# Patient Record
Sex: MALE | Race: WHITE | NOT HISPANIC OR LATINO | Employment: UNEMPLOYED | URBAN - METROPOLITAN AREA
[De-identification: names, ages, dates, MRNs, and addresses within clinical notes are randomized per-mention and may not be internally consistent; named-entity substitution may affect disease eponyms.]

---

## 2021-07-01 ENCOUNTER — RECORDS - HEALTHEAST (OUTPATIENT)
Dept: SCHEDULING | Facility: CLINIC | Age: 41
End: 2021-07-01

## 2021-07-01 ENCOUNTER — TELEPHONE (OUTPATIENT)
Dept: NURSING | Facility: CLINIC | Age: 41
End: 2021-07-01

## 2021-07-01 NOTE — TELEPHONE ENCOUNTER
[place] from patient group home calling to schedule covid vaccine appointments. Patient has not had vaccine. Transferred to scheduling line.   Louise Mosley RN   07/01/21 11:57 AM  [facility] Nurse Advisor

## 2021-07-04 NOTE — TELEPHONE ENCOUNTER
Telephone Encounter by Louise Mosley RN at 7/1/2021 11:54 AM     Author: Louise Mosley RN Service: -- Author Type: Registered Nurse    Filed: 7/1/2021 12:00 PM Encounter Date: 7/1/2021 Status: Signed    : Louise Mosley RN (Registered Nurse)       Error

## 2021-07-06 ENCOUNTER — OFFICE VISIT (OUTPATIENT)
Dept: FAMILY MEDICINE | Facility: CLINIC | Age: 41
End: 2021-07-06
Payer: MEDICAID

## 2021-07-06 ENCOUNTER — IMMUNIZATION (OUTPATIENT)
Dept: NURSING | Facility: CLINIC | Age: 41
End: 2021-07-06
Payer: MEDICAID

## 2021-07-06 VITALS
TEMPERATURE: 96.4 F | OXYGEN SATURATION: 98 % | HEIGHT: 72 IN | WEIGHT: 218.8 LBS | HEART RATE: 101 BPM | BODY MASS INDEX: 29.64 KG/M2 | DIASTOLIC BLOOD PRESSURE: 80 MMHG | RESPIRATION RATE: 22 BRPM | SYSTOLIC BLOOD PRESSURE: 132 MMHG

## 2021-07-06 DIAGNOSIS — E55.9 VITAMIN D DEFICIENCY: ICD-10-CM

## 2021-07-06 DIAGNOSIS — Z13.220 SCREENING FOR HYPERLIPIDEMIA: ICD-10-CM

## 2021-07-06 DIAGNOSIS — J45.909 ASTHMA, UNSPECIFIED ASTHMA SEVERITY, UNSPECIFIED WHETHER COMPLICATED, UNSPECIFIED WHETHER PERSISTENT: ICD-10-CM

## 2021-07-06 DIAGNOSIS — Z91.030 ALLERGY TO HONEY BEE VENOM: ICD-10-CM

## 2021-07-06 DIAGNOSIS — F29 PSYCHOSIS, UNSPECIFIED PSYCHOSIS TYPE (H): ICD-10-CM

## 2021-07-06 DIAGNOSIS — Z11.4 SCREENING FOR HIV (HUMAN IMMUNODEFICIENCY VIRUS): ICD-10-CM

## 2021-07-06 DIAGNOSIS — F60.2 ANTISOCIAL PERSONALITY DISORDER (H): ICD-10-CM

## 2021-07-06 DIAGNOSIS — B18.2 CHRONIC HEPATITIS C WITHOUT HEPATIC COMA (H): Primary | ICD-10-CM

## 2021-07-06 LAB
CHOLEST SERPL-MCNC: 185 MG/DL
HDLC SERPL-MCNC: 52 MG/DL
LDLC SERPL CALC-MCNC: 113 MG/DL
NONHDLC SERPL-MCNC: 133 MG/DL
TRIGL SERPL-MCNC: 99 MG/DL

## 2021-07-06 PROCEDURE — 99204 OFFICE O/P NEW MOD 45 MIN: CPT | Performed by: NURSE PRACTITIONER

## 2021-07-06 PROCEDURE — 80061 LIPID PANEL: CPT | Performed by: NURSE PRACTITIONER

## 2021-07-06 PROCEDURE — 36415 COLL VENOUS BLD VENIPUNCTURE: CPT | Performed by: NURSE PRACTITIONER

## 2021-07-06 PROCEDURE — 0001A PR COVID VAC PFIZER DIL RECON 30 MCG/0.3 ML IM: CPT

## 2021-07-06 PROCEDURE — 91300 PR COVID VAC PFIZER DIL RECON 30 MCG/0.3 ML IM: CPT

## 2021-07-06 PROCEDURE — 87522 HEPATITIS C REVRS TRNSCRPJ: CPT | Performed by: NURSE PRACTITIONER

## 2021-07-06 PROCEDURE — 86803 HEPATITIS C AB TEST: CPT | Performed by: NURSE PRACTITIONER

## 2021-07-06 PROCEDURE — 87389 HIV-1 AG W/HIV-1&-2 AB AG IA: CPT | Performed by: NURSE PRACTITIONER

## 2021-07-06 RX ORDER — OLANZAPINE 20 MG/1
20 TABLET ORAL DAILY
Status: ON HOLD | COMMUNITY
Start: 2021-06-28 | End: 2021-12-21

## 2021-07-06 RX ORDER — VITAMIN B COMPLEX
1 TABLET ORAL EVERY EVENING
Status: ON HOLD | COMMUNITY
Start: 2021-06-28 | End: 2021-12-21

## 2021-07-06 RX ORDER — EPINEPHRINE 0.3 MG/.3ML
0.3 INJECTION SUBCUTANEOUS PRN
Qty: 1 EACH | Refills: 1 | Status: SHIPPED | OUTPATIENT
Start: 2021-07-06

## 2021-07-06 SDOH — HEALTH STABILITY: MENTAL HEALTH: HOW OFTEN DO YOU HAVE A DRINK CONTAINING ALCOHOL?: NEVER

## 2021-07-06 ASSESSMENT — PAIN SCALES - GENERAL: PAINLEVEL: NO PAIN (0)

## 2021-07-06 ASSESSMENT — MIFFLIN-ST. JEOR: SCORE: 1950.6

## 2021-07-06 ASSESSMENT — PATIENT HEALTH QUESTIONNAIRE - PHQ9: SUM OF ALL RESPONSES TO PHQ QUESTIONS 1-9: 0

## 2021-07-06 NOTE — LETTER
July 7, 2021      Anderson Perez  [address] [place] [address]        Dear Orhermilo,    Your HIV screen was negative and your LDL cholesterol is at goal. Feel free to contact me with any questions or concerns.  Thank you for allowing me to participate in your care.         Chelsi Taylor APRN, CNP       Resulted Orders   HIV Antigen Antibody Combo   Result Value Ref Range    HIV Antigen Antibody Combo Nonreactive NR^Nonreactive          Comment:      HIV-1 p24 Ag & HIV-1/HIV-2 Ab Not Detected   Lipid panel reflex to direct LDL Non-fasting   Result Value Ref Range    Cholesterol 185 <200 mg/dL    Triglycerides 99 <150 mg/dL    HDL Cholesterol 52 >39 mg/dL    LDL Cholesterol Calculated 113 (H) <100 mg/dL      Comment:      Above desirable:  100-129 mg/dl  Borderline High:  130-159 mg/dL  High:             160-189 mg/dL  Very high:       >189 mg/dl      Non HDL Cholesterol 133 (H) <130 mg/dL      Comment:      Above Desirable:  130-159 mg/dl  Borderline high:  160-189 mg/dl  High:             190-219 mg/dl  Very high:       >219 mg/dl

## 2021-07-06 NOTE — PATIENT INSTRUCTIONS
At [facility], we strive to deliver an exceptional experience to you, every time we see you. If you receive a survey, please complete it as we do value your feedback.  If you have MyChart, you can expect to receive results automatically within 24 hours of their completion.  Your provider will send a note interpreting your results as well.   If you do not have MyChart, you should receive your results in about a week by mail.    Your care team:                            Family Medicine Internal Medicine   MD Jamie Ontiveros MD Shantel Branch-Fleming, MD Srinivasa Vaka, MD Katya Belousova, PAMARY Trimble, APRN CNP    Leon Oconnor, MD Pediatrics   Yon Ying, PAMARY Orellana, CNP MD Tabby Soto APRN CNP   MD Ethel Shaikh MD Deborah Mielke, MD Almaz Taylor, APRN [facility]      Clinic hours: Monday - Thursday 7 am-6 pm; Fridays 7 am-5 pm.   Urgent care: Monday - Friday 10 am- 8 pm; Saturday and Sunday 9 am-5 pm.    Clinic: [phone]       [facility]: Monday - Thursday 8 am - 7 pm; Friday 8 am - 6 pm  [facility]: [phone]     Use [email or web address] for 24/7 diagnosis and treatment of dozens of conditions.

## 2021-07-06 NOTE — LETTER
July 8, 2021      Anderson Perez  [address] [place] [address]        Dear ,    We are writing to inform you of your test results.    Your Hepatitis C screen was falsely positive and further testing showed that you do not have Hepatitis C. Feel free to contact me with any questions or concerns.  Thank you for allowing me to participate in your care.     Resulted Orders   HIV Antigen Antibody Combo   Result Value Ref Range    HIV Antigen Antibody Combo Nonreactive NR^Nonreactive          Comment:      HIV-1 p24 Ag & HIV-1/HIV-2 Ab Not Detected   Hepatitis C Screen Reflex to HCV RNA Quant and Genotype   Result Value Ref Range    Hepatitis C Antibody Reactive (A) NR^Nonreactive      Comment:      A reactive result indicates one of the following 1) current HCV infection 2)   past HCV infection that has resolved or 3) false positivity. The CDC   recommends that a reactive result should be followed by Nucleic acid testing   for HCV RNA.   If HCV RNA is detected, that indicates current HCV infection. If HCV RNA is   not detected, that indicates either past, resolved HCV infection, or false HCV   antibody positivity.  Assay performance characteristics have not been established for newborns,   infants, and children     Lipid panel reflex to direct LDL Non-fasting   Result Value Ref Range    Cholesterol 185 <200 mg/dL    Triglycerides 99 <150 mg/dL    HDL Cholesterol 52 >39 mg/dL    LDL Cholesterol Calculated 113 (H) <100 mg/dL      Comment:      Above desirable:  100-129 mg/dl  Borderline High:  130-159 mg/dL  High:             160-189 mg/dL  Very high:       >189 mg/dl      Non HDL Cholesterol 133 (H) <130 mg/dL      Comment:      Above Desirable:  130-159 mg/dl  Borderline high:  160-189 mg/dl  High:             190-219 mg/dl  Very high:       >219 mg/dl     Hepatitis C RNA Quantitative   Result Value Ref Range    HCV RNA Quant IU/ml HCV RNA Not Detected HCVND^HCV RNA Not Detected [IU]/mL       Comment:      The ALCIRA AmpliPrep/ALCIRA TaqMan HCV Test is an FDA-approved in vitro nucleic   acid amplification test for the quantitation of HCV RNA in human plasma (ETDA   plasma) or serum using the ALCIRA AmpliPrep Instrument for automated viral   nucleic acid extraction and the ALCIRA TaqMan Analyzer or ALCIRA TaqMan for   automated Real Time PCR amplification and detection of the viral nucleic acid   target.  Titer results are reported in International Units/mL (IU/mL) using the 1st WHO   International standard for HCV for Nucleic Acid Amplification based assays.      Log of HCV RNA Qt Not Calculated <1.2 Log IU/mL       If you have any questions or concerns, please call the clinic at the number listed above.       Sincerely,      KEHINDE Wheeler CNP

## 2021-07-06 NOTE — PROGRESS NOTES
Assessment & Plan     Chronic hepatitis C without hepatic coma (H)  Checking Hep C- patient reports that he completed treatment for Hep C in 2018.  - Hepatitis C Screen Reflex to HCV RNA Quant and Genotype    Antisocial personality disorder (H)  Referring to collaborative care for management until he is stable and/or he has relocated to the [facility].  - MENTAL HEALTH REFERRAL  - Adult; Psychiatry; Psychiatry; [facility]: Collaborative [facility] Psych[facility]/Bridge to Long-Term Psychiatry as indicated ([phone]); Yes; Recent hospitalization or Emergency Department Visit; Yes; We will contact you t...    Asthma, unspecified asthma severity, unspecified whether complicated, unspecified whether persistent  Patient denies history of asthma, states he's never been diagnosed with asthma, thinks he had Albuterol when he had URI symptoms. He declined to complete ACT today    Psychosis, unspecified psychosis type (H)  Patient is oriented and appropriate today, taking his Olanzapine, referring to collaborative care Psychiatry for stabilization/management.  - OLANZapine (ZYPREXA) 20 MG tablet  - MENTAL HEALTH REFERRAL  - Adult; Psychiatry; Psychiatry; G: Collaborative [facility] Psychiatry Service/Bridge to Long-Term Psychiatry as indicated ([phone]); Yes; Recent hospitalization or Emergency Department Visit; Yes; We will contact you t...    Allergy to honey bee venom  Needs epipen.  - EPINEPHrine (ANY BX GENERIC EQUIV) 0.3 MG/0.3ML injection 2-pack  Dispense: 1 each; Refill: 1    Vitamin D deficiency  Vit d level 4/16/21 was 22.4, replacing Vit D and will recheck level again in 2 months  - Vitamin D3 (CHOLECALCIFEROL) 25 mcg (1000 units) tablet    Screening for hyperlipidemia    - Lipid panel reflex to direct LDL Non-fasting    Screening for HIV (human immunodeficiency virus)    - HIV Antigen Antibody Combo           Tobacco Cessation:   reports that he has been smoking cigarettes and cigars. He has never used  "smokeless tobacco.  Tobacco Cessation Action Plan: Information offered: Patient not interested at this time    BMI:   Estimated body mass index is 29.41 kg/m  as calculated from the following:    Height as of this encounter: 1.837 m (6' 0.32\").    Weight as of this encounter: 99.2 kg (218 lb 12.8 oz).   Weight management plan: Discussed healthy diet and exercise guidelines    Work on weight loss  Regular exercise  See Patient Instructions    Return in about 3 months (around 10/6/2021), or if symptoms worsen or fail to improve, for Follow up, Routine preventive.    KEHINDE Carrillo CNP  [facility] 40 year [facility] who presents for the following health issues  accompanied by his Staff :    HPI   Patient is currently living  [facility] and is wanting to move back to [facility] but  Needs to establish care with a provider in the interim.    New Patient/Transfer of Care  Depression Followup    How are you doing with your depression since your last visit? No change    Are you having other symptoms that might be associated with depression? No    Have you had a significant life event?  No     Are you feeling anxious or having panic attacks?   No    Do you have any concerns with your use of alcohol or other drugs? No    Social History     Tobacco Use     Smoking status: Current Every Day Smoker     Types: Cigarettes, Cigars     Smokeless tobacco: Never Used     Tobacco comment: Patient states he's been trying to cut down    Substance Use Topics     Alcohol use: Never     Frequency: Never     Drug use: Never     No flowsheet data found.  No flowsheet data found.  Last PHQ-9 7/6/2021   1.  Little interest or pleasure in doing things 0   2.  Feeling down, depressed, or hopeless 0   3.  Trouble falling or staying asleep, or sleeping too much 0   4.  Feeling tired or having little energy 0   5.  Poor appetite or overeating 0   6.  Feeling bad about " "yourself 0   7.  Trouble concentrating 0   8.  Moving slowly or restless 0   Q9: Thoughts of better off dead/self-harm past 2 weeks 0   PHQ-9 Total Score 0   Difficulty at work, home, or with people Not difficult at all       Suicide Assessment Five-step Evaluation and Treatment (SAFE-T)      How many servings of fruits and vegetables do you eat daily?  4 or more    On average, how many sweetened beverages do you drink each day (Examples: soda, juice, sweet tea, etc.  Do NOT count diet or artificially sweetened beverages)?   1    How many days per week do you exercise enough to make your heart beat faster? 7    How many minutes a day do you exercise enough to make your heart beat faster? 60 or more    How many days per week do you miss taking your medication? 0      Review of Systems   Constitutional, HEENT, cardiovascular, pulmonary, gi and gu systems are negative, except as otherwise noted.      Objective    /80 (BP Location: Left arm, Patient Position: Sitting, Cuff Size: Adult Large)   Pulse 101   Temp 96.4  F (35.8  C) (Tympanic)   Resp 22   Ht 1.837 m (6' 0.32\")   Wt 99.2 kg (218 lb 12.8 oz)   SpO2 98%   BMI 29.41 kg/m    Body mass index is 29.41 kg/m .  Physical Exam   GENERAL: healthy, alert and no distress  EYES: Eyes grossly normal to inspection, PERRL and conjunctivae and sclerae normal  HENT: ear canals and TM's normal, nose and mouth without ulcers or lesions  NECK: no adenopathy, no asymmetry, masses, or scars and thyroid normal to palpation  RESP: lungs clear to auscultation - no rales, rhonchi or wheezes  CV: regular rate and rhythm, normal S1 S2, no S3 or S4, no murmur, click or rub, no peripheral edema and peripheral pulses strong  ABDOMEN: soft, nontender, no hepatosplenomegaly, no masses and bowel sounds normal  MS: no gross musculoskeletal defects noted, no edema  SKIN: no suspicious lesions or rashes  NEURO: Normal strength and tone, mentation intact and speech normal  BACK: no CVA " tenderness, no paralumbar tenderness  PSYCH: mentation appears normal, affect normal/bright  LYMPH: no cervical adenopathy

## 2021-07-07 ENCOUNTER — TELEPHONE (OUTPATIENT)
Dept: FAMILY MEDICINE | Facility: CLINIC | Age: 41
End: 2021-07-07

## 2021-07-07 LAB
HCV AB SERPL QL IA: REACTIVE
HCV RNA SERPL NAA+PROBE-ACNC: NORMAL [IU]/ML
HCV RNA SERPL NAA+PROBE-LOG IU: NORMAL LOG IU/ML
HIV 1+2 AB+HIV1 P24 AG SERPL QL IA: NONREACTIVE

## 2021-07-07 NOTE — TELEPHONE ENCOUNTER
EPINEPHrine (ANY BX GENERIC EQUIV) 0.3 MG/0.3ML injection 2-pack requires prior authorization.    [email or web address]  KEY: [facility]

## 2021-07-09 NOTE — TELEPHONE ENCOUNTER
Central Prior Authorization Team   Phone: [phone]      Prior Authorization Not Needed per Insurance    07/09/2021  Medication: EPINEPHrine (ANY BX GENERIC EQUIV) 0.3 MG/0.3ML injection 2-pack - NOT NEEDED  Insurance Company: [place] Medicaid ([facility]) - Phone [phone] Fax [phone]  Expected CoPay:      Pharmacy Filling the Rx: [id number] - [place], [place] - [address] AT [address]  Pharmacy Notified: Yes  Patient Notified: Yes (**Instructed pharmacy to notify patient when script is ready to /ship.**)    Notified pharmacy of approved NDC's.

## 2021-07-09 NOTE — TELEPHONE ENCOUNTER
Central Prior Authorization Team   Phone: [phone]      PA Initiation    Medication: EPINEPHrine (ANY BX GENERIC EQUIV) 0.3 MG/0.3ML injection 2-pack - INITIATED  Insurance Company: [place] Medicaid ([facility]) - Phone [phone] Fax [phone]  Pharmacy Filling the Rx: [id number] - [place], [place] - [address] AT [facility]  Filling Pharmacy Phone: [phone]  Filling Pharmacy Fax: [phone]  Start Date: 7/9/2021

## 2021-07-27 ENCOUNTER — IMMUNIZATION (OUTPATIENT)
Dept: NURSING | Facility: CLINIC | Age: 41
End: 2021-07-27
Attending: FAMILY MEDICINE
Payer: MEDICAID

## 2021-07-27 PROCEDURE — 0002A PR COVID VAC PFIZER DIL RECON 30 MCG/0.3 ML IM: CPT

## 2021-07-27 PROCEDURE — 91300 PR COVID VAC PFIZER DIL RECON 30 MCG/0.3 ML IM: CPT

## 2021-08-30 ENCOUNTER — TELEPHONE (OUTPATIENT)
Dept: BEHAVIORAL HEALTH | Facility: CLINIC | Age: 41
End: 2021-08-30

## 2021-08-30 ENCOUNTER — HOSPITAL ENCOUNTER (INPATIENT)
Facility: CLINIC | Age: 41
LOS: 119 days | Discharge: PSYCHIATRIC OR SUBSTANCE USE FACILITY | End: 2021-12-28
Attending: EMERGENCY MEDICINE | Admitting: PSYCHIATRY & NEUROLOGY
Payer: MEDICAID

## 2021-08-30 DIAGNOSIS — F29 PSYCHOSIS, UNSPECIFIED PSYCHOSIS TYPE (H): ICD-10-CM

## 2021-08-30 DIAGNOSIS — Z91.148 NONCOMPLIANCE WITH MEDICATION REGIMEN: ICD-10-CM

## 2021-08-30 DIAGNOSIS — Z72.0 TOBACCO USE: ICD-10-CM

## 2021-08-30 DIAGNOSIS — F31.74 BIPOLAR DISORDER, IN FULL REMISSION, MOST RECENT EPISODE MANIC (H): Primary | ICD-10-CM

## 2021-08-30 DIAGNOSIS — F41.9 ANXIETY: ICD-10-CM

## 2021-08-30 LAB
AMPHETAMINES UR QL SCN: NORMAL
BARBITURATES UR QL: NORMAL
BENZODIAZ UR QL: NORMAL
CANNABINOIDS UR QL SCN: NORMAL
COCAINE UR QL: NORMAL
OPIATES UR QL SCN: NORMAL

## 2021-08-30 PROCEDURE — 99285 EMERGENCY DEPT VISIT HI MDM: CPT | Mod: 25 | Performed by: EMERGENCY MEDICINE

## 2021-08-30 PROCEDURE — 80307 DRUG TEST PRSMV CHEM ANLYZR: CPT | Performed by: EMERGENCY MEDICINE

## 2021-08-30 PROCEDURE — 90791 PSYCH DIAGNOSTIC EVALUATION: CPT

## 2021-08-30 PROCEDURE — 99285 EMERGENCY DEPT VISIT HI MDM: CPT | Performed by: EMERGENCY MEDICINE

## 2021-08-30 NOTE — TELEPHONE ENCOUNTER
"S: 2 pm La[facility] Light,  phone number [phone], called with clinical information for 40yr old/M being brought in by ambulance to the [place] ED.     B: IQ level of 62 - \"compromised understanding\".  No medical conditions.   Hx of CD  See telephone note from AP for more clinical as this was previously conveyed to intake from PD.      A: appears psychotic.       R:  to fax hold paperwork, provisional discharge revocation paperwork, and clinical to intake. Pt to be assessed in ER.   "

## 2021-08-30 NOTE — ED TRIAGE NOTES
"Pt is pacing in room and denies any SI/SIB, HI or any other mental health concerns  Pt states everything you ask me I am going to say, \"no.\"  Pt appears very anxious. Pt states, \"I don't take any meds and I don't need any psychiatric help but I want a brain scan and a DNA test to see what race I am.  Pt makes other bizarre statements.  Pt is very disheveled and smells strongly of body odor.  Pt denies any drug use and states he doesn't take any medications    "
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
## 2021-08-30 NOTE — ED NOTES
8/30/2021  Anderson Perez 1980     [facility] Crisis Assessment:    Started at: 1545  Completed at: 1600  Patient was assessed via in-person.    Chief Complaint and History of Presenting Problem:    Anderson is a 40 year old male who presents to [facility] ED with concerns of decompensating mental health, refusal to take his medications, and making bizarre delusional statements. Pt is currently under commitment and had his provisional discharge revoked with [facility]. Pt was at [facility] ER and was refusing to take his injection of Haldol last week and made statements that if anyone makes him take his medications  I will kill . Pt was recently discharged from [facility] ([facility]). Pt was making statements that he has diplomatic immunity and that he was not going to answer questions. He also stated that he has never had a psychiatric evaluation, never taken medications, and that he is [place] which means he does not have  crazy genes  and that only Caucasians have  crazy genes . Pt reported that he has multiple higher level degrees including Archaeology and Therapy degrees. He denied having any hx of SI or SIB. He stated that he is safe and has been since he was forced into the hospital. Pt's appointed legal guardian is Daylin Umanzor with [employer], [phone].    Assessment and intervention involved meeting with pt, [facility] with  Laurel Ching , employing crisis psychotherapy including: Establishing rapport, Active listening and Assess dimensions of crisis. Collateral information includes phone numbers for  and appointed legal guardian, information regarding order revoking provisional discharge, and hx of patient's mental health.     Biopsychosocial Background and Demographic Information    Pt reports that he is [place] and was born near [facility]. He  was recently placed at [facility] group home after his placement at [facility]. He reports that he has a higher education degree in Archeology and Therapy, and denies having any current employment. Pt has an appointed legal guardian, Daylin Umanzor with [employer], [phone]. According to chart review, pt has a child with a previous romantic partner. His mother resides in an assisted living facility.      Mental Health History and Current Symptoms     Pt denies having any hx of mental health concerns and that he has never had a psychiatric evaluation or any inpatient level placements. Per chart review and discussion with , he has history of bipolar disorder, antisocial personality disorder, unspecified psychosis, and possibly intellectual disability. His  reports his IQ is approximately 63. Pt is currently under commitment and had his provisional discharge revoked with [facility]. Pt was recently discharged from [facility] ([facility]). His current medications are reported with the following; Olanzipam 20MG, Inveja tablet 9mg, Halperdol injection.       Mental Health History (prior psychiatric hospitalizations, civil commitments, programmatic care, etc): Pt has hx of commitments, is currently on commitment with a revoked provisional discharge, was recently discharged from [facility]. Family Mental and Chemical Health History: His mother has a diagnosis of schizoaffective disorder      Current and Historic Psychotropic Medications: Olanzipam 20MG, Inveja tablet 9mg, Halperdol injection.   Medication Adherent: No  Recent medication changes? No    Relevant Medical Concerns  Patient identifies concerns with completing ADLs? No  Patient can ambulate independently? Yes  Other medical health concerns? No  History of concussion or TBI? No     Trauma History   Physical, Emotional, or Sexual abuse: No  Loss of a friend or family member to suicide: No  Other identified  traumatic event or significant stressor: No    Substance Use History and Treatments  Pt denied all drug use. Per chart review, pt has hx of marijuana and methamphetamine use. No known hx of alcohol or drug treatment.     Drug screen/BAL/Breathalyzer Completed? Ordered and pending.   Results: Pending     History of Suicidal Ideation, Suicide Attempts, Non-Suicidal Self Injury, and Risk Formulation:   Details of Current Ideation, Attempt(s), Plan(s): Denies  Risk factors: recent legal concerns , poor interpersonal relationships, disengagement with or distrust of medical/mental health care, impulsivity/recklessness, history of or current substance use and recent discharge from inpatient psychiatric care  Protective factors:  community support, culture, spiritual, or Jainism beliefs and identification of future goals.  History and Prior Methods of Self-injury: Denies  History of Suicide Attempts:Denies    ESS-6  1.a. Over the past 2 weeks, have you had thoughts of killing yourself? No   1.b. Have you ever attempted to kill yourself and, if yes, when did this last happen? No  2. Recent or current suicide plan? No  3. Recent or current intent to act on ideation? No  4. Lifetime psychiatric hospitalization? Yes  5. Pattern of excessive substance use? No  6. Current irritability, agitation, or aggression? Yes  ESS-6 Score: 2      Other Risk Areas  Aggressive/assumptive/homicidal risk factors: Yes: Agitation / Hyperactivity, History of Violence and Impaired Self Control   Sexually inappropriate behavior? No      Vulnerability to sexual exploitation? No     Clinical Presentation and Current Symptoms   Pt was in the ED room and was observed to be pacing back and forth in the room. When  introduced self he immediately stated that he did not need a therapist and that he did not have a 'crazy brain' and that his brain was normal. He stated that only  people have 'crazy genes' and that he has never needed a  psychiatric evaluation because of that. He stated that he was not going to answer any questions and that he had diplomatic immunity which meant that he could leave without anyone talking to him. He continued to answer brief questions. Stated that he was out on a walk when he was brought here for no reason and that he was going to have everyone's jobs and degrees taken away. He stated that he was highly intelligent and that he spoke 17 languages, and denied wanting to teach  any words or elaborate on his languages spoken. He increasingly became agitated with , stated 'you're just standing there like a dildo and you know you can't make me take medications. I'm not answering questions. I will take your degree away from you'. At this point the assessment was concluded to prevent further agitation in pt.     Attention, Hyperactivity, and Impulsivity: Yes: Disorganized/Forgetful, Impulsive and Restless   Anxiety:Yes: Generalized Symptoms: Agitation, Excessive worry and Pacing    Behavioral Difficulties: Yes: Agitation, Anger Problems, Disruptive, Impulsivity/Disinhibition and Negativistic/Defiant   Mood Symptoms: Yes: Aggression, Flight of ideas, Impaired concentration, Impaired decision making , Increased irritability/agitation and Piper   Appetite: No   Feeding and Eating: No  Interpersonal Functioning: Yes: Emotional Deregulation, Impaired Impulse Control and Impaired Interpersonal Functioning  Learning Disabilities/Cognitive/Developmental Disorders: Yes: Mood and Self-Regulation   General Cognitive Impairments: Yes: Decision-Making and Judgment/Insight  Sleep: No   Psychosis: Yes: Delusions: Other: bizarre    Trauma: No       Mental Status Exam:  Affect: Labile  Appearance: Disheveled   Attention Span/Concentration: Inattentive    Eye Contact: Intense  Fund of Knowledge: Appropriate   Language /Speech Content: Fluent  Language /Speech Volume: Loud   Language /Speech Rate/Productions: Hyperverbal    Recent Memory: Poor  Remote Memory: Poor  Mood: Irritable   Orientation:   Person: Yes   Place: No  Time of Day: Yes   Date: Yes   Situation (Do they understand why they are here?): No   Psychomotor Behavior: Agitated   Thought Content: Delusions  Thought Form: Flight of Ideas      Current Providers and Contact Information   Legal guardian is Agency: Daylin Umanzor with [employer].. Physical guardianship paperwork has been reviewed. DEC  is scanning; copy is available for review.     Primary Care Provider: Yes, provider details not recalled  Psychiatrist: No  Therapist: No  : Yes, Laurel Ching [phone]  CTSS or ARM: No  ACT Team: No  Other: No    Has an FRED been signed? No ; no guardian present    Clinical Summary and Recommendations    Clinical summary of assessment (include strengths, protective factors, community resources, and assessment of vulnerability/risk): Anderson presents with concerns of decompensating mental health, refusal to take his medications, and making bizarre delusional statements. Pt is currently under commitment and had his provisional discharge revoked with [facility]. Pt was refusing to take his injection of Haldol last week and made statements that if anyone makes him take his medications  I will kill . Pt was recently discharged from [facility] ([facility] Pt was making statements that he has diplomatic immunity and that he was not going to answer questions. He also stated that he has never had a psychiatric evaluation, never taken medications, and that he is [place] which means he does not have  crazy genes  and that only Caucasians have  crazy genes . Based on his decompensating mental health and bizarre behaviors it is recommended that pt be placed inpatient for safekeeping and stabilization. Appointed legal guardian agrees and consents to him being inpatient.       Diagnosis with F Codes:  F25.9 Schizoaffective,  unspecified    Disposition  Attending provider, Dr. Parks consulted and does  agree with recommended disposition which includes Inpatient Mental Health. Patient's legal appointed guardian agrees with recommended level of care.     Details of final disposition include: Inpatient mental health .      If Inpatient, is patient admitted voluntary? Yes, voluntary by appointed legal guardian.   Patient aware of potential for transfer if there is not appropriate placement? Yes  Patient is willing to travel outside of the [facility] for placement? Yes   Central Intake Notified? Yes: Date: 8/30/2021 Time: 1645.      Duration of assessment time: .25 hrs    CPT code(s) utilized: 42387, up to 74 minutes      Rick Mays, MINA, LGSW

## 2021-08-30 NOTE — ED NOTES
Bed: ED14  Expected date: 8/30/21  Expected time: 2:15 PM  Means of arrival:   Comments:  [address]18: 39yo F bipolar, manic, off meds

## 2021-08-30 NOTE — ED PROVIDER NOTES
"  History     Chief Complaint   Patient presents with     Manic Behavior     The history is provided by the patient and medical records.     Anderson Perez is a 40 year old male with a past medical history of antisocial personality disorder, chronic hepatitis C virus infection, methamphetamine abuse, psychosis, and anxiety who presents to the emergency department with a chief complaint of manic behavior. Patient is pacing in room and denies any suicidal ideation, self injurious behavior, homicidal ideation, or any other mental health concerns.  Patient states everything you ask me I am going to say, \"no.\"  Patient appears very anxious. He states, \"I don't take any meds and I don't need any psychiatric help but I want a brain scan and a DNA test to see what race I am.  Patient makes other bizarre statements.  He is very disheveled and smells strongly of body odor.  He denies any drug use and states he doesn't take any medications.     When asked what brought the patient here today, he states that he does not know as he is \"in a group home for absolutely no reason\" and is \"not on any medications.\"  When he was informed that it does appear he has prescribed some psychiatric medications, he is adamant that he does not need them.  Today, he states that he was just \"walking around getting some exercise\" when suddenly he was surrounded by many police officers who brought him here.  He states he was confused because he does not have any warrants out for his arrest.  When the patient was informed that he is in the hospital, he states that he does not need to be here because he \"does not have crazy dreams\" because he is \"not .\"  The patient is demanding a \"brain scan\" to prove this.  He also states that he does not reside in this county, instead being a resident of a county in [place].  The patient then called me a \"fucking bitch.\"    Per DEC , patient has been refusing to take his Haldol injection.  He " "was supposed to have a Haldol injection last Wednesday at his group home but stated \"if anyone makes me I will kill\".  Patient is currently in  [facility].  He was just released from [facility].  He was on provisional discharge of [facility] which is now revoked and he is now under commitment.  Patient believes he has diplomatic immunity, an archaeology degree, a therapist degree, and speaks 17 languages.  He states that he has never had a psych evaluation, never been placed in treatment, and does not take any medications.  He states that he [place] so he \"does not have any crazy genes\".   He denies suicidal thoughts or history of suicidal thoughts.    I have reviewed the Medications, Allergies, Past Medical and Surgical History, and Social History in the Epic system.    No past medical history on file.  No past surgical history on file.  No current facility-administered medications for this encounter.     Current Outpatient Medications   Medication     EPINEPHrine (ANY BX GENERIC EQUIV) 0.3 MG/0.3ML injection 2-pack     OLANZapine (ZYPREXA) 20 MG tablet     Vitamin D3 (CHOLECALCIFEROL) 25 mcg (1000 units) tablet     Allergies   Allergen Reactions     Bee Venom Anaphylaxis     Clindamycin Hives     Morphine Hives     Past medical history, past surgical history, medications, and allergies were reviewed with the patient. Additional pertinent items: None    Social History     Socioeconomic History     Marital status:      Spouse name: Not on file     Number of children: Not on file     Years of education: Not on file     Highest education level: Not on file   Occupational History     Not on file   Tobacco Use     Smoking status: Current Every Day Smoker     Types: Cigarettes, Cigars     Smokeless tobacco: Never Used     Tobacco comment: Patient states he's been trying to cut down    Substance and Sexual Activity     Alcohol use: Never     Drug use: Never     Sexual " activity: Not Currently     Partners: Female   Other Topics Concern     Not on file   Social History Narrative     Not on file     Social Determinants of Health     Financial Resource Strain:      Difficulty of Paying Living Expenses:    Food Insecurity:      Worried About Running Out of Food in the Last Year:      Ran Out of Food in the Last Year:    Transportation Needs:      Lack of Transportation (Medical):      Lack of Transportation (Non-Medical):    Physical Activity:      Days of Exercise per Week:      Minutes of Exercise per Session:    Stress:      Feeling of Stress :    Social Connections:      Frequency of Communication with Friends and Family:      Frequency of Social Gatherings with Friends and Family:      Attends Voodoo Services:      Active Member of Clubs or Organizations:      Attends Club or Organization Meetings:      Marital Status:    Intimate Partner Violence:      Fear of Current or Ex-Partner:      Emotionally Abused:      Physically Abused:      Sexually Abused:      Social history was reviewed with the patient. Additional pertinent items: None    Review of Systems  A complete review of systems was attempted but limited due to Acute psychiatric condition.    Physical Exam   BP: 114/66  Pulse: 104  Temp: 98.1  F (36.7  C)  Resp: 18  SpO2: 98 %      General: Well nourished, well developed, NAD  HEENT: EOMI, anicteric. NCAT  Neck: no jugular venous distension, supple, nl ROM  Cardiac: Tachycardic rate, extremities appear well-perfused  Pulm: Airway patent, nonlabored breathing  Skin: Warm and dry to the touch.  No rash  Extremities: No LE edema, no cyanosis, w/w/p  Neuro: Alert, steady gait  Psych: Disorganized thought content, patient denies suicidal or homicidal ideation    ED Course        Procedures                          Labs Ordered and Resulted from Time of ED Arrival Up to the Time of Departure from the ED - No data to display         No results found for this or any previous  visit (from the past 24 hour(s)).    Labs, vital signs, and imaging studies were reviewed by me.    Medications - No data to display    Assessments & Plan (with Medical Decision Making)   Anderson Perez is a 40 year old male who presents with psychosis and noncompliance with his prescribed medication regimen.  Very disorganized thought content on arrival.  Urine drug screen and COVID-19 testing ordered in anticipation of mental health admission.    Patient was discussed with mental health , who recommends inpatient mental health admission.  He will contact patient's guardian to ensure that they are not agreement with this plan.  Patient will not need a 72-hour hold as he is currently on a provisional discharge, which she has violated.    I have reviewed the nursing notes.    I have reviewed the findings, diagnosis, plan and need for follow up with the patient.    Patient to be admitted to inpatient mental health.    New Prescriptions    No medications on file       Final diagnoses:   Noncompliance with medication regimen   Anxiety   Psychosis, unspecified psychosis type (H)     I, Monica Colvin, am serving as a trained medical scribe to document services personally performed by Kaylan Parks MD based on the provider's statements to me on August 30, 2021.  This document has been checked and approved by the attending provider.    I, Kaylan Parks MD, was physically present and have reviewed and verified the accuracy of this note documented by Monica Colvin, medical scribe.      Kaylan Parks MD  8/30/2021   [facility] EMERGENCY DEPARTMENT     Kaylan Parks MD  08/30/21 8721

## 2021-08-31 ENCOUNTER — TELEPHONE (OUTPATIENT)
Dept: BEHAVIORAL HEALTH | Facility: CLINIC | Age: 41
End: 2021-08-31

## 2021-08-31 PROBLEM — Z91.148 NONCOMPLIANCE WITH MEDICATION REGIMEN: Status: ACTIVE | Noted: 2021-08-31

## 2021-08-31 PROCEDURE — 250N000013 HC RX MED GY IP 250 OP 250 PS 637: Performed by: EMERGENCY MEDICINE

## 2021-08-31 PROCEDURE — 99223 1ST HOSP IP/OBS HIGH 75: CPT | Mod: AI | Performed by: PSYCHIATRY & NEUROLOGY

## 2021-08-31 PROCEDURE — 124N000002 HC R&B MH UMMC

## 2021-08-31 PROCEDURE — 250N000013 HC RX MED GY IP 250 OP 250 PS 637

## 2021-08-31 RX ORDER — OLANZAPINE 10 MG/1
10 TABLET, ORALLY DISINTEGRATING ORAL ONCE
Status: COMPLETED | OUTPATIENT
Start: 2021-08-31 | End: 2021-08-31

## 2021-08-31 RX ORDER — HYDROXYZINE HYDROCHLORIDE 25 MG/1
25 TABLET, FILM COATED ORAL EVERY 4 HOURS PRN
Status: DISCONTINUED | OUTPATIENT
Start: 2021-08-31 | End: 2021-12-20

## 2021-08-31 RX ORDER — MAGNESIUM HYDROXIDE/ALUMINUM HYDROXICE/SIMETHICONE 120; 1200; 1200 MG/30ML; MG/30ML; MG/30ML
30 SUSPENSION ORAL EVERY 4 HOURS PRN
Status: DISCONTINUED | OUTPATIENT
Start: 2021-08-31 | End: 2021-12-28 | Stop reason: HOSPADM

## 2021-08-31 RX ORDER — BENZTROPINE MESYLATE 0.5 MG/1
0.5 TABLET ORAL ONCE
Status: DISCONTINUED | OUTPATIENT
Start: 2021-08-31 | End: 2021-08-31

## 2021-08-31 RX ORDER — VITAMIN B COMPLEX
25 TABLET ORAL DAILY
Status: DISCONTINUED | OUTPATIENT
Start: 2021-08-31 | End: 2021-11-08

## 2021-08-31 RX ORDER — ACETAMINOPHEN 325 MG/1
650 TABLET ORAL EVERY 4 HOURS PRN
Status: DISCONTINUED | OUTPATIENT
Start: 2021-08-31 | End: 2021-08-31 | Stop reason: CLARIF

## 2021-08-31 RX ORDER — OLANZAPINE 20 MG/1
20 TABLET, ORALLY DISINTEGRATING ORAL DAILY
Status: DISCONTINUED | OUTPATIENT
Start: 2021-08-31 | End: 2021-09-01

## 2021-08-31 RX ORDER — POLYETHYLENE GLYCOL 3350 17 G/17G
17 POWDER, FOR SOLUTION ORAL DAILY PRN
Status: DISCONTINUED | OUTPATIENT
Start: 2021-08-31 | End: 2021-12-28 | Stop reason: HOSPADM

## 2021-08-31 RX ORDER — OLANZAPINE 10 MG/1
10 TABLET ORAL 3 TIMES DAILY PRN
Status: DISCONTINUED | OUTPATIENT
Start: 2021-08-31 | End: 2021-09-21

## 2021-08-31 RX ORDER — OLANZAPINE 10 MG/2ML
10 INJECTION, POWDER, FOR SOLUTION INTRAMUSCULAR 3 TIMES DAILY PRN
Status: DISCONTINUED | OUTPATIENT
Start: 2021-08-31 | End: 2021-09-21

## 2021-08-31 RX ADMIN — OLANZAPINE 10 MG: 10 TABLET, ORALLY DISINTEGRATING ORAL at 14:04

## 2021-08-31 RX ADMIN — Medication 25 MCG: at 18:35

## 2021-08-31 RX ADMIN — OLANZAPINE 20 MG: 20 TABLET, ORALLY DISINTEGRATING ORAL at 18:36

## 2021-08-31 RX ADMIN — OLANZAPINE 10 MG: 10 TABLET, ORALLY DISINTEGRATING ORAL at 09:53

## 2021-08-31 ASSESSMENT — ACTIVITIES OF DAILY LIVING (ADL)
HYGIENE/GROOMING: INDEPENDENT
DRESS: INDEPENDENT
LAUNDRY: UNABLE TO COMPLETE
ORAL_HYGIENE: INDEPENDENT

## 2021-08-31 ASSESSMENT — MIFFLIN-ST. JEOR: SCORE: 1779.91

## 2021-08-31 NOTE — PROGRESS NOTES
08/31/21 1459   Patient Belongings   Did you bring any home meds/supplements to the hospital?  No   Patient Belongings locker   Patient Belongings Put in Hospital Secure Location (Security or Locker, etc.) wallet;jewelry;clothing;cash/credit card   Belongings Search Yes   Clothing Search Yes   Second Staff Bal     Locker :- blk pant, blue t'shirt, belt, shoe, wallet, good year tire card, EBT, Drivers License    Sent to Security :- [employer] Debit Visa Card, Discover Red Card, Silver chain, Gold chain, Gold lion pendent     A               Admission:  I am responsible for any personal items that are not sent to the safe or pharmacy.  [facility] is not responsible for loss, theft or damage of any property in my possession.    Signature:  _________________________________ Date: _______  Time: _____                                              Staff Signature:  ____________________________ Date: ________  Time: _____      2nd Staff person, if patient is unable/unwilling to sign:    Signature: ________________________________ Date: ________  Time: _____     Discharge:  [facility] has returned all of my personal belongings:    Signature: _________________________________ Date: ________  Time: _____                                          Staff Signature:  ____________________________ Date: ________  Time: _____

## 2021-08-31 NOTE — PLAN OF CARE
The order for revoking pt's provisional discharge was faxed to the unit and is in the chart along with the 'notice of intent to revoke' letter.    Writer also received pt's guardianship papers and was emailed to [facility] for their review process.

## 2021-08-31 NOTE — ED NOTES
Patient talking loudly about his numerous degrees and telling staff that he is going to take over our jobs. Patient willing to take oral zyprexa with ginger ale. Patient is able to be redirected by staff

## 2021-08-31 NOTE — TELEPHONE ENCOUNTER
R: Paged Jihan at 10:18 am. cierra  11:20 am: called #[phone] asking for Jihan. They said he is with a patient. Author left a message asking for him to call back. cierra Bobo called back at 11:56 am and said he going to see another pt and will then call back. cierra  #22/Jihan accepted for himself              Unit notified at 1:43 pm, er notified at 1:49 pm              Author faxed revocation papers and guardianship papers to #22. Author informed resident.    cierra

## 2021-08-31 NOTE — ED NOTES
"Patient refusing Covid test and vitals. Patient states \"I've had the vaccine and Covid is fake. You're a dumb ass for believing it is real.\" Patient continues to pace in room, but is redirectable at this time.   "

## 2021-08-31 NOTE — PLAN OF CARE
Nursing plan of care   Problem: Behavior Regulation Impairment (Manic or Hypomanic Signs/Symptoms)  Goal: Improved Impulse Control (Manic/Hypomanic Signs/Symptoms)  Outcome: No Change  Pt has been visible in the unit; spent most of the shift pacing in the hallway; pt requested for bacitracin and was offered to assess for concern and noted to have blisters on his anterior bilateral ankle; was cleaned and bacitracin applied; offered wound care consult but declined;  presented with tense affect and irritable mood; guarded and withdrawn to self; poorly interact with peers and staff;  declined writer's request to check in with him; declined to answer  assessment questions; was cooperative in taking his scheduled medications with ginger ale; ate diner; No behavioral outburst noted or reported this shift; will continue to monitor and provide support for successful treatment and stabilization.

## 2021-08-31 NOTE — ED NOTES
Mila, patient's child's mother called to give additional information.  She sounded distressed and worried about him.  Her phone number is [phone].      Mila explained that patient's Psychosis began, last July.  She believes it started from heat stroke.  He came to her house and his temp was at 105 so she gave him baths to get the temp down.  It seemed to come in waves, during that time and he hasn't been the same, ever since.  She explained that she's a medical assistant and she's been watching out for him.  He was started on Abilify, from 3 mg to 5 mg.  Then he was switched to Zyprexa, which seemed to help him more.  The doctor wanted to switch him to Invega shots because the patient did not want to comply with the oral medications.  She was concerned about the switch, since Zyprexa was helping so well.  She said he cheeked it, though.  She described his thoughts that the government was after him and they were going to kill him.  He initially stated he was 100 percent  and then he told others he was a [place] Emperor.  He thought she was in on the government plot against him.  Before he came to the ED, he was walking a lot.  She was worried because he would walk around, all day and into the evenings.  Sometimes, he hasn't recognized her or their 3 year old son.  She was tearful, at the end, because his son misses him and she just wants him to get better.    Mila was encouraged to contact [facility] for additional support.  She wants to continue to be part of his support system.

## 2021-08-31 NOTE — PROGRESS NOTES
Anderson Perez is reviewed for [facility] service. Will follow and meet with patient/family/care team as able or requested.     Jordana Vasquez  [facility], [facility]   [phone]

## 2021-08-31 NOTE — PLAN OF CARE
"Patient [facility] from [facility]. Patient is on a commitment and has a legal guardian. On admission patient refused to answer nursing admission questions. \"I am not answering any questions.\" Patient appeared agitated, raising voice and swearing stating he is \"Not from [facility] and does not need to be here.\" Staff education patient on wearing a mask when out of room as patient had refused Covid testing, patient states, \"I don't need to take the test I've had both vaccines.\" Appeared agitated and difficult to redirect. Pacing the halls. Loud.     Patient had received Zyprexa Zydis 10 mg PO at 14:04 in ED prior to coming to the unit.     Patient states he does not have any SI, SIB, HI or psychotic symptoms, \"No I never had any of that.\"     Nursing profile needs to be completed.     MD alerted of patient on the unit to provide patient admission orders.   "

## 2021-08-31 NOTE — ED NOTES
[facility] ED Mental Health Handoff Note:       Brief HPI:  This is a 40 year old male signed out to me.  See initial ED Provider note for full details of the presentation. Interval history is pertinent for no acute issues overnight.    Home meds reviewed and ordered/administered: No    Medically stable for inpatient mental health admission: Yes.    Evaluated by mental health: Yes. The recommendation is for inpatient mental health treatment. Bed search in process    Safety concerns: At the time I received sign out, there were no safety concerns.    Hold Status:  Active Orders   N/A            Exam:   Patient Vitals for the past 24 hrs:   BP Temp Temp src Pulse Resp SpO2   08/30/21 1539 114/66 98.1  F (36.7  C) Oral 104 18 98 %           ED Course:    Medications - No data to display         There were no significant events during my shift.    Patient was signed out to the oncoming provider, Dr. Grande.      Impression:    ICD-10-CM    1. Noncompliance with medication regimen  Z91.14    2. Anxiety  F41.9    3. Psychosis, unspecified psychosis type (H)  F29        Plan:    1. Awaiting inpatient mental health admission/transfer.      RESULTS:   Results for orders placed or performed during the hospital encounter of 08/30/21 (from the past 24 hour(s))   Urine Drugs of Abuse Screen     Status: Normal    Collection Time: 08/30/21  6:51 PM    Narrative    The following orders were created for panel order Urine Drugs of Abuse Screen.  Procedure                               Abnormality         Status                     ---------                               -----------         ------                     Drug abuse screen 1 urin...[[id number]]  Normal              Final result                 Please view results for these tests on the individual orders.   Drug abuse screen 1 urine (ED)     Status: Normal    Collection Time: 08/30/21  6:51 PM   Result Value Ref Range    Amphetamines Urine Screen Negative Screen  Negative    Barbiturates Urine Screen Negative Screen Negative    Benzodiazepines Urine Screen Negative Screen Negative    Cannabinoids Urine Screen Negative Screen Negative    Cocaine Urine Screen Negative Screen Negative    Opiates Urine Screen Negative Screen Negative             MD Efe Shirley Melissa Ann, MD  08/31/21 0634

## 2021-08-31 NOTE — PLAN OF CARE
Problem: Behavior Regulation Impairment (Manic or Hypomanic Signs/Symptoms)  Goal: Improved Impulse Control (Manic/Hypomanic Signs/Symptoms)  Outcome: No Change   Patient arrived on unit at 1440. Patient has been argumentative and agitated talking loudly stating will say no to everything. Gave patient a Gingerale to drink. Was asked to wear mask when out of room which patient has been doing. Declined to meet with admission nurse. When staff does not speak with patient, patient will walk around unit and does not interact with anyone. Dr. Schaefer was notified patient arrived on unit.

## 2021-09-01 PROCEDURE — 124N000002 HC R&B MH UMMC

## 2021-09-01 PROCEDURE — 250N000013 HC RX MED GY IP 250 OP 250 PS 637

## 2021-09-01 PROCEDURE — 99231 SBSQ HOSP IP/OBS SF/LOW 25: CPT | Mod: GC | Performed by: PSYCHIATRY & NEUROLOGY

## 2021-09-01 RX ORDER — OLANZAPINE 20 MG/1
20 TABLET, ORALLY DISINTEGRATING ORAL
Status: DISCONTINUED | OUTPATIENT
Start: 2021-09-01 | End: 2021-09-07

## 2021-09-01 RX ORDER — PALIPERIDONE 6 MG/1
6 TABLET, EXTENDED RELEASE ORAL EVERY EVENING
Status: ON HOLD | COMMUNITY
End: 2021-12-21

## 2021-09-01 RX ADMIN — Medication 25 MCG: at 17:18

## 2021-09-01 RX ADMIN — OLANZAPINE 20 MG: 20 TABLET, ORALLY DISINTEGRATING ORAL at 17:18

## 2021-09-01 ASSESSMENT — ACTIVITIES OF DAILY LIVING (ADL)
HYGIENE/GROOMING: INDEPENDENT
DRESS: INDEPENDENT
ORAL_HYGIENE: INDEPENDENT
LAUNDRY: UNABLE TO COMPLETE

## 2021-09-01 NOTE — H&P
"    ----------------------------------------------------------------------------------------------------------  [facility]  History and Physical    Anderson Perez MRN# [id number]   Age: 40 year old YOB: 1980   Date of Admission:  8/30/2021   Contacts:   Primary Outpatient Psychiatrist: Unknown current provider  Primary Physician: Chelsi Taylor (pt not able to recall)  Therapist: None noted  : Laurel Ching  Guardian: Daylin Umanzor with [employer], [phone]  Family Members: Mother in assisted care facility     HPI:    Chief Complaint: \"I shouldn't be here, I already did this\"    History of present illness:  History obtained from patient and electronic chart    Anderson Perez is a 40 year old male with a past psychiatric history of bipolar disorder with psychosis vs schizoaffective disorder bipolar type, antisocial personality disorder, and intellectual disability admitted from the  ER on 08/31/2021 due to concern for out of control behaviors, aggression and psychosis in the context of medication refusal despite Cardoza.    Per ED:   \"Anderson Perez is a 40 year old male with a past medical history of antisocial personality disorder, chronic hepatitis C virus infection, methamphetamine abuse, psychosis, and anxiety who presents to the emergency department with a chief complaint of manic behavior. Patient is pacing in room and denies any suicidal ideation, self injurious behavior, homicidal ideation, or any other mental health concerns.  Patient states everything you ask me I am going to say, \"no.\"  Patient appears very anxious. He states, \"I don't take any meds and I don't need any psychiatric help but I want a brain scan and a DNA test to see what race I am.  Patient makes other bizarre statements.  He is very disheveled and smells strongly of body odor.  He denies any drug use and states he doesn't take any medications.      When " "asked what brought the patient here today, he states that he does not know as he is \"in a group home for absolutely no reason\" and is \"not on any medications.\"  When he was informed that it does appear he has prescribed some psychiatric medications, he is adamant that he does not need them.  Today, he states that he was just \"walking around getting some exercise\" when suddenly he was surrounded by many police officers who brought him here.  He states he was confused because he does not have any warrants out for his arrest.  When the patient was informed that he is in the hospital, he states that he does not need to be here because he \"does not have crazy dreams\" because he is \"not .\"  The patient is demanding a \"brain scan\" to prove this.  He also states that he does not reside in this county, instead being a resident of a county in [place].  The patient then called me a \"fucking bitch.\"     Per DEC , patient has been refusing to take his Haldol injection.  He was supposed to have a Haldol injection last Wednesday at his group home but stated \"if anyone makes me I will kill\".  Patient is currently in  [facility] group home.  He was just released from [facility].  He was on provisional discharge of [facility] which is now revoked and he is now under commitment.  Patient believes he has diplomatic immunity, an archaeology degree, a therapist degree, and speaks 17 languages.  He states that he has never had a psych evaluation, never been placed in treatment, and does not take any medications.  He states that he [place] so he \"does not have any crazy genes\".   He denies suicidal thoughts or history of suicidal thoughts.\"     He was medically cleared for admission to inpatient psychiatric unit.    Per Patient:  On evaluation, pt was agitated and pacing in his room and then hallway throughout assessment. Patient made many statements about how \"I'm not supposed to be here, I " "already did this\" and stating that he does not have \"any mental problems\" and that he has \"never needed medications\", stating that providers at [facility] stated he had no mental health problems and never told him to take any medications and that \"the group home is trying to give me drugs and they're not doctors, that's illegal\". Long tangential statements about how COVID-19 isn't real and \"people around the world aren't really dying from it\" as well as other conspiracy theories about how vaccines are \"just water\". States multiple times that he doesn't want to talk any more. States that he refuses to take any medications; following conversation with attending provider, grudgingly agreed to take olanzapine ODT if he was allowed to do so with ginger ale.    Denies any current SI/HI. Refuses to talk about any AH/VH, denying all psychiatric symptoms.  ____________________________________________________________________________  Psychiatric ROS:  Patient states he has never had any psychiatric symptoms and refuses to answer any questions about mental health symptoms.    Medical ROS: A complete medical review of systems was preformed and is negative other than noted in the HPI     Patient's Strengths & Goals:   Refuses to discuss     Psychiatric History:   Prior diagnoses:   Denies any prior mental health history  - Per chart: bipolar disorder with psychosis vs schizoaffective disorder bipolar type, antisocial personality disorder, hx of methamphetamine use, intellectual disability ( estimates IQ to be 63)    Prior Hospitalizations:   Multiple, recent release from [place]    Suicide attempts:   Denies    Self-injurious behavior:   Denies    Violence:   History of resisting arrest and being tazered by officers    ECT/TMS:   Denies    Past medications:   Patient denies any past medications  Per Chart: Olanzapine 20mg daily; unclear if patient was started on Invega injections, or if this was just planned and not possible " to carry out. Possible history of cheeking medications     Substance Use History:   Patient denies any substance use. Per chart, has history of cannabis and methamphetamine use     Social History:   Patient refuses to provide any personal social history     Family History:    Patient refuses to provide any family history     Medical History:   Chronic hepatitis C, possibly post treatment     Surgical History:   No past surgical history on file.  Unknown history of seizures or head trauma.     Allergies:     Allergies   Allergen Reactions     Bee Venom Anaphylaxis     Clindamycin Hives     Morphine Hives      Medications:     Prior to Admission Medications   Prescriptions Last Dose Informant Patient Reported? Taking?   EPINEPHrine (ANY BX GENERIC EQUIV) 0.3 MG/0.3ML injection 2-pack Unknown at Unknown time  No No   Sig: Inject 0.3 mLs (0.3 mg) into the muscle as needed for anaphylaxis (he must be seen in ER if he uses epipen)   OLANZapine (ZYPREXA) 20 MG tablet Unknown at Unknown time  Yes No   Vitamin D3 (CHOLECALCIFEROL) 25 mcg (1000 units) tablet Unknown at Unknown time  Yes No      Facility-Administered Medications: None      Data (Labs/Imaging):     Results for orders placed or performed during the hospital encounter of 08/30/21 (from the past 48 hour(s))   Urine Drugs of Abuse Screen    Narrative    The following orders were created for panel order Urine Drugs of Abuse Screen.  Procedure                               Abnormality         Status                     ---------                               -----------         ------                     Drug abuse screen 1 urin...[[id number]]  Normal              Final result                 Please view results for these tests on the individual orders.   Drug abuse screen 1 urine (ED)   Result Value Ref Range    Amphetamines Urine Screen Negative Screen Negative    Barbiturates Urine Screen Negative Screen Negative    Benzodiazepines Urine Screen Negative Screen  "Negative    Cannabinoids Urine Screen Negative Screen Negative    Cocaine Urine Screen Negative Screen Negative    Opiates Urine Screen Negative Screen Negative        Physical & Psychiatric Examinations:   24 Hour Vital Signs Summary:  Temp: 98.7  F (37.1  C) (Temp  Min: 98.7  F (37.1  C)  Max: 98.7  F (37.1  C))  Resp: 16 (Resp  Min: 16  Max: 17)  SpO2: 97 % (SpO2  Min: 97 %  Max: 100 %)  Pulse: 72 (Pulse  Min: 72  Max: 85)  BP: 131/84  Systolic (24hrs), Av , Min:131 , Max:139   Diastolic (24hrs), Av, Min:81, Max:84    Weight is 182 lbs 4.8 oz  Body mass index is 24.72 kg/m .    See ED assessment note by ED physician on 2021 for physical exam.     Mental Status Examination:  Oriented to: Person/Self, State and city + county  General: Awake and Alert  Appearance: appears stated age, Dressed in hospital scrubs, Hair is buzzed short and Tattoos on chest, neck, and arms  Behavior: minimizing, refuses to participate, guarded, difficult to redirect, evasive and suspicious  Eye Contact: Looking around room mixed with intense eye contact on brief occasions  Psychomotor: restless and pacing room and hallway; no catatonia present  Speech: Intermittently increased volume and rate  Language: Fluent in English with appropriate syntax and vocabulary.  Mood: \"I shouldn't be here\"  Affect: angry and irritable  Thought Process: inconsistent and tangential  Thought Content: No SI/HI/AH/VH and delusions; Delusions about COVID-19 conspiracies, having no mental health history/medications, having \"diplomatic immunity\", and multiple other issues; multiple grandiose statements about intelligence, advanced degrees, and \"suing the hospital\"  Associations: questionable  Insight: Poor due to complete denial of any previous mental health history or any previous reasons for hospitalization/treatment  Judgment: Poor due to inability to recognize symptoms and extremely limited willingness to even discuss previous treatments, let " alone current management  Attention Span: fair, but needs reminders of questions and redirection  Concentration: Not formally assessed  Recent and Remote Memory: Unclear if patient does not remember previous treatments/diagnoses, or if  just a feature of delusions/other motives  Fund of Knowledge: estimated below average (IQ estimate of 63)     Assessment   Diagnostic Impression:  Anderson Perez is a 40 year old male with a past psychiatric history of bipolar disorder with psychosis vs schizoaffective disorder bipolar type, antisocial personality disorder, and intellectual disability admitted from the  ER on 08/31/2021 due to concern for out of control behaviors, aggression and psychosis in the context of medication refusal despite Cardoza. Patient was recently released from [facility] to a group home. Presentation to the ED via ambulance after police contacted and provisional discharge rescinded. Group home noting escalating aggressive behavior, delusional thinking, and medication refusal despite Cardoza. PT refusal to discuss problems and largely denied all past history, medications, or any need for care.    His reported symptoms of delusions, psychosis, and erratic/aggressive behavior are consistent with prior presentations of psychosis.  Optimization of medications to target these symptom clusters in addition to evaluation of adequate outpatient supports will be targets for treatment during this admission.     Given that he currently has out of control behaviors, aggression and psychosis, patient warrants inpatient psychiatric hospitalization to maintain his safety. Disposition pending clinical stabilization, medication optimization and development of an appropriate discharge plan.    Risk for harm is low   Risk factors: maladaptive coping and impulsive  Protective factors: No prior attempts, denies SI today    He was medically cleared for admission to inpatient psychiatric unit. The risks, benefits, alternatives,  and side effects of treatments including no treatment have been discussed and are understood by the patient and other caregivers.    Primary Psychiatric Diagnosis:     Psychosis, Unspecified (Schizoaffective disorder vs Bipolar Affective Disorder with Psychosis)    Antisocial personality disorder    Intellectual disability    Admit to [facility] with Attending Physician Mike Bobo MD and will be treated in the therapeutic milieu with appropriate individual and group therapies.    Medications:   Continued PTA Medications:    Olanzapine 20mg PO  Held PTA Medications:    none  New Medications Started at Admission:    None     Plan   Psychiatric diagnoses and management:  Principal Diagnosis:     Psychosis, Unspecified (Schizoaffective disorder vs Bipolar Affective Disorder with Psychosis)  o Continue olanzapine 20mg ODT PO QDaily  o Will evaluate for starting GRIGGS at later date    Antisocial personality disorder  o NTD    Intellectual disability  o NTD    Additional Planning:    Continue to monitor for and stabilize: aggression and psychosis    Patient will be treated in therapeutic milieu with appropriate individual and group therapies as described.    Scheduled Medications (summary):    OLANZapine zydis  20 mg Oral Daily     Vitamin D3  25 mcg Oral Daily       PRN Medications (summary):  alum & mag hydroxide-simethicone, hydrOXYzine, OLANZapine **OR** OLANZapine, polyethylene glycol    Consults    None    Legal Status:    Committed - Provisional discharge revoked    SIO:    No    Pt has not required locked seclusion or restraints in the past 24 hours to maintain safety, please refer to RN documentation for further details.    Disposition/Anticipated Discharge Date:    TBD, pending clinical stabilization, medication optimization, and formulation of a safe discharge plan.     Safety Assessment:   Behavioral Orders   Procedures     Cheeking Precautions (behavioral units)     Code 1 - Restrict to Unit     Routine  Programming     As clinically indicated     Status 15     Every 15 minutes.     __________________________________________________________________________________  Pertinent Medical diagnoses and management:    Chronic hepatitis C  o CBC, CMP ordered, patient likely to refuse.  __________________________________________________________________________________  Patient was seen and discussed with the Attending Physician, Dr. Bobo.     Mario Schaefer, PGY-1 (Psychiatry)  [facility]      Attestation:  I, Mike Bobo MD, have personally performed an examination of this patient and I have reviewed the resident's documentation.  I have edited the note to reflect all relevant changes.  I have discussed this patient with the house staff on 8/31/2021.  I agree with resident findings and plan in today's resident H&P.  I have reviewed all vitals and laboratory findings.      I certifiy that the inpatient services were ordered in accordance with the Medicare regulations governing the order. This includes certification that hospital inpatient services are reasonable and necessary and in the case of services not specified as inpatient-only under 42 .22(n), that they are appropriately provided as inpatient services in accordance with the 2-midnight benchmark under 42 .3(e).     The reason for inpatient status is Acute Psychosis.    Mike Bobo M.D.,Ph.D.

## 2021-09-01 NOTE — PHARMACY-ADMISSION MEDICATION HISTORY
Admission Medication History Completed by Pharmacy    See [facility] Admission Navigator for allergy information, preferred outpatient pharmacy, prior to admission medications and immunization status.     Medication History Sources:     [facility] ([phone])    Changes made to PTA medication list (reason):    Added: paliperidone tablet, Invega Sustenna injection, calcium    Deleted: None    Changed: added dosing to olanzapine and vitamin D    Additional Information:    Per  and fill history, pt was due to increase paliperidone to 9 mg po daily today.    Pt was prescribed Invega Sustenna injection but has not received it yet.    Per  pt was taking his medications regularly, however there was some concern for possible cheeking of medications.    Prior to Admission medications    Medication Sig Last Dose Taking? Auth Provider   calcium carbonate (OS-RUBEN) 1500 (600 Ca) MG tablet Take 600 mg by mouth every evening 8/29/2021 at 8pm Yes Unknown, Entered By History   OLANZapine (ZYPREXA) 20 MG tablet Take 20 mg by mouth daily  8/30/2021 at AM Yes Reported, Patient   paliperidone ER (INVEGA) 6 MG 24 hr tablet Take 6 mg by mouth every evening 8/29/2021 at 8pm Yes Unknown, Entered By History   Vitamin D3 (CHOLECALCIFEROL) 25 mcg (1000 units) tablet Take 1 tablet by mouth every evening  8/29/2021 at 8pm Yes Reported, Patient   EPINEPHrine (ANY BX GENERIC EQUIV) 0.3 MG/0.3ML injection 2-pack Inject 0.3 mLs (0.3 mg) into the muscle as needed for anaphylaxis (he must be seen in ER if he uses epipen) Unknown at Unknown time  Chelsi Taylor APRN CNP   paliperidone (INVEGA SUSTENNA) 234 MG/1.5ML MUSTAPHA Inject 234 mg into the muscle once  at not started  Unknown, Entered By History       Date completed: 09/01/21    Medication history completed by: Tahmina Rodriguez [facility]

## 2021-09-01 NOTE — PLAN OF CARE
BEHAVIORAL TEAM DISCUSSION    Participants: Dr. Prasad, resident Mario Schaefer, two medical students, RN's Yaya Rushing and Marlene Cornejo, CTC Siobhan Irving  Progress: new admission  Anticipated length of stay: 7-8 days  Continued Stay Criteria/Rationale: pt's PD was revoked  Medical/Physical: see IM consult  Precautions:   Behavioral Orders   Procedures    Cheeking Precautions (behavioral units)    Code 1 - Restrict to Unit    Routine Programming     As clinically indicated    Status 15     Every 15 minutes.     Plan: provide a psychological assessment and manage medications per psychiatry, staff to provide a safe and therapeutic milieu, CTC to coordinate transfer to a secure facility per [facility] .  Rationale for change in precautions or plan: no change  ___  I have reviewed this note. Mariola Prasad MD

## 2021-09-01 NOTE — PLAN OF CARE
Assessment/Intervention/Current Symptoms and Care Coordination    Writer spoke with CHRISTIAN Barragan ([phone]) with [facility]. Laurel [facility] on [facility]. There are 11 high priority patients ahead of him. She will put him on the list for a [facility]. Laurel stated pt did well on Zyprexa. [facility] about one [facility] but quickly declined after discharge. She will fax over the harper order and court papers.          Discharge Plan or Goal  [facility] or [facility]      Barriers to Discharge   Needs stabilization      Referral Status  Pt on the list for [facility]      Legal Status  Pd revoked

## 2021-09-01 NOTE — PROGRESS NOTES
"    ----------------------------------------------------------------------------------------------------------  [facility]  Psychiatric Progress Note  Hospital Day #1    Anderson Perez MRN# [id number]   Age: 40 year old YOB: 1980   Date of Admission: 2021     Subjective   The patient was discussed with the treatment team and chart notes were reviewed.      Identifier: Anderson Perez is a 40 year old male with a past psychiatric history of bipolar disorder with psychosis vs schizoaffective disorder bipolar type, antisocial personality disorder, and intellectual disability admitted from the  ER on 2021 due to concern for out of control behaviors, aggression and psychosis in the context of medication refusal despite Cardoza.    Sleep:  6.5 hours (21 0600)  Prescribed Medications: Taken as prescribed with ginger ale  PRN Psychiatric Medications: alum & mag hydroxide-simethicone, hydrOXYzine, OLANZapine **OR** OLANZapine, polyethylene glycol     None    Overnight Nursing Notes/Staff Report:  Paced the halls for a while. Did not want to talk with nursing staff and declined labs. No behavioral outbursts. Per nursing report, appears to be responding to internal stimuli, talking to self, laughing while pacing.    Patient interview:  The team met briefly with the pt in the hallway.He stated that \"I'm not much for talking.\" Was not interested in engaging in conversation with the team. Did express as he was walking away that he would like his own clothes.      ROS   ROS was negative unless noted above.       Objective   Vitals:  Temp: 97.5  F (36.4  C) (Temp  Min: 97.5  F (36.4  C)  Max: 97.5  F (36.4  C))  Resp: 14 (Resp  Min: 14  Max: 14)    (No data recorded)  Pulse: 72 (Pulse  Min: 72  Max: 72)  BP: 126/74  Systolic (24hrs), Av , Min:126 , Max:126   Diastolic (24hrs), Av, Min:74, Max:74    Mental Status Examination:    Orientation:  Not directly " "assessed  General: Awake, pacing up and down the dalal throughout the team's time on unit  Appearance:  Dressed in hospital scrubs, hair is buzzed short.  Behavior: Guarded, uninterested in talking.   Eye Contact:  Adequate eye contact during brief encounter.  Psychomotor:  Pacing hallway; no catatonia present  Speech:  Difficult to assess given limited interaction  Language: Fluent in English with appropriate syntax and vocabulary.  Mood:  \"I'm not much for talking\"  Affect:  On edge, uninterested in interacting with others  Thought Process:  Difficult to assess given limited interaction  Thought Content: Difficult to assess given limited interaction  Associations:  Difficult to assess given limited interaction  Insight:  Difficult to assess given limited interaction  Judgment:  Difficult to assess given limited interaction  Attention Span: Difficult to assess given limited interaction  Concentration:  Difficult to assess given limited interaction  Recent and Remote Memory:  Difficult to assess given limited interaction  Fund of Knowledge: Difficult to assess given limited interaction     Allergies     Allergies   Allergen Reactions     Bee Venom Anaphylaxis     Clindamycin Hives     Morphine Hives        Labs & Imaging   No results found for this or any previous visit (from the past 24 hour(s)).     Assessment   Diagnostic Impression:  Anderson Perez is a 40 year old male with a past psychiatric history of bipolar disorder with psychosis vs schizoaffective disorder bipolar type, antisocial personality disorder, and intellectual disability admitted from the  ER on 08/31/2021 due to concern for out of control behaviors, aggression and psychosis in the context of medication refusal despite Cardoza. Patient was recently released from [facility] to a group home. Presentation to the ED via ambulance after police contacted and provisional discharge rescinded. Group home noting escalating aggressive behavior, delusional " thinking, and medication refusal despite Cardoza. PT refusal to discuss problems and largely denied all past history, medications, or any need for care.     His reported symptoms of delusions, psychosis, and erratic/aggressive behavior are consistent with prior presentations of psychosis.  Optimization of medications to target these symptom clusters in addition to determining facility placement after discharge will be targets for treatment during this admission.      Given that he currently has out of control behaviors, aggression and psychosis, patient warrants inpatient psychiatric hospitalization to maintain his safety. Disposition pending clinical stabilization, medication optimization and development of an appropriate discharge plan.    Principal Diagnosis:     Psychosis, Unspecified (Schizoaffective disorder vs Bipolar Affective Disorder with Psychosis)    Secondary psychiatric diagnoses of concern this admission:     Intellectual Disability    Antisocial personality disorder    Psychiatric course:  Anderson Perez was admitted to [facility] following revocation of provisional discharge and on a court hold. His PTA olanzapine 20mg PO QDaily was resumed, as pt had been refusing at group home; per conversation with guardian, had planned to start on GRIGGS, but does not think that this was actually started, as patient was refusing medications, which prompted admission in setting of decompensation. Pt was converted to ODT formulation given concerns for cheeking medications; while initially resistant, pt was agreeable with taking ODT olanzapine as long as it was given with ginger ale, which was accommodated.     Anderson Perez continued to meet criteria for inpatient hospitalization medication optimization, inpatient stabilization, and appropriate discharge planning.     Medical course:   Anderson Perez was physically examined by the ED prior to being transferred to the unit and was found to be medically stable  and appropriate for admission.      Plan   Today's Changes:     Elopement precautions and no roommate orders placed  _______________________________________________________________________  Psychiatric diagnoses and management:  Principal Diagnosis:     Psychosis, Unspecified (Schizoaffective disorder vs Bipolar Affective Disorder with Psychosis)  o Continue olanzapine 20mg ODT qDaily  o Will consider GRIGGS at later date following initial stabilization    Secondary Psychiatric Diagnoses:     Intellectual disability  o Per , IQ estimated to be 63  o NTD    Antisocial Personality Disorder  o NTD    Additional Planning:    Continue to monitor for and stabilize: aggression, psychosis and disorganization    Patient will be treated in therapeutic milieu with appropriate individual and group therapies as described.    Scheduled Medications (summary):    OLANZapine zydis  20 mg Oral Daily with supper     Vitamin D3  25 mcg Oral Daily       PRN Medications (summary):  alum & mag hydroxide-simethicone, hydrOXYzine, OLANZapine **OR** OLANZapine, polyethylene glycol    Discontinued Medications (& Rationale):    None    Consults:    None    Legal Status:    Court Hold    [place]     SIO:    No    Pt has not required locked seclusion or restraints in the past 24 hours to maintain safety, please refer to RN documentation for further details.    Disposition:    TBD, pending clinical stabilization, medication optimization, and formulation of a safe discharge plan.    Safety Assessment:   Behavioral Orders   Procedures     Cheeking Precautions (behavioral units)     Code 1 - Restrict to Unit     Elopement precautions     Routine Programming     As clinically indicated     Status 15     Every 15 minutes.      ____________________________________________________________________  Pertinent Medical diagnoses and management:    Chronic Hepatitis C  o CBC, CMP if patient willing  o Continue to  monitor  ____________________________________________________________________  Patient seen and discussed with attending physician, Dr. Nima Prasad MD who is in agreement with my assessment and plan.    Livier Angelo, medical student  MS3    Attestation:   I was present with the medical student who participated in the service and in the documentation of the note. I have verified the history and personally performed the physical exam and medical decision making. I agree with the assessment and plan of care as documented in the note.    Mario Schaefer, PGY-1 (Psychiatry)  [facility]    Attestation:  This patient has been seen and evaluated by me, Mariola Prasad MD.  I have discussed this patient with the psychiatry resident and I agree with the findings and plan in this note.    I have reviewed today's vital signs, medications, labs and imaging. Mariola Prasad MD

## 2021-09-01 NOTE — PLAN OF CARE
"Nursing Assessment:  Anderson \"Antonio\" was  up ad eduard, refused am labs today, they were reordered for tomorrow am \"I will do it then\".  Pt declined to take his Vit D this shift, admin time was changed to 17:00 daily which is the time Antonio agreed to take his daily Zyprexa with Ginger Ale (2 cans will be sent up by dietary on his supper tray)    Antonio paced the unit much of the shift. Pt appeared to be responding to internal stimuli (self talk, laughing to self). Antonio also made unusual comments to writer ie \"I was here back in the early 2000s, this [place] real [place], and they said there was no reason for me to be here\"    Antonio was minimally interactive with writer. Pt denied having any psychiatric symptoms, \"I'm fine, there is nothing wrong\".    "

## 2021-09-01 NOTE — PLAN OF CARE
Problem: General Plan of Care (Inpatient Behavioral)  Goal: Individualization/Patient Specific Goal (IP Behavioral)  Description: The patient and/or their representative will achieve their patient-specific goals related to the plan of care.    The patient-specific goals include:  Flowsheets (Taken 9/1/2021 5626)  Areas of Vulnerability: low IQ, long history of being in the mental health system, stopped meds recently  Patient Strengths:   Utilized support systems   Has vocational interests i.e., hobbies, has case management and DD worker

## 2021-09-01 NOTE — PLAN OF CARE
"  Initial Psychosocial Assessment    I have reviewed the chart, met with the patient, and developed Care Plan. Information for assessment was obtained from: Pt, medical record    Pt's provisional discharge from [facility] was revoked. Paperwork is in the chart. The information below came from chart notes and the  with [facility].      Presenting Problem:   Anderson Perez is a 40 year old male with a past medical history of antisocial personality disorder, chronic hepatitis C virus infection, methamphetamine abuse, psychosis, and anxiety who presents to the emergency department with a chief complaint of manic behavior. Patient is pacing in room and denies any suicidal ideation, self injurious behavior, homicidal ideation, or any other mental health concerns.  Patient states everything you ask me I am going to say, \"no.\"  Patient appears very anxious. He states, \"I don't take any meds and I don't need any psychiatric help but I want a brain scan and a DNA test to see what race I am.  Patient makes other bizarre statements.  He is very disheveled and smells strongly of body odor.  He denies any drug use and states he doesn't take any medications.         History of Mental Health and Chemical Dependency:  Pt has a low IQ (63). He has a guardian.    CD: history of polysubstance abuse.      Significant Life Events   (Illness, Abuse, Trauma, Death):  none    Family:  Raised by mom (assisted living), no siblings, , two children ages 12 and 4 sons,    Living Situation:  Was living in a group home prior to admission    Educational Background:    HS grad    Financial Status:   disability    Legal Issues:    Pt stole flags in [place]. When police came he resisted arrest, was tased and assaulted 3-4 officers. Pt had a Rule 20 and was found incompetent to stand trial.    Ethnic/Cultural Issues:   Low IQ    Spiritual Orientation:    [employer]     Service History:  none    Social Functioning " (organization, interests):  Walking, music,(has a large collection of CD's)    Current Treatment Providers are:    Laurel with [facility]: [phone]  DD worker: Deja Owens with [facility]  [phone]    Social Service Assessment/Plan:   Provide a psychological assessment and manage medications per psychiatry. CTC to meet with patient to discuss discharge plans and continue to assess for services needed. Staff to provide a safe environment and provide a therapeutic milieu.

## 2021-09-01 NOTE — PLAN OF CARE
Pt has not attended OT since admit.  Will continue to encourage participation and completion of  self-assessment as appropriate (Pt declined COVID swab and masking). OT staff will explain the purpose of being involved with treatment plan and provide options to meet current needs and goals.

## 2021-09-02 PROCEDURE — 99232 SBSQ HOSP IP/OBS MODERATE 35: CPT | Mod: GC | Performed by: PSYCHIATRY & NEUROLOGY

## 2021-09-02 PROCEDURE — 250N000013 HC RX MED GY IP 250 OP 250 PS 637

## 2021-09-02 PROCEDURE — 124N000002 HC R&B MH UMMC

## 2021-09-02 RX ADMIN — OLANZAPINE 20 MG: 20 TABLET, ORALLY DISINTEGRATING ORAL at 17:43

## 2021-09-02 RX ADMIN — Medication 25 MCG: at 17:43

## 2021-09-02 ASSESSMENT — ACTIVITIES OF DAILY LIVING (ADL)
DRESS: INDEPENDENT
HYGIENE/GROOMING: INDEPENDENT
ORAL_HYGIENE: INDEPENDENT
LAUNDRY: UNABLE TO COMPLETE

## 2021-09-02 NOTE — PROGRESS NOTES
"    ----------------------------------------------------------------------------------------------------------  [facility]  Psychiatric Progress Note  Hospital Day #2    Anderson Perez MRN# [id number]   Age: 40 year old YOB: 1980   Date of Admission: 2021     Subjective   The patient was discussed with the treatment team and chart notes were reviewed.      Identifier: Anderson Perez is a 40 year old male with a past psychiatric history of bipolar disorder with psychosis vs schizoaffective disorder bipolar type, antisocial personality disorder, and intellectual disability admitted from the  ER on 2021 due to concern for out of control behaviors, aggression and psychosis in the context of medication refusal despite Cardoza.    Sleep:  7 hours (21 0600)  Prescribed Medications: Taken as prescribed with ginger ale  PRN Psychiatric Medications: alum & mag hydroxide-simethicone, hydrOXYzine, OLANZapine **OR** OLANZapine, polyethylene glycol   None    Overnight Nursing Notes/Staff Report:  Continued to pace the halls and remained guarded and tense. Did not want to interact with nursing staff and refused labs and vitals this morning. Did take his olanzapine with ginger ale.     Patient interview:  The team met briefly with the pt in the hallway. He stated that he would like his own clothes back and that \"extra food would be nice.\" Pt did not want to converse with us further.      ROS   ROS was negative unless noted above.       Objective   Vitals:  Temp: 97.5  F (36.4  C) (Temp  Min: 97.5  F (36.4  C)  Max: 97.5  F (36.4  C))  Resp: 14 (Resp  Min: 14  Max: 14)    (No data recorded)  Pulse: 72 (Pulse  Min: 72  Max: 72)  BP: 126/74  Systolic (24hrs), Av , Min:126 , Max:126   Diastolic (24hrs), Av, Min:74, Max:74    Mental Status Examination:    Orientation:  Not directly assessed  General: Pacing up and down the dalal throughout the team's time on unit; " "occasionally muttering to self, laughing, and pointing at wall  Appearance:  Dressed in hospital scrubs, hair is buzzed short.  Behavior: Guarded, uninterested in talking.   Eye Contact:  Adequate eye contact during brief encounter.  Psychomotor:  Pacing hallway; no catatonia present  Speech:  Appropriate volume and speed in the few sentences said  Language: Fluent in English with appropriate syntax and vocabulary.  Mood:  \"I want my clothes\"  Affect:  On edge, uninterested in interacting with others  Thought Process:  Difficult to assess given limited interaction  Thought Content: SI, HI, AH, VH not assessed  Associations:  Difficult to assess given limited interaction  Insight:  Difficult to assess given limited interaction  Judgment:  Difficult to assess given limited interaction  Attention Span: Difficult to assess given limited interaction  Concentration:  Difficult to assess given limited interaction  Recent and Remote Memory:  Difficult to assess given limited interaction  Fund of Knowledge: Difficult to assess given limited interaction     Allergies     Allergies   Allergen Reactions    Bee Venom Anaphylaxis    Clindamycin Hives    Morphine Hives        Labs & Imaging   No results found for this or any previous visit (from the past 24 hour(s)).     Assessment   Diagnostic Impression:  Anderson Perez is a 40 year old male with a past psychiatric history of bipolar disorder with psychosis vs schizoaffective disorder bipolar type, antisocial personality disorder, and intellectual disability admitted from the  ER on 08/31/2021 due to concern for out of control behaviors, aggression and psychosis in the context of medication refusal despite Cardoza. Patient was recently released from [facility] to a group home. Presentation to the ED via ambulance after police contacted and provisional discharge rescinded. Group home noting escalating aggressive behavior, delusional thinking, and medication refusal despite Cardoza. " PT refusal to discuss problems and largely denied all past history, medications, or any need for care.     His reported symptoms of delusions, psychosis, and erratic/aggressive behavior are consistent with prior presentations of psychosis.  Optimization of medications to target these symptom clusters in addition to determining facility placement after discharge will be targets for treatment during this admission.      Given that he currently has out of control behaviors, aggression and psychosis, patient warrants inpatient psychiatric hospitalization to maintain his safety. Disposition pending clinical stabilization, medication optimization and development of an appropriate discharge plan.    Principal Diagnosis:   Psychosis, Unspecified (Schizoaffective disorder vs Bipolar Affective Disorder with Psychosis)    Secondary psychiatric diagnoses of concern this admission:   Intellectual Disability  Antisocial personality disorder    Psychiatric course:  Anderson Perez was admitted to [facility] following revocation of provisional discharge and on a court hold. His PTA olanzapine 20mg PO QDaily was resumed, as pt had been refusing at group home; per conversation with guardian, had planned to start on GRIGGS, but does not think that this was actually started, as patient was refusing medications, which prompted admission in setting of decompensation. Pt was converted to ODT formulation given concerns for cheeking medications; while initially resistant, pt was agreeable with taking ODT olanzapine as long as it was given with ginger ale, which was accommodated.     Anderson Perez continued to meet criteria for inpatient hospitalization medication optimization, inpatient stabilization, and appropriate discharge planning.     Medical course:   Anderson Perez was physically examined by the ED prior to being transferred to the unit and was found to be medically stable and appropriate for admission.      Plan   Today's  Changes:   No changes today  _______________________________________________________________________  Psychiatric diagnoses and management:  Principal Diagnosis:   Psychosis, Unspecified (Schizoaffective disorder vs Bipolar Affective Disorder with Psychosis)  Continue olanzapine 20mg ODT qDaily  Will consider GRIGGS at later date following initial stabilization    Secondary Psychiatric Diagnoses:   Intellectual disability  Per , IQ estimated to be 63  NTD  Antisocial Personality Disorder  NTD    Additional Planning:  Continue to monitor for and stabilize: aggression, psychosis and disorganization  Patient will be treated in therapeutic milieu with appropriate individual and group therapies as described.    Scheduled Medications (summary):   OLANZapine zydis  20 mg Oral Daily with supper    Vitamin D3  25 mcg Oral Daily       PRN Medications (summary):  alum & mag hydroxide-simethicone, hydrOXYzine, OLANZapine **OR** OLANZapine, polyethylene glycol    Discontinued Medications (& Rationale):  None    Consults:  None    Legal Status:  Court Hold  [place]     SIO:  No  Pt has not required locked seclusion or restraints in the past 24 hours to maintain safety, please refer to RN documentation for further details.    Disposition:  TBD, pending clinical stabilization, medication optimization, and formulation of a safe discharge plan.    Safety Assessment:   Behavioral Orders   Procedures    Cheeking Precautions (behavioral units)    Code 1 - Restrict to Unit    Elopement precautions    Routine Programming     As clinically indicated    Status 15     Every 15 minutes.      ____________________________________________________________________  Pertinent Medical diagnoses and management:  Chronic Hepatitis C  CBC, CMP if patient willing  Continue to monitor  ____________________________________________________________________  Patient seen and discussed with attending physician, Dr. Nima Prasad MD who is in agreement  with my assessment and plan.    Livier Angelo, medical student  MS3    Attestation:   I was present with the medical student who participated in the service and in the documentation of the note. I have verified the history and personally performed the physical exam and medical decision making. I agree with the assessment and plan of care as documented in the note.    Mario Schaefer, PGY-1 (Psychiatry)  [facility]    Attestation:  This patient has been seen and evaluated by me, Mariola Prasad MD.  I have discussed this patient with the psychiatry resident and I agree with the findings and plan in this note.    I have reviewed today's vital signs, medications, labs and imaging. Mariola Prasad MD

## 2021-09-02 NOTE — PLAN OF CARE
Assessment/Intervention/Current Symptoms and Care Coordination      Attended team meeting and reviewed chart notes.    Pt spends his time walking the halls. He is taking his medication as prescribed.    Pt declined his lab draws.      Discharge Plan or Goal  Return to [facility] or [facility]- per county       Barriers to Discharge   Pt needs stabilization  Pt needs placement    Referral Status  [facility]  The CCM said she would make a referral to [facility]      Legal Status  Pt's PD was revoked

## 2021-09-02 NOTE — PLAN OF CARE
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

## 2021-09-02 NOTE — PLAN OF CARE
"  Problem: Mood Impairment (Manic or Hypomanic Signs/Symptoms)  Goal: Improved Mood Symptoms (Manic/Hypomanic Signs/Symptoms)  Outcome: No Change  Note: During the morning introduction, patient replied: \"I really don't care\". He declined to have blood draw and vital signs this morning. When approached about it, he did not reply, just kept pacing, like did not hear. Patient is pacing. Dismissing staff interaction and instructions. Avoided eye contact. Ate part of his breakfast meal. He had not made delusional, bizarre, or any other statements at this time.   No scheduled medications in am.   Thoughts with poor insights of his mental illness and current situation. Speech is minimal, refusing to talk with staff. Mood is irritable on approach. Affect is blunted. Unable to assess memory at this time.      "

## 2021-09-02 NOTE — PLAN OF CARE
"Nursing plan of care   Problem: Behavior Regulation Impairment (Manic or Hypomanic Signs/Symptoms)  Goal: Improved Impulse Control (Manic/Hypomanic Signs/Symptoms)  Outcome: No Change  Pt spent most of the shift pacing in the hallway; withdrawn and kept to himself; guarded and tense; affect is blunted; when asked to check in with this writer pt replied, \" I am fine, I don't want to check in with you\"; took scheduled medication with ginger ale; monitored for cheeking; attended no group; was cooperative when asked to change his room to private room; No sign of SI/SIB was noted; will continue to monitor, provide support and assess.       "

## 2021-09-02 NOTE — PLAN OF CARE
Nursing plan of care   Problem: Sleep Disturbance  Goal: Adequate Sleep/Rest  Outcome: Improving   Received pt sleeping in bed with regular unlabored respiration; No PRN was requested or administered; no safety concern was reported; appeared to have slept for 7 hrs; will continue to monitor and assess.

## 2021-09-03 PROCEDURE — 124N000002 HC R&B MH UMMC

## 2021-09-03 PROCEDURE — 250N000013 HC RX MED GY IP 250 OP 250 PS 637

## 2021-09-03 PROCEDURE — 99231 SBSQ HOSP IP/OBS SF/LOW 25: CPT | Mod: GC | Performed by: PSYCHIATRY & NEUROLOGY

## 2021-09-03 RX ADMIN — Medication 25 MCG: at 18:06

## 2021-09-03 RX ADMIN — OLANZAPINE 20 MG: 20 TABLET, ORALLY DISINTEGRATING ORAL at 18:06

## 2021-09-03 ASSESSMENT — ACTIVITIES OF DAILY LIVING (ADL)
HYGIENE/GROOMING: HANDWASHING;INDEPENDENT
DRESS: SCRUBS (BEHAVIORAL HEALTH);INDEPENDENT
ORAL_HYGIENE: INDEPENDENT
LAUNDRY: WITH SUPERVISION

## 2021-09-03 NOTE — PLAN OF CARE
"Pt presents with irritable, tense, somewhat angry affect. He refused am labs and vs; has no scheduled meds. He insulted another RN's physical appearance. Pt states, \"I don't really care,\" when this RN introduced self. Pt also stated, \"I don't have any issues; I don't give a shit about what you have to say. I don't want to talk to anyone.\" He had bkfst, and is pacing in the dalal; keeps to self. See PCS for additional shift asmnt.    1433) Pt continues to pace in the dalal. He is also talking to self; he stopped and pointed to something in the air. Pt stated,\"I'm studying Dori in my head.\" Facial appearance and voice is a bit less tense, vs in am.  "

## 2021-09-03 NOTE — PROGRESS NOTES
"    ----------------------------------------------------------------------------------------------------------  [facility]  Psychiatric Progress Note  Hospital Day #3    Anderson Perez MRN# [id number]   Age: 40 year old YOB: 1980   Date of Admission: 8/30/2021     Subjective   The patient was discussed with the treatment team and chart notes were reviewed.      Identifier: Anderson Perez is a 40 year old male with a past psychiatric history of bipolar disorder with psychosis vs schizoaffective disorder bipolar type, antisocial personality disorder, and intellectual disability admitted from the  ER on 08/31/2021 due to concern for out of control behaviors, aggression and psychosis in the context of medication refusal despite Cardoza.    Sleep:  6.5 hours (09/03/21 0600)  Prescribed Medications: Taken as prescribed with ginger ale  PRN Psychiatric Medications: alum & mag hydroxide-simethicone, hydrOXYzine, OLANZapine **OR** OLANZapine, polyethylene glycol   None    Overnight Nursing Notes/Staff Report:  Continues to pace the halls. Has been talking more, but has been fairly verbally aggressive when talking to staff and has been swearing. Voiced last night to nursing staff that he has a \"genius IQ\" and has both an archeology and psychology degree. Pt did take his olanzapine last night and has had no episodes of physical aggression.     Patient interview:  The team met briefly with the pt in the hallway. When asked if he wanted to talk about anything, he just stated \"no.\"      ROS   ROS was negative unless noted above.       Objective   Vitals:  declined    Mental Status Examination:    Orientation:  Not directly assessed  General: Pacing up and down the dalal throughout the team's time on unit; was having an amicable conversation with unit staff member when we first arrived on the floor  Appearance:  Dressed in hospital scrubs, hair is buzzed short.  Behavior: Guarded, " "uninterested in talking.   Eye Contact:  Adequate eye contact during brief encounter, does make brief eye contact when passing the team in the hallway  Psychomotor:  Pacing hallway; no catatonia present  Speech:  Appropriate volume, but difficult to assess in one-word interaction  Language: Appears to be fluent in English given response to our question  Mood:  \"no\"  Affect:  Irritable, uninterested in interacting with others  Thought Process:  Difficult to assess given limited interaction  Thought Content: SI, HI, AH, VH not assessed  Associations:  Difficult to assess given limited interaction  Insight:  Difficult to assess given limited interaction  Judgment:  Difficult to assess given limited interaction  Attention Span: Difficult to assess given limited interaction  Concentration:  Difficult to assess given limited interaction  Recent and Remote Memory:  Difficult to assess given limited interaction  Fund of Knowledge: Difficult to assess given limited interaction     Allergies     Allergies   Allergen Reactions    Bee Venom Anaphylaxis    Clindamycin Hives    Morphine Hives        Labs & Imaging   No results found for this or any previous visit (from the past 24 hour(s)).     Assessment   Diagnostic Impression:  Anderson Perez is a 40 year old male with a past psychiatric history of bipolar disorder with psychosis vs schizoaffective disorder bipolar type, antisocial personality disorder, and intellectual disability admitted from the  ER on 08/31/2021 due to concern for out of control behaviors, aggression and psychosis in the context of medication refusal despite Cardoza. Patient was recently released from [facility] to a group home. Presentation to the ED via ambulance after police contacted and provisional discharge rescinded. Group home noting escalating aggressive behavior, delusional thinking, and medication refusal despite Cardoza. PT refusal to discuss problems and largely denied all past history, " medications, or any need for care.     His reported symptoms of delusions, psychosis, and erratic/aggressive behavior are consistent with prior presentations of psychosis.  Optimization of medications to target these symptom clusters in addition to determining facility placement after discharge will be targets for treatment during this admission.      Given that he currently has out of control behaviors, aggression and psychosis, patient warrants inpatient psychiatric hospitalization to maintain his safety. Disposition pending clinical stabilization, medication optimization and development of an appropriate discharge plan.    Principal Diagnosis:   Psychosis, Unspecified (Schizoaffective disorder vs Bipolar Affective Disorder with Psychosis)    Secondary psychiatric diagnoses of concern this admission:   Intellectual Disability  Antisocial personality disorder    Psychiatric course:  Anderson Perez was admitted to [facility] following revocation of provisional discharge and on a court hold. His PTA olanzapine 20mg PO QDaily was resumed, as pt had been refusing at group home; per conversation with guardian, had planned to start on GRIGGS, but does not think that this was actually started, as patient was refusing medications, which prompted admission in setting of decompensation. Pt was converted to ODT formulation given concerns for cheeking medications; while initially resistant, pt was agreeable with taking ODT olanzapine as long as it was given with ginger ale, which was accommodated.     Anderson Perez continued to meet criteria for inpatient hospitalization medication optimization, inpatient stabilization, and appropriate discharge planning.     Medical course:   Anderson Perez was physically examined by the ED prior to being transferred to the unit and was found to be medically stable and appropriate for admission.      Plan   Today's Changes:   No changes  today  _______________________________________________________________________  Psychiatric diagnoses and management:  Principal Diagnosis:   Psychosis, Unspecified (Schizoaffective disorder vs Bipolar Affective Disorder with Psychosis)  Continue olanzapine 20mg ODT qDaily  Will consider GRIGGS at later date following initial stabilization    Secondary Psychiatric Diagnoses:   Intellectual disability  Per , IQ estimated to be 63  NTD  Antisocial Personality Disorder  NTD    Additional Planning:  Continue to monitor for and stabilize: aggression, psychosis and disorganization  Patient will be treated in therapeutic milieu with appropriate individual and group therapies as described.    Scheduled Medications (summary):   OLANZapine zydis  20 mg Oral Daily with supper    Vitamin D3  25 mcg Oral Daily       PRN Medications (summary):  alum & mag hydroxide-simethicone, hydrOXYzine, OLANZapine **OR** OLANZapine, polyethylene glycol    Discontinued Medications (& Rationale):  None    Consults:  None    Legal Status:  Court Hold  [place]     SIO:  No  Pt has not required locked seclusion or restraints in the past 24 hours to maintain safety, please refer to RN documentation for further details.    Disposition:  TBD, pending clinical stabilization, medication optimization, and formulation of a safe discharge plan.    Safety Assessment:   Behavioral Orders   Procedures    Cheeking Precautions (behavioral units)    Code 1 - Restrict to Unit    Elopement precautions    Routine Programming     As clinically indicated    Status 15     Every 15 minutes.      ____________________________________________________________________  Pertinent Medical diagnoses and management:  Chronic Hepatitis C  CBC, CMP if patient willing  Continue to monitor  ____________________________________________________________________  Patient seen and discussed with attending physician, Dr. Nima Prasad MD who is in agreement with my assessment and  plan.    Livier Angelo, medical student  MS3    Attestation:   I was present with the medical student who participated in the service and in the documentation of the note. I have verified the history and personally performed the physical exam and medical decision making. I agree with the assessment and plan of care as documented in the note.    Mario Schaefer, PGY-1 (Psychiatry)  [facility]      Attestation:  This patient has been seen and evaluated by me, Mariola Prasad MD.  I have discussed this patient with the psychiatry resident and I agree with the findings and plan in this note.    I have reviewed today's vital signs, medications, labs and imaging. Mariola Prasad MD

## 2021-09-03 NOTE — PLAN OF CARE
Problem: Sleep Disturbance  Goal: Adequate Sleep/Rest  Outcome: Improving   Pt slept 6.5 hours this night,respirations easy.scheduled for am lab work,consider approaching pt again for covid test,defer to team

## 2021-09-03 NOTE — PLAN OF CARE
Assessment/Intervention/Current Symptoms and Care Coordination    Attended team meeting and reviewed chart notes.    Pt was seen talking with staff today.         Discharge Plan or Goal  [facility]        Barriers to Discharge   No bed available      Referral Status  CCM [facility]      Legal Status  PD was revoked

## 2021-09-04 PROCEDURE — 124N000002 HC R&B MH UMMC

## 2021-09-04 PROCEDURE — 250N000013 HC RX MED GY IP 250 OP 250 PS 637

## 2021-09-04 RX ADMIN — Medication 25 MCG: at 18:23

## 2021-09-04 RX ADMIN — OLANZAPINE 20 MG: 20 TABLET, ORALLY DISINTEGRATING ORAL at 18:23

## 2021-09-04 NOTE — PLAN OF CARE
"Nursing plan of care   Problem: Behavior Regulation Impairment (Psychotic Signs/Symptoms)  Goal: Improved Behavioral Control (Psychotic Signs/Symptoms)  Outcome: No Change  Pt continues to be oppositional and uncooperative with routine nursing care; declined VS assessment and to check in with this writer; blunted affect; withdrawn and guarded; ate dinner and snack; appetite is excellent; took scheduled med with ginger ale; denied having any problem, \" I am fine\"; No medication side effect was noted or reported; med compliant; will continue to monitor and and assess.   "

## 2021-09-04 NOTE — PLAN OF CARE
Problem: Sleep Disturbance  Goal: Adequate Sleep/Rest  Outcome: No Change   Pt appeared to sleep comfortably on all checks for 6.5 hours,he remains on elopement precautions for safety

## 2021-09-04 NOTE — PLAN OF CARE
"Nursing plan of care   Problem: Behavior Regulation Impairment (Psychotic Signs/Symptoms)  Goal: Improved Behavioral Control (Psychotic Signs/Symptoms)  Outcome: No Change  Pt was active and visible in the milieu; spent most of the shift pacing in the hallway; kept to himself and guarded; declined to check in with this writer, but stated, \" I am fine\"; noted talking to himself; affect is blunt; disheveled and untidy; offered to take scheduled medication, but stated, \" I will take after dinner\" ; no medication side effect was noted or reported this shift; will continue to monitor and assess.      Soon after dinner, pt agitated and start pacing up and down the hallways with tense and irritable affect; was responding to internal stimuli and over heard talking about diplomatic immunity; asked this writer if I am [place] and stated, \" yes, you are, fucken terrorist\"; will continue to monitor and provide support.          "

## 2021-09-04 NOTE — PLAN OF CARE
"Pt is up and pacing in the dalal. He had bkfst, and asked for extra coffee. His facial appearance is a bit less tense this morning, vs yesterday. He continues to decline 1:1 check in, also vs. Pt states, \"No! I have 12 degrees; I have an  degree, and a psychologist degree. I'm not supposed to be here!\" He continues to swear a bit, as he paces. Reminded pt to ask for assist, as needed. See PCS for additional shift asmnt.    1414) Pt continues to pace in the dalal for most of shift. Talking to self, at times. Not amenable to discussing tx plan or BH asmnt, currently.  "

## 2021-09-05 PROCEDURE — 124N000002 HC R&B MH UMMC

## 2021-09-05 PROCEDURE — 250N000013 HC RX MED GY IP 250 OP 250 PS 637

## 2021-09-05 RX ADMIN — Medication 25 MCG: at 19:04

## 2021-09-05 RX ADMIN — OLANZAPINE 20 MG: 20 TABLET, ORALLY DISINTEGRATING ORAL at 19:05

## 2021-09-05 ASSESSMENT — ACTIVITIES OF DAILY LIVING (ADL)
HYGIENE/GROOMING: INDEPENDENT
DRESS: INDEPENDENT
LAUNDRY: WITH SUPERVISION
ORAL_HYGIENE: INDEPENDENT

## 2021-09-05 NOTE — PROGRESS NOTES
"Pt appear agitated while pacing the dalal when he approached writer in an aggressive manner and asked \" why are you here in this country\" and \"go back to your country\". Pt also yelled \" you're a [place] terrorist\" and continued to repeat other racist remarks throughout the shift. Writer notified pt's nurse about this incident.   "

## 2021-09-05 NOTE — PLAN OF CARE
Plan of care   Problem: Sleep Disturbance  Goal: Adequate Sleep/Rest  Outcome: No Change  At the start of the shift,  Pt was up pacing the hallway; retired to bed around 0100 and remained asleep; No safety concern to report; pt appeared to have slept for 5.25 hrs; will continue to monitor and assess.

## 2021-09-05 NOTE — PLAN OF CARE
Problem: Adult Inpatient Plan of Care  Goal: Plan of Care Review  Outcome: No Change     Pt.was out in the milieu pacing the hallway. He showed great appetite as he ate breakfast and lunch. Affect blunted and irritable. He appears to be responding to internal stimuli. Was observed mumbling to self. He refused to check-in with writer. He did not require any prn medication or seclusion/restraint to manage behavior. Will continue to monitor.

## 2021-09-06 PROCEDURE — 124N000002 HC R&B MH UMMC

## 2021-09-06 PROCEDURE — 250N000013 HC RX MED GY IP 250 OP 250 PS 637

## 2021-09-06 PROCEDURE — 99232 SBSQ HOSP IP/OBS MODERATE 35: CPT | Performed by: PSYCHIATRY & NEUROLOGY

## 2021-09-06 RX ADMIN — Medication 25 MCG: at 18:25

## 2021-09-06 RX ADMIN — OLANZAPINE 20 MG: 20 TABLET, ORALLY DISINTEGRATING ORAL at 18:25

## 2021-09-06 ASSESSMENT — ACTIVITIES OF DAILY LIVING (ADL)
HYGIENE/GROOMING: INDEPENDENT;PROMPTS
DRESS: INDEPENDENT
LAUNDRY: UNABLE TO COMPLETE
ORAL_HYGIENE: INDEPENDENT
ORAL_HYGIENE: INDEPENDENT
LAUNDRY: WITH SUPERVISION
HYGIENE/GROOMING: INDEPENDENT
DRESS: INDEPENDENT

## 2021-09-06 NOTE — PLAN OF CARE
"Nursing Plan of care   Problem: Behavior Regulation Impairment (Psychotic Signs/Symptoms)  Goal: Improved Behavioral Control (Psychotic Signs/Symptoms)  Outcome: No Change   Pt spent most of the shift pacing in the hallway; guarded and tense but no behavioral outburst; was responding to internal stimuli and over hared talking about \"[place]\"; took scheduled medication with ginger ale; declined to check in with him; ate dinner; good appetite; No sign of SI/SIB was noted; will continue to monitor and assess.   "

## 2021-09-06 NOTE — PLAN OF CARE
Problem: Sleep Disturbance  Goal: Adequate Sleep/Rest  Outcome: Improving   Pt appeared to sleep comfortably for 6 hours this night,respirations easy

## 2021-09-06 NOTE — PLAN OF CARE
Antonio sarkar standing in dalal-appears preoccupied and is almost constantly moving lips and talking softly to self as though in conversation with other-apolinarq gesturing with hands or emphatically moving head as though making point in conversation-guarded on approach, quickly leaves interaction with staff-

## 2021-09-06 NOTE — PROGRESS NOTES
"    ----------------------------------------------------------------------------------------------------------  [facility]  Psychiatric Progress Note  Hospital Day #6    Anderson Perez MRN# [id number]   Age: 40 year old YOB: 1980   Date of Admission: 8/30/2021     Subjective   The patient was discussed with the treatment team and chart notes were reviewed.      Identifier: Anderson Perez is a 40 year old male with a past psychiatric history of bipolar disorder with psychosis vs schizoaffective disorder bipolar type, antisocial personality disorder, and intellectual disability admitted from the  ER on 08/31/2021 due to concern for out of control behaviors, aggression and psychosis in the context of medication refusal despite Cardoza.    Sleep:  6 hours (09/06/21 0600)  Prescribed Medications: Taken as prescribed with ginger ale - olanzapine ODT 20mg qpm  PRN Psychiatric Medications: alum & mag hydroxide-simethicone, hydrOXYzine, OLANZapine **OR** OLANZapine, polyethylene glycol     None    9/6/2021 - staff have offered PRNs, but he's consistently refused them.     Overnight Nursing Notes/Staff Report:  - paced halls until around 1:00, then slept (appeared to have slept 5.25 hours). No safety concern reported by night staff.  - 9/6/2021 day staff reported he's been agitated, pacing halls constantly. Refused PRNs.    Patient interview:  I attempted to speak with Antonio as he was pacing halls, after introducing myself. 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 That he doesn't need a doctor here. He said that he shouldn't be here, \"because the [facility] that they said, isn't even a real [facility]. I have  papers. The  is coming to get me. You all are going to be called terrorists.\"        ROS   Was not completed - see above.       Objective   Vitals:  Antonio declined " "vital signs measurement today.    Mental Status Examination:    Awake, alert.  Adequately-groomed in hospital scrubs.  Appeared mildly agitated - wide eyed staring at interviewer; uncooperative.  Mild psychomotor agitation - rapid gait, though no gross hyperactivity, nor invol mvmt/tremor.  Speech of mildly rapid rate, normal volume, articulate.  Thought Process was logical and linear; Thought Content was grossly delusion and with grandiosity.  Mood not described - though appeared irritable; affect was full, mildly agitated-appearing.  No outward evidence of responding to internal stimuli.  Per record, he has intellectual disability. Was not able to assess for gross cognitive deficits re: memory, attention/concentration, language capability, or fund of knowledge.  Insight: likely poor.  Reason and Judgment: impaired.        Allergies     Allergies   Allergen Reactions     Bee Venom Anaphylaxis     Clindamycin Hives     Morphine Hives        Labs & Imaging   No results found for this or any previous visit (from the past 24 hour(s)).     Assessment   Diagnostic Impression:  Per 9/3/2021 Psych Prog Note by ANANT Angelo MS3, MOY Schaefer MD (PGY1), and CRIS Prasad MD (Attending), \"Anderson Perez is a 40 year old male with a past psychiatric history of bipolar disorder with psychosis vs schizoaffective disorder bipolar type, antisocial personality disorder, and intellectual disability admitted from the  ER on 08/31/2021 due to concern for out of control behaviors, aggression and psychosis in the context of medication refusal despite Cardoza. Patient was recently released from [facility] to a group home. Presentation to the ED via ambulance after police contacted and provisional discharge rescinded. Group home noting escalating aggressive behavior, delusional thinking, and medication refusal despite Cardoza. PT refusal to discuss problems and largely denied all past history, medications, or any need for care.      His reported " "symptoms of delusions, psychosis, and erratic/aggressive behavior are consistent with prior presentations of psychosis.  Optimization of medications to target these symptom clusters in addition to determining facility placement after discharge will be targets for treatment during this admission.        Given that he currently has out of control behaviors, aggression and psychosis, patient warrants inpatient psychiatric hospitalization to maintain his safety. Disposition pending clinical stabilization, medication optimization and development of an appropriate discharge plan.\"    Principal Diagnosis:     Psychosis, Unspecified (Schizoaffective disorder vs Bipolar Affective Disorder with Psychosis), manic with psychotic features.    Secondary psychiatric diagnoses of concern this admission:     Intellectual Disability    Antisocial personality disorder    Psychiatric course:  Anderson Perez was admitted to [facility] following revocation of provisional discharge and on a court hold. His PTA olanzapine 20mg PO QDaily was resumed, as pt had been refusing at group home; per conversation with guardian, had planned to start on GRIGGS, but does not think that this was actually started, as patient was refusing medications, which prompted admission in setting of decompensation. Pt was converted to ODT formulation given concerns for cheeking medications; while initially resistant, pt was agreeable with taking ODT olanzapine as long as it was given with ginger ale, which was accommodated.     As of 9/6/2021, Anderson continues to present as mildly agitated, pacing the halls vigorously most to all of the day and sleeping approximately 5.25 hours last night. He's been adherent to scheduled olanzapine ODT 20mg po qpm, though has refused PRNs that have been offered to him by staff.  He declined to speak with me, though verbalized grossly delusional and grandiose beliefs and appeared to be mildly over-active.    Anderson Byrdgabriela " continued to meet criteria for inpatient hospitalization medication optimization, inpatient stabilization, and appropriate discharge planning.     Medical course:   Anderson Perez was physically examined by the ED prior to being transferred to the unit and was found to be medically stable and appropriate for admission.      Plan   Today's Changes:     No changes today - he will be receiving his 6th dose of olanzapine this evening (after likely non-adherence PTA).  _______________________________________________________________________  Psychiatric diagnoses and management:  Principal Diagnosis:     Psychosis, Unspecified (Schizoaffective disorder vs Bipolar Affective Disorder with Psychosis)  o Continue olanzapine 20mg ODT qDaily  o Will consider GRIGGS at later date following initial stabilization    Secondary Psychiatric Diagnoses:     Intellectual disability  o Per , IQ estimated to be 63  o NTD    Antisocial Personality Disorder  o NTD    Additional Planning:    Continue to monitor for and stabilize: aggression, psychosis and disorganization    Patient will be treated in therapeutic milieu with appropriate individual and group therapies as described.    Scheduled Medications (summary):    OLANZapine zydis  20 mg Oral Daily with supper     Vitamin D3  25 mcg Oral Daily       PRN Medications (summary):  alum & mag hydroxide-simethicone, hydrOXYzine, OLANZapine **OR** OLANZapine, polyethylene glycol    Discontinued Medications (& Rationale):    None    Consults:    None    Legal Status:    Court Hold    [place]     SIO:    No    Pt has not required locked seclusion or restraints in the past 24 hours to maintain safety, please refer to RN documentation for further details.    Disposition:    TBD, pending clinical stabilization, medication optimization, and formulation of a safe discharge plan.    Safety Assessment:   Behavioral Orders   Procedures     Cheeking Precautions (behavioral units)     Code 1 -  Restrict to Unit     Elopement precautions     Routine Programming     As clinically indicated     Status 15     Every 15 minutes.      ____________________________________________________________________  Pertinent Medical diagnoses and management:    Chronic Hepatitis C  o CBC, CMP if patient willing  o Continue to monitor  ____________________________________________________________________      Sina Cutler MD  Staff Psychiatrist providing cross-coverage

## 2021-09-06 NOTE — PLAN OF CARE
"  Problem: Behavioral Health Plan of Care  Goal: Plan of Care Review  Outcome: No Change  Flowsheets (Taken 9/6/2021 1102)  Plan of Care Reviewed With: patient  Progress: no change  Patient Agreement with Plan of Care: unable to participate     Problem: Behavior Regulation Impairment (Psychotic Signs/Symptoms)  Goal: Improved Behavioral Control (Psychotic Signs/Symptoms)  9/6/2021 1102 by Natacha Saini RN  Outcome: No Change      Antonio freq standing in dalal-appears preoccupied and is almost constantly moving lips and talking softly to self as though in conversation with other-freq gesturing with hands or emphatically moving head as though making point in conversation-guarded on approach, quickly leaves interaction with staff-freq walking about unit-[address] Anderson becoming increasingly agitated-angrily raising middle finger to no one present-pacing intensely in dalal-refused PRN medication-intense verbal response, \"Check my record! There's a  stamp on there! I'm not even supposed to be here! Check the maps! [facility] doesn't even exist!\"-Antonio did go to his room for brief period-accepted cup of coffee and able to decrease level of agitation-   "

## 2021-09-07 PROCEDURE — 124N000002 HC R&B MH UMMC

## 2021-09-07 PROCEDURE — 250N000013 HC RX MED GY IP 250 OP 250 PS 637

## 2021-09-07 PROCEDURE — 99231 SBSQ HOSP IP/OBS SF/LOW 25: CPT | Mod: GC | Performed by: PSYCHIATRY & NEUROLOGY

## 2021-09-07 RX ADMIN — OLANZAPINE 30 MG: 20 TABLET, ORALLY DISINTEGRATING ORAL at 17:30

## 2021-09-07 RX ADMIN — Medication 25 MCG: at 17:30

## 2021-09-07 ASSESSMENT — ACTIVITIES OF DAILY LIVING (ADL)
HYGIENE/GROOMING: INDEPENDENT
ORAL_HYGIENE: INDEPENDENT
LAUNDRY: WITH SUPERVISION
DRESS: INDEPENDENT

## 2021-09-07 NOTE — PLAN OF CARE
Problem: Behavioral Health Plan of Care  Goal: Plan of Care Review  Outcome: Improving  Flowsheets (Taken 9/7/2021 3713)  Plan of Care Reviewed With: patient  Progress: improving  Patient Agreement with Plan of Care: (Denies illness) disagrees (describe)     Problem: Behavior Regulation Impairment (Manic or Hypomanic Signs/Symptoms)  Goal: Improved Impulse Control (Manic/Hypomanic Signs/Symptoms)  Outcome: Improving     Problem: Behavior Regulation Impairment (Psychotic Signs/Symptoms)  Goal: Improved Behavioral Control (Psychotic Signs/Symptoms)  Outcome: Improving     Anderson spending most of day walking up and down hallway-freq lips moving as though responding in conversation, but fewer audible words as compared with yesterday-less gesturing as compared with yesterday-continues tense affect, but pleasant in brief interactions-avoids interaction with others

## 2021-09-07 NOTE — PLAN OF CARE
BEHAVIORAL TEAM DISCUSSION    Participants: Dr. Bobo, resident Mario Schaefer, RN Natacha Saini, medical students Jorge, OT Sujatha Bill, CTC Siobhan Irving  Progress: minimal  Anticipated length of stay: CCM making referrals  Continued Stay Criteria/Rationale: PD was revoked  Medical/Physical: pt declines Labs or medical check  Precautions:   Behavioral Orders   Procedures     Cheeking Precautions (behavioral units)     Code 1 - Restrict to Unit     Elopement precautions     Routine Programming     As clinically indicated     Status 15     Every 15 minutes.     Plan: provide a psychological assessment and manage medications per psychiatry, staff to provide a safe and therapeutic milieu, CTC to coordinate with CCM for discharge planning.  Rationale for change in precautions or plan: no change

## 2021-09-07 NOTE — PROGRESS NOTES
----------------------------------------------------------------------------------------------------------  [facility]  Psychiatric Progress Note  Hospital Day #7    Anderson Perez MRN# [id number]   Age: 40 year old YOB: 1980   Date of Admission: 8/30/2021     Subjective   The patient was discussed with the treatment team and chart notes were reviewed.      Identifier: Anderson Perez is a 40 year old male with a past psychiatric history of bipolar disorder with psychosis vs schizoaffective disorder bipolar type, antisocial personality disorder, and intellectual disability admitted from the  ER on 08/31/2021 due to concern for out of control behaviors, aggression and psychosis in the context of medication refusal despite Cardoza.    Sleep:  6 hours (09/07/21 0622)  Prescribed Medications: Taken as prescribed with ginger ale  PRN Psychiatric Medications: alum & mag hydroxide-simethicone, hydrOXYzine, OLANZapine **OR** OLANZapine, polyethylene glycol     None    Overnight Nursing Notes/Staff Report:  Anderson spending most of day walking up and down hallway-freq lips moving as though responding in conversation, but fewer audible words as compared with yesterday-less gesturing as compared with yesterday-continues tense affect, but pleasant in brief interactions-avoids interaction with others.    Pt pacing in hallway majority of shift. Pt is appearing to respond to visual stimuli and is frequently making odd gestures and pointing at empty spaced in the room. Pt also appears to be possibly responding to auditory hallucinations.     Patient interview:  Pt was willing to talk to the treatment team while pacing back and forth in the dalal.  The patient makes minimal effort to communicate with the team.  He says his mood is good, and that his medications are ok.  He denies any problems.  Pt endorses getting his meds with ginger ale, which is his preference.       YAMILA DRISCOLL was  "negative unless noted above.       Objective   Vitals:  declined    Mental Status Examination:    Orientation:  Grossly intact  General: Pacing up and down the dalal throughout the team's time on unit; was willing to briefly chat with the treatment team while he was pacing.  Appearance:  Dressed in hospital scrubs, hair is buzzed short.  Behavior: Guarded  Eye Contact:  Adequate eye contact during brief encounter, does make brief eye contact when passing the team in the hallway  Psychomotor:  Pacing hallway; no catatonia present  Speech:  Appropriate volume, and rate  Language: Appears to be fluent in English given response to our question  Mood:  \"okay\"  Affect:  Irritable, uninterested in interacting with others  Thought Process:  linear  Thought Content: continues to express delusions  Associations:  loose  Insight:  poor  Judgment:  poor  Attention Span: limited  Concentration:  limited  Recent and Remote Memory:  Grossly intact  Fund of Knowledge: WNL   Allergies     Allergies   Allergen Reactions     Bee Venom Anaphylaxis     Clindamycin Hives     Morphine Hives        Labs & Imaging   No results found for this or any previous visit (from the past 24 hour(s)).     Assessment   Diagnostic Impression:  Anderson Perez is a 40 year old male with a past psychiatric history of bipolar disorder with psychosis vs schizoaffective disorder bipolar type, antisocial personality disorder, and intellectual disability admitted from the  ER on 08/31/2021 due to concern for out of control behaviors, aggression and psychosis in the context of medication refusal despite Cardoza. Patient was recently released from [facility] to a group home. Presentation to the ED via ambulance after police contacted and provisional discharge rescinded. Group home noting escalating aggressive behavior, delusional thinking, and medication refusal despite Cardoza. PT refusal to discuss problems and largely denied all past history, medications, or any " need for care.     His reported symptoms of delusions, psychosis, and erratic/aggressive behavior are consistent with prior presentations of psychosis.  Optimization of medications to target these symptom clusters in addition to determining facility placement after discharge will be targets for treatment during this admission.      Given that he currently has out of control behaviors, aggression and psychosis, patient warrants inpatient psychiatric hospitalization to maintain his safety. Disposition pending clinical stabilization, medication optimization and development of an appropriate discharge plan.    Principal Diagnosis:     Psychosis, Unspecified (Schizoaffective disorder vs Bipolar Affective Disorder with Psychosis)    Secondary psychiatric diagnoses of concern this admission:     Intellectual Disability    Antisocial personality disorder    Psychiatric course:  Anderson Perez was admitted to [facility] following revocation of provisional discharge and on a court hold. His PTA olanzapine 20mg PO QDaily was resumed, as pt had been refusing at group home; per conversation with guardian, had planned to start on GRIGGS, but does not think that this was actually started, as patient was refusing medications, which prompted admission in setting of decompensation. Pt was converted to ODT formulation given concerns for cheeking medications; while initially resistant, pt was agreeable with taking ODT olanzapine as long as it was given with ginger ale, which was accommodated.     Anderson Perez continued to meet criteria for inpatient hospitalization medication optimization, inpatient stabilization, and appropriate discharge planning.     Medical course:   Anderson Perez was physically examined by the ED prior to being transferred to the unit and was found to be medically stable and appropriate for admission.      Plan   Today's Changes:     Increase zyprexa to 30mg ODT  hs  _______________________________________________________________________  Psychiatric diagnoses and management:  Principal Diagnosis:     Psychosis, Unspecified (Schizoaffective disorder vs Bipolar Affective Disorder with Psychosis)  o Continue olanzapine 20mg ODT qDaily  o Will consider GRIGGS at later date following initial stabilization    Secondary Psychiatric Diagnoses:     Intellectual disability  o Per , IQ estimated to be 63  o NTD    Antisocial Personality Disorder  o NTD    Additional Planning:    Continue to monitor for and stabilize: aggression, psychosis and disorganization    Patient will be treated in therapeutic milieu with appropriate individual and group therapies as described.    Scheduled Medications (summary):    OLANZapine zydis  30 mg Oral Daily with supper     Vitamin D3  25 mcg Oral Daily       PRN Medications (summary):  alum & mag hydroxide-simethicone, hydrOXYzine, OLANZapine **OR** OLANZapine, polyethylene glycol    Discontinued Medications (& Rationale):    None    Consults:    None    Legal Status:    Court Hold    Cardoza     SIO:    No    Pt has not required locked seclusion or restraints in the past 24 hours to maintain safety, please refer to RN documentation for further details.    Disposition:    TBD, pending clinical stabilization, medication optimization, and formulation of a safe discharge plan.    Safety Assessment:   Behavioral Orders   Procedures     Cheeking Precautions (behavioral units)     Code 1 - Restrict to Unit     Elopement precautions     Routine Programming     As clinically indicated     Status 15     Every 15 minutes.      ____________________________________________________________________  Pertinent Medical diagnoses and management:    Chronic Hepatitis C  o CBC, CMP if patient willing  o Continue to monitor  ____________________________________________________________________    Attestation:  This patient has been seen and evaluated by Mike edgar  MD Jihan.  I have discussed this patient with the house staff team including the resident and medical student and I agree with the findings and plan in this note.    I have reviewed today's vital signs, medications, labs and imaging. Mike Bobo MD , PhD.

## 2021-09-07 NOTE — PLAN OF CARE
Problem: Social Isolation  Goal: Increased Social Interaction  Outcome: No Change   Nursing Assessment    Recent Vitals: Refused    General Shift Summary  Pt pacing in hallway majority of shift. Pt is appearing to respond to visual stimuli and is frequently making odd gestures and pointing at empty spaced in the room. Pt also appears to be possibly responding to auditory hallucinations.     Pt is pleasant but brief with writer and declines to check in. Pt states that everything is going okay though. Pt did not have any behavioral concerns this shift.     Patient is medication compliant and reported no side effects. No PRN medications given this shift.     Hygiene is neglected. Appetite good.       Davey Moody, RN

## 2021-09-07 NOTE — PLAN OF CARE
Assessment/Intervention/Current Symptoms and Care Coordination    Attended team meeting and reviewed chart notes.    Writer left message with CHRISTIAN Barragan -[phone]- to see if there is any movement at [email or web address] or [facility].      Discharge Plan or Goal  Discharge to [facility] [[email or web address] or [facility]      Barriers to Discharge   Needs bed available      Referral Status  CHRISTIAN Barragan [facility]      [facility] Status  PD was revoked

## 2021-09-07 NOTE — PLAN OF CARE
Night Shift Summary (9/6/21 into 09/07/21)    Pt awake in room at start of shift. Pt appeared asleep around 0015. Pt woke a couple times briefly during the night but remained in room and did not have interaction with staff.     Appears to have slept 6 hours.

## 2021-09-08 PROCEDURE — 250N000013 HC RX MED GY IP 250 OP 250 PS 637

## 2021-09-08 PROCEDURE — 124N000002 HC R&B MH UMMC

## 2021-09-08 PROCEDURE — 99232 SBSQ HOSP IP/OBS MODERATE 35: CPT | Mod: GC | Performed by: PSYCHIATRY & NEUROLOGY

## 2021-09-08 RX ADMIN — OLANZAPINE 30 MG: 20 TABLET, ORALLY DISINTEGRATING ORAL at 18:08

## 2021-09-08 RX ADMIN — Medication 25 MCG: at 18:08

## 2021-09-08 ASSESSMENT — ACTIVITIES OF DAILY LIVING (ADL)
DRESS: SCRUBS (BEHAVIORAL HEALTH);INDEPENDENT
LAUNDRY: UNABLE TO COMPLETE
ORAL_HYGIENE: INDEPENDENT
HYGIENE/GROOMING: INDEPENDENT

## 2021-09-08 NOTE — PLAN OF CARE
"Problem: Adult Inpatient Plan of Care  Goal: Optimal Comfort and Wellbeing  Outcome: No Change  Intervention: Provide Person-Centered Care     Problem: Suicidal Behavior  Goal: Suicidal Behavior is Absent or Managed  Outcome: No Change     Patient is up on unit frequently pacing halls.  He does appear to be responding to internal stimuli as he is observed talking to himself and making bizarre gestures with his hands, however he denies any hallucinations.  Patient is dismissive when asked assessment questions.  He denies anxiety, depression, pain, SI, HI, and SIB.  He states, \"I'm fine.\"  Patient refused vital signs this shift.  Patient remains safe on unit.  Will continue to monitor.  Pura Clark RN   "

## 2021-09-08 NOTE — PLAN OF CARE
"Nursing plan of care   Problem: Behavior Regulation Impairment (Psychotic Signs/Symptoms)  Goal: Improved Behavioral Control (Psychotic Signs/Symptoms)  Outcome: No Change  Pt has been busy pacing in the hallway; was noted quietly responding to internal stimuli; declined to check in with this writer, but sated \"I am fine, I already checked in this morning\"; appeared to be less tense, but dismissive of staff attention;isolate and withdrawn; mostly kept to himself and no interaction with peers; medication compliant; took medication with ginger ale; No medication side effect was noted or reported; No SI/SIB noted; declined shift VS assessment; will continue to monitor and assess.      "

## 2021-09-08 NOTE — PLAN OF CARE
Problem: Behavior Regulation Impairment (Psychotic Signs/Symptoms)  Goal: Improved Behavioral Control (Psychotic Signs/Symptoms)  Outcome: Improving   Pt has spent entire shift walking the halls , talking to himself . Med compliant , pleasant on approach but very minimal conversation

## 2021-09-08 NOTE — PROGRESS NOTES
"    ----------------------------------------------------------------------------------------------------------  [facility]  Psychiatric Progress Note  Hospital Day #8    Anderson Perez MRN# [id number]   Age: 40 year old YOB: 1980   Date of Admission: 8/30/2021     Subjective   The patient was discussed with the treatment team and chart notes were reviewed.      Identifier: Anderson Perez is a 40 year old male with a past psychiatric history of bipolar disorder with psychosis vs schizoaffective disorder bipolar type, antisocial personality disorder, and intellectual disability admitted from the  ER on 08/31/2021 due to concern for out of control behaviors, aggression and psychosis in the context of medication refusal despite Cardoza.    Sleep:  7 hours (09/08/21 0600)  Prescribed Medications: Taken as prescribed with ginger ale  PRN Psychiatric Medications: alum & mag hydroxide-simethicone, hydrOXYzine, OLANZapine **OR** OLANZapine, polyethylene glycol     None    Overnight Nursing Notes/Staff Report:  \"Pt has spent entire shift walking the halls , talking to himself . Med compliant , pleasant on approach but very minimal conversation\"    \"Pt appeared to sleep 7 hours. No PRNs given or requested. No medical or behavioral events this shift.\"    Patient interview:    Pt seen in his room during team rounds. Short answers to questions, responded \"yup\" to if his sleep was alright, was having adequate BM, and if he was getting enough to eat. Responded \"nope\" to if there was anything he needed or we could do for him today.     ROS   ROS was negative unless noted above.       Objective   Vitals:  declined    Mental Status Examination:    Orientation:  Grossly intact  General: Pacing up and down the dalal prior to meeting, but was sitting up in bed when seen by team.  Appearance:  Dressed in hospital scrubs, hair is buzzed short.  Behavior: Guarded  Eye Contact:  Adequate eye " "contact during brief encounter, does make brief eye contact when passing the team in the hallway and while sitting in bed  Psychomotor:  Pacing hallway; no catatonia present  Speech:  Appropriate volume, and rate  Language: Appears to be fluent in English given response to our question  Mood:  \"I don't need to check in with anyone\"  Affect:  Irritable but less so than previous days, uninterested in interacting with others  Thought Process:  linear  Thought Content: Unable to assess due to brevity of encounter  Associations:  loose  Insight:  poor  Judgment:  poor  Attention Span: limited  Concentration:  limited  Recent and Remote Memory:  Grossly intact  Fund of Knowledge: WNL   Allergies     Allergies   Allergen Reactions     Bee Venom Anaphylaxis     Clindamycin Hives     Morphine Hives        Labs & Imaging   No results found for this or any previous visit (from the past 24 hour(s)).     Assessment   Diagnostic Impression:  Anderson Perez is a 40 year old male with a past psychiatric history of bipolar disorder with psychosis vs schizoaffective disorder bipolar type, antisocial personality disorder, and intellectual disability admitted from the  ER on 08/31/2021 due to concern for out of control behaviors, aggression and psychosis in the context of medication refusal despite Cardoza. Patient was recently released from [facility] to a group home. Presentation to the ED via ambulance after police contacted and provisional discharge rescinded. Group home noting escalating aggressive behavior, delusional thinking, and medication refusal despite Cardoza. PT refusal to discuss problems and largely denied all past history, medications, or any need for care.     His reported symptoms of delusions, psychosis, and erratic/aggressive behavior are consistent with prior presentations of psychosis.  Optimization of medications to target these symptom clusters in addition to determining facility placement after discharge will be " targets for treatment during this admission.      Given that he currently has out of control behaviors, aggression and psychosis, patient warrants inpatient psychiatric hospitalization to maintain his safety. Disposition pending clinical stabilization, medication optimization and development of an appropriate discharge plan.    Principal Diagnosis:     Psychosis, Unspecified (Schizoaffective disorder vs Bipolar Affective Disorder with Psychosis)    Secondary psychiatric diagnoses of concern this admission:     Intellectual Disability    Antisocial personality disorder    Psychiatric course:  Anderson Perez was admitted to [facility] following revocation of provisional discharge and on a court hold. His PTA olanzapine 20mg PO QDaily was resumed, as pt had been refusing at group home; per conversation with guardian, had planned to start on GRIGGS, but does not think that this was actually started, as patient was refusing medications, which prompted admission in setting of decompensation. Pt was converted to ODT formulation given concerns for cheeking medications; while initially resistant, pt was agreeable with taking ODT olanzapine as long as it was given with ginger ale, which was accommodated.     Anderson Perez continued to meet criteria for inpatient hospitalization medication optimization, inpatient stabilization, and appropriate discharge planning.     Medical course:   Anderson Perez was physically examined by the ED prior to being transferred to the unit and was found to be medically stable and appropriate for admission.      Plan   Today's Changes:     None  _______________________________________________________________________  Psychiatric diagnoses and management:  Principal Diagnosis:     Psychosis, Unspecified (Schizoaffective disorder vs Bipolar Affective Disorder with Psychosis)  o Continue olanzapine 30mg ODT qDaily  o Will consider GRIGGS at later date following initial stabilization    Secondary  Psychiatric Diagnoses:     Intellectual disability  o Per , IQ estimated to be 63  o NTD    Antisocial Personality Disorder  o NTD    Additional Planning:    Continue to monitor for and stabilize: aggression, psychosis and disorganization    Patient will be treated in therapeutic milieu with appropriate individual and group therapies as described.    Scheduled Medications (summary):    OLANZapine zydis  30 mg Oral Daily with supper     Vitamin D3  25 mcg Oral Daily       PRN Medications (summary):  alum & mag hydroxide-simethicone, hydrOXYzine, OLANZapine **OR** OLANZapine, polyethylene glycol    Discontinued Medications (& Rationale):    None    Consults:    None    Legal Status:    Court Hold    [place]     SIO:    No    Pt has not required locked seclusion or restraints in the past 24 hours to maintain safety, please refer to RN documentation for further details.    Disposition:    TBD, pending clinical stabilization, medication optimization, and formulation of a safe discharge plan.    Safety Assessment:   Behavioral Orders   Procedures     Cheeking Precautions (behavioral units)     Code 1 - Restrict to Unit     Elopement precautions     Routine Programming     As clinically indicated     Status 15     Every 15 minutes.      ____________________________________________________________________  Pertinent Medical diagnoses and management:    Chronic Hepatitis C  o CBC, CMP if patient willing  o Continue to monitor  ____________________________________________________________________  Patient was seen and discussed with the Attending Physician, Dr. Bobo.    Mario Schaefer, PGY-1 (Psychiatry)  [facility]    Attestation:  This patient has been seen and evaluated by me, Mike Bobo MD.  I have discussed this patient with the house staff team including the resident and medical student and I agree with the findings and plan in this note.    I have reviewed today's vital signs, medications, labs  and imaging. Mike Bobo MD , PhD.

## 2021-09-09 PROCEDURE — 99231 SBSQ HOSP IP/OBS SF/LOW 25: CPT | Mod: GC | Performed by: PSYCHIATRY & NEUROLOGY

## 2021-09-09 PROCEDURE — 250N000013 HC RX MED GY IP 250 OP 250 PS 637

## 2021-09-09 PROCEDURE — 124N000002 HC R&B MH UMMC

## 2021-09-09 RX ADMIN — OLANZAPINE 30 MG: 20 TABLET, ORALLY DISINTEGRATING ORAL at 18:07

## 2021-09-09 RX ADMIN — Medication 25 MCG: at 18:07

## 2021-09-09 ASSESSMENT — ACTIVITIES OF DAILY LIVING (ADL)
LAUNDRY: WITH SUPERVISION
HYGIENE/GROOMING: INDEPENDENT;PROMPTS
DRESS: INDEPENDENT
ORAL_HYGIENE: INDEPENDENT

## 2021-09-09 NOTE — PROGRESS NOTES
"    ----------------------------------------------------------------------------------------------------------  [facility]  Psychiatric Progress Note  Hospital Day #9    Anderson Perez MRN# [id number]   Age: 40 year old YOB: 1980   Date of Admission: 8/30/2021     Subjective   The patient was discussed with the treatment team and chart notes were reviewed.      Identifier: Anderson Perez is a 40 year old male with a past psychiatric history of bipolar disorder with psychosis vs schizoaffective disorder bipolar type, antisocial personality disorder, and intellectual disability admitted from the  ER on 08/31/2021 due to concern for out of control behaviors, aggression and psychosis in the context of medication refusal despite Cardoza.    Sleep:  6 hours (09/09/21 0600)  Prescribed Medications: Taken as prescribed with ginger ale  PRN Psychiatric Medications: alum & mag hydroxide-simethicone, hydrOXYzine, OLANZapine **OR** OLANZapine, polyethylene glycol     None    Overnight Nursing Notes/Staff Report:  \"Pt has been busy pacing in the hallway; was noted quietly responding to internal stimuli; declined to check in with this writer, but sated \"I am fine, I already checked in this morning\"; appeared to be less tense, but dismissive of staff attention;isolate and withdrawn; mostly kept to himself and no interaction with peers; medication compliant; took medication with ginger ale; No medication side effect was noted or reported; No SI/SIB noted; declined shift VS assessment; will continue to monitor and assess\"    \"Pt had a good shift; respiration was regular and unlabored; No safety concern to report; pt appeared to have slept for 6 hrs; will continue to monitor ad assess.\"    Patient interview:   Patient was seen very briefly while pacing the hallways. States that he does not need to check in. Responds to questions about sleeping well, having BM's, and eating well in the " "affirmative, states that he is eating all food at meals.     ROS   ROS was negative unless noted above.       Objective   Vitals:  declined    Mental Status Examination:    Orientation:  Grossly intact  General: Pacing up and down the dalal prior to meeting, but was sitting up in bed when seen by team.  Appearance:  Dressed in hospital scrubs, hair is buzzed short.  Behavior: Guarded  Eye Contact:  Adequate eye contact during brief encounter, does make brief eye contact when passing the team in the hallway and while sitting in bed  Psychomotor:  Pacing hallway; no catatonia present  Speech:  Appropriate volume, and rate  Language: Appears to be fluent in English given response to our question  Mood:  \"I don't need to check in\"  Affect:  Irritable but less so than previous days, uninterested in interacting with others  Thought Process:  linear  Thought Content: Unable to assess due to brevity of encounter  Associations:  loose  Insight:  poor  Judgment:  poor  Attention Span: limited  Concentration:  limited  Recent and Remote Memory:  Grossly intact  Fund of Knowledge: WNL   Allergies     Allergies   Allergen Reactions     Bee Venom Anaphylaxis     Clindamycin Hives     Morphine Hives        Labs & Imaging   No results found for this or any previous visit (from the past 24 hour(s)).     Assessment   Diagnostic Impression:  Anderson Perez is a 40 year old male with a past psychiatric history of bipolar disorder with psychosis vs schizoaffective disorder bipolar type, antisocial personality disorder, and intellectual disability admitted from the  ER on 08/31/2021 due to concern for out of control behaviors, aggression and psychosis in the context of medication refusal despite Cardoza. Patient was recently released from [facility] to a group home. Presentation to the ED via ambulance after police contacted and provisional discharge rescinded. Group home noting escalating aggressive behavior, delusional thinking, and " medication refusal despite Cardoza. PT refusal to discuss problems and largely denied all past history, medications, or any need for care.     His reported symptoms of delusions, psychosis, and erratic/aggressive behavior are consistent with prior presentations of psychosis.  Optimization of medications to target these symptom clusters in addition to determining facility placement after discharge will be targets for treatment during this admission.      Given that he currently has out of control behaviors, aggression and psychosis, patient warrants inpatient psychiatric hospitalization to maintain his safety. Disposition pending clinical stabilization, medication optimization and development of an appropriate discharge plan.    Principal Diagnosis:     Psychosis, Unspecified (Schizoaffective disorder vs Bipolar Affective Disorder with Psychosis)    Secondary psychiatric diagnoses of concern this admission:     Intellectual Disability    Antisocial personality disorder    Psychiatric course:  Anderson Perez was admitted to [facility] following revocation of provisional discharge and on a court hold. His PTA olanzapine 20mg PO QDaily was resumed, as pt had been refusing at group home; per conversation with guardian, had planned to start on GRIGGS, but does not think that this was actually started, as patient was refusing medications, which prompted admission in setting of decompensation. Pt was converted to ODT formulation given concerns for cheeking medications; while initially resistant, pt was agreeable with taking ODT olanzapine as long as it was given with ginger ale, which was accommodated.     Anderson Perez continued to meet criteria for inpatient hospitalization medication optimization, inpatient stabilization, and appropriate discharge planning.     Medical course:   Anderson Perez was physically examined by the ED prior to being transferred to the unit and was found to be medically stable and  appropriate for admission.      Plan   Today's Changes:     None  _______________________________________________________________________  Psychiatric diagnoses and management:  Principal Diagnosis:     Psychosis, Unspecified (Schizoaffective disorder vs Bipolar Affective Disorder with Psychosis)  o Continue olanzapine 30mg ODT qDaily  o Will consider GRIGGS at later date following initial stabilization    Secondary Psychiatric Diagnoses:     Intellectual disability  o Per , IQ estimated to be 63  o NTD    Antisocial Personality Disorder  o NTD    Additional Planning:    Continue to monitor for and stabilize: aggression, psychosis and disorganization    Patient will be treated in therapeutic milieu with appropriate individual and group therapies as described.    Scheduled Medications (summary):    OLANZapine zydis  30 mg Oral Daily with supper     Vitamin D3  25 mcg Oral Daily       PRN Medications (summary):  alum & mag hydroxide-simethicone, hydrOXYzine, OLANZapine **OR** OLANZapine, polyethylene glycol    Discontinued Medications (& Rationale):    None    Consults:    None    Legal Status:    Court Hold    Cardoza     SIO:    No    Pt has not required locked seclusion or restraints in the past 24 hours to maintain safety, please refer to RN documentation for further details.    Disposition:    TBD, pending clinical stabilization, medication optimization, and formulation of a safe discharge plan.    Safety Assessment:   Behavioral Orders   Procedures     Cheeking Precautions (behavioral units)     Code 1 - Restrict to Unit     Elopement precautions     Routine Programming     As clinically indicated     Status 15     Every 15 minutes.      ____________________________________________________________________  Pertinent Medical diagnoses and management:    Chronic Hepatitis C  o CBC, CMP if patient willing  o Continue to monitor  ____________________________________________________________________  Patient  was seen and discussed with the Attending Physician, Dr. Bobo.    Mario Schaefer, PGY-1 (Psychiatry)  [facility]    Attestation:  This patient has been seen and evaluated by me, Mike Bobo MD.  I have discussed this patient with the house staff team including the resident and medical student and I agree with the findings and plan in this note.    I have reviewed today's vital signs, medications, labs and imaging. Mike Bobo MD , PhD.

## 2021-09-09 NOTE — PLAN OF CARE
Plan of care   Problem: Sleep Disturbance  Goal: Adequate Sleep/Rest  Outcome: Improving   Pt had a good shift; respiration was regular and unlabored; No safety concern to report; pt appeared to have slept for 6 hrs; will continue to monitor ad assess.

## 2021-09-09 NOTE — PLAN OF CARE
Assessment/Intervention/Current Symptoms and Care Coordination    Attended team meeting and reviewed chart notes.    Writer left another message with CRHISTIAN Barragan [phone] to see if there is any movement on a bed at a [facility].      Discharge Plan or Goal  [facility] or [facility]      Barriers to Discharge   Pt needs stabilization  No bed available at this time      Referral Status  No bed available at this time      Legal Status  PD revocation

## 2021-09-09 NOTE — PLAN OF CARE
Problem: Behavioral Health Plan of Care  Goal: Plan of Care Review  Outcome: Improving  Flowsheets (Taken 9/9/2021 1023)  Plan of Care Reviewed With: patient  Progress: improving  Patient Agreement with Plan of Care: (Denies illness) disagrees (describe)     Problem: Behavior Regulation Impairment (Manic or Hypomanic Signs/Symptoms)  Goal: Improved Impulse Control (Manic/Hypomanic Signs/Symptoms)  Outcome: Improving     Problem: Behavior Regulation Impairment (Psychotic Signs/Symptoms)  Goal: Improved Behavioral Control (Psychotic Signs/Symptoms)  Outcome: Improving     Anderson continues to spend time walking up and down the dalal, almost constantly moving lips as though in conversation with someone-at times gesturing with hands-pleasant on approach, but continues to avoid interactions with others-does not interact with peers-continues to refuse VS

## 2021-09-09 NOTE — PLAN OF CARE
"Nursing plan of care   Problem: Behavior Regulation Impairment (Psychotic Signs/Symptoms)  Goal: Improved Behavioral Control (Psychotic Signs/Symptoms)  Outcome: No Change  Pt spent most of the shift pacing in the hallway, and was noted responding to internal stimuli; kept to himself and withdrawn; attended no group; no shower; declined check in but answered he is doing \"OK\"; No sign of SI/SIB was noted or reported this shift.       During HS medication administration, pt get agitated and stated,\" You know that you are a terrorist giving me this medication, Why I am taking two ODT tablets?; I have 9 degrees, I am better than all F* Drs in this hospital\" Pt also stated, \"I have Doctorate degree in archeology and psychology, who treats psychopaths\". Pt appeared to be cheeking his medication AEB noted an attempt to hide the ODT Zyprexa under his tongue and run to his room, he took Vitamin D separately. Will continue to monitor and assess.        "

## 2021-09-10 PROCEDURE — 124N000002 HC R&B MH UMMC

## 2021-09-10 PROCEDURE — 99231 SBSQ HOSP IP/OBS SF/LOW 25: CPT | Mod: GC | Performed by: PSYCHIATRY & NEUROLOGY

## 2021-09-10 PROCEDURE — 250N000013 HC RX MED GY IP 250 OP 250 PS 637

## 2021-09-10 RX ADMIN — Medication 25 MCG: at 17:42

## 2021-09-10 RX ADMIN — OLANZAPINE 30 MG: 20 TABLET, ORALLY DISINTEGRATING ORAL at 17:42

## 2021-09-10 ASSESSMENT — ACTIVITIES OF DAILY LIVING (ADL)
LAUNDRY: WITH SUPERVISION
ORAL_HYGIENE: INDEPENDENT
DRESS: INDEPENDENT
HYGIENE/GROOMING: INDEPENDENT;PROMPTS

## 2021-09-10 NOTE — PROGRESS NOTES
"    ----------------------------------------------------------------------------------------------------------  [facility]  Psychiatric Progress Note  Hospital Day #10    Anderson Perez MRN# [id number]   Age: 40 year old YOB: 1980   Date of Admission: 8/30/2021     Subjective   The patient was discussed with the treatment team and chart notes were reviewed.      Identifier: Anderson Perez is a 40 year old male with a past psychiatric history of bipolar disorder with psychosis vs schizoaffective disorder bipolar type, antisocial personality disorder, and intellectual disability admitted from the  ER on 08/31/2021 due to concern for out of control behaviors, aggression and psychosis in the context of medication refusal despite Cardoza.    Sleep:  7 hours (09/10/21 0600)  Prescribed Medications: Taken as prescribed with ginger ale  PRN Psychiatric Medications: alum & mag hydroxide-simethicone, hydrOXYzine, OLANZapine **OR** OLANZapine, polyethylene glycol     None    Overnight Nursing Notes/Staff Report:  \"Pt spent most of the shift pacing in the hallway, and was noted responding to internal stimuli; kept to himself and withdrawn; attended no group; no shower; declined check in but answered he is doing \"OK\"; No sign of SI/SIB was noted or reported this shift.        During HS medication administration, pt get agitated and stated,\" You know that you are a terrorist giving me this medication, Why I am taking two ODT tablets?; I have 9 degrees, I am better than all F* Drs in this hospital\" Pt also stated, \"I have Doctorate degree in archeology and psychology, who treats psychopaths\". Pt appeared to be cheeking his medication AEB noted an attempt to hide the ODT Zyprexa under his tongue and run to his room, he took Vitamin D separately. Will continue to monitor and assess.\"    \"Pt appeared to sleep 7 hours. No PRNs given or requested. No medical or behavioral events this " "shift.\"    Patient interview:   Pt was seen while pacing in the hallway as a part of team rounds. Responds that \"I don't need to check in\" and affirms that he is sleeping and eating well, no concerns. States that he was not trying to spit out or cheek medications last night. Was accepting of his door being closed during medication times for the next few days.     ROS   ROS was negative unless noted above.       Objective   Vitals:  declined    Mental Status Examination:    Orientation:  Grossly intact  General: Pacing up and down the dalal during meeting.  Appearance:  Dressed in hospital scrubs, hair is buzzed short.  Behavior: Guarded  Eye Contact:  Adequate eye contact during brief encounter, does make brief eye contact when passing the team in the hallway  Psychomotor:  Pacing hallway; no catatonia present  Speech:  Appropriate volume, and rate  Language: Appears to be fluent in English given response to our question  Mood:  \"I don't need a check-in\"  Affect:  Irritable but less so than previous days, uninterested in interacting with others  Thought Process:  Uncertain, limited interactions  Thought Content: Unable to assess due to brevity of encounter  Associations:  loose  Insight:  poor  Judgment:  poor  Attention Span: limited  Concentration:  limited  Recent and Remote Memory:  Grossly intact  Fund of Knowledge: Suspect lower than average   Allergies     Allergies   Allergen Reactions     Bee Venom Anaphylaxis     Clindamycin Hives     Morphine Hives        Labs & Imaging   No results found for this or any previous visit (from the past 24 hour(s)).     Assessment   Diagnostic Impression:  Anderson Perez is a 40 year old male with a past psychiatric history of bipolar disorder with psychosis vs schizoaffective disorder bipolar type, antisocial personality disorder, and intellectual disability admitted from the  ER on 08/31/2021 due to concern for out of control behaviors, aggression and psychosis in the " context of medication refusal despite Cardoza. Patient was recently released from [facility] to a group home. Presentation to the ED via ambulance after police contacted and provisional discharge rescinded. Group home noting escalating aggressive behavior, delusional thinking, and medication refusal despite Cardoza. PT refusal to discuss problems and largely denied all past history, medications, or any need for care.     His reported symptoms of delusions, psychosis, and erratic/aggressive behavior are consistent with prior presentations of psychosis.  Optimization of medications to target these symptom clusters in addition to determining facility placement after discharge will be targets for treatment during this admission.      Given that he currently has out of control behaviors, aggression and psychosis, patient warrants inpatient psychiatric hospitalization to maintain his safety. Disposition pending clinical stabilization, medication optimization and development of an appropriate discharge plan.    Principal Diagnosis:     Psychosis, Unspecified (Schizoaffective disorder vs Bipolar Affective Disorder with Psychosis)    Secondary psychiatric diagnoses of concern this admission:     Intellectual Disability    Antisocial personality disorder    Psychiatric course:  Anderson Perez was admitted to [facility] following revocation of provisional discharge and on a court hold. His PTA olanzapine 20mg PO QDaily was resumed, as pt had been refusing at group home; per conversation with guardian, had planned to start on GRIGGS, but does not think that this was actually started, as patient was refusing medications, which prompted admission in setting of decompensation. Pt was converted to ODT formulation given concerns for cheeking medications; while initially resistant, pt was agreeable with taking ODT olanzapine as long as it was given with ginger ale, which was accommodated.     Anderson Perez continued to meet criteria for  inpatient hospitalization medication optimization, inpatient stabilization, and appropriate discharge planning.     Medical course:   Anderson Perez was physically examined by the ED prior to being transferred to the unit and was found to be medically stable and appropriate for admission.      Plan   Today's Changes:     None  _______________________________________________________________________  Psychiatric diagnoses and management:  Principal Diagnosis:     Psychosis, Unspecified (Schizoaffective disorder vs Bipolar Affective Disorder with Psychosis)  o Continue olanzapine 30mg ODT qDaily  o Will consider GRIGGS at later date following initial stabilization    Secondary Psychiatric Diagnoses:     Intellectual disability  o Per , IQ estimated to be 63  o NTD    Antisocial Personality Disorder  o NTD    Additional Planning:    Continue to monitor for and stabilize: aggression, psychosis and disorganization    Patient will be treated in therapeutic milieu with appropriate individual and group therapies as described.    Scheduled Medications (summary):    OLANZapine zydis  30 mg Oral Daily with supper     Vitamin D3  25 mcg Oral Daily       PRN Medications (summary):  alum & mag hydroxide-simethicone, hydrOXYzine, OLANZapine **OR** OLANZapine, polyethylene glycol    Discontinued Medications (& Rationale):    None    Consults:    None    Legal Status:    Court Hold    Sony ADAMES:    No    Pt has not required locked seclusion or restraints in the past 24 hours to maintain safety, please refer to RN documentation for further details.    Disposition:    TBD, pending clinical stabilization, medication optimization, and formulation of a safe discharge plan.    Safety Assessment:   Behavioral Orders   Procedures     Cheeking Precautions (behavioral units)     Code 1 - Restrict to Unit     Elopement precautions     Routine Programming     As clinically indicated     Status 15     Every 15 minutes.       ____________________________________________________________________  Pertinent Medical diagnoses and management:    Chronic Hepatitis C  o CBC, CMP if patient willing  o Continue to monitor  ____________________________________________________________________  Patient was seen and discussed with the Attending Physician, Dr. Bobo.    Mario Schaefer, PGY-1 (Psychiatry)  [facility]    Attestation:  This patient has been seen and evaluated by me, Mike Bobo MD.  I have discussed this patient with the house staff team including the resident and medical student and I agree with the findings and plan in this note.    I have reviewed today's vital signs, medications, labs and imaging. Mike Bobo MD , PhD.

## 2021-09-10 NOTE — PLAN OF CARE
Patient pacing dalal majority of shift thus far.  Patient joaquina when asked if he thought meds beneficial ('yeah').   Pt appeared to be responding to internal stimuli throughout shift; minimal interaction with staff or peers.

## 2021-09-10 NOTE — PLAN OF CARE
Assessment/Intervention/Current Symptoms and Care Coordination    Attended team meeting and reviewed chart notes.    Pt is still pacing the halls and responding to internal stimuli. He appears to be improving. He was cheeking meds last evening.      Discharge Plan or Goal  To [facility]    Barriers to Discharge   Needs a bed      Referral Status  No beds at this time    Legal Status  PD was revoked

## 2021-09-11 PROCEDURE — 124N000002 HC R&B MH UMMC

## 2021-09-11 PROCEDURE — 250N000013 HC RX MED GY IP 250 OP 250 PS 637

## 2021-09-11 RX ADMIN — Medication 25 MCG: at 17:58

## 2021-09-11 RX ADMIN — OLANZAPINE 30 MG: 20 TABLET, ORALLY DISINTEGRATING ORAL at 17:58

## 2021-09-11 ASSESSMENT — ACTIVITIES OF DAILY LIVING (ADL)
LAUNDRY: WITH SUPERVISION
DRESS: INDEPENDENT
HYGIENE/GROOMING: INDEPENDENT
ORAL_HYGIENE: INDEPENDENT

## 2021-09-11 NOTE — PLAN OF CARE
Problem: Behavioral Health Plan of Care  Goal: Plan of Care Review  Outcome: Improving  Flowsheets (Taken 9/11/2021 4936)  Plan of Care Reviewed With: patient  Progress: improving  Patient Agreement with Plan of Care: (Does not believe he is ill) disagrees (describe)     Problem: Behavior Regulation Impairment (Psychotic Signs/Symptoms)  Goal: Improved Behavioral Control (Psychotic Signs/Symptoms)  Outcome: Improving     Anderson OOB for bkft-returned to room where he has remained in bed sleeping and resting until 1045-remained quietly in bed even while housekeeping mopping floors and cleaning room-post OOB pacing in dalal, moving mouth as though in conversation-does not interact with others-

## 2021-09-12 PROCEDURE — 124N000002 HC R&B MH UMMC

## 2021-09-12 PROCEDURE — 250N000013 HC RX MED GY IP 250 OP 250 PS 637

## 2021-09-12 RX ADMIN — Medication 25 MCG: at 18:04

## 2021-09-12 RX ADMIN — OLANZAPINE 30 MG: 20 TABLET, ORALLY DISINTEGRATING ORAL at 18:04

## 2021-09-12 ASSESSMENT — ACTIVITIES OF DAILY LIVING (ADL)
ORAL_HYGIENE: INDEPENDENT
LAUNDRY: WITH SUPERVISION
HYGIENE/GROOMING: INDEPENDENT
DRESS: INDEPENDENT

## 2021-09-12 NOTE — PLAN OF CARE
"  Problem: Behavioral Health Plan of Care  Goal: Plan of Care Review  Outcome: No Change  Flowsheets (Taken 9/12/2021 1229)  Plan of Care Reviewed With: patient  Progress: no change  Patient Agreement with Plan of Care: disagrees (describe)     Problem: Behavior Regulation Impairment (Manic or Hypomanic Signs/Symptoms)  Goal: Improved Impulse Control (Manic/Hypomanic Signs/Symptoms)  Outcome: No Change     Problem: Mood Impairment (Manic or Hypomanic Signs/Symptoms)  Goal: Improved Mood Symptoms (Manic/Hypomanic Signs/Symptoms)  Outcome: No Change     Problem: Behavior Regulation Impairment (Psychotic Signs/Symptoms)  Goal: Improved Behavioral Control (Psychotic Signs/Symptoms)  Outcome: No Change     Anderson CHARITY early in shift-pacing in dalal throughout day-almost constant moving of lips as though in conversation-appears preoccupied-irritable when approached and asked how he is feeling, \"Why do you keep asking me that?!\"-does not interact with others other than to obtain coffee-  "

## 2021-09-12 NOTE — PLAN OF CARE
Patient pacing dalal majority of shift.   Patient requested cream for abrasion on right foot which developed from sandle rubbing against his skin as pt paced dalal.  Pt readily took meds; polite with requests.   Pt appears to be responding to internal stimuli throughout shift.

## 2021-09-12 NOTE — PLAN OF CARE
"  \"It's funny; cause I got kicked out of here years ago for having sex with three staff here.  So I don't know why I'm here.\"  Pt became irate after taking medication, insisting that he was not mentally ill and that he 'should not even be here!'.  Patient also claims he had 3 doctorate degrees.   Pt appears to be responding to internal stimuli;  continues to pace throughout shift.  "

## 2021-09-13 PROCEDURE — 99232 SBSQ HOSP IP/OBS MODERATE 35: CPT | Mod: GC | Performed by: PSYCHIATRY & NEUROLOGY

## 2021-09-13 PROCEDURE — 250N000013 HC RX MED GY IP 250 OP 250 PS 637

## 2021-09-13 PROCEDURE — 124N000002 HC R&B MH UMMC

## 2021-09-13 RX ADMIN — OLANZAPINE 30 MG: 20 TABLET, ORALLY DISINTEGRATING ORAL at 18:07

## 2021-09-13 RX ADMIN — Medication 25 MCG: at 18:08

## 2021-09-13 ASSESSMENT — ACTIVITIES OF DAILY LIVING (ADL)
LAUNDRY: WITH SUPERVISION
ORAL_HYGIENE: INDEPENDENT
HYGIENE/GROOMING: INDEPENDENT;PROMPTS
DRESS: INDEPENDENT

## 2021-09-13 NOTE — PROGRESS NOTES
"    ----------------------------------------------------------------------------------------------------------  [facility]  Psychiatric Progress Note  Hospital Day #13    Anderson Perez MRN# [id number]   Age: 40 year old YOB: 1980   Date of Admission: 8/30/2021     Subjective   The patient was discussed with the treatment team and chart notes were reviewed.      Identifier: Anderson Perez is a 40 year old male with a past psychiatric history of bipolar disorder with psychosis vs schizoaffective disorder bipolar type, antisocial personality disorder, and intellectual disability admitted from the  ER on 08/31/2021 due to concern for out of control behaviors, aggression and psychosis in the context of medication refusal despite Cardoza.    Sleep:  6.5 hours (09/13/21 0600)  Prescribed Medications: Taken as prescribed with ginger ale  PRN Psychiatric Medications: alum & mag hydroxide-simethicone, hydrOXYzine, OLANZapine **OR** OLANZapine, polyethylene glycol     None    Overnight Nursing Notes/Staff Report:  Nursing notes reviewed. Continues to pace hallways, generally averse to interactions with peers or staff.    \"\"It's funny; cause I got kicked out of here years ago for having sex with three staff here.  So I don't know why I'm here.\"  Pt became irate after taking medication, insisting that he was not mentally ill and that he 'should not even be here!'.  Patient also claims he had 3 doctorate degrees.   Pt appears to be responding to internal stimuli;  continues to pace throughout shift.\"    \"Pt appeared to sleep 6.5 hours. No PRNs given or requested. No medical or behavioral events this shift.  \"    Patient interview:   Pt was seen while pacing in the hallway as a part of team rounds. Responds to questions about sleep and appetite with \"I'm fine, I eat all my meals\". When asked about showering, stated that he was interested, but didn't want to until after dinner. " "Refused to have blood drawn for olanzapine level.     ROS   ROS was negative unless noted above.       Objective   Vitals:  Declined. Most recent from 9/9/21    /74 (BP Location: Left arm)   Pulse 79   Temp 97  F (36.1  C) (Tympanic)   Resp 14   Ht 1.829 m (6')   Wt 82.7 kg (182 lb 4.8 oz)   SpO2 98%   BMI 24.72 kg/m      Mental Status Examination:  Orientation:  Grossly intact  General: Pacing up and down the dalal during meeting.  Appearance:  Dressed in hospital scrubs, hair is buzzed short.  Behavior: Guarded  Eye Contact:  Adequate eye contact during brief encounter, does make brief eye contact when passing the team in the hallway  Psychomotor:  Pacing hallway; no catatonia present  Speech:  Appropriate volume, and rate  Language: Appears to be fluent in English given response to our question  Mood:  \"I'm fine\"  Affect:  Irritable but less so than previous days, uninterested in interacting with others  Thought Process:  Uncertain, limited interactions  Thought Content: Unable to assess due to brevity of encounter  Associations:  loose  Insight:  poor  Judgment:  poor  Attention Span: limited  Concentration:  limited  Recent and Remote Memory:  Grossly intact  Fund of Knowledge: Suspect lower than average   Allergies     Allergies   Allergen Reactions     Bee Venom Anaphylaxis     Clindamycin Hives     Morphine Hives        Labs & Imaging   No results found for this or any previous visit (from the past 24 hour(s)).     Assessment   Diagnostic Impression:  Anderson Perez is a 40 year old male with a past psychiatric history of bipolar disorder with psychosis vs schizoaffective disorder bipolar type, antisocial personality disorder, and intellectual disability admitted from the  ER on 08/31/2021 due to concern for out of control behaviors, aggression and psychosis in the context of medication refusal despite Cardoza. Patient was recently released from [facility] to a group home. Presentation to the ED via " ambulance after police contacted and provisional discharge rescinded. Group home noting escalating aggressive behavior, delusional thinking, and medication refusal despite Cardoza. PT refusal to discuss problems and largely denied all past history, medications, or any need for care.     His reported symptoms of delusions, psychosis, and erratic/aggressive behavior are consistent with prior presentations of psychosis.  Optimization of medications to target these symptom clusters in addition to determining facility placement after discharge will be targets for treatment during this admission.      Given that he currently has out of control behaviors, aggression and psychosis, patient warrants inpatient psychiatric hospitalization to maintain his safety. Disposition pending clinical stabilization, medication optimization and development of an appropriate discharge plan.    Principal Diagnosis:     Psychosis, Unspecified (Schizoaffective disorder vs Bipolar Affective Disorder with Psychosis)    Secondary psychiatric diagnoses of concern this admission:     Intellectual Disability    Antisocial personality disorder    Psychiatric course:  Anderson Perez was admitted to [facility] following revocation of provisional discharge and on a court hold. His PTA olanzapine 20mg PO QDaily was resumed, as pt had been refusing at group home; per conversation with guardian, had planned to start on GRIGGS, but does not think that this was actually started, as patient was refusing medications, which prompted admission in setting of decompensation. Pt was converted to ODT formulation given concerns for cheeking medications; while initially resistant, pt was agreeable with taking ODT olanzapine as long as it was given with ginger ale, which was accommodated. Refused blood draw to get olanzapine level on 9/13.    Anderson Perez continued to meet criteria for inpatient hospitalization medication optimization, inpatient stabilization, and  appropriate discharge planning.     Medical course:   Anderson Perez was physically examined by the ED prior to being transferred to the unit and was found to be medically stable and appropriate for admission.      Plan   Today's Changes:     Olanzapine level ordered - draw was refused by patient  _______________________________________________________________________  Psychiatric diagnoses and management:  Principal Diagnosis:     Psychosis, Unspecified (Schizoaffective disorder vs Bipolar Affective Disorder with Psychosis)  o Continue olanzapine 30mg ODT qDaily  o Will consider GRIGGS at later date following initial stabilization    Secondary Psychiatric Diagnoses:     Intellectual disability  o Per , IQ estimated to be 63  o NTD    Antisocial Personality Disorder  o NTD    Additional Planning:    Continue to monitor for and stabilize: aggression, psychosis and disorganization    Patient will be treated in therapeutic milieu with appropriate individual and group therapies as described.    Scheduled Medications (summary):    OLANZapine zydis  30 mg Oral Daily with supper     Vitamin D3  25 mcg Oral Daily       PRN Medications (summary):  alum & mag hydroxide-simethicone, hydrOXYzine, OLANZapine **OR** OLANZapine, polyethylene glycol    Discontinued Medications (& Rationale):    None    Consults:    None    Legal Status:    Court Hold    Sony ADAMES:    No    Pt has not required locked seclusion or restraints in the past 24 hours to maintain safety, please refer to RN documentation for further details.    Disposition:    TBD, pending clinical stabilization, medication optimization, and formulation of a safe discharge plan.    Safety Assessment:   Behavioral Orders   Procedures     Cheeking Precautions (behavioral units)     Code 1 - Restrict to Unit     Elopement precautions     Routine Programming     As clinically indicated     Status 15     Every 15 minutes.       ____________________________________________________________________  Pertinent Medical diagnoses and management:    Chronic Hepatitis C  o CBC, CMP if patient willing  o Continue to monitor  ____________________________________________________________________  Patient seen and discussed with attending physician, Dr. Mike Bobo MD who is in agreement with my assessment and plan.    Mario Schaefer, PGY-1 (Psychiatry)  [facility]    Attestation:  This patient has been seen and evaluated by me, Mike Bobo MD.  I have discussed this patient with the house staff team including the resident and medical student and I agree with the findings and plan in this note.    I have reviewed today's vital signs, medications, labs and imaging. Mike Bobo MD , PhD.

## 2021-09-13 NOTE — PLAN OF CARE
BEHAVIORAL TEAM DISCUSSION    Participants: Dr. Bobo, resident Mario Schaefer, medical students Ralph and Terrance, OT Sujatha Rose, RN Natacha Saini, CTC Siobhan Irving  Progress: minimal, pt still appears to be responding to internal stimuli  Anticipated length of stay: until a bed opens at [facility] or [facility]  Continued Stay Criteria/Rationale: pt's PD was revoked  Medical/Physical: pt is declining medical work up  Precautions:   Behavioral Orders   Procedures     Cheeking Precautions (behavioral units)     Code 1 - Restrict to Unit     Elopement precautions     Routine Programming     As clinically indicated     Status 15     Every 15 minutes.     Plan: provide a daily psychological assessment and manage medications per psychiatry team, staff to provide a safe and therapeutic milieu, CTC to coordinate transfer with [facility]  Rationale for change in precautions or plan: no change

## 2021-09-13 NOTE — PLAN OF CARE
Assessment/Intervention/Current Symptoms and Care Coordination    Attended team meeting and reviewed chart notes.    Writer spoke with pt's CHRISTIAN Barragan ([phone]) regarding bed availability. [facility] stated that pt is on the list for all the [facility] facilities and [facility]. She will call [facility] to see about a bed. We discussed pt possibly cheeking his medications. She thought this happened at the group home as he decompensated so quickly. She will call once she knows more.      Discharge Plan or Goal  To [facility] or [facility]        Barriers to Discharge   Needs bed available      Referral Status  CM checking status of referrals      Legal Status  Pt's PD was revoked

## 2021-09-13 NOTE — PLAN OF CARE
"  Problem: Behavioral Health Plan of Care  Goal: Plan of Care Review  Outcome: No Change  Flowsheets (Taken 9/13/2021 1032)  Plan of Care Reviewed With: patient  Progress: no change  Patient Agreement with Plan of Care: (Does not believe he is ill) disagrees (describe)     Problem: Behavior Regulation Impairment (Psychotic Signs/Symptoms)  Goal: Improved Behavioral Control (Psychotic Signs/Symptoms)  Outcome: No Change     Problem: Mood Impairment (Manic or Hypomanic Signs/Symptoms)  Goal: Improved Mood Symptoms (Manic/Hypomanic Signs/Symptoms)  Outcome: No Change     Anderson NASH early pacing in dalal-continues to walk up and down dalal throughout morning, almost constantly moving lips as though in conversation-often gesturing with arms, nodding or tilting head-did with encouragement stop and participate in [employer] contest during community meeting-1215 Antonio became agitated post request for lab draw-intensely walking up and down dalal talking about [facility] brought him to hospital-Again talking about having sex with all of the female staff on previous admission which and put ultimately led to his being here-eventually accepted cup of coffee and regained calm-Antonio whispered to male psych assoc that he \"almost\" had sex with female OT-    "

## 2021-09-14 PROCEDURE — 99232 SBSQ HOSP IP/OBS MODERATE 35: CPT | Mod: GC | Performed by: PSYCHIATRY & NEUROLOGY

## 2021-09-14 PROCEDURE — 250N000013 HC RX MED GY IP 250 OP 250 PS 637

## 2021-09-14 PROCEDURE — 124N000002 HC R&B MH UMMC

## 2021-09-14 RX ADMIN — OLANZAPINE 30 MG: 20 TABLET, ORALLY DISINTEGRATING ORAL at 17:04

## 2021-09-14 RX ADMIN — Medication 25 MCG: at 17:04

## 2021-09-14 ASSESSMENT — ACTIVITIES OF DAILY LIVING (ADL)
ORAL_HYGIENE: INDEPENDENT
DRESS: INDEPENDENT
HYGIENE/GROOMING: INDEPENDENT;PROMPTS

## 2021-09-14 NOTE — PROGRESS NOTES
"Pt walked up and down the milieu throughout the shift. He was social with staff tonight and talked with writer, he began the conversation with \"Are you , do you have a boyfriend? Are you in school?\" He then talked about his \"14 different degrees\". He assured writer that he was an  and that he was in charge of a team where they had dug up an important bone. He also insisted that he was \"banned for life from this hospital\" and \"shouldn't be here\".   "

## 2021-09-14 NOTE — PLAN OF CARE
Nursing Plan of care   Problem: Behavior Regulation Impairment (Psychotic Signs/Symptoms)  Goal: Improved Behavioral Control (Psychotic Signs/Symptoms)  Outcome: No Change  Received pt pacing up and down in the hallway; overheard talking about having no social security number and no body can trace him; declined to check in with this writer; was agitated earlier and intrusive towards other pt;was easily redirected and later apologized to staff for his behavior;  offered and took his scheduled Zyprexa; presented as tense and anxious; thought content appeared to be grandiose delusional; ate dinner; good appetite; No sign of SI/SIB was noted; refused VS assessment; took no shower; will continue to monitor and assess.

## 2021-09-14 NOTE — PLAN OF CARE
Assessment/Intervention/Current Symptoms and Care Coordination    Attended team meeting and reviewed chart notes.      Writer spoke with [facility] regarding placement. [facility] stated that [facility] not [facility] pt in a [facility] due to his past behavior. He will remain on the list for [facility].        Discharge Plan or Goal  [facility]      Barriers to Discharge   No bed at [facility]      Referral Status  On list for [facility]        Legal Status  PD was revoked

## 2021-09-14 NOTE — PLAN OF CARE
"  Problem: Behavior Regulation Impairment (Psychotic Signs/Symptoms)  Goal: Improved Behavioral Control (Psychotic Signs/Symptoms)  Outcome: No Change   Writer asked writer how his feet are and patient said \"I have 16 degrees. I'm an  and a psychotherapist. I know if my feet have an infection.\" Patient asked for Pedialyte and Gatorade was ordered with each tray for patient. Patient was okay with receiving the Gatorade. Patient declined to check in with writer and the he went on to say that we should call his doctor from [place] and also another doctor. Patient continued to perseverate about calling doctors from the past. Writer ended conversation. Writer has been walking the hallway most of the morning. Patient's lips will often be moving. Declined VSs.  "

## 2021-09-14 NOTE — PLAN OF CARE
Patient still pacing dalal most shift; appears responding to internal stimuli.  Pt shaved tonight, but did not shower.

## 2021-09-14 NOTE — PROGRESS NOTES
"    ----------------------------------------------------------------------------------------------------------  [facility]  Psychiatric Progress Note  Hospital Day #14    Anderson Perez MRN# [id number]   Age: 40 year old YOB: 1980   Date of Admission: 8/30/2021     Subjective   The patient was discussed with the treatment team and chart notes were reviewed.      Identifier: Anderson Perez is a 40 year old male with a past psychiatric history of bipolar disorder with psychosis vs schizoaffective disorder bipolar type, antisocial personality disorder, and intellectual disability admitted from the  ER on 08/31/2021 due to concern for out of control behaviors, aggression and psychosis in the context of medication refusal despite Cardoza.    Sleep:  7 hours (09/14/21 0700)  Prescribed Medications: Taken as prescribed with ginger ale  PRN Psychiatric Medications: alum & mag hydroxide-simethicone, hydrOXYzine, OLANZapine **OR** OLANZapine, polyethylene glycol     None    Overnight Nursing Notes/Staff Report:  All overnight nursing notes reviewed.    \"Pt walked up and down the milieu throughout the shift. He was social with staff tonight and talked with writer, he began the conversation with \"Are you , do you have a boyfriend? Are you in school?\" He then talked about his \"14 different degrees\". He assured writer that he was an  and that he was in charge of a team where they had dug up an important bone. He also insisted that he was \"banned for life from this hospital\" and \"shouldn't be here\". \"    \"Pt appeared to sleep 7 hours. No PRNs given or requested. No medical or behavioral events this shift.\"    Patient interview:   Patient was seen while pacing the hallway during team rounds. Patient was initially reticent to speak with team, but once engaged spoke at length about \"killing a nazi\", delusions about [place] being comprised entirely of Russians and " "Germans and \"incest\", and other delusional topics. 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 Largely not possible to redirect patient and continued to speak tangentially about delusions until redirected to lunch arriving.     ROS   ROS was negative unless noted above.       Objective   Vitals:  Declined. Most recent from 9/9/21    /74 (BP Location: Left arm)   Pulse 79   Temp 97  F (36.1  C) (Tympanic)   Resp 14   Ht 1.829 m (6')   Wt 82.7 kg (182 lb 4.8 oz)   SpO2 98%   BMI 24.72 kg/m      Mental Status Examination:  Orientation:  Grossly intact  General: Pacing up and down the dalal during meeting.  Appearance:  Dressed in hospital scrubs, hair is buzzed short.  Behavior: Guarded  Eye Contact:  Relatively intense eye contact when speaking about delusions  Psychomotor:  Pacing hallway; no catatonia present  Speech:  Appropriate volume, and rate  Language: Appears to be fluent in English   Mood:  \"I'm good\"  Affect:  Generally flat, with minor reactivity when speaking about delusions, less irritable than previous assessments.  Thought Process:  Tangential, disorganized  Thought Content: Unable to assess due to brevity of encounter  Associations:  loose  Insight:  poor  Judgment:  poor  Attention Span: limited  Concentration:  limited  Recent and Remote Memory:  Grossly intact  Fund of Knowledge: Suspect lower than average   Allergies     Allergies   Allergen Reactions     Bee Venom Anaphylaxis     Clindamycin Hives     Morphine Hives        Labs & Imaging   No results found for this or any previous visit (from the past 24 hour(s)).     Assessment   Diagnostic Impression:  Anderson Perez is a 40 year old male with a past psychiatric history of bipolar disorder with psychosis vs schizoaffective disorder bipolar type, antisocial personality disorder, and intellectual disability admitted " from the  ER on 08/31/2021 due to concern for out of control behaviors, aggression and psychosis in the context of medication refusal despite Cardoza. Patient was recently released from [facility] to a group home. Presentation to the ED via ambulance after police contacted and provisional discharge rescinded. Group home noting escalating aggressive behavior, delusional thinking, and medication refusal despite Cardoza. PT refusal to discuss problems and largely denied all past history, medications, or any need for care.     His reported symptoms of delusions, psychosis, and erratic/aggressive behavior are consistent with prior presentations of psychosis.  Optimization of medications to target these symptom clusters in addition to determining facility placement after discharge will be targets for treatment during this admission.      Given that he currently has out of control behaviors, aggression and psychosis, patient warrants inpatient psychiatric hospitalization to maintain his safety. Disposition pending clinical stabilization, medication optimization and development of an appropriate discharge plan.    Principal Diagnosis:     Psychosis, Unspecified (Schizoaffective disorder vs Bipolar Affective Disorder with Psychosis)    Secondary psychiatric diagnoses of concern this admission:     Intellectual Disability    Antisocial personality disorder    Psychiatric course:  Anderson Perez was admitted to [facility] following revocation of provisional discharge and on a court hold. His PTA olanzapine 20mg PO QDaily was resumed, as pt had been refusing at group home; per conversation with guardian, had planned to start on GRIGGS, but does not think that this was actually started, as patient was refusing medications, which prompted admission in setting of decompensation. Pt was converted to ODT formulation given concerns for cheeking medications; while initially resistant, pt was agreeable with taking ODT olanzapine as long as it  was given with ginger ale, which was accommodated. Refused blood draw to get olanzapine level on 9/13.    Anderson Perez continued to meet criteria for inpatient hospitalization medication optimization, inpatient stabilization, and appropriate discharge planning.     Medical course:   Anderson Perez was physically examined by the ED prior to being transferred to the unit and was found to be medically stable and appropriate for admission.      Plan   Today's Changes:     None  _______________________________________________________________________  Psychiatric diagnoses and management:  Principal Diagnosis:     Psychosis, Unspecified (Schizoaffective disorder vs Bipolar Affective Disorder with Psychosis)  o Continue olanzapine 30mg ODT qDaily  o Will consider GRIGGS at later date following initial stabilization    Secondary Psychiatric Diagnoses:     Intellectual disability  o Per , IQ estimated to be 63  o NTD    Antisocial Personality Disorder  o NTD    Additional Planning:    Continue to monitor for and stabilize: aggression, psychosis and disorganization    Patient will be treated in therapeutic milieu with appropriate individual and group therapies as described.    Scheduled Medications (summary):    OLANZapine zydis  30 mg Oral Daily with supper     Vitamin D3  25 mcg Oral Daily       PRN Medications (summary):  alum & mag hydroxide-simethicone, hydrOXYzine, OLANZapine **OR** OLANZapine, polyethylene glycol    Discontinued Medications (& Rationale):    None    Consults:    None    Legal Status:    Court Hold    Cardo[place]     SIO:    No    Pt has not required locked seclusion or restraints in the past 24 hours to maintain safety, please refer to RN documentation for further details.    Disposition:    TBD, pending clinical stabilization, medication optimization, and formulation of a safe discharge plan.    Safety Assessment:   Behavioral Orders   Procedures     Cheeking Precautions (behavioral units)      Code 1 - Restrict to Unit     Elopement precautions     Routine Programming     As clinically indicated     Status 15     Every 15 minutes.      ____________________________________________________________________  Pertinent Medical diagnoses and management:    Chronic Hepatitis C  o CBC, CMP if patient willing  o Continue to monitor  ____________________________________________________________________  Patient seen and discussed with attending physician, Dr. Mike Bobo MD who is in agreement with my assessment and plan.    Mario Schaefer, PGY-1 (Psychiatry)  [facility]    Attestation:  This patient has been seen and evaluated by me, Mike Bobo MD.  I have discussed this patient with the house staff team including the resident and medical student and I agree with the findings and plan in this note.    I have reviewed today's vital signs, medications, labs and imaging. Mike Bobo MD , PhD.

## 2021-09-15 PROCEDURE — 99232 SBSQ HOSP IP/OBS MODERATE 35: CPT | Mod: GC | Performed by: PSYCHIATRY & NEUROLOGY

## 2021-09-15 PROCEDURE — 124N000002 HC R&B MH UMMC

## 2021-09-15 PROCEDURE — 250N000013 HC RX MED GY IP 250 OP 250 PS 637

## 2021-09-15 RX ADMIN — OLANZAPINE 30 MG: 20 TABLET, ORALLY DISINTEGRATING ORAL at 17:32

## 2021-09-15 RX ADMIN — Medication 25 MCG: at 17:32

## 2021-09-15 ASSESSMENT — ACTIVITIES OF DAILY LIVING (ADL)
DRESS: INDEPENDENT
HYGIENE/GROOMING: INDEPENDENT
LAUNDRY: WITH SUPERVISION
ORAL_HYGIENE: INDEPENDENT

## 2021-09-15 NOTE — PLAN OF CARE
Problem: Behavioral Health Plan of Care  Goal: Plan of Care Review  Outcome: Improving  Flowsheets (Taken 9/15/2021 1030)  Plan of Care Reviewed With: patient  Progress: improving  Patient Agreement with Plan of Care: (Denial of illness and need for treatment) disagrees (describe)     Problem: Behavior Regulation Impairment (Psychotic Signs/Symptoms)  Goal: Improved Behavioral Control (Psychotic Signs/Symptoms)  Outcome: Improving   Antonio continues to spend most of time pacing dalal, mumbling to self-appears preoccupied-small amt conversation with male staff, but does not interact with peers-less tense today

## 2021-09-15 NOTE — PROGRESS NOTES
"    ----------------------------------------------------------------------------------------------------------  [facility]  Psychiatric Progress Note  Hospital Day #15    Anderson Perez MRN# [id number]   Age: 40 year old YOB: 1980   Date of Admission: 8/30/2021     Subjective   The patient was discussed with the treatment team and chart notes were reviewed.      Identifier: Anderson Perez is a 40 year old male with a past psychiatric history of bipolar disorder with psychosis vs schizoaffective disorder bipolar type, antisocial personality disorder, and intellectual disability admitted from the  ER on 08/31/2021 due to concern for out of control behaviors, aggression and psychosis in the context of medication refusal despite Cardoza.    Sleep:  6.5 hours (09/15/21 0600)  Prescribed Medications: Taken as prescribed with ginger ale  PRN Psychiatric Medications: alum & mag hydroxide-simethicone, hydrOXYzine, OLANZapine **OR** OLANZapine, polyethylene glycol     None    Overnight Nursing Notes/Staff Report:  All overnight nursing notes reviewed.    \"Received pt pacing up and down in the hallway; overheard talking about having no social security number and no body can trace him; declined to check in with this writer; was agitated earlier and intrusive towards other pt;was easily redirected and later apologized to staff for his behavior;  offered and took his scheduled Zyprexa; presented as tense and anxious; thought content appeared to be grandiose delusional; ate dinner; good appetite; No sign of SI/SIB was noted; refused VS assessment; took no shower; will continue to monitor and assess.\"    \"Pt appeared to sleep 6.5 hours. No PRNs given or requested. No medical or behavioral events this shift\"    Patient interview:   Was seen while walking the halls in the afternoon. Stated that he was eating well and doing \"the Louise walk\" and that the prophet had been crucified for " "being Jain and that he had walked 10 million steps because he was incarcerated even though he was innocent. No noted issues, but expressed wanting to leave as soon as possible.     ROS   ROS was negative unless noted above.       Objective   Vitals:  Declined. Most recent from 9/9/21    /74 (BP Location: Left arm)   Pulse 79   Temp 97  F (36.1  C) (Tympanic)   Resp 14   Ht 1.829 m (6')   Wt 82.7 kg (182 lb 4.8 oz)   SpO2 98%   BMI 24.72 kg/m      Mental Status Examination:  Orientation:  Grossly intact  General: Pacing up and down the dalal during meeting.  Appearance:  Dressed in hospital scrubs, hair is buzzed short.  Behavior: Guarded  Eye Contact:  Relatively intense eye contact when speaking about delusions  Psychomotor:  Pacing hallway; no catatonia present  Speech:  Appropriate volume, and rate  Language: Appears to be fluent in English   Mood:  \"I'm doing the Louise walk\"  Affect:  Generally flat, with minor reactivity when speaking about delusions, less irritable than previous assessments.  Thought Process:  Tangential, disorganized  Thought Content: Unable to assess due to brevity of encounter  Associations:  loose  Insight:  poor  Judgment:  poor  Attention Span: limited  Concentration:  limited  Recent and Remote Memory:  Grossly intact  Fund of Knowledge: Suspect lower than average   Allergies     Allergies   Allergen Reactions     Bee Venom Anaphylaxis     Clindamycin Hives     Morphine Hives        Labs & Imaging   No results found for this or any previous visit (from the past 24 hour(s)).     Assessment   Diagnostic Impression:  Anderson Perez is a 40 year old male with a past psychiatric history of bipolar disorder with psychosis vs schizoaffective disorder bipolar type, antisocial personality disorder, and intellectual disability admitted from the  ER on 08/31/2021 due to concern for out of control behaviors, aggression and psychosis in the context of medication refusal despite " Cardoza. Patient was recently released from [facility] to a group home. Presentation to the ED via ambulance after police contacted and provisional discharge rescinded. Group home noting escalating aggressive behavior, delusional thinking, and medication refusal despite Cardoza. PT refusal to discuss problems and largely denied all past history, medications, or any need for care.     His reported symptoms of delusions, psychosis, and erratic/aggressive behavior are consistent with prior presentations of psychosis.  Optimization of medications to target these symptom clusters in addition to determining facility placement after discharge will be targets for treatment during this admission.      Given that he currently has out of control behaviors, aggression and psychosis, patient warrants inpatient psychiatric hospitalization to maintain his safety. Disposition pending clinical stabilization, medication optimization and development of an appropriate discharge plan.    Principal Diagnosis:     Psychosis, Unspecified (Schizoaffective disorder vs Bipolar Affective Disorder with Psychosis)    Secondary psychiatric diagnoses of concern this admission:     Intellectual Disability    Antisocial personality disorder    Psychiatric course:  Anderson Perez was admitted to [facility] following revocation of provisional discharge and on a court hold. His PTA olanzapine 20mg PO QDaily was resumed, as pt had been refusing at group home; per conversation with guardian, had planned to start on GRIGGS, but does not think that this was actually started, as patient was refusing medications, which prompted admission in setting of decompensation. Pt was converted to ODT formulation given concerns for cheeking medications; while initially resistant, pt was agreeable with taking ODT olanzapine as long as it was given with ginger ale, which was accommodated. Refused blood draw to get olanzapine level on 9/13.    Anderson Perez continued to meet  criteria for inpatient hospitalization medication optimization, inpatient stabilization, and appropriate discharge planning.     Medical course:   Anderson Perez was physically examined by the ED prior to being transferred to the unit and was found to be medically stable and appropriate for admission.      Plan   Today's Changes:     None  _______________________________________________________________________  Psychiatric diagnoses and management:  Principal Diagnosis:     Psychosis, Unspecified (Schizoaffective disorder vs Bipolar Affective Disorder with Psychosis)  o Continue olanzapine 30mg ODT qDaily  o Will consider GRIGGS at later date following initial stabilization    Secondary Psychiatric Diagnoses:     Intellectual disability  o Per , IQ estimated to be 63  o NTD    Antisocial Personality Disorder  o NTD    Additional Planning:    Continue to monitor for and stabilize: aggression, psychosis and disorganization    Patient will be treated in therapeutic milieu with appropriate individual and group therapies as described.    Scheduled Medications (summary):    OLANZapine zydis  30 mg Oral Daily with supper     Vitamin D3  25 mcg Oral Daily       PRN Medications (summary):  alum & mag hydroxide-simethicone, hydrOXYzine, OLANZapine **OR** OLANZapine, polyethylene glycol    Discontinued Medications (& Rationale):    None    Consults:    None    Legal Status:    Court Hold    Sony CASTILLOO:    No    Pt has not required locked seclusion or restraints in the past 24 hours to maintain safety, please refer to RN documentation for further details.    Disposition:    TBD, pending clinical stabilization, medication optimization, and formulation of a safe discharge plan.    Safety Assessment:   Behavioral Orders   Procedures     Cheeking Precautions (behavioral units)     Code 1 - Restrict to Unit     Elopement precautions     Routine Programming     As clinically indicated     Status 15     Every 15  minutes.      ____________________________________________________________________  Pertinent Medical diagnoses and management:    Chronic Hepatitis C  o CBC, CMP if patient willing  o Continue to monitor  ____________________________________________________________________  Patient seen and discussed with attending physician, Dr. Mike Bobo MD who is in agreement with my assessment and plan.    Mario Schaefer, PGY-1 (Psychiatry)  [facility]    Attestation:  This patient has been seen and evaluated by me, Mike Bobo MD.  I have discussed this patient with the house staff team including the resident and medical student and I agree with the findings and plan in this note.    I have reviewed today's vital signs, medications, labs and imaging. Mike Bobo MD , PhD.

## 2021-09-15 NOTE — PLAN OF CARE
Assessment/Intervention/Current Symptoms and Care Coordination    Attended team meeting and reviewed chart notes.    Pt spends his time pacing the halls. He speaks to a few staff members.      Discharge Plan or Goal  [facility]      Barriers to Discharge   Needs bed at [facility]      Referral Status  Referral to [facility]  Pt was declined to transfer to [facility]    Legal Status  Pt's PD was revoked

## 2021-09-16 PROCEDURE — 250N000013 HC RX MED GY IP 250 OP 250 PS 637

## 2021-09-16 PROCEDURE — 124N000002 HC R&B MH UMMC

## 2021-09-16 PROCEDURE — 99232 SBSQ HOSP IP/OBS MODERATE 35: CPT | Mod: GC | Performed by: PSYCHIATRY & NEUROLOGY

## 2021-09-16 RX ADMIN — Medication 25 MCG: at 18:10

## 2021-09-16 RX ADMIN — OLANZAPINE 30 MG: 20 TABLET, ORALLY DISINTEGRATING ORAL at 18:10

## 2021-09-16 ASSESSMENT — ACTIVITIES OF DAILY LIVING (ADL)
HYGIENE/GROOMING: INDEPENDENT
ORAL_HYGIENE: INDEPENDENT
DRESS: INDEPENDENT

## 2021-09-16 NOTE — PLAN OF CARE
Nursing plan of care   Problem: Behavior Regulation Impairment (Psychotic Signs/Symptoms)  Goal: Improved Behavioral Control (Psychotic Signs/Symptoms)  Outcome: No Change  Pt was active pacing in the hallway most of the shift; declined to check in with his writer, but was cooperative in taking his scheduled medication; reported he is doing ok; NO sign of SI/SIB was noted; appeared responding to internal stimuli; ate dinner; good appetite, took no shower; attended no group.

## 2021-09-16 NOTE — PROGRESS NOTES
"    ----------------------------------------------------------------------------------------------------------  [facility]  Psychiatric Progress Note  Hospital Day #16    Anderson Perez MRN# [id number]   Age: 40 year old YOB: 1980   Date of Admission: 8/30/2021     Subjective   The patient was discussed with the treatment team and chart notes were reviewed.      Identifier: Anderson Perez is a 40 year old male with a past psychiatric history of bipolar disorder with psychosis vs schizoaffective disorder bipolar type, antisocial personality disorder, and intellectual disability admitted from the  ER on 08/31/2021 due to concern for out of control behaviors, aggression and psychosis in the context of medication refusal despite Cardoza.    Sleep:  7 hours (09/16/21 0200)  Prescribed Medications: Taken as prescribed with ginger ale  PRN Psychiatric Medications: alum & mag hydroxide-simethicone, hydrOXYzine, OLANZapine **OR** OLANZapine, polyethylene glycol     None    Overnight Nursing Notes/Staff Report:  All overnight nursing notes reviewed. Continues to pace hallway. Somewhat more open with staff (said \"good morning\" to familiar people this AM).     \"Antonio continues to spend most of time pacing dalal, mumbling to self-appears preoccupied-small amt conversation with male staff, but does not interact with peers-less tense today\"    \"Received pt sleeping in bed with regular unlabored respiration;  No safety concern was noted or reported; pt appeared to have slept for 7 hrs; will continue to monitor and assess.\"    Patient interview:   Patient was seen in the hallway while pacing. Patient was brief in responses, stated that he is doing \"fine\", denied any concerns or issues with medications.      ROS   ROS was negative unless noted above.       Objective   Vitals:  Declined. Most recent from 9/9/21    /74   Pulse 82   Temp 96.9  F (36.1  C) (Oral)   Resp 14   Ht 1.829 " "m (6')   Wt 82.7 kg (182 lb 4.8 oz)   SpO2 97%   BMI 24.72 kg/m      Mental Status Examination:  Orientation:  Grossly intact  General: Pacing up and down the dalal during meeting.  Appearance:  Dressed in hospital scrubs, hair is buzzed short.  Behavior: Guarded  Eye Contact:  Relatively intense eye contact when speaking about delusions  Psychomotor:  Pacing hallway; no catatonia present  Speech:  Appropriate volume, and rate  Language: Appears to be fluent in English   Mood:  \"I\"m good\"  Affect:  Generally flat, with minor reactivity when speaking about delusions, less irritable than previous assessments.  Thought Process:  Tangential, disorganized  Thought Content: Unable to assess due to brevity of encounter  Associations:  loose  Insight:  poor  Judgment:  poor  Attention Span: limited  Concentration:  limited  Recent and Remote Memory:  Grossly intact  Fund of Knowledge: Suspect lower than average   Allergies     Allergies   Allergen Reactions     Bee Venom Anaphylaxis     Clindamycin Hives     Morphine Hives        Labs & Imaging   No results found for this or any previous visit (from the past 24 hour(s)).     Assessment   Diagnostic Impression:  Anderson Perez is a 40 year old male with a past psychiatric history of bipolar disorder with psychosis vs schizoaffective disorder bipolar type, antisocial personality disorder, and intellectual disability admitted from the  ER on 08/31/2021 due to concern for out of control behaviors, aggression and psychosis in the context of medication refusal despite Cardoza. Patient was recently released from [facility] to a group home. Presentation to the ED via ambulance after police contacted and provisional discharge rescinded. Group home noting escalating aggressive behavior, delusional thinking, and medication refusal despite Cardoza. PT refusal to discuss problems and largely denied all past history, medications, or any need for care.     His reported symptoms of " delusions, psychosis, and erratic/aggressive behavior are consistent with prior presentations of psychosis.  Optimization of medications to target these symptom clusters in addition to determining facility placement after discharge will be targets for treatment during this admission.      Given that he currently has PTA report of out of control behaviors, aggression and psychosis, patient warrants inpatient psychiatric hospitalization to maintain his safety. Disposition pending clinical stabilization, medication optimization and development of an appropriate discharge plan.    Principal Diagnosis:     Psychosis, Unspecified (Schizoaffective disorder vs Bipolar Affective Disorder with Psychosis)    Secondary psychiatric diagnoses of concern this admission:     Intellectual Disability    Antisocial personality disorder    Psychiatric course:  Anderson Perez was admitted to [facility] following revocation of provisional discharge and on a court hold. His PTA olanzapine 20mg PO QDaily was resumed, as pt had been refusing at group home; per conversation with guardian, had planned to start on GRIGGS, but does not think that this was actually started, as patient was refusing medications, which prompted admission in setting of decompensation. Pt was converted to ODT formulation given concerns for cheeking medications; while initially resistant, pt was agreeable with taking ODT olanzapine as long as it was given with ginger ale, which was accommodated. Refused blood draw to get olanzapine level on 9/13.    Anderson Perez continued to meet criteria for inpatient hospitalization medication optimization, inpatient stabilization, and appropriate discharge planning.     Medical course:   Anderson Perez was physically examined by the ED prior to being transferred to the unit and was found to be medically stable and appropriate for admission.      Plan   Today's Changes:      None  _______________________________________________________________________  Psychiatric diagnoses and management:  Principal Diagnosis:     Psychosis, Unspecified (Schizoaffective disorder vs Bipolar Affective Disorder with Psychosis)  o Continue olanzapine 30mg ODT qDaily  o Will consider GRIGGS at later date following initial stabilization    Secondary Psychiatric Diagnoses:     Intellectual disability  o Per , IQ estimated to be 63  o NTD    Antisocial Personality Disorder  o NTD    Additional Planning:    Continue to monitor for and stabilize: aggression, psychosis and disorganization    Patient will be treated in therapeutic milieu with appropriate individual and group therapies as described.    Scheduled Medications (summary):    OLANZapine zydis  30 mg Oral Daily with supper     Vitamin D3  25 mcg Oral Daily       PRN Medications (summary):  alum & mag hydroxide-simethicone, hydrOXYzine, OLANZapine **OR** OLANZapine, polyethylene glycol    Discontinued Medications (& Rationale):    None    Consults:    None    Legal Status:    Court Hold    Cardoza     SIO:    No    Pt has not required locked seclusion or restraints in the past 24 hours to maintain safety, please refer to RN documentation for further details.    Disposition:    TBD, pending clinical stabilization, medication optimization, and formulation of a safe discharge plan.    Safety Assessment:   Behavioral Orders   Procedures     Cheeking Precautions (behavioral units)     Code 1 - Restrict to Unit     Elopement precautions     Routine Programming     As clinically indicated     Status 15     Every 15 minutes.      ____________________________________________________________________  Pertinent Medical diagnoses and management:    Chronic Hepatitis C  o CBC, CMP if patient willing  o Continue to monitor  ____________________________________________________________________  Patient seen and discussed with attending physician, Dr. Fitzgerald  MD Neo who is in agreement with my assessment and plan.    Mario Schaefer, PGY-1 (Psychiatry)  [facility]    Attending Attestation:

## 2021-09-16 NOTE — PLAN OF CARE
Plan of care   Problem: Sleep Disturbance  Goal: Adequate Sleep/Rest  Outcome: Improving   Received pt sleeping in bed with regular unlabored respiration;  No safety concern was noted or reported; pt appeared to have slept for 7 hrs; will continue to monitor and assess.

## 2021-09-16 NOTE — PLAN OF CARE
Assessment/Intervention/Current Symptoms and Care Coordination     Attended team meeting and reviewed chart notes.     Pt spends his time pacing the halls. He speaks to a few staff members.        Discharge Plan or Goal  [facility]        Barriers to Discharge   Needs bed at [facility]        Referral Status  Referral to [facility]  Pt was declined to transfer to [facility]     Legal Status  Pt's PD was revoked

## 2021-09-16 NOTE — PLAN OF CARE
Problem: Suicidal Behavior  Goal: Suicidal Behavior is Absent or Managed  Outcome: Improving  Flowsheets (Taken 9/16/2021 6369)  Mutually Determined Action Steps (Suicidal Behavior Absent/Managed): sets future-oriented goal   Pt has been walking up and down the dalal all shift. Pt

## 2021-09-17 PROCEDURE — 99232 SBSQ HOSP IP/OBS MODERATE 35: CPT | Mod: GC | Performed by: PSYCHIATRY & NEUROLOGY

## 2021-09-17 PROCEDURE — 124N000002 HC R&B MH UMMC

## 2021-09-17 PROCEDURE — 250N000013 HC RX MED GY IP 250 OP 250 PS 637

## 2021-09-17 RX ADMIN — OLANZAPINE 30 MG: 20 TABLET, ORALLY DISINTEGRATING ORAL at 18:29

## 2021-09-17 RX ADMIN — Medication 25 MCG: at 18:29

## 2021-09-17 ASSESSMENT — ACTIVITIES OF DAILY LIVING (ADL)
ORAL_HYGIENE: INDEPENDENT
DRESS: SCRUBS (BEHAVIORAL HEALTH)
HYGIENE/GROOMING: INDEPENDENT
LAUNDRY: WITH SUPERVISION

## 2021-09-17 NOTE — PLAN OF CARE
Assessment/Intervention/Current Symptoms and Care Coordination    Attended team meeting and reviewed chart notes.    Pt was told he was going to [facility]. He did appear to be accepting of this discharge plan.      Discharge Plan or Goal  To [facility]    Barriers to Discharge   No bed available      Referral Status  Only made to [facility] won't take pt        Legal Status  PD has been revoked

## 2021-09-17 NOTE — PLAN OF CARE
"Nursing Plan of care   Problem: Behavior Regulation Impairment (Psychotic Signs/Symptoms)  Goal: Improved Behavioral Control (Psychotic Signs/Symptoms)  Outcome: No Change  Received pt nonstop pacing in the hallway; was overheard discussing delusional statements with peers in the lounge; one pt attempted to challenge pt statement and told him that is not true; Antonio was not happy about it. Disheveled and untidy; encouraged to take shower but stated, \" I took the other day\"; took medication with ginger ale and opened his mouth to show not cheeking; No sign of SI/SIB noted; appeared responding; ate dinner and snack; good appetite; will continue to monitor and assess.      "

## 2021-09-17 NOTE — PROGRESS NOTES
"    ----------------------------------------------------------------------------------------------------------  [facility]  Psychiatric Progress Note  Hospital Day #17    Anderson Perez MRN# [id number]   Age: 40 year old YOB: 1980   Date of Admission: 8/30/2021     Subjective   The patient was discussed with the treatment team and chart notes were reviewed.      Identifier: Anderson Perez is a 40 year old male with a past psychiatric history of bipolar disorder with psychosis vs schizoaffective disorder bipolar type, antisocial personality disorder, and intellectual disability admitted from the  ER on 08/31/2021 due to concern for out of control behaviors, aggression and psychosis in the context of medication refusal despite Cardoza.    Sleep:  7 hours (09/17/21 0649)  Prescribed Medications: Taken as prescribed with ginger ale  PRN Psychiatric Medications: alum & mag hydroxide-simethicone, hydrOXYzine, OLANZapine **OR** OLANZapine, polyethylene glycol     None    Overnight Nursing Notes/Staff Report:  All overnight nursing notes reviewed.     \"Received pt nonstop pacing in the hallway; was overheard discussing delusional statements with peers in the lounge; one pt attempted to challenge pt statement and told him that is not true; Antonio was not happy about it. Disheveled and untidy; encouraged to take shower but stated, \" I took the other day\"; took medication with ginger ale and opened his mouth to show not cheeking; No sign of SI/SIB noted; appeared responding; ate dinner and snack; good appetite; will continue to monitor and assess.\"    \"Pt had a good shift; No Behavioral concern was noted or reported; pt appeared to have slept for 7 hrs ;will continue to monitor and assess.\"    Patient interview:   Patient seen while pacing the halls as a part of team rounds. States that \"everything is good\" and agreeable with plan to go to [facility] when bed available. Michel that he " "walks 2/2 agitation, and that it is instead what he calls the \"Dani Walk\", as Dani was also an innocent who was wrongly imprisoned.     ROS   ROS was negative unless noted above.     Objective   Vitals:  Declined. Most recent from 9/9/21    /70 (BP Location: Left arm)   Pulse 100   Temp 98.2  F (36.8  C)   Resp 14   Ht 1.829 m (6')   Wt 82.7 kg (182 lb 4.8 oz)   SpO2 97%   BMI 24.72 kg/m      Mental Status Examination:  Orientation:  Grossly intact  General: Pacing up and down the dalal during meeting.  Appearance:  Dressed in hospital scrubs, hair is buzzed short.  Behavior: Guarded  Eye Contact:  Relatively intense eye contact when speaking about delusions  Psychomotor:  Pacing hallway; no catatonia present  Speech:  Appropriate volume, and rate  Language: Appears to be fluent in English   Mood:  \"Everything is good\"  Affect:  Generally flat, with minor reactivity when speaking about delusions, less irritable than previous assessments.  Thought Process:  Tangential, disorganized  Thought Content: Unable to assess due to brevity of encounter  Associations:  loose  Insight:  poor  Judgment:  poor  Attention Span: limited  Concentration:  limited  Recent and Remote Memory:  Grossly intact  Fund of Knowledge: Suspect lower than average   Allergies     Allergies   Allergen Reactions     Bee Venom Anaphylaxis     Clindamycin Hives     Morphine Hives        Labs & Imaging   No results found for this or any previous visit (from the past 24 hour(s)).     Assessment   Diagnostic Impression:  Anderson Perez is a 40 year old male with a past psychiatric history of bipolar disorder with psychosis vs schizoaffective disorder bipolar type, antisocial personality disorder, and intellectual disability admitted from the  ER on 08/31/2021 due to concern for out of control behaviors, aggression and psychosis in the context of medication refusal despite Cardoza. Patient was recently released from [facility] to a group " home. Presentation to the ED via ambulance after police contacted and provisional discharge rescinded. Group home noting escalating aggressive behavior, delusional thinking, and medication refusal despite Cardoza. PT refusal to discuss problems and largely denied all past history, medications, or any need for care.     His reported symptoms of delusions, psychosis, and erratic/aggressive behavior are consistent with prior presentations of psychosis.  Optimization of medications to target these symptom clusters in addition to determining facility placement after discharge will be targets for treatment during this admission.      Given that he currently has PTA report of out of control behaviors, aggression and psychosis, patient warrants inpatient psychiatric hospitalization to maintain his safety. Disposition pending clinical stabilization, medication optimization and development of an appropriate discharge plan.    Principal Diagnosis:     Psychosis, Unspecified (Schizoaffective disorder vs Bipolar Affective Disorder with Psychosis)    Secondary psychiatric diagnoses of concern this admission:     Intellectual Disability    Antisocial personality disorder    Psychiatric course:  Anderson Perez was admitted to [facility] following revocation of provisional discharge and on a court hold. His PTA olanzapine 20mg PO QDaily was resumed, as pt had been refusing at group home; per conversation with guardian, had planned to start on GRIGGS, but does not think that this was actually started, as patient was refusing medications, which prompted admission in setting of decompensation. Pt was converted to ODT formulation given concerns for cheeking medications; while initially resistant, pt was agreeable with taking ODT olanzapine as long as it was given with ginger ale, which was accommodated. Refused blood draw to get olanzapine level on 9/13.    Anderson Perez continued to meet criteria for inpatient hospitalization  medication optimization, inpatient stabilization, and appropriate discharge planning.     Medical course:   Anderson Perez was physically examined by the ED prior to being transferred to the unit and was found to be medically stable and appropriate for admission.      Plan   Today's Changes:     None  _______________________________________________________________________  Psychiatric diagnoses and management:  Principal Diagnosis:     Psychosis, Unspecified (Schizoaffective disorder vs Bipolar Affective Disorder with Psychosis)  o Continue olanzapine 30mg ODT qDaily  o Will consider GRIGGS at later date following initial stabilization    Secondary Psychiatric Diagnoses:     Intellectual disability  o Per , IQ estimated to be 63  o NTD    Antisocial Personality Disorder  o NTD    Additional Planning:    Continue to monitor for and stabilize: aggression, psychosis and disorganization    Patient will be treated in therapeutic milieu with appropriate individual and group therapies as described.    Scheduled Medications (summary):    OLANZapine zydis  30 mg Oral Daily with supper     Vitamin D3  25 mcg Oral Daily       PRN Medications (summary):  alum & mag hydroxide-simethicone, hydrOXYzine, OLANZapine **OR** OLANZapine, polyethylene glycol    Discontinued Medications (& Rationale):    None    Consults:    None    Legal Status:    Court Hold    Sony ADAMES:    No    Pt has not required locked seclusion or restraints in the past 24 hours to maintain safety, please refer to RN documentation for further details.    Disposition:    TBD, pending clinical stabilization, medication optimization, and formulation of a safe discharge plan.    Safety Assessment:   Behavioral Orders   Procedures     Cheeking Precautions (behavioral units)     Code 1 - Restrict to Unit     Elopement precautions     Routine Programming     As clinically indicated     Status 15     Every 15 minutes.       ____________________________________________________________________  Pertinent Medical diagnoses and management:    Chronic Hepatitis C  o CBC, CMP if patient willing  o Continue to monitor  ____________________________________________________________________  Patient seen and discussed with attending physician, Dr. Mike Bobo, who is in agreement with my assessment and plan.    Mario Schaefer, PGY-1 (Psychiatry)  [facility]    Attestation:  This patient has been seen and evaluated by me, Mike Bobo MD.  I have discussed this patient with the house staff team including the resident and medical student and I agree with the findings and plan in this note.    I have reviewed today's vital signs, medications, labs and imaging. Mike Bobo MD , PhD.

## 2021-09-17 NOTE — PLAN OF CARE
Patient calm and cooperative, visible in milieu.  Flat affect, denies SI/SIB, depression/anxiety.  Patient has been pacing hallway since morning keeping to himself.  Good appetite, no aggressive behavior noted, will continue to monitor closely.

## 2021-09-17 NOTE — PLAN OF CARE
Plan of care   Problem: Sleep Disturbance  Goal: Adequate Sleep/Rest  Outcome: Improving   Pt had a good shift; No Behavioral concern was noted or reported; pt appeared to have slept for 7 hrs ;will continue to monitor and assess.

## 2021-09-18 PROCEDURE — 250N000013 HC RX MED GY IP 250 OP 250 PS 637

## 2021-09-18 PROCEDURE — 124N000002 HC R&B MH UMMC

## 2021-09-18 RX ADMIN — OLANZAPINE 30 MG: 20 TABLET, ORALLY DISINTEGRATING ORAL at 18:48

## 2021-09-18 RX ADMIN — Medication 25 MCG: at 18:48

## 2021-09-18 ASSESSMENT — ACTIVITIES OF DAILY LIVING (ADL)
DRESS: INDEPENDENT
LAUNDRY: WITH SUPERVISION
LAUNDRY: WITH SUPERVISION
HYGIENE/GROOMING: INDEPENDENT
HYGIENE/GROOMING: INDEPENDENT
ORAL_HYGIENE: INDEPENDENT
DRESS: SCRUBS (BEHAVIORAL HEALTH);INDEPENDENT
ORAL_HYGIENE: INDEPENDENT

## 2021-09-18 NOTE — PLAN OF CARE
"Nursing plan of care   Problem: Behavior Regulation Impairment (Psychotic Signs/Symptoms)  Goal: Improved Behavioral Control (Psychotic Signs/Symptoms)  Outcome: No Change   Pt spent most of the shift pacing in the hallway and noted responding to internal stimuli. Was cooperative with his scheduled medication and took with ginger ale; declined to check in with this writer but stated \" I am fine\"; verified for cheeking and was cooperative; ate dinner; good appetite; No PRN administered; was encouraged but took no shower; will continue to monitor and assess.   "

## 2021-09-18 NOTE — PLAN OF CARE
Problem: Suicidal Behavior  Goal: Suicidal Behavior is Absent or Managed  Outcome: Improving  Flowsheets (Taken 9/18/2021 0957)  Individualized Action Step (Suicidal Behavior Absent/Managed): pt denies SI/SIB     Pt visible in the milieu but socially withdrawn. Per unit staff, pt was observed punching the air and seemed irritable. When this writer approached pt he appeared calm and was pacing the hallway. This writer spoke with pt and he denies the presence of anxiety. Pt also agrees to come to this writer if he is feeling upset or agitated. Pt offered coffee, which he was happy to receive. Pt does appear responding to internal stimuli. At this time, pt has not displayed physical or verbal aggression towards staff or peers. Will continue to observe for necessity of prn medication.

## 2021-09-18 NOTE — PLAN OF CARE
"  Problem: Sleep Disturbance  Goal: Adequate Sleep/Rest  Outcome: No Change   Observed to have slept well for approx 7 hrs. On all Q 15\" rounds overnight w/ regular non-labored respirations and no reported or observed distress.  Safe, therapeutic environment maintained.   "

## 2021-09-19 PROCEDURE — 124N000002 HC R&B MH UMMC

## 2021-09-19 PROCEDURE — 250N000013 HC RX MED GY IP 250 OP 250 PS 637

## 2021-09-19 RX ADMIN — Medication 25 MCG: at 18:23

## 2021-09-19 RX ADMIN — OLANZAPINE 30 MG: 20 TABLET, ORALLY DISINTEGRATING ORAL at 18:23

## 2021-09-19 ASSESSMENT — ACTIVITIES OF DAILY LIVING (ADL)
ORAL_HYGIENE: INDEPENDENT
DRESS: INDEPENDENT
LAUNDRY: WITH SUPERVISION
HYGIENE/GROOMING: INDEPENDENT
DRESS: SCRUBS (BEHAVIORAL HEALTH);INDEPENDENT
ORAL_HYGIENE: INDEPENDENT
HYGIENE/GROOMING: INDEPENDENT
LAUNDRY: WITH SUPERVISION

## 2021-09-19 NOTE — PLAN OF CARE
Problem: Sleep Disturbance  Goal: Adequate Sleep/Rest  Outcome: Improving   Pt slept 7 hrs,respirations easy

## 2021-09-19 NOTE — PLAN OF CARE
"  Problem: Behavioral Disturbance  Goal: Behavioral Disturbance  Description: Signs and symptoms of listed problems will be absent or manageable by discharge or transition of care.  Outcome: Improving  Flowsheets (Taken 9/19/2021 1309)  Behavioral Disturbance Assessed: all  Behavioral Disturbance Present:   thought process   insight     Pt visible in the milieu for the majority of the day. He is socially withdrawn but states to this writer, \"I'm not much for socializing.\" He denies SI/SIB, as well as AH/VH. However, appears responding. Pt reports to this writer that he feels ready for discharge. He appears to lack some insight to his illness, as he states that he doesn't know why he was admitted to the hospital. No request or indication for prn medications. No additional concerns at this time. Will continue to assess and offer support.   "

## 2021-09-19 NOTE — PLAN OF CARE
Problem: Behavioral Disturbance  Goal: Behavioral Disturbance  Description: Signs and symptoms of listed problems will be absent or manageable by discharge or transition of care.  Outcome: Improving  Flowsheets (Taken 9/18/2021 2021)  Behavioral Disturbance Assessed: all  Behavioral Disturbance Present:   affect   thought process   mood   insight  Note: Patient appeared less tense than this morning. Spends shift walking the dalal. No interaction with peers. Appears distracted and responding to internal stimuli. He was cooperative taking medication. Requested it with gingerale. No agitation towards staff. Declines to check in with staff. Tonya Sotelo RN

## 2021-09-20 PROCEDURE — 99232 SBSQ HOSP IP/OBS MODERATE 35: CPT | Mod: GC | Performed by: PSYCHIATRY & NEUROLOGY

## 2021-09-20 PROCEDURE — 124N000002 HC R&B MH UMMC

## 2021-09-20 PROCEDURE — 250N000013 HC RX MED GY IP 250 OP 250 PS 637

## 2021-09-20 RX ADMIN — Medication 25 MCG: at 18:24

## 2021-09-20 RX ADMIN — OLANZAPINE 30 MG: 20 TABLET, ORALLY DISINTEGRATING ORAL at 18:23

## 2021-09-20 ASSESSMENT — ACTIVITIES OF DAILY LIVING (ADL)
HYGIENE/GROOMING: INDEPENDENT
DRESS: INDEPENDENT
LAUNDRY: WITH SUPERVISION
ORAL_HYGIENE: INDEPENDENT
LAUNDRY: UNABLE TO COMPLETE
ORAL_HYGIENE: INDEPENDENT
HYGIENE/GROOMING: INDEPENDENT
DRESS: INDEPENDENT

## 2021-09-20 NOTE — PROGRESS NOTES
"    ----------------------------------------------------------------------------------------------------------  [facility]  Psychiatric Progress Note  Hospital Day #20    Anderson Perez MRN# [id number]   Age: 40 year old YOB: 1980   Date of Admission: 8/30/2021     Subjective   The patient was discussed with the treatment team and chart notes were reviewed.      Identifier: Anderson Perez is a 40 year old male with a past psychiatric history of bipolar disorder with psychosis vs schizoaffective disorder bipolar type, antisocial personality disorder, and intellectual disability admitted from the  ER on 08/31/2021 due to concern for out of control behaviors, aggression and psychosis in the context of medication refusal despite Cardoza.    Sleep:  6.5 hours (09/20/21 0600)  Prescribed Medications: Taken as prescribed with ginger ale  PRN Psychiatric Medications: alum & mag hydroxide-simethicone, hydrOXYzine, OLANZapine **OR** OLANZapine, polyethylene glycol     None    Overnight Nursing Notes/Staff Report:  All weekend nursing notes reviewed.     \"Pt visible in the milieu for the majority of the evening. He took scheduled medications without issue. He has been calm and pleasant on the unit. Pt denies SI/SIB, as well AH/VH but continues to appear responding. Went to bed this evening without prompting. No additional concerns at this time. Will continue to assess and offer support. \"    \"Pt slept 6.5 hrs respirations easy\"    Patient interview:   Patient seen while pacing the halls as a part of team rounds. Replies \"I'm good\" to questions about medications, sleep, and eating. When asked about increasing medications, states \"if that's what you want\", then adds \"I think they're okay\". Asked about when [facility] was [facility] and [facility] \"[facility]\" and indicated he is still willing to go there as a part of care.     ROS   ROS was negative " "unless noted above.     Objective   Vitals:  Declined. Most recent from 9/9/21    /70 (BP Location: Left arm)   Pulse 100   Temp 97.7  F (36.5  C)   Resp 14   Ht 1.829 m (6')   Wt 82.7 kg (182 lb 4.8 oz)   SpO2 100%   BMI 24.72 kg/m      Mental Status Examination:  Orientation:  Grossly intact  General: Pacing up and down the dalal during meeting.  Appearance:  Dressed in hospital scrubs, hair is buzzed short.  Behavior: Guarded  Eye Contact:  Minimal, only brief when turning at either end of the hallway  Psychomotor:  Pacing hallway; no catatonia present  Speech:  Appropriate volume, and rate  Language: Appears to be fluent in English   Mood:  \"I'm good\"  Affect:  Generally flat, with minor reactivity; less irritable than previous assessments.  Thought Process:  Tangential, disorganized  Thought Content: Unable to assess due to brevity of encounter  Associations:  loose  Insight:  poor  Judgment:  poor  Attention Span: limited  Concentration:  limited  Recent and Remote Memory:  Grossly intact  Fund of Knowledge: Suspect lower than average   Allergies     Allergies   Allergen Reactions     Bee Venom Anaphylaxis     Clindamycin Hives     Morphine Hives        Labs & Imaging   No results found for this or any previous visit (from the past 24 hour(s)).     Assessment   Diagnostic Impression:  Anderson Perez is a 40 year old male with a past psychiatric history of bipolar disorder with psychosis vs schizoaffective disorder bipolar type, antisocial personality disorder, and intellectual disability admitted from the  ER on 08/31/2021 due to concern for out of control behaviors, aggression and psychosis in the context of medication refusal despite Cardoza. Patient was recently released from [facility] to a group home. Presentation to the ED via ambulance after police contacted and provisional discharge rescinded. Group home noting escalating aggressive behavior, delusional thinking, and medication refusal despite " Sony. PT refusal to discuss problems and largely denied all past history, medications, or any need for care.     His reported symptoms of delusions, psychosis, and erratic/aggressive behavior are consistent with prior presentations of psychosis.  Optimization of medications to target these symptom clusters in addition to determining facility placement after discharge will be targets for treatment during this admission.      Given that he currently has PTA report of out of control behaviors, aggression and psychosis, patient warrants inpatient psychiatric hospitalization to maintain his safety. Disposition pending availability of [facility] placement.    Principal Diagnosis:     Psychosis, Unspecified (Schizoaffective disorder vs Bipolar Affective Disorder with Psychosis)    Secondary psychiatric diagnoses of concern this admission:     Intellectual Disability    Antisocial personality disorder    Psychiatric course:  Anderson Perez was admitted to [facility] following revocation of provisional discharge and on a court hold. His PTA olanzapine 20mg PO QDaily was resumed, as pt had been refusing at group home; per conversation with guardian, had planned to start on GRIGGS, but does not think that this was actually started, as patient was refusing medications, which prompted admission in setting of decompensation. Pt was converted to ODT formulation given concerns for cheeking medications; while initially resistant, pt was agreeable with taking ODT olanzapine as long as it was given with ginger ale, which was accommodated. Refused blood draw to get olanzapine level on 9/13.    Anderson Perez continued to meet criteria for inpatient hospitalization medication optimization, inpatient stabilization, and appropriate discharge planning.     Medical course:   Anderson Perez was physically examined by the ED prior to being transferred to the unit and was found to be medically stable and appropriate for admission.      Plan    Today's Changes:     Increase olanzapine to 40mg ODT  _______________________________________________________________________  Psychiatric diagnoses and management:  Principal Diagnosis:     Psychosis, Unspecified (Schizoaffective disorder vs Bipolar Affective Disorder with Psychosis)  o Increase olanzapine to 40mg ODT qDaily  o Will consider GRIGGS at later date following initial stabilization    Secondary Psychiatric Diagnoses:     Intellectual disability  o Per , IQ estimated to be 63  o NTD    Antisocial Personality Disorder  o NTD    Additional Planning:    Continue to monitor for and stabilize: aggression, psychosis and disorganization    Patient will be treated in therapeutic milieu with appropriate individual and group therapies as described.    Scheduled Medications (summary):    OLANZapine zydis  30 mg Oral Daily with supper     Vitamin D3  25 mcg Oral Daily       PRN Medications (summary):  alum & mag hydroxide-simethicone, hydrOXYzine, OLANZapine **OR** OLANZapine, polyethylene glycol    Discontinued Medications (& Rationale):    None    Consults:    None    Legal Status:    Court Hold    Cardoza     SIO:    No    Pt has not required locked seclusion or restraints in the past 24 hours to maintain safety, please refer to RN documentation for further details.    Disposition:    TBD, pending clinical stabilization, medication optimization, and formulation of a safe discharge plan.    Safety Assessment:   Behavioral Orders   Procedures     Cheeking Precautions (behavioral units)     Code 1 - Restrict to Unit     Elopement precautions     Routine Programming     As clinically indicated     Status 15     Every 15 minutes.      ____________________________________________________________________  Pertinent Medical diagnoses and management:    Chronic Hepatitis C  o CBC, CMP if patient willing  o Continue to monitor  ____________________________________________________________________  Patient seen and  discussed with attending physician, Dr. Mike Bobo, who is in agreement with my assessment and plan.    Mario Schaefer, PGY-1 (Psychiatry)  [facility]      Attestation:  This patient has been seen and evaluated by me, Mike Bobo MD.  I have discussed this patient with the house staff team including the resident and medical student and I agree with the findings and plan in this note.    I have reviewed today's vital signs, medications, labs and imaging. Mike Bobo MD , PhD.

## 2021-09-20 NOTE — PLAN OF CARE
Problem: Behavioral Health Plan of Care  Goal: Plan of Care Review  Outcome: Improving  Flowsheets (Taken 9/20/2021 0509)  Plan of Care Reviewed With: patient  Progress: improving  Patient Agreement with Plan of Care: disagrees (describe)     Problem: Behavior Regulation Impairment (Psychotic Signs/Symptoms)  Goal: Improved Behavioral Control (Psychotic Signs/Symptoms)  Outcome: Improving     Anderson continues pacing dalal constantly moving mouth in talking motion-less tense, but avoids interaction with others-did introduce self to young female float staff-does not attend grps-minimal interactions with others

## 2021-09-20 NOTE — PLAN OF CARE
Problem: Sleep Disturbance  Goal: Adequate Sleep/Rest  Outcome: No Change   Pt slept 6.5 hrs respirations easy

## 2021-09-20 NOTE — PLAN OF CARE
BEHAVIORAL TEAM DISCUSSION    Participants: Dr. Bobo, resident Mario Schaefer, medical students Ralph and Vivek, OT Sujatha Bill, RN Natacha Saini, CTC Siobhan Irving  Progress: some improvement in mood, pt still appears to be responding to internal stimuli  Anticipated length of stay: until a bed at [facility] opens up  Continued Stay Criteria/Rationale: pt's PD was revoked  Medical/Physical: pt refusing labs  Precautions:   Behavioral Orders   Procedures     Cheeking Precautions (behavioral units)     Code 1 - Restrict to Unit     Elopement precautions     Routine Programming     As clinically indicated     Status 15     Every 15 minutes.     Plan: Provide a psychological assessment and manage medications per psychiatry, staff to provide safe and therapeutic milieu and groups will be offered, CTC to coordinate care with [facility] from [facility]  Rationale for change in precautions or plan: no change

## 2021-09-20 NOTE — PLAN OF CARE
Assessment/Intervention/Current Symptoms and Care Coordination      Attended team meeting and reviewed chart notes.    Pt continues to pace the dalal. He is checked daily to assure pt is taking his medications.        Discharge Plan or Goal  [facility]      Barriers to Discharge   Needs bed to open at [facility]      Referral Status  Referral to [facility]  Pt has been denied referral for [facility]        Legal Status  Pt's PD was revoked

## 2021-09-20 NOTE — PLAN OF CARE
Problem: Adult Inpatient Plan of Care  Goal: Optimal Comfort and Wellbeing  Outcome: Improving    Pt visible in the milieu for the majority of the evening. He took scheduled medications without issue. He has been calm and pleasant on the unit. Pt denies SI/SIB, as well AH/VH but continues to appear responding. Went to bed this evening without prompting. No additional concerns at this time. Will continue to assess and offer support.

## 2021-09-21 PROCEDURE — 99231 SBSQ HOSP IP/OBS SF/LOW 25: CPT | Mod: GC | Performed by: PSYCHIATRY & NEUROLOGY

## 2021-09-21 PROCEDURE — 250N000013 HC RX MED GY IP 250 OP 250 PS 637

## 2021-09-21 PROCEDURE — 124N000002 HC R&B MH UMMC

## 2021-09-21 RX ORDER — DIPHENHYDRAMINE HYDROCHLORIDE 50 MG/ML
50 INJECTION INTRAMUSCULAR; INTRAVENOUS EVERY 4 HOURS PRN
Status: DISCONTINUED | OUTPATIENT
Start: 2021-09-21 | End: 2021-09-21

## 2021-09-21 RX ORDER — OLANZAPINE 20 MG/1
40 TABLET, ORALLY DISINTEGRATING ORAL
Status: DISCONTINUED | OUTPATIENT
Start: 2021-09-21 | End: 2021-11-08

## 2021-09-21 RX ORDER — DIPHENHYDRAMINE HYDROCHLORIDE 50 MG/ML
50 INJECTION INTRAMUSCULAR; INTRAVENOUS EVERY 4 HOURS PRN
Status: DISCONTINUED | OUTPATIENT
Start: 2021-09-21 | End: 2021-12-28 | Stop reason: HOSPADM

## 2021-09-21 RX ORDER — LORAZEPAM 2 MG/ML
2 INJECTION INTRAMUSCULAR EVERY 4 HOURS PRN
Status: DISCONTINUED | OUTPATIENT
Start: 2021-09-21 | End: 2021-12-28 | Stop reason: HOSPADM

## 2021-09-21 RX ORDER — HALOPERIDOL 5 MG/1
5 TABLET ORAL EVERY 6 HOURS PRN
Status: DISCONTINUED | OUTPATIENT
Start: 2021-09-21 | End: 2021-12-28 | Stop reason: HOSPADM

## 2021-09-21 RX ORDER — LORAZEPAM 2 MG/ML
2 INJECTION INTRAMUSCULAR EVERY 4 HOURS PRN
Status: DISCONTINUED | OUTPATIENT
Start: 2021-09-21 | End: 2021-09-21

## 2021-09-21 RX ORDER — HALOPERIDOL 5 MG/ML
5 INJECTION INTRAMUSCULAR EVERY 4 HOURS PRN
Status: DISCONTINUED | OUTPATIENT
Start: 2021-09-21 | End: 2021-12-28 | Stop reason: HOSPADM

## 2021-09-21 RX ORDER — HALOPERIDOL 5 MG/ML
2 INJECTION INTRAMUSCULAR EVERY 4 HOURS PRN
Status: DISCONTINUED | OUTPATIENT
Start: 2021-09-21 | End: 2021-09-21

## 2021-09-21 RX ADMIN — OLANZAPINE 40 MG: 20 TABLET, ORALLY DISINTEGRATING ORAL at 18:00

## 2021-09-21 RX ADMIN — Medication 25 MCG: at 18:00

## 2021-09-21 ASSESSMENT — ACTIVITIES OF DAILY LIVING (ADL)
LAUNDRY: WITH SUPERVISION
ORAL_HYGIENE: INDEPENDENT
DRESS: INDEPENDENT
HYGIENE/GROOMING: INDEPENDENT

## 2021-09-21 NOTE — PLAN OF CARE
Problem: Behavioral Health Plan of Care  Goal: Plan of Care Review  Outcome: Improving  Flowsheets (Taken 9/21/2021 1127)  Plan of Care Reviewed With: patient  Progress: improving  Patient Agreement with Plan of Care: disagrees (describe)     Problem: Behavior Regulation Impairment (Psychotic Signs/Symptoms)  Goal: Improved Behavioral Control (Psychotic Signs/Symptoms)  Outcome: Improving     Anderson walking in dalal most of morning-moving lips as though in conversation-avoids conversation with staff-states he is fine and doesn't need to talk-does talk with autistic peer in pleasant manner-

## 2021-09-21 NOTE — PLAN OF CARE
Pt appeared to sleep 5.75 hours. No PRNs given or requested. No medical or behavioral events this shift.      Problem: Sleep Disturbance  Goal: Adequate Sleep/Rest  Outcome: No Change

## 2021-09-21 NOTE — PROGRESS NOTES
"    ----------------------------------------------------------------------------------------------------------  [facility]  Psychiatric Progress Note  Hospital Day #21    Anderson Perez MRN# [id number]   Age: 40 year old YOB: 1980   Date of Admission: 8/30/2021     Subjective   The patient was discussed with the treatment team and chart notes were reviewed.      Identifier: Anderson Perez is a 40 year old male with a past psychiatric history of bipolar disorder with psychosis vs schizoaffective disorder bipolar type, antisocial personality disorder, and intellectual disability admitted from the  ER on 08/31/2021 due to concern for out of control behaviors, aggression and psychosis in the context of medication refusal despite Cardoza.    Sleep:  5.75 hours (09/21/21 0600)  Prescribed Medications: Taken as prescribed with ginger ale  PRN Psychiatric Medications: alum & mag hydroxide-simethicone, hydrOXYzine, OLANZapine **OR** OLANZapine, polyethylene glycol     None    Overnight Nursing Notes/Staff Report:  All overnight nursing notes reviewed.     \"Visible in milieu, pacing almost entire shift, although was observed talking with peers more than usual, and also was conversational with staff at times. Pt appears responding frequently to . Pt denies any MH concerns, but asks how long until they are able to be discharged to [facility]. Pt also asks if they can be transferred to another hospital. At one point pt was observed by staff to be punching in numbers to exit door code at end of dalal. When asked what they were doing pt reported, \"I'm trying to get out of here.\" Pt was easily redirected from behavior.      Patient is medication compliant and reported no side effects. Writer educated pt to let ODT zyprexa to dissolve in their mouth which they agreed to, but pt continues to swallow medication. No PRN medications given this shift     Hygiene is poor, " "appetite is good.\"    \"Pt appeared to sleep 5.75 hours. No PRNs given or requested. No medical or behavioral events this shift.  \"    Patient interview:   Patient was seen while pacing hallway as a part of team rounds. Before asking a question, patient said \"Everything's fine, no changes\". Stated that he had no concerns, was eating and sleeping well, and had no issues with medication.     ROS   ROS was negative unless noted above.     Objective   Vitals:  Declined. Most recent from 9/9/21    /75 (BP Location: Left arm)   Pulse 77   Temp 98.2  F (36.8  C) (Oral)   Resp 14   Ht 1.829 m (6')   Wt 82.7 kg (182 lb 4.8 oz)   SpO2 98%   BMI 24.72 kg/m      Mental Status Examination:  Orientation:  Grossly intact  General: Pacing up and down the dalal during meeting.  Appearance:  Dressed in hospital scrubs, hair is buzzed short.  Behavior: Guarded  Eye Contact:  Minimal, only brief when turning at either end of the hallway  Psychomotor:  Pacing hallway; no catatonia present  Speech:  Appropriate volume, and rate  Language: Appears to be fluent in English   Mood:  \"No changes\"  Affect:  Generally flat, with minor reactivity; irritability continues to be reduced from time of admission, but still present intermittently  Thought Process:  Limited engagement with team during today's assessment  Thought Content: Unable to assess due to brevity of encounter  Associations:  loose  Insight:  poor  Judgment:  poor  Attention Span: limited  Concentration:  limited  Recent and Remote Memory:  Grossly intact  Fund of Knowledge: Suspect lower than average   Allergies     Allergies   Allergen Reactions     Bee Venom Anaphylaxis     Clindamycin Hives     Morphine Hives        Labs & Imaging   No results found for this or any previous visit (from the past 24 hour(s)).     Assessment   Diagnostic Impression:  Anderson CHI Orlagabriela is a 40 year old male with a past psychiatric history of bipolar disorder with psychosis vs " schizoaffective disorder bipolar type, antisocial personality disorder, and intellectual disability admitted from the  ER on 08/31/2021 due to concern for out of control behaviors, aggression and psychosis in the context of medication refusal despite Cardoza. Patient was recently released from [facility] to a group home. Presentation to the ED via ambulance after police contacted and provisional discharge rescinded. Group home noting escalating aggressive behavior, delusional thinking, and medication refusal despite Cardoza. PT refusal to discuss problems and largely denied all past history, medications, or any need for care.     His reported symptoms of delusions, psychosis, and erratic/aggressive behavior are consistent with prior presentations of psychosis.  Optimization of medications to target these symptom clusters in addition to determining facility placement after discharge will be targets for treatment during this admission.      Given that he currently has PTA report of out of control behaviors, aggression and psychosis, patient warrants inpatient psychiatric hospitalization to maintain his safety. Disposition pending availability of [facility] placement.    Principal Diagnosis:     Psychosis, Unspecified (Schizoaffective disorder vs Bipolar Affective Disorder with Psychosis)    Secondary psychiatric diagnoses of concern this admission:     Intellectual Disability    Antisocial personality disorder    Psychiatric course:  Anderson Perez was admitted to [facility] following revocation of provisional discharge and on a court hold. His PTA olanzapine 20mg PO QDaily was resumed, as pt had been refusing at group home; per conversation with guardian, had planned to start on GRIGGS, but does not think that this was actually started, as patient was refusing medications, which prompted admission in setting of decompensation. Pt was converted to ODT formulation given concerns for cheeking medications; while initially resistant,  pt was agreeable with taking ODT olanzapine as long as it was given with ginger ale, which was accommodated. Refused blood draw to get olanzapine level on 9/13. Olanzapine dose increased to 40mg on 9/21 given limited efficacy at current dose. PRN for agitation/aggression was changed to haloperidol 5mg, lorazepam 2mg, and diphenhydramine 50mg, with PO haloperidol available.    Anderson Perez continued to meet criteria for inpatient hospitalization medication optimization, inpatient stabilization, and appropriate discharge planning.     Medical course:   Anderson Perez was physically examined by the ED prior to being transferred to the unit and was found to be medically stable and appropriate for admission.      Plan   Today's Changes:     Increase olanzapine to 40mg ODT  _______________________________________________________________________  Psychiatric diagnoses and management:  Principal Diagnosis:     Psychosis, Unspecified (Schizoaffective disorder vs Bipolar Affective Disorder with Psychosis)  o Increase olanzapine to 40mg ODT qDaily  o Will consider GRIGGS at later date following initial stabilization    Secondary Psychiatric Diagnoses:     Intellectual disability  o Per , IQ estimated to be 63  o NTD    Antisocial Personality Disorder  o NTD    Additional Planning:    Continue to monitor for and stabilize: aggression, psychosis and disorganization    Patient will be treated in therapeutic milieu with appropriate individual and group therapies as described.    Scheduled Medications (summary):    OLANZapine zydis  40 mg Oral Daily with supper     Vitamin D3  25 mcg Oral Daily       PRN Medications (summary):  alum & mag hydroxide-simethicone, hydrOXYzine, OLANZapine **OR** OLANZapine, polyethylene glycol    Discontinued Medications (& Rationale):    None    Consults:    None    Legal Status:    Court Hold    Sony CASTILLOO:    No    Pt has not required locked seclusion or restraints in the past 24  hours to maintain safety, please refer to RN documentation for further details.    Disposition:    Pending availability of bed for placement at [facility].    Safety Assessment:   Behavioral Orders   Procedures     Cheeking Precautions (behavioral units)     Code 1 - Restrict to Unit     Elopement precautions     Routine Programming     As clinically indicated     Status 15     Every 15 minutes.      ____________________________________________________________________  Pertinent Medical diagnoses and management:    Chronic Hepatitis C  o Continue to monitor  ____________________________________________________________________  Patient seen and discussed with attending physician, Dr. Mike Bobo, who is in agreement with my assessment and plan.    Mario Schaefer, PGY-1 (Psychiatry)  [facility]    Attestation:  This patient has been seen and evaluated by me, Mike Bobo MD.  I have discussed this patient with the house staff team including the resident and medical student and I agree with the findings and plan in this note.    I have reviewed today's vital signs, medications, labs and imaging. Mike Bobo MD , PhD.

## 2021-09-21 NOTE — PLAN OF CARE
"  Problem: Behavioral Disturbance  Goal: Behavioral Disturbance  Description: Signs and symptoms of listed problems will be absent or manageable by discharge or transition of care.  Outcome: Improving   Nursing Assessment    Recent Vitals: B/P:122/75  T:98.2  P:77     General Shift Summary  Visible in milieu, pacing almost entire shift, although was observed talking with peers more than usual, and also was conversational with staff at times. Pt appears responding frequently to AH. Pt denies any MH concerns, but asks how long until they are able to be discharged to [facility]. Pt also asks if they can be transferred to another hospital. At one point pt was observed by staff to be punching in numbers to exit door code at end of dalal. When asked what they were doing pt reported, \"I'm trying to get out of here.\" Pt was easily redirected from behavior.      Patient is medication compliant and reported no side effects. Writer educated pt to let ODT zyprexa to dissolve in their mouth which they agreed to, but pt continues to swallow medication. No PRN medications given this shift    Hygiene is poor, appetite is good.       Davey Moody, RN    "

## 2021-09-21 NOTE — PLAN OF CARE
Assessment/Intervention/Current Symptoms and Care Coordination    Reviewed chart notes and checked in with the team.    Writer spoke with CPA to see if or when a bed would be available at [facility]. A bed is not available and when a bed will be open is up to the list of people ahead of pt. According to CPA people in long term get preference due to not being treated for their mental illness while in long term. We talked about [facility] but since pt has a history of aggression he will only be on the [facility] list. In May of 2020, pt had a R20 assessment for a pending felony charge and was found incompetent to stand trial. He was admitted to [facility] at the end of April 2021 and was PD'd to a group home at the end of June. He came to  August 30th.              Discharge Plan or Goal  [facility]    Barriers to Discharge   Needs bed available      Referral Status  Pt has been declined bed at [facility]        Legal Status  Pt's PD has been revoked

## 2021-09-22 PROCEDURE — 250N000013 HC RX MED GY IP 250 OP 250 PS 637

## 2021-09-22 PROCEDURE — 124N000002 HC R&B MH UMMC

## 2021-09-22 PROCEDURE — 99232 SBSQ HOSP IP/OBS MODERATE 35: CPT | Mod: GC | Performed by: PSYCHIATRY & NEUROLOGY

## 2021-09-22 RX ADMIN — Medication 25 MCG: at 17:51

## 2021-09-22 RX ADMIN — OLANZAPINE 40 MG: 20 TABLET, ORALLY DISINTEGRATING ORAL at 17:51

## 2021-09-22 ASSESSMENT — ACTIVITIES OF DAILY LIVING (ADL)
DRESS: INDEPENDENT
ORAL_HYGIENE: INDEPENDENT
LAUNDRY: WITH SUPERVISION
HYGIENE/GROOMING: INDEPENDENT

## 2021-09-22 NOTE — PROGRESS NOTES
"    ----------------------------------------------------------------------------------------------------------  [facility]  Psychiatric Progress Note  Hospital Day #22    Anderson Perez MRN# [id number]   Age: 40 year old YOB: 1980   Date of Admission: 8/30/2021     Subjective   The patient was discussed with the treatment team and chart notes were reviewed.      Identifier: Anderson Perez is a 40 year old male with a past psychiatric history of bipolar disorder with psychosis vs schizoaffective disorder bipolar type, antisocial personality disorder, and intellectual disability admitted from the  ER on 08/31/2021 due to concern for out of control behaviors, aggression and psychosis in the context of medication refusal despite Cardoza.    Sleep:  6 hours (09/22/21 0700)  Prescribed Medications: Taken as prescribed with ginger ale  PRN Psychiatric Medications: alum & mag hydroxide-simethicone, haloperidol lactate **AND** diphenhydrAMINE **AND** LORazepam, haloperidol, hydrOXYzine, polyethylene glycol     None    Overnight Nursing Notes/Staff Report:  All overnight nursing notes reviewed.     \" Pt appeared to sleep 6 hours. Pt talking to self in bed at beginning of shift. Pt up @0215 to request yogurt as a snack and returned to bed. No PRNs given or requested. No medical or behavioral events this shift.\"    \"Pt calm . Pleasant , med compliant . Appears to be responding, talking and smiling to him self, . Pt enjoys  another peer on the unit , walks with him\"      Patient interview:   Patient was seen while pacing hallway as a part of team rounds. Pt stated \" I do not need any medicine, why are you giving me medication? I  Have 16 degrees and I belong to the [employer]...\" .     ROS   ROS was negative unless noted above.     Objective   Vitals:  Declined. Most recent from 9/20/21  Vitals:    09/15/21 1600 09/19/21 1558 09/20/21 0700 09/20/21 1620   BP:    122/75   BP " "Location:    Left arm   Pulse:    77   Resp:  14     Temp:   97.7  F (36.5  C) 98.2  F (36.8  C)   TempSrc: Oral   Oral   SpO2:   100% 98%   Weight:       Height:           Mental Status Examination:  Orientation:  Grossly intact  General: Pacing up and down the dalal during meeting.  Appearance:  Dressed in hospital scrubs, hair is buzzed short.  Behavior: Guarded  Eye Contact:  Minimal, only brief when turning at either end of the hallway  Psychomotor:  Pacing hallway; no catatonia present  Speech:  Appropriate volume, and rate  Language: Appears to be fluent in English   Mood:  \"upset\"  Affect:  Generally flat, with minor reactivity; irritability continues to be reduced from time of admission, but still present intermittently  Thought Process:  Limited engagement with team during today's assessment  Thought Content: Unable to assess due to brevity of encounter  Associations:  loose  Insight:  poor  Judgment:  poor  Attention Span: limited  Concentration:  limited  Recent and Remote Memory:  Grossly intact  Fund of Knowledge: Suspect lower than average   Allergies     Allergies   Allergen Reactions     Bee Venom Anaphylaxis     Clindamycin Hives     Morphine Hives        Labs & Imaging   No results found for this or any previous visit (from the past 24 hour(s)).     Assessment   Diagnostic Impression:  Anderson Perez is a 40 year old male with a past psychiatric history of bipolar disorder with psychosis vs schizoaffective disorder bipolar type, antisocial personality disorder, and intellectual disability admitted from the  ER on 08/31/2021 due to concern for out of control behaviors, aggression and psychosis in the context of medication refusal despite Cardoza. Patient was recently released from [facility] to a group home. Presentation to the ED via ambulance after police contacted and provisional discharge rescinded. Group home noting escalating aggressive behavior, delusional thinking, and medication refusal " despite Cardoza. PT refusal to discuss problems and largely denied all past history, medications, or any need for care.     His reported symptoms of delusions, psychosis, and erratic/aggressive behavior are consistent with prior presentations of psychosis.  Optimization of medications to target these symptom clusters in addition to determining facility placement after discharge will be targets for treatment during this admission.      Given that he currently has PTA report of out of control behaviors, aggression and psychosis, patient warrants inpatient psychiatric hospitalization to maintain his safety. Disposition pending availability of [facility] placement.    Principal Diagnosis:     Psychosis, Unspecified (Schizoaffective disorder vs Bipolar Affective Disorder with Psychosis)    Secondary psychiatric diagnoses of concern this admission:     Intellectual Disability    Antisocial personality disorder    Psychiatric course:  Anderson Perez was admitted to [facility] following revocation of provisional discharge and on a court hold. His PTA olanzapine 20mg PO QDaily was resumed, as pt had been refusing at group home; per conversation with guardian, had planned to start on GRIGGS, but does not think that this was actually started, as patient was refusing medications, which prompted admission in setting of decompensation. Pt was converted to ODT formulation given concerns for cheeking medications; while initially resistant, pt was agreeable with taking ODT olanzapine as long as it was given with ginger ale, which was accommodated. Refused blood draw to get olanzapine level on 9/13. Olanzapine dose increased to 40mg on 9/21 given limited efficacy at current dose. PRN for agitation/aggression was changed to haloperidol 5mg, lorazepam 2mg, and diphenhydramine 50mg, with PO haloperidol available.    Anderson Perez continued to meet criteria for inpatient hospitalization medication optimization, inpatient stabilization, and  appropriate discharge planning.     Medical course:   Anderson Perez was physically examined by the ED prior to being transferred to the unit and was found to be medically stable and appropriate for admission.      Plan   Today's Changes:     None  _______________________________________________________________________  Psychiatric diagnoses and management:  Principal Diagnosis:     Psychosis, Unspecified (Schizoaffective disorder vs Bipolar Affective Disorder with Psychosis)  o Increase olanzapine to 40mg ODT qDaily  o Will consider GRIGGS at later date following initial stabilization    Secondary Psychiatric Diagnoses:     Intellectual disability  o Per , IQ estimated to be 63  o NTD    Antisocial Personality Disorder  o NTD    Additional Planning:    Continue to monitor for and stabilize: aggression, psychosis and disorganization    Patient will be treated in therapeutic milieu with appropriate individual and group therapies as described.    Scheduled Medications (summary):    OLANZapine zydis  40 mg Oral Daily with supper     Vitamin D3  25 mcg Oral Daily       PRN Medications (summary):  alum & mag hydroxide-simethicone, haloperidol lactate **AND** diphenhydrAMINE **AND** LORazepam, haloperidol, hydrOXYzine, polyethylene glycol    Discontinued Medications (& Rationale):    None    Consults:    None    Legal Status:    Court Hold    Sony CASTILLOO:    No    Pt has not required locked seclusion or restraints in the past 24 hours to maintain safety, please refer to RN documentation for further details.    Disposition:    Pending availability of bed for placement at [facility]. Exploring other options like [facility] for him.     Safety Assessment:   Behavioral Orders   Procedures     Cheeking Precautions (behavioral units)     Code 1 - Restrict to Unit     Elopement precautions     Routine Programming     As clinically indicated     Status 15     Every 15 minutes.       ____________________________________________________________________  Pertinent Medical diagnoses and management:    Chronic Hepatitis C  o Continue to monitor  ____________________________________________________________________  Patient seen and discussed with attending physician, Dr. Mike Bobo, who is in agreement with my assessment and plan.    Marlon Apodaca, PGY-1 (Psychiatry)  [facility]    Attestation:  This patient has been seen and evaluated by me, Mike Bobo MD.  I have discussed this patient with the house staff team including the resident and medical student and I agree with the findings and plan in this note.    I have reviewed today's vital signs, medications, labs and imaging. Mike Bobo MD , PhD.

## 2021-09-22 NOTE — PLAN OF CARE
Problem: Behavioral Health Plan of Care  Goal: Plan of Care Review  Outcome: Improving  Flowsheets (Taken 9/22/2021 9332)  Plan of Care Reviewed With: patient  Progress: improving  Patient Agreement with Plan of Care: (Denies illness) disagrees (describe)     Problem: Behavior Regulation Impairment (Psychotic Signs/Symptoms)  Goal: Improved Behavioral Control (Psychotic Signs/Symptoms)  Outcome: Improving     Anderson spent almost entire day walking up and down dalal moving mouth as though in conversation with others-less tense, but avoids interactions with staff-rarely interacts with others-

## 2021-09-22 NOTE — PLAN OF CARE
Assessment/Intervention/Current Symptoms and Care Coordination     Reviewed chart notes and checked in with the team.     Writer spoke with CPA to see if or when a bed would be available at [facility]. A bed is not available and when a bed will be open is up to the list of people ahead of pt. According to CPA people in [facility] get preference due to not being treated for their mental illness while in [facility]. We talked about [facility] but since pt has a history of aggression he will only be on the [facility] list. In May of 2020, pt had a R20 assessment for a pending felony charge and was found incompetent to stand trial. He was admitted to [facility] at the end of April 2021 and was PD'd to a group home at the end of June. He came to  August 30th.                    Discharge Plan or Goal  [facility]     Barriers to Discharge   Needs bed available        Referral Status  Pt has been declined bed at [facility]           Legal Status  Pt's PD has been revoked

## 2021-09-22 NOTE — PLAN OF CARE
Pt calm . Pleasant , med compliant . Appears to be responding, talking and smiling to him self, . Pt enjoys  another peer on the unit , walks with him

## 2021-09-22 NOTE — PLAN OF CARE
Pt appeared to sleep 6 hours. Pt talking to self in bed at beginning of shift. Pt up @0215 to request yogurt as a snack and returned to bed. No PRNs given or requested. No medical or behavioral events this shift.      Problem: Sleep Disturbance  Goal: Adequate Sleep/Rest  Outcome: No Change

## 2021-09-23 PROCEDURE — 124N000002 HC R&B MH UMMC

## 2021-09-23 PROCEDURE — 99232 SBSQ HOSP IP/OBS MODERATE 35: CPT | Mod: GC | Performed by: PSYCHIATRY & NEUROLOGY

## 2021-09-23 PROCEDURE — 250N000013 HC RX MED GY IP 250 OP 250 PS 637

## 2021-09-23 PROCEDURE — 258N000001 HC RX 258

## 2021-09-23 RX ORDER — WADDING
500 EACH MISCELLANEOUS 3 TIMES DAILY PRN
Status: DISCONTINUED | OUTPATIENT
Start: 2021-09-23 | End: 2021-10-10

## 2021-09-23 RX ADMIN — Medication 500 ML: at 17:39

## 2021-09-23 RX ADMIN — OLANZAPINE 40 MG: 20 TABLET, ORALLY DISINTEGRATING ORAL at 18:40

## 2021-09-23 RX ADMIN — Medication 500 ML: at 20:17

## 2021-09-23 RX ADMIN — Medication 25 MCG: at 18:40

## 2021-09-23 ASSESSMENT — ACTIVITIES OF DAILY LIVING (ADL)
HYGIENE/GROOMING: INDEPENDENT
LAUNDRY: WITH SUPERVISION
DRESS: INDEPENDENT
ORAL_HYGIENE: INDEPENDENT
HYGIENE/GROOMING: INDEPENDENT
DRESS: INDEPENDENT
LAUNDRY: WITH SUPERVISION
ORAL_HYGIENE: INDEPENDENT

## 2021-09-23 NOTE — PLAN OF CARE
Problem: Sleep Disturbance  Goal: Adequate Sleep/Rest  Outcome: Improving     Antonio appeared comfortable and to be sleeping a total of 6.75 hours this night shift.  No prns or snacks given or requested.

## 2021-09-23 NOTE — PLAN OF CARE
Problem: Behavioral Health Plan of Care  Goal: Plan of Care Review  Outcome: Improving  Flowsheets (Taken 9/23/2021 6160)  Plan of Care Reviewed With: patient  Progress: improving  Patient Agreement with Plan of Care: (Denies illness) disagrees (describe)     Problem: Mood Impairment (Manic or Hypomanic Signs/Symptoms)  Goal: Improved Mood Symptoms (Manic/Hypomanic Signs/Symptoms)  Outcome: Improving     Problem: Behavior Regulation Impairment (Manic or Hypomanic Signs/Symptoms)  Goal: Improved Impulse Control (Manic/Hypomanic Signs/Symptoms)  Outcome: Improving     Antonio continues to spend day walking up and down dalal-smiling and laughing to self at times-almost constantly moving lips as though in conversation-more easily engaged in conversation today-laughing and joking with male staff-states he has four twins and is fluent in Ninja-softer tone in brief social exchanges-

## 2021-09-23 NOTE — PLAN OF CARE
Assessment/Intervention/Current Symptoms and Care Coordination      Checked in with the team and reviewed chart notes.    Writer will speak with CHRISTIAN Barragan today about discharge planning.    Writer [facility] ([phone] [place]) to review pt's case.         Discharge Plan or Goal  Pt is on the list for [facility]- pt has been declined for [facility] placement    Writer will check with [facility] for placement to see if they will take him.      Barriers to Discharge   Needs stabilization      Referral Status  None made        Legal Status  PD has been revoked

## 2021-09-23 NOTE — PROGRESS NOTES
"    ----------------------------------------------------------------------------------------------------------  [facility]  Psychiatric Progress Note  Hospital Day #23    Anderson Perez MRN# [id number]   Age: 40 year old YOB: 1980   Date of Admission: 8/30/2021     Subjective   The patient was discussed with the treatment team and chart notes were reviewed.      Identifier: Anderson Perez is a 40 year old male with a past psychiatric history of bipolar disorder with psychosis vs schizoaffective disorder bipolar type, antisocial personality disorder, and intellectual disability admitted from the  ER on 08/31/2021 due to concern for out of control behaviors, aggression and psychosis in the context of medication refusal despite Cardoza.    Sleep:  6.75 hours (09/23/21 0610)  Prescribed Medications: Taken as prescribed with ginger ale  PRN Psychiatric Medications: alum & mag hydroxide-simethicone, haloperidol lactate **AND** diphenhydrAMINE **AND** LORazepam, haloperidol, hydrOXYzine, pediatric electrolyte, polyethylene glycol     None    Overnight Nursing Notes/Staff Report:  All overnight nursing notes reviewed.     \"Antonio appeared comfortable and to be sleeping a total of 6.75 hours this night shift.  No prns or snacks given or requested.\"    \"Pt calm , walking the halls talking to himself, does not speak to staff , med compliant\"      Patient interview:   Patient was seen while pacing hallway as a part of team rounds. Pt stated \" I am fine\"  \"I don't want to talk\".  \"I am doing my Mohamed walk\". He responded with \"I ate all my food\" when asked about his appetite.   Pt requested pedialyte.      ROS   ROS was negative unless noted above.     Objective   Vitals:  Declined. Most recent from 9/20/21  Vitals:    09/15/21 1600 09/19/21 1558 09/20/21 0700 09/20/21 1620   BP:    122/75   BP Location:    Left arm   Pulse:    77   Resp:  14     TempSrc: Oral   Oral   SpO2:   100% " "98%   Weight:       Height:           Mental Status Examination:  Orientation:  Grossly intact  General: Pacing up and down the dalal during meeting.  Appearance:  Dressed in hospital scrubs, hair is buzzed short.  Behavior: Guarded  Eye Contact:  Minimal, only brief when turning at either end of the hallway  Psychomotor:  Pacing hallway; no catatonia present  Speech:  Appropriate volume, and rate  Language: Appears to be fluent in English   Mood:  \"I am fine\"  Affect:  Generally flat, with minor reactivity; irritability continues to be reduced from time of admission, but still present intermittently  Thought Process:  Limited engagement with team during today's assessment  Thought Content: Unable to assess due to brevity of encounter  Associations:  loose  Insight:  poor  Judgment:  poor  Attention Span: limited  Concentration:  limited  Recent and Remote Memory:  Grossly intact  Fund of Knowledge: Suspect lower than average   Allergies     Allergies   Allergen Reactions     Bee Venom Anaphylaxis     Clindamycin Hives     Morphine Hives        Labs & Imaging   No results found for this or any previous visit (from the past 24 hour(s)).     Assessment   Diagnostic Impression:  Anderson Perez is a 40 year old male with a past psychiatric history of bipolar disorder with psychosis vs schizoaffective disorder bipolar type, antisocial personality disorder, and intellectual disability admitted from the  ER on 08/31/2021 due to concern for out of control behaviors, aggression and psychosis in the context of medication refusal despite Cardoza. Patient was recently released from [facility] to a group home. Presentation to the ED via ambulance after police contacted and provisional discharge rescinded. Group home noting escalating aggressive behavior, delusional thinking, and medication refusal despite Cardoza. PT refusal to discuss problems and largely denied all past history, medications, or any need for care.     His reported " symptoms of delusions, psychosis, and erratic/aggressive behavior are consistent with prior presentations of psychosis.  Optimization of medications to target these symptom clusters in addition to determining facility placement after discharge will be targets for treatment during this admission.      Given that he currently has PTA report of out of control behaviors, aggression and psychosis, patient warrants inpatient psychiatric hospitalization to maintain his safety. Dis[facility].    Principal Diagnosis:     Psychosis, Unspecified (Schizoaffective disorder vs Bipolar Affective Disorder with Psychosis)    Secondary psychiatric diagnoses of concern this admission:     Intellectual Disability    Antisocial personality disorder    Psychiatric course:  Anderson Perez was admitted to [facility] following revocation of provisional discharge and on a court hold. His PTA olanzapine 20mg PO QDaily was resumed, as pt had been refusing at group home; per conversation with guardian, had planned to start on GRIGGS, but does not think that this was actually started, as patient was refusing medications, which prompted admission in setting of decompensation. Pt was converted to ODT formulation given concerns for cheeking medications; while initially resistant, pt was agreeable with taking ODT olanzapine as long as it was given with ginger ale, which was accommodated. Refused blood draw to get olanzapine level on 9/13. Olanzapine dose increased to 40mg on 9/21 given limited efficacy at current dose. PRN for agitation/aggression was changed to haloperidol 5mg, lorazepam 2mg, and diphenhydramine 50mg, with PO haloperidol available.    Anderson Perez continued to meet criteria for inpatient hospitalization medication optimization, inpatient stabilization, and appropriate discharge planning.     Medical course:   Anderson Perez was physically examined by the ED prior to being transferred to  the unit and was found to be medically stable and appropriate for admission.      Plan   Today's Changes:     Pedialyte prescribed for hydration per pt request.   _______________________________________________________________________  Psychiatric diagnoses and management:  Principal Diagnosis:     Psychosis, Unspecified (Schizoaffective disorder vs Bipolar Affective Disorder with Psychosis)  o Increase olanzapine to 40mg ODT qDaily  o Will consider GRIGGS at later date following initial stabilization    Secondary Psychiatric Diagnoses:     Intellectual disability  o Per , IQ estimated to be 63  o NTD    Antisocial Personality Disorder  o NTD    Additional Planning:    Continue to monitor for and stabilize: aggression, psychosis and disorganization    Patient will be treated in therapeutic milieu with appropriate individual and group therapies as described.    Scheduled Medications (summary):    OLANZapine zydis  40 mg Oral Daily with supper     Vitamin D3  25 mcg Oral Daily       PRN Medications (summary):  alum & mag hydroxide-simethicone, haloperidol lactate **AND** diphenhydrAMINE **AND** LORazepam, haloperidol, hydrOXYzine, pediatric electrolyte, polyethylene glycol    Discontinued Medications (& Rationale):    None    Consults:    None    Legal Status:    Court Hold    Cardoza     SIO:    No    Pt has not required locked seclusion or restraints in the past 24 hours to maintain safety, please refer to RN documentation for further details.    Disposition:    Pending availability of bed for placement at [facility]. Exploring other options like [facility] for him.     Safety Assessment:   Behavioral Orders   Procedures     Cheeking Precautions (behavioral units)     Code 1 - Restrict to Unit     Elopement precautions     Routine Programming     As clinically indicated     Status 15     Every 15 minutes.      ____________________________________________________________________  Pertinent Medical diagnoses and  management:    Chronic Hepatitis C  o Continue to monitor  ____________________________________________________________________  Patient seen and discussed with attending physician, Dr. Mike Bobo, who is in agreement with my assessment and plan.    Marlon Apodaca, PGY-1 (Psychiatry)  [facility]      Attestation:  This patient has been seen and evaluated by me, Mike Bobo MD.  I have discussed this patient with the house staff team including the resident and medical student and I agree with the findings and plan in this note.    I have reviewed today's vital signs, medications, labs and imaging. Mike Bobo MD , PhD.

## 2021-09-24 PROCEDURE — 99231 SBSQ HOSP IP/OBS SF/LOW 25: CPT | Mod: GC | Performed by: PSYCHIATRY & NEUROLOGY

## 2021-09-24 PROCEDURE — 258N000001 HC RX 258

## 2021-09-24 PROCEDURE — 250N000013 HC RX MED GY IP 250 OP 250 PS 637

## 2021-09-24 PROCEDURE — 124N000002 HC R&B MH UMMC

## 2021-09-24 RX ADMIN — Medication 500 ML: at 10:54

## 2021-09-24 RX ADMIN — OLANZAPINE 40 MG: 20 TABLET, ORALLY DISINTEGRATING ORAL at 18:24

## 2021-09-24 RX ADMIN — Medication 25 MCG: at 18:24

## 2021-09-24 RX ADMIN — Medication 500 ML: at 16:30

## 2021-09-24 ASSESSMENT — ACTIVITIES OF DAILY LIVING (ADL)
ORAL_HYGIENE: INDEPENDENT
HYGIENE/GROOMING: INDEPENDENT
DRESS: SCRUBS (BEHAVIORAL HEALTH);INDEPENDENT

## 2021-09-24 NOTE — PLAN OF CARE
Problem: Behavioral Health Plan of Care  Goal: Plan of Care Review  9/23/2021 2250 by Natacha Saini RN  Outcome: Improving  Flowsheets (Taken 9/23/2021 2250)     Anderson spent most of evening pacing in dalal-more spontaneous and improved ability to engage with others, but continues to laugh and talk to self when not engaged with others

## 2021-09-24 NOTE — PROGRESS NOTES
"    ----------------------------------------------------------------------------------------------------------  [facility]  Psychiatric Progress Note  Hospital Day #24    Anderson Perez MRN# [id number]   Age: 40 year old YOB: 1980   Date of Admission: 8/30/2021     Subjective   The patient was discussed with the treatment team and chart notes were reviewed.      Identifier: Anderson Perez is a 40 year old male with a past psychiatric history of bipolar disorder with psychosis vs schizoaffective disorder bipolar type, antisocial personality disorder, and intellectual disability admitted from the  ER on 08/31/2021 due to concern for out of control behaviors, aggression and psychosis in the context of medication refusal despite Cardoza.    Sleep:  7 hours (09/24/21 0600)  Prescribed Medications: Taken as prescribed with ginger ale  PRN Psychiatric Medications: alum & mag hydroxide-simethicone, haloperidol lactate **AND** diphenhydrAMINE **AND** LORazepam, haloperidol, hydrOXYzine, pediatric electrolyte, polyethylene glycol     None    Overnight Nursing Notes/Staff Report:  All overnight nursing notes reviewed.     \"Pt appeared to sleep 7 hours. Did not hear pt talking to self this shift. No PRNs given or requested. No medical or behavioral events this shift.\"    \"Anderson spent most of evening pacing in dalal-more spontaneous and improved ability to engage with others, but continues to laugh and talk to self when not engaged with others\"    Patient interview:   Patient was seen while pacing hallway as a part of team rounds. Pt stated \" I am fine\"  \"I don't want to talk\".  When asked how his mood was he said \"great\". He said \"yes \" when asked if he had received his pedialyte and if he had a BM.     ROS   ROS was negative unless noted above.     Objective   Vitals:  Declined. Most recent from 9/20/21  Vitals:    09/15/21 1600 09/19/21 1558 09/20/21 0700 09/20/21 1620   Pulse:  " "  77   Resp:  14     TempSrc: Oral   Oral   SpO2:   100% 98%   Weight:       Height:           Mental Status Examination:  Orientation:  Grossly intact  General: Pacing up and down the dalal during meeting.  Appearance:  Dressed in hospital scrubs, hair is buzzed short.  Behavior: Guarded  Eye Contact:  Minimal, only brief when turning at either end of the hallway  Psychomotor:  Pacing hallway; no catatonia present  Speech:  Appropriate volume, and rate  Language: Appears to be fluent in English   Mood:  \"great\"  Affect:  Generally flat, with minor reactivity; irritability continues to be reduced from time of admission, but still present intermittently  Thought Process:  Limited engagement with team during today's assessment  Thought Content: Unable to assess due to brevity of encounter  Associations:  loose  Insight:  poor  Judgment:  poor  Attention Span: limited  Concentration:  limited  Recent and Remote Memory:  Grossly intact  Fund of Knowledge: Suspect lower than average   Allergies     Allergies   Allergen Reactions     Bee Venom Anaphylaxis     Clindamycin Hives     Morphine Hives        Labs & Imaging   No results found for this or any previous visit (from the past 24 hour(s)).     Assessment   Diagnostic Impression:  Anderson Perez is a 40 year old male with a past psychiatric history of bipolar disorder with psychosis vs schizoaffective disorder bipolar type, antisocial personality disorder, and intellectual disability admitted from the  ER on 08/31/2021 due to concern for out of control behaviors, aggression and psychosis in the context of medication refusal despite Cardoza. Patient was recently released from [facility] to a group home. Presentation to the ED via ambulance after police contacted and provisional discharge rescinded. Group home noting escalating aggressive behavior, delusional thinking, and medication refusal despite Cardoza. PT refusal to discuss problems and largely denied all past history, " medications, or any need for care.     His reported symptoms of delusions, psychosis, and erratic/aggressive behavior are consistent with prior presentations of psychosis.  Optimization of medications to target these symptom clusters in addition to determining facility placement after discharge will be targets for treatment during this admission.      Given that he currently has PTA report of out of control behaviors, aggression and psychosis, patient warrants inpatient psychiatric hospitalization to maintain his safety. Disposition pending availability of [facility] placement.    Principal Diagnosis:     Psychosis, Unspecified (Schizoaffective disorder vs Bipolar Affective Disorder with Psychosis)    Secondary psychiatric diagnoses of concern this admission:     Intellectual Disability    Antisocial personality disorder    Psychiatric course:  Anderson Perez was admitted to [facility] following revocation of provisional discharge and on a court hold. His PTA olanzapine 20mg PO QDaily was resumed, as pt had been refusing at group home; per conversation with guardian, had planned to start on GRIGGS, but does not think that this was actually started, as patient was refusing medications, which prompted admission in setting of decompensation. Pt was converted to ODT formulation given concerns for cheeking medications; while initially resistant, pt was agreeable with taking ODT olanzapine as long as it was given with ginger ale, which was accommodated. Refused blood draw to get olanzapine level on 9/13. Olanzapine dose increased to 40mg on 9/21 given limited efficacy at current dose. PRN for agitation/aggression was changed to haloperidol 5mg, lorazepam 2mg, and diphenhydramine 50mg, with PO haloperidol available.    Anderson Perez continued to meet criteria for inpatient hospitalization medication optimization, inpatient stabilization, and appropriate discharge planning.     Medical course:   Anderson Perez was  physically examined by the ED prior to being transferred to the unit and was found to be medically stable and appropriate for admission.      Plan   Today's Changes:     None  _______________________________________________________________________  Psychiatric diagnoses and management:  Principal Diagnosis:     Psychosis, Unspecified (Schizoaffective disorder vs Bipolar Affective Disorder with Psychosis)  o Increase olanzapine to 40mg ODT qDaily  o Will consider GRIGGS at later date following initial stabilization    Secondary Psychiatric Diagnoses:     Intellectual disability  o Per , IQ estimated to be 63  o NTD    Antisocial Personality Disorder  o NTD    Additional Planning:    Continue to monitor for and stabilize: aggression, psychosis and disorganization    Patient will be treated in therapeutic milieu with appropriate individual and group therapies as described.    Scheduled Medications (summary):    OLANZapine zydis  40 mg Oral Daily with supper     Vitamin D3  25 mcg Oral Daily       PRN Medications (summary):  alum & mag hydroxide-simethicone, haloperidol lactate **AND** diphenhydrAMINE **AND** LORazepam, haloperidol, hydrOXYzine, pediatric electrolyte, polyethylene glycol    Discontinued Medications (& Rationale):    None    Consults:    None    Legal Status:    Court Hold    [place]     SIO:    No    Pt has not required locked seclusion or restraints in the past 24 hours to maintain safety, please refer to RN documentation for further details.    Disposition:    Pending availability of bed for placement at [facility]. Exploring other options like [facility] for him.     Safety Assessment:   Behavioral Orders   Procedures     Cheeking Precautions (behavioral units)     Code 1 - Restrict to Unit     Elopement precautions     Routine Programming     As clinically indicated     Status 15     Every 15 minutes.      ____________________________________________________________________  Pertinent Medical diagnoses  and management:    Chronic Hepatitis C  o Continue to monitor  ____________________________________________________________________  Patient seen and discussed with attending physician, Dr. Mike Bobo, who is in agreement with my assessment and plan.    Marlon Apodaca, PGY-1 (Psychiatry)  [facility]      Attestation:  This patient has been seen and evaluated by me, Mike Bobo MD.  I have discussed this patient with the house staff team including the resident and medical student and I agree with the findings and plan in this note.    I have reviewed today's vital signs, medications, labs and imaging. Mike Bobo MD , PhD.

## 2021-09-24 NOTE — PLAN OF CARE
Problem: Behavior Regulation Impairment (Psychotic Signs/Symptoms)  Goal: Improved Behavioral Control (Psychotic Signs/Symptoms)  Outcome: No Change   Patient denied pain. Patient asked for Pedialyte which was administered. Patient declined to check in with writer. Has been walking the hallway most the shift, lips are frequently moving. Does not attend groups.

## 2021-09-24 NOTE — PLAN OF CARE
Pt appeared to sleep 7 hours. Did not hear pt talking to self this shift. No PRNs given or requested. No medical or behavioral events this shift.      Problem: Sleep Disturbance  Goal: Adequate Sleep/Rest  Outcome: No Change

## 2021-09-24 NOTE — PLAN OF CARE
Assessment/Intervention/Current Symptoms and Care Coordination    Checked in with team and reviewed chart notes.    Pt continues to pace the halls and often seen mumbling to self. He did ask a pt if he was leaving today showing a bit more engagement with peers.       Discharge Plan or Goal  Discharge to [facility]      Barriers to Discharge   Needs further stabilization  Needs bed at [facility]      Referral Status  Referral to [facility]    Legal Status  PD was revoked

## 2021-09-25 PROCEDURE — 250N000013 HC RX MED GY IP 250 OP 250 PS 637

## 2021-09-25 PROCEDURE — 124N000002 HC R&B MH UMMC

## 2021-09-25 RX ADMIN — OLANZAPINE 40 MG: 20 TABLET, ORALLY DISINTEGRATING ORAL at 17:49

## 2021-09-25 RX ADMIN — Medication 25 MCG: at 17:49

## 2021-09-25 ASSESSMENT — ACTIVITIES OF DAILY LIVING (ADL)
DRESS: SCRUBS (BEHAVIORAL HEALTH);INDEPENDENT
ORAL_HYGIENE: INDEPENDENT
HYGIENE/GROOMING: INDEPENDENT
LAUNDRY: UNABLE TO COMPLETE

## 2021-09-25 NOTE — PLAN OF CARE
Patient continues to pace dalal throughout shift.  Patient questioned rational for locking bedroom door after taking medication, but complied.   Pt took shower tonight; albeit, briefly and still wet when donned clean scrubs.

## 2021-09-25 NOTE — PLAN OF CARE
"Problem: Behavioral Health Plan of Care  Goal: Optimized Coping Skills in Response to Life Stressors  Outcome: No Change  Goal: Develops/Participates in Therapeutic Burnham to Support Successful Transition  Outcome: No Change  Intervention: Foster Therapeutic [place]    Patient is flat, blunted, and dismissive.  He is up on the unit, pacing halls this shift.  When approached, patient was dismissive with assessment questions.  He denies all psych symptoms and pain.  He states, \"I'm good, everything is good.\"  Patient does appear to be responding to internal stimuli.  He is observed talking and laughing to self.  Patient remains safe on unit.  Will continue to monitor.  Pura Clark RN   "

## 2021-09-26 PROCEDURE — 124N000002 HC R&B MH UMMC

## 2021-09-26 PROCEDURE — 250N000013 HC RX MED GY IP 250 OP 250 PS 637

## 2021-09-26 PROCEDURE — 258N000001 HC RX 258

## 2021-09-26 RX ADMIN — Medication 400 ML: at 20:55

## 2021-09-26 RX ADMIN — OLANZAPINE 40 MG: 20 TABLET, ORALLY DISINTEGRATING ORAL at 17:28

## 2021-09-26 RX ADMIN — Medication 25 MCG: at 17:28

## 2021-09-26 RX ADMIN — Medication 350 ML: at 22:29

## 2021-09-26 ASSESSMENT — ACTIVITIES OF DAILY LIVING (ADL)
LAUNDRY: WITH SUPERVISION
DRESS: INDEPENDENT
ORAL_HYGIENE: INDEPENDENT
HYGIENE/GROOMING: INDEPENDENT

## 2021-09-26 NOTE — PLAN OF CARE
Patient pacing dalal almost entire shift.   Pt readily complied with locking door after taking medication; albeit, pt still continues to respond to internal stimuli.  Pt had one brief episode of bloody nose relieved by cold pack and tissue.

## 2021-09-26 NOTE — PLAN OF CARE
Pt appeared to sleep 6.75 hours. Not heard talking to self. No PRNs given or requested. No medical or behavioral events this shift.      Problem: Sleep Disturbance  Goal: Adequate Sleep/Rest  Outcome: No Change

## 2021-09-26 NOTE — PLAN OF CARE
Problem: Behavioral Health Plan of Care  Goal: Plan of Care Review  Outcome: Improving  Flowsheets (Taken 9/26/2021 1200)  Plan of Care Reviewed With: patient  Progress: improving  Patient Agreement with Plan of Care: (diagrees re dx) disagrees (describe)     Problem: Behavior Regulation Impairment (Manic or Hypomanic Signs/Symptoms)  Goal: Improved Impulse Control (Manic/Hypomanic Signs/Symptoms)  Outcome: Improving     Anderson continues to spend day pacing in dalal mumbling to self-at times moving hands as though explaining details of conversation to another-did stop walking to participate in Proxeonop contest, but post his turn left grp and continued pacing-pleasant tone in brief interactions, but avoids extensive interaction

## 2021-09-27 PROCEDURE — 250N000013 HC RX MED GY IP 250 OP 250 PS 637

## 2021-09-27 PROCEDURE — 258N000001 HC RX 258

## 2021-09-27 PROCEDURE — 124N000002 HC R&B MH UMMC

## 2021-09-27 PROCEDURE — 99231 SBSQ HOSP IP/OBS SF/LOW 25: CPT | Mod: GC | Performed by: PSYCHIATRY & NEUROLOGY

## 2021-09-27 RX ADMIN — Medication 500 ML: at 20:18

## 2021-09-27 RX ADMIN — OLANZAPINE 40 MG: 20 TABLET, ORALLY DISINTEGRATING ORAL at 18:42

## 2021-09-27 RX ADMIN — Medication 25 MCG: at 18:43

## 2021-09-27 RX ADMIN — Medication 250 ML: at 16:30

## 2021-09-27 ASSESSMENT — ACTIVITIES OF DAILY LIVING (ADL)
DRESS: INDEPENDENT
LAUNDRY: WITH SUPERVISION
ORAL_HYGIENE: INDEPENDENT
HYGIENE/GROOMING: INDEPENDENT

## 2021-09-27 NOTE — PLAN OF CARE
BEHAVIORAL TEAM DISCUSSION    Participants: MITA Ellis, Mike Bobo MD, Cheryl Apodaca MD (resident), Natacha Saini RN  Progress: A little improved, continues to be psychotic.  Anticipated length of stay: 7-10 days.  Continued Stay Criteria/Rationale: Pt is ill, exploring consideration that he may be at baseline.  Medical/Physical: No  Precautions:   Behavioral Orders   Procedures     Cheeking Precautions (behavioral units)     Code 1 - Restrict to Unit     Elopement precautions     Routine Programming     As clinically indicated     Status 15     Every 15 minutes.     Plan: Psychiatric supervision of medication management, Patient will participate in therapeutic milieu, and CTC will provide case management and discharge planning.  Rationale for change in precautions or plan: No changes.

## 2021-09-27 NOTE — PLAN OF CARE
Problem: Behavioral Health Plan of Care  Goal: Plan of Care Review  Outcome: Improving  Flowsheets (Taken 9/27/2021 1145)  Plan of Care Reviewed With: patient  Progress: improving  Patient Agreement with Plan of Care: (Does not believe he is ill) disagrees (describe)     Problem: Behavioral Disturbance  Goal: Behavioral Disturbance  Description: Signs and symptoms of listed problems will be absent or manageable by discharge or transition of care.  Outcome: Improving  Flowsheets (Taken 9/27/2021 1145)  Behavioral Disturbance Assessed: all  Behavioral Disturbance Present:    thought process    insight    Antonio continues to spend most of time pacing in dalal-eats meals in his room, otherwise walking in dalal-brief social greetings, but avoids conversation-supportive tone with autistic peer who seeks interaction with him-does not attend grp

## 2021-09-27 NOTE — PROGRESS NOTES
"    ----------------------------------------------------------------------------------------------------------  [facility]  Psychiatric Progress Note  Hospital Day #27    Anderson ePrez MRN# [id number]   Age: 40 year old YOB: 1980   Date of Admission: 8/30/2021     Subjective   The patient was discussed with the treatment team and chart notes were reviewed.      Identifier: Anderson Perez is a 40 year old male with a past psychiatric history of bipolar disorder with psychosis vs schizoaffective disorder bipolar type, antisocial personality disorder, and intellectual disability admitted from the  ER on 08/31/2021 due to concern for out of control behaviors, aggression and psychosis in the context of medication refusal despite Cardoza.    Sleep:  7 hours (09/27/21 0700)  Prescribed Medications: Taken as prescribed with ginger ale  PRN Psychiatric Medications: alum & mag hydroxide-simethicone, haloperidol lactate **AND** diphenhydrAMINE **AND** LORazepam, haloperidol, hydrOXYzine, pediatric electrolyte, polyethylene glycol     None    Overnight Nursing Notes/Staff Report:  9/27/2021  11:50 AM  \"Antonio continues to spend most of time pacing in dalal-eats meals in his room, otherwise walking in dalal-brief social greetings, but avoids conversation-supportive tone with autistic peer who seeks interaction with him-does not attend [facility].\"    9/27/2021  6:25 AM  \"Pt appeared to sleep 7 hours. No PRNs given or requested. No medical or behavioral events this shift\"      Patient interview:   Patient was seen while pacing the main hallway. He spoke and responded with brief answers.  He said \" I want to go to [facility]. The team proposed other options, including [facility] and patient was open to knowing more. He reported eating well, taking meds and passing BM well.      ROS   ROS was negative unless noted above.     Objective   Vitals:  Declined. Most recent from 9/20/21  Vitals:    09/09/21 1545 " "09/15/21 1600 09/19/21 1558 09/20/21 1620   Resp:   14    TempSrc: Tympanic Oral  Oral   Weight:       Height:           Mental Status Examination:  Orientation:  Grossly intact  General: Pacing up and down the dalal during meeting.  Appearance:  Dressed in hospital scrubs, hair is buzzed short.  Behavior: Guarded  Eye Contact:  Minimal, only brief when turning at either end of the hallway  Psychomotor:  Pacing hallway; no catatonia present  Speech:  Appropriate volume, and rate  Language: Appears to be fluent in English   Mood:  \"great\"  Affect:  Generally flat, with minor reactivity; irritability continues to be reduced from time of admission, but still present intermittently  Thought Process:  Limited engagement with team during today's assessment  Thought Content: Unable to assess due to brevity of encounter  Associations:  loose  Insight:  poor  Judgment:  poor  Attention Span: limited  Concentration:  limited  Recent and Remote Memory:  Grossly intact  Fund of Knowledge: Suspect lower than average   Allergies     Allergies   Allergen Reactions     Bee Venom Anaphylaxis     Clindamycin Hives     Morphine Hives        Labs & Imaging   No results found for this or any previous visit (from the past 24 hour(s)).     Assessment   Diagnostic Impression:  Anderson Perez is a 40 year old male with a past psychiatric history of bipolar disorder with psychosis vs schizoaffective disorder bipolar type, antisocial personality disorder, and intellectual disability admitted from the  ER on 08/31/2021 due to concern for out of control behaviors, aggression and psychosis in the context of medication refusal despite Cardoza. Patient was recently [facility] from [email or web address] to a group home. Presentation to the ED via ambulance after police contacted and provisional discharge rescinded. Group home noting escalating aggressive behavior, delusional thinking, and medication refusal despite Cardoza. PT refusal to discuss problems and largely " denied all past history, medications, or any need for care.     His reported symptoms of delusions, psychosis, and erratic/aggressive behavior are consistent with prior presentations of psychosis.  Optimization of medications to target these symptom clusters in addition to determining facility placement after discharge will be targets for treatment during this admission.      Given that he currently has PTA report of out of control behaviors, aggression and psychosis, patient warrants inpatient psychiatric hospitalization to maintain his safety. [facility] other [facility].    Principal Diagnosis:     Psychosis, Unspecified (Schizoaffective disorder vs Bipolar Affective Disorder with Psychosis)    Secondary psychiatric diagnoses of concern this admission:     Intellectual Disability    Antisocial personality disorder    Psychiatric course:  Anderson Perez was admitted to [facility] following revocation of provisional discharge and on a court hold. His PTA olanzapine 20mg PO QDaily was resumed, as pt had been refusing at group home; per conversation with guardian, had planned to start on GRIGGS, but does not think that this was actually started, as patient was refusing medications, which prompted admission in setting of decompensation. Pt was converted to ODT formulation given concerns for cheeking medications; while initially resistant, pt was agreeable with taking ODT olanzapine as long as it was given with ginger ale, which was accommodated. Refused blood draw to get olanzapine level on 9/13. Olanzapine dose increased to 40mg on 9/21 given limited efficacy at current dose. PRN for agitation/aggression was changed to haloperidol 5mg, lorazepam 2mg, and diphenhydramine 50mg, with PO haloperidol available.    Anderson Perez continued to meet criteria for inpatient hospitalization medication optimization, inpatient stabilization, and appropriate discharge planning.      Medical course:   Anderson Perez was physically examined by the ED prior to being transferred to the unit and was found to be medically stable and appropriate for admission.      Plan   Today's Changes:     None  _______________________________________________________________________  Psychiatric diagnoses and management:  Principal Diagnosis:     Psychosis, Unspecified (Schizoaffective disorder vs Bipolar Affective Disorder with Psychosis)  o Increase olanzapine to 40mg ODT qDaily  o Will consider GRIGGS at later date following initial stabilization    Secondary Psychiatric Diagnoses:     Intellectual disability  o Per , IQ estimated to be 63  o NTD    Antisocial Personality Disorder  o NTD    Additional Planning:    Continue to monitor for and stabilize: aggression, psychosis and disorganization    Patient will be treated in therapeutic milieu with appropriate individual and group therapies as described.    Scheduled Medications (summary):    OLANZapine zydis  40 mg Oral Daily with supper     Vitamin D3  25 mcg Oral Daily       PRN Medications (summary):  alum & mag hydroxide-simethicone, haloperidol lactate **AND** diphenhydrAMINE **AND** LORazepam, haloperidol, hydrOXYzine, pediatric electrolyte, polyethylene glycol    Discontinued Medications (& Rationale):    None    Consults:    None    Legal Status:    Court Hold    Sony ADAMES:    No    Pt has not required locked seclusion or restraints in the past 24 hours to maintain safety, please refer to RN documentation for further details.    Disposition:    Pending availability of bed for placement at [facility]. Exploring other options like [facility] for him.     Safety Assessment:   Behavioral Orders   Procedures     Cheeking Precautions (behavioral units)     Code 1 - Restrict to Unit     Elopement precautions     Routine Programming     As clinically indicated     Status 15     Every 15 minutes.       ____________________________________________________________________  Pertinent Medical diagnoses and management:    Chronic Hepatitis C  o Continue to monitor  ____________________________________________________________________  Patient seen and discussed with attending physician, Dr. Mike Bobo, who is in agreement with my assessment and plan.    Marlon Apodaca, PGY-1 (Psychiatry)  [facility]    Attestation:  This patient has been seen and evaluated by me, Mike Bobo MD.  I have discussed this patient with the house staff team including the resident and medical student and I agree with the findings and plan in this note.    I have reviewed today's vital signs, medications, labs and imaging. Mike Bobo MD , PhD.

## 2021-09-27 NOTE — PLAN OF CARE
"  Problem: Behavioral Health Plan of Care  Goal: Plan of Care Review  Recent Flowsheet Documentation  Taken 9/26/2021 2000 by Shekhar Walls RN  Plan of Care Reviewed With: patient  Patient Agreement with Plan of Care: (doesn't feel needs to be here) disagrees (describe)     \"When am I getting out of here?\"   Pt pacing dalal majority of shift, but pleasant on approach.   Pt again tolerant of having room locked after meds.  Pt gesticulating and appearing to be responding to internal stimuli.  "

## 2021-09-27 NOTE — PLAN OF CARE
Assessment/Intervention/Current Symptoms and Care Coordination  Attend Team Meeting  Review Chart  Called CHRISTIAN Laurel [phone]- bnun voice mail request return call.    Discharge Plan or Goal  TBD- [facility]  Writer [facility].  Pt is on the list but not likely to get admitted because incarcerated people go to the front of the list.  It is not likely he will move up the list.  Discussed options, she suggested consult with CCM.    Barriers to Discharge   Pt has been declined for referral to [facility] due to severity of psychosis.  Team believes Pt may be at baseline which is too ill for many placements.    Referral Status  Pending change back to [facility] or other option.    Legal Status  Court Hold- revoked PD

## 2021-09-28 PROCEDURE — 124N000002 HC R&B MH UMMC

## 2021-09-28 PROCEDURE — 250N000013 HC RX MED GY IP 250 OP 250 PS 637

## 2021-09-28 RX ADMIN — OLANZAPINE 40 MG: 20 TABLET, ORALLY DISINTEGRATING ORAL at 18:30

## 2021-09-28 RX ADMIN — Medication 25 MCG: at 18:30

## 2021-09-28 ASSESSMENT — ACTIVITIES OF DAILY LIVING (ADL)
HYGIENE/GROOMING: INDEPENDENT
DRESS: INDEPENDENT
ORAL_HYGIENE: INDEPENDENT

## 2021-09-28 NOTE — PLAN OF CARE
Nursing plan of care   Problem: Behavior Regulation Impairment (Psychotic Signs/Symptoms)  Goal: Improved Behavioral Control (Psychotic Signs/Symptoms)  Outcome: No Change  Pt has been pacing in the hallway since the start of the shift; was appeared to be responding to internal stimuli; declined a visit from his Guardian and was agitated about it ( see previous note),pt legal guardian decided not to come to visit.Denied SI/SIB; was cooperative with room lock for 15 minutes post med; No sign of medication cheeking was noted; good appetite; No medication side effect was noted or reported. Declined VS assessment; took no shower; attended no group;  Will continue to monitor and assess.

## 2021-09-28 NOTE — PROVIDER NOTIFICATION
"Received a call from Daylin Umanzor,pt's legal Guardian [facility] was informed about the visit and pt stated, \" I am 40 yrs old, I don't have a guardian, I don't need one, I don't need anybody to make decision for me, I don't need a visit from anybody\"; Daylin was informed about pt statement but she said, \" I want to see him and will take a chance if he talked to me\" attending resident was notified.     "

## 2021-09-28 NOTE — PLAN OF CARE
"  Problem: Behavioral Disturbance  Goal: Behavioral Disturbance  Description: Signs and symptoms of listed problems will be absent or manageable by discharge or transition of care.  Outcome: No Change   \"I don't need to talk, I just talked to the doctors.\" Patient declined to speak with writer. Patient denied pain and suicidal thoughts. Has been pacing the dalal most of the shift.   "

## 2021-09-28 NOTE — PLAN OF CARE
Problem: Behavioral Health Plan of Care  Goal: Plan of Care Review  Recent Flowsheet Documentation  Taken 9/27/2021 1900 by Shekhar Walls RN  Plan of Care Reviewed With: patient  Patient Agreement with Plan of Care: (wants to leave) agrees      Patient pacing dalal, gesticulating and talking to self.   Pt polite during interaction with writer and cooperative with med regime.   Hospitalist Progress Note      PCP: No primary care provider on file. Date of Admission: 3/4/2021    Chief Complaint:  No acute events, afebrile, stable HD    Medications:  Reviewed    Infusion Medications    sodium chloride 250 mL/hr at 03/04/21 2050    sodium chloride      dextrose 5 % and 0.45 % NaCl 150 mL/hr at 03/05/21 0303    insulin 2.4 Units/hr (03/05/21 0927)     Scheduled Medications    aspirin EC  81 mg Oral Daily    carvedilol  12.5 mg Oral BID    lisinopril  2.5 mg Oral Daily    enoxaparin  40 mg Subcutaneous Daily    polyethylene glycol  17 g Oral Daily     PRN Meds: dextrose, potassium chloride, magnesium sulfate, sodium phosphate IVPB **OR** sodium phosphate IVPB **OR** sodium phosphate IVPB, dextrose 5 % and 0.45 % NaCl      Intake/Output Summary (Last 24 hours) at 3/5/2021 1003  Last data filed at 3/5/2021 0543  Gross per 24 hour   Intake 1338 ml   Output --   Net 1338 ml       Exam:    /82   Pulse 78   Temp 97.8 °F (36.6 °C) (Oral)   Resp 15   Ht 5' 5\" (1.651 m)   Wt 198 lb 13.7 oz (90.2 kg)   LMP 02/18/2021   SpO2 100%   BMI 33.09 kg/m²     General appearance: alert, appears stated age and cooperative,   Lungs: clear to auscultation bilaterally  Heart: regular rate and rhythm and S1, S2 normal  Abdomen: soft, BS active  Extremities: no edema      Labs:   Recent Labs     03/04/21  1530 03/04/21  1643 03/05/21  0505   WBC 11.2*  --  9.6   HGB 13.9 15.6 12.4   HCT 44.0  --  38.2     --  259     Recent Labs     03/04/21  1530 03/04/21  1643 03/04/21  2132 03/05/21  0505   *  --  131* 137   K 3.9  --  3.8 3.4   CL 93*  --  102 110*   CO2 9*  --  6* 13*   BUN 8  --  7 6   CREATININE 0.82 0.6 0.74 0.54   CALCIUM 8.9  --  8.4* 8.7   PHOS  --   --  2.1* 1.2*     Recent Labs     03/04/21  1530   AST 7   ALT 6   BILITOT <0.2   ALKPHOS 140*     No results for input(s): INR in the last 72 hours.   Recent Labs     03/04/21  1530 03/05/21  0505   CKTOTAL 28  -- TROPONINI <0.010 <0.010       Urinalysis:      Lab Results   Component Value Date    NITRU Negative 05/28/2018    WBCUA 20-50 05/28/2018    BACTERIA Many 05/28/2018    RBCUA 0-2 05/28/2018    BLOODU LARGE 05/28/2018    SPECGRAV 1.010 05/28/2018    GLUCOSEU 500 05/28/2018       Radiology:  CTA CHEST W WO CONTRAST   Final Result      No CT evidence pulmonary embolism. Small area of groundglass opacity lateral right upper lobe. Finding nonspecific, and may reflect inflammatory or infectious etiology. Right thyroid nodule. Nonemergent thyroid sonography recommended for further assessment to rule out malignancy. All CT scans at this facility use dose modulation, iterative reconstruction, and/or weight based dosing when appropriate to reduce radiation dose to as low as reasonably achievable.          XR CHEST PORTABLE   Final Result   NO ACUTE ACTIVE CARDIOPULMONARY PROCESS              Assessment/Plan:    40 y.o. female with PMH of chronic diastolic HF, IDDM2, HTN, obesity who presented with:     DKA  - AG closing, Bicarbonate slowly improving  - continue IV hydration and IV insulin infusion per protocol  - endocrinology consulted for further management    Hypophosphatemia   - replace per ICU protocol     Shortness of breath  - CTA of the chest,EKG,troponin,BNP were negative  - cardiology consulted     HTN  - continue home meds     Constipation  - bowel regimen          Electronically signed by Ulysses Cleaver, MD on 3/5/2021 at 10:03 AM

## 2021-09-28 NOTE — PROGRESS NOTES
"    ----------------------------------------------------------------------------------------------------------  [facility]  Psychiatric Progress Note  Hospital Day #28    Anderson Perez MRN# [id number]   Age: 40 year old YOB: 1980   Date of Admission: 8/30/2021     Subjective   The patient was discussed with the treatment team and chart notes were reviewed.      Identifier: Anderson Perez is a 40 year old male with a past psychiatric history of bipolar disorder with psychosis vs schizoaffective disorder bipolar type, antisocial personality disorder, and intellectual disability admitted from the  ER on 08/31/2021 due to concern for out of control behaviors, aggression and psychosis in the context of medication refusal despite Cardoza.    Sleep:  6.5 hours (09/28/21 0600)  Prescribed Medications: Taken as prescribed with ginger ale  PRN Psychiatric Medications: alum & mag hydroxide-simethicone, haloperidol lactate **AND** diphenhydrAMINE **AND** LORazepam, haloperidol, hydrOXYzine, pediatric electrolyte, polyethylene glycol     None    Overnight Nursing Notes/Staff Report:  9/28/2021  1:20 PM  \" \"I don't need to talk, I just talked to the doctors.\" Patient declined to speak with writer. Patient denied pain and suicidal thoughts. Has been pacing the dalal most of the shift.\"    9/28/2021  6:42 AM  \"Pt appeared to sleep 6.5 hours. No PRNs given or requested. No medical or behavioral events this shift.\"    9/27/2021  7:45 PM  \"Patient pacing dalal, gesticulating and talking to self.   Pt polite during interaction with writer and cooperative with med regime.\"      Patient interview:   Patient was seen while pacing the main hallway.  He declines restlessness if he stops pacing.  Reports good bowel movements, and no other problems.  When asked if he has any other questions, he states, \"Just wondering how long I'm gonna be here.\"     ROS   ROS was negative unless noted above.     " "Objective   Vitals:  Declined. Most recent from 9/20/21  Vitals:    09/09/21 1545 09/15/21 1600 09/19/21 1558 09/20/21 1620   Resp:   14    TempSrc: Tympanic Oral  Oral   Weight:       Height:           Mental Status Examination:  Orientation:  Grossly intact  General: Pacing up and down the dalal during meeting.  Appearance:  Dressed in hospital scrubs, hair is buzzed short.  Behavior: Guarded  Eye Contact:  Minimal, only brief when turning at either end of the hallway  Psychomotor:  Pacing hallway; no catatonia present  Speech:  Appropriate volume, and rate  Language: Appears to be fluent in English   Mood:  \"good\"  Affect:  Generally flat, with minor reactivity; irritability continues to be reduced from time of admission, but still present intermittently  Thought Process:  Limited engagement with team during today's assessment  Thought Content: Unable to assess due to brevity of encounter  Associations:  loose  Insight:  poor  Judgment:  poor  Attention Span: limited  Concentration:  limited  Recent and Remote Memory:  Grossly intact  Fund of Knowledge: Suspect lower than average   Allergies     Allergies   Allergen Reactions     Bee Venom Anaphylaxis     Clindamycin Hives     Morphine Hives        Labs & Imaging   No results found for this or any previous visit (from the past 24 hour(s)).     Assessment   Diagnostic Impression:  Anderson Perez is a 40 year old male with a past psychiatric history of bipolar disorder with psychosis vs schizoaffective disorder bipolar type, antisocial personality disorder, and intellectual disability admitted from the  ER on 08/31/2021 due to concern for out of control behaviors, aggression and psychosis in the context of medication refusal despite Cardoza. Patient was recently released from [facility] to a group home. Presentation to the ED via ambulance after police contacted and provisional discharge rescinded. Group home noting escalating aggressive behavior, delusional thinking, " and medication refusal despite Cardoza. PT refusal to discuss problems and largely denied all past history, medications, or any need for care.     His reported symptoms of delusions, psychosis, and erratic/aggressive behavior are consistent with prior presentations of psychosis.  Optimization of medications to target these symptom clusters in addition to determining facility placement after discharge will be targets for treatment during this admission.      Given that he currently has PTA report of out of control behaviors, aggression and psychosis, patient warrants inpatient psychiatric hospitalization to maintain his safety. Disposition pending availability of [facility] placement or other option.    Principal Diagnosis:     Psychosis, Unspecified (Schizoaffective disorder vs Bipolar Affective Disorder with Psychosis)    Secondary psychiatric diagnoses of concern this admission:     Intellectual Disability    Antisocial personality disorder    Psychiatric course:  Anderson Perez was admitted to [facility] following revocation of provisional discharge and on a court hold. His PTA olanzapine 20mg PO QDaily was resumed, as pt had been refusing at group home; per conversation with guardian, had planned to start on GRIGGS, but does not think that this was actually started, as patient was refusing medications, which prompted admission in setting of decompensation. Pt was converted to ODT formulation given concerns for cheeking medications; while initially resistant, pt was agreeable with taking ODT olanzapine as long as it was given with ginger ale, which was accommodated. Refused blood draw to get olanzapine level on 9/13. Olanzapine dose increased to 40mg on 9/21 given limited efficacy at current dose. PRN for agitation/aggression was changed to haloperidol 5mg, lorazepam 2mg, and diphenhydramine 50mg, with PO haloperidol available.    Anderson Perez continued to meet criteria for inpatient hospitalization medication  optimization, inpatient stabilization, and appropriate discharge planning.     Medical course:   Anderson Perez was physically examined by the ED prior to being transferred to the unit and was found to be medically stable and appropriate for admission.      Plan   Today's Changes:     None  _______________________________________________________________________  Psychiatric diagnoses and management:  Principal Diagnosis:     Psychosis, Unspecified (Schizoaffective disorder vs Bipolar Affective Disorder with Psychosis)  o Increase olanzapine to 40mg ODT qDaily  o Will consider GRIGGS at later date following initial stabilization    Secondary Psychiatric Diagnoses:     Intellectual disability  o Per , IQ estimated to be 63  o NTD    Antisocial Personality Disorder  o NTD    Additional Planning:    Continue to monitor for and stabilize: aggression, psychosis and disorganization    Patient will be treated in therapeutic milieu with appropriate individual and group therapies as described.    Scheduled Medications (summary):    OLANZapine zydis  40 mg Oral Daily with supper     Vitamin D3  25 mcg Oral Daily       PRN Medications (summary):  alum & mag hydroxide-simethicone, haloperidol lactate **AND** diphenhydrAMINE **AND** LORazepam, haloperidol, hydrOXYzine, pediatric electrolyte, polyethylene glycol    Discontinued Medications (& Rationale):    None    Consults:    None    Legal Status:    Court Hold    Cardoza     SIO:    No    Pt has not required locked seclusion or restraints in the past 24 hours to maintain safety, please refer to RN documentation for further details.    Disposition:    Pending availability of bed for placement at [facility]. Exploring other options like [facility] for him.     Safety Assessment:   Behavioral Orders   Procedures     Cheeking Precautions (behavioral units)     Code 1 - Restrict to Unit     Elopement precautions     Routine Programming     As clinically indicated     Status 15      Every 15 minutes.      ____________________________________________________________________  Pertinent Medical diagnoses and management:    Chronic Hepatitis C  o Continue to monitor  ____________________________________________________________________  Patient seen and discussed with attending physician, Dr. Mike Bobo, who is in agreement with my assessment and plan.      Marlon Apodaca, PGY-1 (Psychiatry)  [facility]      Attestation:  This patient has been seen and evaluated by me, Mike Bobo MD.  I have discussed this patient with the house staff team including the resident and medical student and I agree with the findings and plan in this note.    I have reviewed today's vital signs, medications, labs and imaging. Mike Bobo MD , PhD.

## 2021-09-29 PROCEDURE — 250N000013 HC RX MED GY IP 250 OP 250 PS 637

## 2021-09-29 PROCEDURE — 124N000002 HC R&B MH UMMC

## 2021-09-29 PROCEDURE — 99231 SBSQ HOSP IP/OBS SF/LOW 25: CPT | Mod: GC | Performed by: PSYCHIATRY & NEUROLOGY

## 2021-09-29 RX ADMIN — OLANZAPINE 40 MG: 20 TABLET, ORALLY DISINTEGRATING ORAL at 18:22

## 2021-09-29 RX ADMIN — Medication 25 MCG: at 18:22

## 2021-09-29 NOTE — PLAN OF CARE
Assessment/Intervention/Current Symtoms and Care Coordination  -Chart review  -Pre round meeting with team  -Rounded with team, addressed patient needs/concerns  -Post round meeting with team  Current Symptoms include the following: Pacing samantha    Contacted by [employer] Phone: [phone]/fax [phone]/Email: [email or web address]: The patient is required to have a court-ordered evaluation for his Rule 20 and as he remains hospitalized and awaiting placement, they requested to schedule the evaluation by video while he is inpatient.      Time/Date of Evaluation:  12:00pm-2:00pm on Wednesday October 6th   Mr. Mireles requested that our scheduling department reach out to their provider, [employer], who will send a link for the video conference  [employer] Email: [email or web address].    Writer sent ITV request via email to IP team and informed patient      Discharge Plan or Goal  TBD pending placement, currently on waitlist for [facility]     Barriers to Discharge   Placement     Referral Status  ITV request made today for court ordered evaluation next week     Legal Status  Court hold

## 2021-09-29 NOTE — PLAN OF CARE
"Nursing plan of care   Problem: Behavior Regulation Impairment (Manic or Hypomanic Signs/Symptoms)  Goal: Improved Impulse Control (Manic/Hypomanic Signs/Symptoms)  Outcome: No Change  Pt declined to check in with this writer, but stated, \"I am fine, everything is ok\"; spent most of the shift pacing in the hallway; appeared to be responding to internal stimuli; ate dinner; good appetite; no behavioral concern noted this shift; medciation complaint; denied medication side effect; attended no group ; took no shower; cooperative with cheeking monitor and room lock for 15 minutes post med administration. Declined VS assessment; Will continue to monitor and assess.      "

## 2021-09-29 NOTE — PLAN OF CARE
Problem: Behavioral Health Plan of Care  Goal: Plan of Care Review  Outcome: Improving  Flowsheets (Taken 9/29/2021 5383)  Plan of Care Reviewed With: patient  Progress: improving  Patient Agreement with Plan of Care: agrees with comment (describe)     Problem: Behavior Regulation Impairment (Psychotic Signs/Symptoms)  Goal: Improved Behavioral Control (Psychotic Signs/Symptoms)  Outcome: Improving     Anderson declines Covid 19 test-stating he has been vaccinated and so does not want to continue testing-continues to spend his time walking in dalal-moving lips as though talking, but less constant-pleasant in brief interactions-Received phone call from guardianDaylin [phone]-states she received call from Antonio's girlfriend stating in addition to Zyprexa 40 mg HS, Antonio needs either BuSpar or Paxil to manage anxiety-Dr Apodaca notified

## 2021-09-30 PROCEDURE — 250N000013 HC RX MED GY IP 250 OP 250 PS 637

## 2021-09-30 PROCEDURE — 124N000002 HC R&B MH UMMC

## 2021-09-30 PROCEDURE — 99232 SBSQ HOSP IP/OBS MODERATE 35: CPT | Mod: GC | Performed by: PSYCHIATRY & NEUROLOGY

## 2021-09-30 RX ADMIN — Medication 25 MCG: at 18:34

## 2021-09-30 RX ADMIN — OLANZAPINE 40 MG: 20 TABLET, ORALLY DISINTEGRATING ORAL at 18:34

## 2021-09-30 ASSESSMENT — ACTIVITIES OF DAILY LIVING (ADL)
HYGIENE/GROOMING: INDEPENDENT
DRESS: INDEPENDENT
ORAL_HYGIENE: INDEPENDENT
LAUNDRY: WITH SUPERVISION

## 2021-09-30 NOTE — PLAN OF CARE
Pt appeared to sleep 6.5 hours. No PRNs given or requested. No medical or behavioral events this shift.       Problem: Sleep Disturbance  Goal: Adequate Sleep/Rest  Outcome: No Change

## 2021-09-30 NOTE — PROGRESS NOTES
"    ----------------------------------------------------------------------------------------------------------  [facility]  Psychiatric Progress Note  Hospital Day #30    Anderson Perez MRN# [id number]   Age: 40 year old YOB: 1980   Date of Admission: 8/30/2021     Subjective   The patient was discussed with the treatment team and chart notes were reviewed.      Identifier: Anderson Perez is a 40 year old male with a past psychiatric history of bipolar disorder with psychosis vs schizoaffective disorder bipolar type, antisocial personality disorder, and intellectual disability admitted from the  ER on 08/31/2021 due to concern for out of control behaviors, aggression and psychosis in the context of medication refusal despite Cardoza.    Sleep:  6.5 hours (09/30/21 0600)  Prescribed Medications: Taken as prescribed with ginger ale  PRN Psychiatric Medications: alum & mag hydroxide-simethicone, haloperidol lactate **AND** diphenhydrAMINE **AND** LORazepam, haloperidol, hydrOXYzine, pediatric electrolyte, polyethylene glycol     None    Overnight Nursing Notes/Staff Report:  9/30/2021  06:04 AM  \" Pt appeared to sleep 6.5 hours. No PRNs given or requested. No medical or behavioral events this shift\"    9/29/2021  10:08 PM  \"Pt declined to check in with this writer, but stated, \"I am fine, everything is ok\"; spent most of the shift pacing in the hallway; appeared to be responding to internal stimuli; ate dinner; good appetite; no behavioral concern noted this shift; medciation complaint; denied medication side effect; attended no group ; took no shower; cooperative with cheeking monitor and room lock for 15 minutes post med administration. Declined VS assessment; Will continue to monitor and assess \"    Patient interview:   Patient was seen while pacing the main hallway.  He  States \" I don't want to talk\". Denies hallucinations. States that he is doing good and has no " "concerns.      ROS   ROS was negative unless noted above.     Objective   Vitals:  Declined. Most recent from 9/20/21  Vitals:    09/09/21 1545 09/15/21 1600 09/19/21 1558 09/20/21 1620   Resp:   14    TempSrc: Tympanic Oral  Oral   Weight:       Height:           Mental Status Examination:  Orientation:  Grossly intact  General: Pacing up and down the dalal during meeting.  Appearance:  Dressed in hospital scrubs, hair is buzzed short.  Behavior: Guarded  Eye Contact:  Minimal, only brief when turning at either end of the hallway  Psychomotor:  Pacing hallway; no catatonia present  Speech:  Appropriate volume, and rate  Language: Appears to be fluent in English   Mood:  \"good\"  Affect:  Generally flat, with minor reactivity; irritability continues to be reduced from time of admission, but still present intermittently  Thought Process:  Limited engagement with team during today's assessment  Thought Content: Unable to assess due to brevity of encounter  Associations:  loose  Insight:  poor  Judgment:  poor  Attention Span: limited  Concentration:  limited  Recent and Remote Memory:  Grossly intact  Fund of Knowledge: Suspect lower than average   Allergies     Allergies   Allergen Reactions     Bee Venom Anaphylaxis     Clindamycin Hives     Morphine Hives        Labs & Imaging   No results found for this or any previous visit (from the past 24 hour(s)).     Assessment   Diagnostic Impression:  Anderson Perez is a 40 year old male with a past psychiatric history of bipolar disorder with psychosis vs schizoaffective disorder bipolar type, antisocial personality disorder, and intellectual disability admitted from the  ER on 08/31/2021 due to concern for out of control behaviors, aggression and psychosis in the context of medication refusal despite Cadroza. Patient was recently released from [facility] to a group home. Presentation to the ED via ambulance after police contacted and provisional discharge rescinded. Group " home noting escalating aggressive behavior, delusional thinking, and medication refusal despite Cardoza. PT refusal to discuss problems and largely denied all past history, medications, or any need for care.     His reported symptoms of delusions, psychosis, and erratic/aggressive behavior are consistent with prior presentations of psychosis.  Optimization of medications to target these symptom clusters in addition to determining facility placement after discharge will be targets for treatment during this admission.      Given that he currently has PTA report of out of control behaviors, aggression and psychosis, patient warrants inpatient psychiatric hospitalization to maintain his safety. Disposition pending availability of [facility] placement or other option.    Principal Diagnosis:     Psychosis, Unspecified (Schizoaffective disorder vs Bipolar Affective Disorder with Psychosis)    Secondary psychiatric diagnoses of concern this admission:     Intellectual Disability    Antisocial personality disorder    Psychiatric course:  Anderson Perez was admitted to [facility] following revocation of provisional discharge and on a court hold. His PTA olanzapine 20mg PO QDaily was resumed, as pt had been refusing at group home; per conversation with guardian, had planned to start on GRIGGS, but does not think that this was actually started, as patient was refusing medications, which prompted admission in setting of decompensation. Pt was converted to ODT formulation given concerns for cheeking medications; while initially resistant, pt was agreeable with taking ODT olanzapine as long as it was given with ginger ale, which was accommodated. Refused blood draw to get olanzapine level on 9/13. Olanzapine dose increased to 40mg on 9/21 given limited efficacy at current dose. PRN for agitation/aggression was changed to haloperidol 5mg, lorazepam 2mg, and diphenhydramine 50mg, with PO haloperidol available.    Anderson Perez  continued to meet criteria for inpatient hospitalization medication optimization, inpatient stabilization, and appropriate discharge planning.     Medical course:   Anderson Perez was physically examined by the ED prior to being transferred to the unit and was found to be medically stable and appropriate for admission.      Plan   Today's Changes:     The team discussed other possible options for placement. This included calling his CM to obtain a CADI Waiver for possible placement in a group home.   _______________________________________________________________________  Psychiatric diagnoses and management:  Principal Diagnosis:     Psychosis, Unspecified (Schizoaffective disorder vs Bipolar Affective Disorder with Psychosis)  o Increase olanzapine to 40mg ODT qDaily  o Will consider GRIGGS at later date following initial stabilization    Secondary Psychiatric Diagnoses:     Intellectual disability  o Per , IQ estimated to be 63  o NTD    Antisocial Personality Disorder  o NTD    Additional Planning:    Continue to monitor for and stabilize: aggression, psychosis and disorganization    Patient will be treated in therapeutic milieu with appropriate individual and group therapies as described.    Scheduled Medications (summary):    OLANZapine zydis  40 mg Oral Daily with supper     Vitamin D3  25 mcg Oral Daily       PRN Medications (summary):  alum & mag hydroxide-simethicone, haloperidol lactate **AND** diphenhydrAMINE **AND** LORazepam, haloperidol, hydrOXYzine, pediatric electrolyte, polyethylene glycol    Discontinued Medications (& Rationale):    None    Consults:    None    Legal Status:    Court Hold    Sony ADAMES:    No    Pt has not required locked seclusion or restraints in the past 24 hours to maintain safety, please refer to RN documentation for further details.    Disposition:    Pending availability of bed for placement at [facility]. Exploring other options like [facility] for him.     Safety  Assessment:   Behavioral Orders   Procedures     Cheeking Precautions (behavioral units)     Code 1 - Restrict to Unit     Elopement precautions     Routine Programming     As clinically indicated     Status 15     Every 15 minutes.      ____________________________________________________________________  Pertinent Medical diagnoses and management:    Chronic Hepatitis C  o Continue to monitor  ____________________________________________________________________  Patient seen and discussed with attending physician, Dr. Mike Bobo, who is in agreement with my assessment and plan.      Marlon Apodaca, PGY-1 (Psychiatry)  [facility]    Attestation:  This patient has been seen and evaluated by me, Mike Bobo MD.  I have discussed this patient with the house staff team including the resident and medical student and I agree with the findings and plan in this note.    I have reviewed today's vital signs, medications, labs and imaging. Mike Bobo MD , PhD.

## 2021-09-30 NOTE — PLAN OF CARE
Problem: Behavioral Health Plan of Care  Goal: Plan of Care Review  Outcome: Improving  Flowsheets (Taken 9/30/2021 6551)  Plan of Care Reviewed With: patient  Progress: improving  Patient Agreement with Plan of Care: (denial of illness, but cooperative with plan) disagrees (describe)     Problem: Behavior Regulation Impairment (Psychotic Signs/Symptoms)  Goal: Improved Behavioral Control (Psychotic Signs/Symptoms)  Outcome: Improving     Antonio continues to spend day walking in dalal, moving lips as though in conversation-less tense today-pleasant in brief interactions-avoids interactions with others

## 2021-09-30 NOTE — PROGRESS NOTES
"    ----------------------------------------------------------------------------------------------------------  [facility]  Psychiatric Progress Note  Hospital Day #29    Anderson Perez MRN# [id number]   Age: 40 year old YOB: 1980   Date of Admission: 8/30/2021     Subjective   The patient was discussed with the treatment team and chart notes were reviewed.      Identifier: Anderson Perez is a 40 year old male with a past psychiatric history of bipolar disorder with psychosis vs schizoaffective disorder bipolar type, antisocial personality disorder, and intellectual disability admitted from the  ER on 08/31/2021 due to concern for out of control behaviors, aggression and psychosis in the context of medication refusal despite Cardoza.    Sleep:  7 hours (09/29/21 0600)  Prescribed Medications: Taken as prescribed with ginger ale  PRN Psychiatric Medications: alum & mag hydroxide-simethicone, haloperidol lactate **AND** diphenhydrAMINE **AND** LORazepam, haloperidol, hydrOXYzine, pediatric electrolyte, polyethylene glycol     None    Overnight Nursing Notes/Staff Report:  9/29/2021  06:16 AM  \" Pt appeared to sleep 7 hours. No PRNs given or requested. No medical or behavioral events this shift.\"    9/28/2021  10:15 PM  \"Pt has been pacing in the hallway since the start of the shift; was appeared to be responding to internal stimuli; declined a visit from his Guardian and was agitated about it ( see previous note),pt legal guardian decided not to come to visit.Denied SI/SIB; was cooperative with room lock for 15 minutes post med; No sign of medication cheeking was noted; good appetite; No medication side effect was noted or reported. Declined VS assessment; took no shower; attended no group;  Will continue to monitor and assess. \"    Per legal guardian  She has known Anderson since April 2021.   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 Anderson tracked well through the conversation though with some delusional thinking. Per Anderson's girl friend, he is not close to his baseline.  This writer discussed the intention of the team to start Clozapine and this would need to be included in Anderson's Cardoza order prior to starting. Guardian was agreeable with starting Clozapine and suggested to the team to start regardless of the medication being in the Cardoza order since Anderson is willing to take the medication voluntarily. This writer explained to guardian that we would need to include the medication in the Cardoza order before starting, since Anderson does not have capacity to consent to treatment. Guardian also suggested that per Anderson's girlfriend, he has done well on Buspar and Paxil before. This writer explained that there is no indication to prescribe those medications to Anderson now.     Patient interview:   Patient was seen while pacing the main hallway.  He  reports good bowel movements, and no other problems.  When asked if he has any other questions, he states, \"Just wondering how long I'm gonna be here.\"     ROS   ROS was negative unless noted above.     Objective   Vitals:  Declined. Most recent from 9/20/21  Vitals:    09/09/21 1545 09/15/21 1600 09/19/21 1558 09/20/21 1620   Resp:   14    TempSrc: Tympanic Oral  Oral   Weight:       Height:           Mental Status Examination:  Orientation:  Grossly intact  General: Pacing up and down the dalal during meeting.  Appearance:  Dressed in hospital scrubs, hair is buzzed short.  Behavior: Guarded  Eye Contact:  Minimal, only brief when turning at either end of the hallway  Psychomotor:  Pacing hallway; no catatonia present  Speech:  Appropriate volume, and rate  Language: Appears to be fluent in English   Mood:  \"good\"  Affect:  Generally flat, with minor " reactivity; irritability continues to be reduced from time of admission, but still present intermittently  Thought Process:  Limited engagement with team during today's assessment  Thought Content: Unable to assess due to brevity of encounter  Associations:  loose  Insight:  poor  Judgment:  poor  Attention Span: limited  Concentration:  limited  Recent and Remote Memory:  Grossly intact  Fund of Knowledge: Suspect lower than average   Allergies     Allergies   Allergen Reactions     Bee Venom Anaphylaxis     Clindamycin Hives     Morphine Hives        Labs & Imaging   No results found for this or any previous visit (from the past 24 hour(s)).     Assessment   Diagnostic Impression:  Anderson Perez is a 40 year old male with a past psychiatric history of bipolar disorder with psychosis vs schizoaffective disorder bipolar type, antisocial personality disorder, and intellectual disability admitted from the  ER on 08/31/2021 due to concern for out of control behaviors, aggression and psychosis in the context of medication refusal despite Cardoza. Patient was recently released from [facility] to a group home. Presentation to the ED via ambulance after police contacted and provisional discharge rescinded. Group home noting escalating aggressive behavior, delusional thinking, and medication refusal despite Cardoza. PT refusal to discuss problems and largely denied all past history, medications, or any need for care.     His reported symptoms of delusions, psychosis, and erratic/aggressive behavior are consistent with prior presentations of psychosis.  Optimization of medications to target these symptom clusters in addition to determining facility placement after discharge will be targets for treatment during this admission.      Given that he currently has PTA report of out of control behaviors, aggression and psychosis, patient warrants inpatient psychiatric hospitalization to maintain his safety. Disposition pending  availability of [facility] placement or other option.    Principal Diagnosis:     Psychosis, Unspecified (Schizoaffective disorder vs Bipolar Affective Disorder with Psychosis)    Secondary psychiatric diagnoses of concern this admission:     Intellectual Disability    Antisocial personality disorder    Psychiatric course:  Anderson Perez was admitted to [facility] following revocation of provisional discharge and on a court hold. His PTA olanzapine 20mg PO QDaily was resumed, as pt had been refusing at group home; per conversation with guardian, had planned to start on GRIGGS, but does not think that this was actually started, as patient was refusing medications, which prompted admission in setting of decompensation. Pt was converted to ODT formulation given concerns for cheeking medications; while initially resistant, pt was agreeable with taking ODT olanzapine as long as it was given with ginger ale, which was accommodated. Refused blood draw to get olanzapine level on 9/13. Olanzapine dose increased to 40mg on 9/21 given limited efficacy at current dose. PRN for agitation/aggression was changed to haloperidol 5mg, lorazepam 2mg, and diphenhydramine 50mg, with PO haloperidol available.    Anderson Perez continued to meet criteria for inpatient hospitalization medication optimization, inpatient stabilization, and appropriate discharge planning.     Medical course:   Anderson Perez was physically examined by the ED prior to being transferred to the unit and was found to be medically stable and appropriate for admission.      Plan   Today's Changes:     None  _______________________________________________________________________  Psychiatric diagnoses and management:  Principal Diagnosis:     Psychosis, Unspecified (Schizoaffective disorder vs Bipolar Affective Disorder with Psychosis)  o Increase olanzapine to 40mg ODT qDaily  o Will consider GRIGGS at later date following initial stabilization    Secondary  Psychiatric Diagnoses:     Intellectual disability  o Per , IQ estimated to be 63  o NTD    Antisocial Personality Disorder  o NTD    Additional Planning:    Continue to monitor for and stabilize: aggression, psychosis and disorganization    Patient will be treated in therapeutic milieu with appropriate individual and group therapies as described.    Scheduled Medications (summary):    OLANZapine zydis  40 mg Oral Daily with supper     Vitamin D3  25 mcg Oral Daily       PRN Medications (summary):  alum & mag hydroxide-simethicone, haloperidol lactate **AND** diphenhydrAMINE **AND** LORazepam, haloperidol, hydrOXYzine, pediatric electrolyte, polyethylene glycol    Discontinued Medications (& Rationale):    None    Consults:    None    Legal Status:    Court Hold    Cardoza     SIO:    No    Pt has not required locked seclusion or restraints in the past 24 hours to maintain safety, please refer to RN documentation for further details.    Disposition:    Pending availability of bed for placement at [facility]. Exploring other options like [facility] for him.     Safety Assessment:   Behavioral Orders   Procedures     Cheeking Precautions (behavioral units)     Code 1 - Restrict to Unit     Elopement precautions     Routine Programming     As clinically indicated     Status 15     Every 15 minutes.      ____________________________________________________________________  Pertinent Medical diagnoses and management:    Chronic Hepatitis C  o Continue to monitor  ____________________________________________________________________  Patient seen and discussed with attending physician, Dr. Mike Bobo, who is in agreement with my assessment and plan.      Marlon Apodaca, PGY-1 (Psychiatry)  [facility]      Attestation:  This patient has been seen and evaluated by me, Mike Bobo MD.  I have discussed this patient with the house staff team including the resident and medical student and I agree with the  findings and plan in this note.    I have reviewed today's vital signs, medications, labs and imaging. Mike Bobo MD , PhD.

## 2021-09-30 NOTE — PLAN OF CARE
Assessment/Intervention/Current Symptoms and Care Coordination    Attend Team Meeting  Review Chart  Discuss referral options  Writer called [facility] [phone].  Left voice mail requesting a return call to discuss CADI Application to refer to Adult Foster Care or a Group Home.    Discharge Plan or Goal  On the list for [facility], also consider Group Home or Adult Foster Care.    Barriers to Discharge   Pt is psychotic, Provider believes this may be his baseline.    Referral Status  Referred to [facility]- they declined  Referred to [facility]- their list is not moving.  Consider CADI waiver and referral to Adult [facility] or Group Home.    Legal Status  Revoked PD- Court Hold.

## 2021-10-01 PROCEDURE — 99232 SBSQ HOSP IP/OBS MODERATE 35: CPT | Mod: GC | Performed by: PSYCHIATRY & NEUROLOGY

## 2021-10-01 PROCEDURE — 250N000013 HC RX MED GY IP 250 OP 250 PS 637

## 2021-10-01 PROCEDURE — 124N000002 HC R&B MH UMMC

## 2021-10-01 RX ADMIN — OLANZAPINE 40 MG: 20 TABLET, ORALLY DISINTEGRATING ORAL at 18:19

## 2021-10-01 RX ADMIN — Medication 25 MCG: at 18:19

## 2021-10-01 ASSESSMENT — ACTIVITIES OF DAILY LIVING (ADL)
DRESS: INDEPENDENT
HYGIENE/GROOMING: INDEPENDENT
ORAL_HYGIENE: INDEPENDENT
LAUNDRY: WITH SUPERVISION

## 2021-10-01 NOTE — PROGRESS NOTES
"    ----------------------------------------------------------------------------------------------------------  [facility]  Psychiatric Progress Note  Hospital Day #31    Anderson Perez MRN# [id number]   Age: 40 year old YOB: 1980   Date of Admission: 8/30/2021     Subjective   The patient was discussed with the treatment team and chart notes were reviewed.      Identifier: Anderson Perez is a 40 year old male with a past psychiatric history of bipolar disorder with psychosis vs schizoaffective disorder bipolar type, antisocial personality disorder, and intellectual disability admitted from the  ER on 08/31/2021 due to concern for out of control behaviors, aggression and psychosis in the context of medication refusal despite Cardoza.    Sleep:  7 hours (10/01/21 0500)  Prescribed Medications: Taken as prescribed with ginger ale  PRN Psychiatric Medications: alum & mag hydroxide-simethicone, haloperidol lactate **AND** diphenhydrAMINE **AND** LORazepam, haloperidol, hydrOXYzine, pediatric electrolyte, polyethylene glycol     None    Overnight Nursing Notes/Staff Report:  10/01/2021  05:36 AM  \" Pt appeared to have slept for 7 hours.  Breathing is even and unlabored.  No medical concerns/prns this shift.\"    9/30/2021  11:18 PM  \"Pt has been pacing in the hallway since the start of the shift; withdrawn and  kept to himself; appeared responding to internal stimuli; declined request to check in with this writer, but stated, \" I am fine, no issue\";, medication complaint; denied mediation side effect; ate dinner; good appetite; attend no group; took no shower; declined shift VS assessment; no physical or behavioral concern noted/ reported; will continue to monitor and assess.   \"    Patient interview:   Patient was seen while pacing the main hallway.  He  States \" I am fine\". Denies hallucinations. States that he is eating well, passing BM, has no concerns and \"wants to get " "out of here\".      ROS   ROS was negative unless noted above.     Objective   Vitals:  Declined. Most recent from 9/20/21  Vitals:    09/09/21 1545 09/15/21 1600 09/19/21 1558 09/20/21 1620   Resp:   14    TempSrc: Tympanic Oral  Oral   Weight:       Height:           Mental Status Examination:  Orientation:  Grossly intact  General: Pacing up and down the dalal during meeting.  Appearance:  Dressed in hospital scrubs, hair is buzzed short.  Behavior: Guarded  Eye Contact:  Minimal, only brief when turning at either end of the hallway  Psychomotor:  Pacing hallway; no catatonia present  Speech:  Appropriate volume, and rate  Language: Appears to be fluent in English   Mood:  \"fine\"  Affect:  Generally flat, with minor reactivity; irritability continues to be reduced from time of admission, but still present intermittently  Thought Process:  Limited engagement with team during today's assessment  Thought Content: Unable to assess due to brevity of encounter  Associations:  loose  Insight:  poor  Judgment:  poor  Attention Span: limited  Concentration:  limited  Recent and Remote Memory:  Grossly intact  Fund of Knowledge: Suspect lower than average   Allergies     Allergies   Allergen Reactions     Bee Venom Anaphylaxis     Clindamycin Hives     Morphine Hives        Labs & Imaging   No results found for this or any previous visit (from the past 24 hour(s)).     Assessment   Diagnostic Impression:  Anderson Perez is a 40 year old male with a past psychiatric history of bipolar disorder with psychosis vs schizoaffective disorder bipolar type, antisocial personality disorder, and intellectual disability admitted from the  ER on 08/31/2021 due to concern for out of control behaviors, aggression and psychosis in the context of medication refusal despite Cardoza. Patient was recently released from [facility] to a group home. Presentation to the ED via ambulance after police contacted and provisional discharge rescinded. Group " home noting escalating aggressive behavior, delusional thinking, and medication refusal despite Cardoza. PT refusal to discuss problems and largely denied all past history, medications, or any need for care.     His reported symptoms of delusions, psychosis, and erratic/aggressive behavior are consistent with prior presentations of psychosis.  Optimization of medications to target these symptom clusters in addition to determining facility placement after discharge will be targets for treatment during this admission.      Given that he currently has PTA report of out of control behaviors, aggression and psychosis, patient warrants inpatient psychiatric hospitalization to maintain his safety. Disposition pending availability of [facility] placement or other option.    Principal Diagnosis:     Psychosis, Unspecified (Schizoaffective disorder vs Bipolar Affective Disorder with Psychosis)    Secondary psychiatric diagnoses of concern this admission:     Intellectual Disability    Antisocial personality disorder    Psychiatric course:  Anderson Perez was admitted to [facility] following revocation of provisional discharge and on a court hold. His PTA olanzapine 20mg PO QDaily was resumed, as pt had been refusing at group home; per conversation with guardian, had planned to start on GRIGGS, but does not think that this was actually started, as patient was refusing medications, which prompted admission in setting of decompensation. Pt was converted to ODT formulation given concerns for cheeking medications; while initially resistant, pt was agreeable with taking ODT olanzapine as long as it was given with ginger ale, which was accommodated. Refused blood draw to get olanzapine level on 9/13. Olanzapine dose increased to 40mg on 9/21 given limited efficacy at current dose. PRN for agitation/aggression was changed to haloperidol 5mg, lorazepam 2mg, and diphenhydramine 50mg, with PO haloperidol available.    Anderson Perez  continued to meet criteria for inpatient hospitalization medication optimization, inpatient stabilization, and appropriate discharge planning.     Medical course:   Anderson Perez was physically examined by the ED prior to being transferred to the unit and was found to be medically stable and appropriate for admission.      Plan   Today's Changes:     None   _______________________________________________________________________  Psychiatric diagnoses and management:  Principal Diagnosis:     Psychosis, Unspecified (Schizoaffective disorder vs Bipolar Affective Disorder with Psychosis)  o Increase olanzapine to 40mg ODT qDaily  o Will consider GRIGGS at later date following initial stabilization    Secondary Psychiatric Diagnoses:     Intellectual disability  o Per , IQ estimated to be 63  o NTD    Antisocial Personality Disorder  o NTD    Additional Planning:    Continue to monitor for and stabilize: aggression, psychosis and disorganization    Patient will be treated in therapeutic milieu with appropriate individual and group therapies as described.    Scheduled Medications (summary):    OLANZapine zydis  40 mg Oral Daily with supper     Vitamin D3  25 mcg Oral Daily       PRN Medications (summary):  alum & mag hydroxide-simethicone, haloperidol lactate **AND** diphenhydrAMINE **AND** LORazepam, haloperidol, hydrOXYzine, pediatric electrolyte, polyethylene glycol    Discontinued Medications (& Rationale):    None    Consults:    None    Legal Status:    Committed with Cardoza     SIO:    No    Pt has not required locked seclusion or restraints in the past 24 hours to maintain safety, please refer to RN documentation for further details.    Disposition:    Pending availability of bed for placement at [facility]. Exploring other options like [facility] for him.     Safety Assessment:   Behavioral Orders   Procedures     Cheeking Precautions (behavioral units)     Code 1 - Restrict to Unit     Elopement precautions      Routine Programming     As clinically indicated     Status 15     Every 15 minutes.      ____________________________________________________________________  Pertinent Medical diagnoses and management:    Chronic Hepatitis C  o Continue to monitor  ____________________________________________________________________  Patient seen and discussed with attending physician, Dr. Mike Bobo, who is in agreement with my assessment and plan.      Marlon Apodaca, PGY-1 (Psychiatry)  [facility]    Attestation:  This patient has been seen and evaluated by me, Mike Bobo MD.  I have discussed this patient with the house staff team including the resident and medical student and I agree with the findings and plan in this note.    I have reviewed today's vital signs, medications, labs and imaging. Mike Bobo MD , PhD.

## 2021-10-01 NOTE — PLAN OF CARE
Assessment/Intervention/Current Symptoms and Care Coordination  WR attended team and reviewed chart.     Pt has Rule 20 eval scheduled for October 6th from 12-2PM. Henry Mireles through [facility] called, he has not received the link for the eval. WR sent an e-mail for IT[employer] request to assist. WR received e-mail confirmation that a request was sent to [employer] for the link to be sent.     Writer called [facility] [phone].  Left voice mail requesting a return call to discuss CADI Application to refer to Adult Foster Care or a Group Home.    Discharge Plan or Goal  On the list for [facility], also consider Group Home or Adult Foster Care.    Barriers to Discharge   Pt is psychotic, Provider believes this may be his baseline    Referral Status  [facility]- they declined  Referred to [facility]- their list is not moving.  Consider CADI waiver and referral to Adult [facility] or Group Home    Legal Status  Commitment/ Cardoza

## 2021-10-01 NOTE — PLAN OF CARE
"Nursing Plan of care   Problem: Behavior Regulation Impairment (Psychotic Signs/Symptoms)  Goal: Improved Behavioral Control (Psychotic Signs/Symptoms)  Outcome: No Change   Pt has been pacing in the hallway since the start of the shift; withdrawn and  kept to himself; appeared responding to internal stimuli; declined request to check in with this writer, but stated, \" I am fine, no issue\";, medication complaint; denied mediation side effect; ate dinner; good appetite; attend no group; took no shower; declined shift VS assessment; no physical or behavioral concern noted/ reported; will continue to monitor and assess.    "

## 2021-10-01 NOTE — PLAN OF CARE
Problem: Behavioral Health Plan of Care  Goal: Plan of Care Review  Outcome: Improving  Flowsheets (Taken 10/1/2021 1421)  Plan of Care Reviewed With: patient  Progress: improving  Patient Agreement with Plan of Care: (Agrees with plan-does not believe he is ill) agrees with comment (describe)     Problem: Behavior Regulation Impairment (Psychotic Signs/Symptoms)  Goal: Improved Behavioral Control (Psychotic Signs/Symptoms)  Outcome: Improving  Flowsheets (Taken 10/1/2021 1426)  Mutually Determined Action Steps (Improved Behavioral Control): identifies future-oriented goal     Anderson continues pacing up and down dalal-constantly mumbling to self-cooperative when redirected from lounge during need to redirect peer-pleasant in interactions

## 2021-10-02 PROCEDURE — 124N000002 HC R&B MH UMMC

## 2021-10-02 PROCEDURE — 250N000013 HC RX MED GY IP 250 OP 250 PS 637

## 2021-10-02 RX ADMIN — Medication 25 MCG: at 17:49

## 2021-10-02 RX ADMIN — OLANZAPINE 40 MG: 20 TABLET, ORALLY DISINTEGRATING ORAL at 17:49

## 2021-10-02 ASSESSMENT — ACTIVITIES OF DAILY LIVING (ADL)
ORAL_HYGIENE: INDEPENDENT
DRESS: INDEPENDENT
LAUNDRY: WITH SUPERVISION
HYGIENE/GROOMING: INDEPENDENT

## 2021-10-02 NOTE — PLAN OF CARE
"Nursing plan of care   Problem: Behavior Regulation Impairment (Psychotic Signs/Symptoms)  Goal: Improved Behavioral Control (Psychotic Signs/Symptoms)  Outcome: Improving  Flowsheets (Taken 10/1/2021 1425 by Natacha Saini RN)  Mutually Determined Action Steps (Improved Behavioral Control): identifies future-oriented goal  Pt spent most of the shift pacing in the hallway; calm and pleasant; intermittently interact with staff and made positive comments on staff conversation; pt asked about his discharge plan and how long it takes; was told he is on waiting list for [facility] and he replied, \"That is good\"; requested electric razor to shave his beard and was assisted; medication compliant; denied medication side effect; no safety concern to report this shift; will continue to monitor and assess.     "

## 2021-10-02 NOTE — PLAN OF CARE
Plan of care   Problem: Sleep Disturbance  Goal: Adequate Sleep/Rest  Outcome: Improving   Pt had a good shift; respiration was regular and unlabored; No safety concern to report; appeared to have slept for 7 hrs; will continue to monitor and assess.

## 2021-10-02 NOTE — PLAN OF CARE
Problem: Adult Inpatient Plan of Care  Goal: Optimal Comfort and Wellbeing  Outcome: Improving       Pt visible in the milieu for the majority of the morning. He is socially withdrawn but interacts appropriately when indicated. Pt reports sleeping well overnight. Pt calm and no behavioral concerns. Appears responding to internal stimuli. Denies SI/SIB. Declined his COVID nasal swab, as he states he had the COVID vaccine.     No additional concerns at this time. Will continue to assess and offer support.

## 2021-10-03 PROCEDURE — 250N000013 HC RX MED GY IP 250 OP 250 PS 637

## 2021-10-03 PROCEDURE — 124N000002 HC R&B MH UMMC

## 2021-10-03 PROCEDURE — 258N000001 HC RX 258

## 2021-10-03 RX ADMIN — OLANZAPINE 40 MG: 20 TABLET, ORALLY DISINTEGRATING ORAL at 16:32

## 2021-10-03 RX ADMIN — Medication 500 ML: at 18:01

## 2021-10-03 RX ADMIN — Medication 25 MCG: at 16:32

## 2021-10-03 ASSESSMENT — ACTIVITIES OF DAILY LIVING (ADL)
ORAL_HYGIENE: INDEPENDENT
DRESS: SCRUBS (BEHAVIORAL HEALTH);INDEPENDENT
DRESS: SCRUBS (BEHAVIORAL HEALTH);INDEPENDENT
LAUNDRY: UNABLE TO COMPLETE
HYGIENE/GROOMING: INDEPENDENT;PROMPTS
ORAL_HYGIENE: INDEPENDENT;PROMPTS
LAUNDRY: WITH SUPERVISION
HYGIENE/GROOMING: INDEPENDENT

## 2021-10-03 NOTE — PLAN OF CARE
Plan of care   Problem: Sleep Disturbance  Goal: Adequate Sleep/Rest  Outcome: Improving  Pt appeared to have slept for 7 hrs; no physical or behavioral concern to report this shift; No safety concern to report; will continue to monitor and assess.

## 2021-10-03 NOTE — PROGRESS NOTES
Brief Cross Cover Note    Subjective  Notified by nursing that patient is requesting nasal spray for a dry nose.     Objective  /75 (BP Location: Left arm)   Pulse 77   Temp 98.2  F (36.8  C) (Oral)   Resp 14   Ht 1.829 m (6')   Wt 82.7 kg (182 lb 4.8 oz)   SpO2 98%   BMI 24.72 kg/m        Assessment & Plan  - Nasal spray q1hour PRN    Diane Dsouza MD  Psychiatry PGY-2

## 2021-10-03 NOTE — PLAN OF CARE
"Patient pacing the dalal all shift, denied pain and discomfort. Remain visible in the milieu but not engaged with peers. Saw another patient Pedialyte bottle in the medication room and stated\"that is mine and I need some right now\". Writer informed patient that the bottle belong to someone else but a new bottle will be order for him. Patient later drank 500cc of Pedialyte. No attempts to elope from the unit but did request to leave once and writer reminded him that he is here for treatment and to get better. Patient was medication compliant and no cheeking noted. Ate 100% of dinner, refuse shower and vital sign check. Denied SI/HI/SIB and hallucination.    "

## 2021-10-03 NOTE — PLAN OF CARE
Problem: Behavioral Disturbance  Goal: Behavioral Disturbance  Description: Signs and symptoms of listed problems will be absent or manageable by discharge or transition of care.  Outcome: Improving  Flowsheets  Taken 10/3/2021 1012 by Sierra Ramires RN  Behavioral Disturbance Present:    affect    insight    Pt visible in the milieu for the majority of the day. He is socially withdrawn but pleasant in his interactions. Pt denies the presence of anxiety, as well as SI/SIB. He appears responding to internal stimuli, but pt denies. Pt declined his vitals. No behavioral concerns. Will continue to assess and offer support.

## 2021-10-03 NOTE — PLAN OF CARE
"Nursing plan of care   Problem: Behavior Regulation Impairment (Psychotic Signs/Symptoms)  Goal: Improved Behavioral Control (Psychotic Signs/Symptoms)  Outcome: Improving  Flowsheets (Taken 10/2/2021 2147)  Mutually Determined Action Steps (Improved Behavioral Control): verbalizes personal treatment goal  Pt had a good shift; has been pacing in the hallway as usual; getting more approachable and willing to answer questions and ask about his concern and treatment plan; pt discussed about his discharge plan with staff, and staff told him that he is on waiting list for [facility] and probably he might go to adult foster care or group home if it takes longer to get in to [facility]; pt upset about adult foster and stated, \" I am not disabled, I am not a baby, I don't want to go there\" but he is open to group home in [place]; staff noted him walking in the hallway with scant blood stained tissue and asked him if he has dry nose and need nasal spray, and pt stated, \"yes\"; this writer called attending resident and received order for Nacl nasal spray, but pt retired to bed before spray arrived at the unit from pharmacy; pt denied SI/SIB, med complaint; denied medication side effect; will continue to monitor and assess.   "

## 2021-10-03 NOTE — PROGRESS NOTES
Pt stopped writer to ask what the plan was when he is discharged. Writer read the CTC notes and told him the potential plans. Pt is requesting to go to a group home in [place]. Pt has told writer this 2 different times in 5 minutes.

## 2021-10-04 PROCEDURE — 250N000013 HC RX MED GY IP 250 OP 250 PS 637

## 2021-10-04 PROCEDURE — 124N000002 HC R&B MH UMMC

## 2021-10-04 PROCEDURE — 99231 SBSQ HOSP IP/OBS SF/LOW 25: CPT | Mod: GC | Performed by: PSYCHIATRY & NEUROLOGY

## 2021-10-04 RX ADMIN — Medication 25 MCG: at 18:22

## 2021-10-04 RX ADMIN — OLANZAPINE 40 MG: 20 TABLET, ORALLY DISINTEGRATING ORAL at 18:22

## 2021-10-04 RX ADMIN — Medication 500 ML: at 14:34

## 2021-10-04 ASSESSMENT — ACTIVITIES OF DAILY LIVING (ADL)
LAUNDRY: WITH SUPERVISION
HYGIENE/GROOMING: INDEPENDENT
DRESS: INDEPENDENT
ORAL_HYGIENE: INDEPENDENT

## 2021-10-04 NOTE — PROGRESS NOTES
"    ----------------------------------------------------------------------------------------------------------  [facility]  Psychiatric Progress Note  Hospital Day #34    Anderson Perez MRN# [id number]   Age: 40 year old YOB: 1980   Date of Admission: 8/30/2021     Subjective   The patient was discussed with the treatment team and chart notes were reviewed.      Identifier: Anderson Perez is a 40 year old male with a past psychiatric history of bipolar disorder with psychosis vs schizoaffective disorder bipolar type, antisocial personality disorder, and intellectual disability admitted from the  ER on 08/31/2021 due to concern for out of control behaviors, aggression and psychosis in the context of medication refusal despite Cardoza.    Sleep:  7 hours (10/04/21 0600)  Prescribed Medications: Taken as prescribed with ginger ale  PRN Psychiatric Medications: alum & mag hydroxide-simethicone, haloperidol lactate **AND** diphenhydrAMINE **AND** LORazepam, haloperidol, hydrOXYzine, pediatric electrolyte, polyethylene glycol, sodium chloride   None    Overnight Nursing Notes/Staff Report:    Nursing notes were reviewed. No acute events over the weekend. Anderson seemed more approachable to staff members and asked about discharge plans. He does not want to go to a foster home but would like to go to a group home in [place] when discharged. His rule-20 is re-scheduled for 10/13/2021 from 12-2 pm.     Patient interview:   Patient was seen while pacing the main hallway.  He  States \" I am fine\". He asked the team when he will be discharging. The team told him that it would take a couple of weeks to get placement for him and he seemed agreeable.      ROS   ROS was negative unless noted above.     Objective   Vitals:  Declined. Most recent from 9/20/21  Vitals:    09/09/21 1545 09/15/21 1600 09/19/21 1558 09/20/21 1620   Resp:   14    TempSrc: Tympanic Oral  Oral   Weight:     " "  Height:           Mental Status Examination:  Orientation:  Grossly intact  General: Pacing up and down the dalal during meeting.  Appearance:  Dressed in hospital scrubs, hair is buzzed short.  Behavior: Guarded  Eye Contact:  Minimal, only brief when turning at either end of the hallway  Psychomotor:  Pacing hallway; no catatonia present  Speech:  Appropriate volume, and rate  Language: Appears to be fluent in English   Mood:  \"fine\"  Affect:  Generally flat, with minor reactivity; irritability continues to be reduced from time of admission, but still present intermittently  Thought Process:  Limited engagement with team during today's assessment  Thought Content: Unable to assess due to brevity of encounter  Associations:  loose  Insight:  poor  Judgment:  poor  Attention Span: limited  Concentration:  limited  Recent and Remote Memory:  Grossly intact  Fund of Knowledge: Suspect lower than average   Allergies     Allergies   Allergen Reactions    Bee Venom Anaphylaxis    Clindamycin Hives    Morphine Hives        Labs & Imaging   No results found for this or any previous visit (from the past 24 hour(s)).     Assessment   Diagnostic Impression:  Anderson Perez is a 40 year old male with a past psychiatric history of bipolar disorder with psychosis vs schizoaffective disorder bipolar type, antisocial personality disorder, and intellectual disability admitted from the  ER on 08/31/2021 due to concern for out of control behaviors, aggression and psychosis in the context of medication refusal despite Cardoza. Patient was recently [facility] from [facility] to a group home. Presentation to the ED via ambulance after police contacted and provisional discharge rescinded. Group home noting escalating aggressive behavior, delusional thinking, and medication refusal despite Cardoza. PT refusal to discuss problems and largely denied all past history, medications, or any need for care.     His reported symptoms of delusions, " psychosis, and erratic/aggressive behavior are consistent with prior presentations of psychosis.  Optimization of medications to target these symptom clusters in addition to determining facility placement after discharge will be targets for treatment during this admission.      Given that he currently has PTA report of out of control behaviors, aggression and psychosis, patient warrants inpatient psychiatric hospitalization to maintain his safety. Disposition pending availability of [facility] placement or other option.    Principal Diagnosis:   Psychosis, Unspecified (Schizoaffective disorder vs Bipolar Affective Disorder with Psychosis)    Secondary psychiatric diagnoses of concern this admission:   Intellectual Disability  Antisocial personality disorder    Psychiatric course:  Anderson Perez was admitted to [facility] following revocation of provisional discharge and on a court hold. His PTA olanzapine 20mg PO QDaily was resumed, as pt had been refusing at group home; per conversation with guardian, had planned to start on GRIGGS, but does not think that this was actually started, as patient was refusing medications, which prompted admission in setting of decompensation. Pt was converted to ODT formulation given concerns for cheeking medications; while initially resistant, pt was agreeable with taking ODT olanzapine as long as it was given with ginger ale, which was accommodated. Refused blood draw to get olanzapine level on 9/13. Olanzapine dose increased to 40mg on 9/21 given limited efficacy at current dose. PRN for agitation/aggression was changed to haloperidol 5mg, lorazepam 2mg, and diphenhydramine 50mg, with PO haloperidol available.    Anderson Perez continued to meet criteria for inpatient hospitalization medication optimization, inpatient stabilization, and appropriate discharge planning.     Medical course:   Anderson Perez was physically examined by the ED prior to being transferred to the unit and  was found to be medically stable and appropriate for admission.      Plan   Today's Changes:   None   _______________________________________________________________________  Psychiatric diagnoses and management:  Principal Diagnosis:   Psychosis, Unspecified (Schizoaffective disorder vs Bipolar Affective Disorder with Psychosis)  Increase olanzapine to 40mg ODT qDaily  Will consider GRIGGS at later date following initial stabilization    Secondary Psychiatric Diagnoses:   Intellectual disability  Per , IQ estimated to be 63  NTD  Antisocial Personality Disorder  NTD    Additional Planning:  Continue to monitor for and stabilize: aggression, psychosis and disorganization  Patient will be treated in therapeutic milieu with appropriate individual and group therapies as described.    Scheduled Medications (summary):   OLANZapine zydis  40 mg Oral Daily with supper    Vitamin D3  25 mcg Oral Daily       PRN Medications (summary):  alum & mag hydroxide-simethicone, haloperidol lactate **AND** diphenhydrAMINE **AND** LORazepam, haloperidol, hydrOXYzine, pediatric electrolyte, polyethylene glycol, sodium chloride    Discontinued Medications (& Rationale):  None    Consults:  None    Legal Status:  Committed with Cardoza     SIO:  No  Pt has not required locked seclusion or restraints in the past 24 hours to maintain safety, please refer to RN documentation for further details.    Disposition:  Pending availability of bed for placement at [facility]. Exploring other options like group homes for him.     Safety Assessment:   Behavioral Orders   Procedures    Cheeking Precautions (behavioral units)    Code 1 - Restrict to Unit    Elopement precautions    Routine Programming     As clinically indicated    Status 15     Every 15 minutes.      ____________________________________________________________________  Pertinent Medical diagnoses and management:  Chronic Hepatitis C  Continue to  monitor  ____________________________________________________________________  Patient seen and discussed with attending physician, Dr. Mike Bobo, who is in agreement with my assessment and plan.      Marlon Apodaca, PGY-1 (Psychiatry)  [facility]    Attestation:  This patient has been seen and evaluated by me, Mariola Prasad MD.  I have discussed this patient with the psychiatry resident and I agree with the findings and plan in this note.    I have reviewed today's vital signs, medications, labs and imaging. Mariola Prasad MD

## 2021-10-04 NOTE — PLAN OF CARE
BEHAVIORAL TEAM DISCUSSION    Participants: Ruth Headley [facility], Dr. Guerline Prasad Attending, Dr. Marlon Apodaca Resident, Paige Phelan medical student  Progress: Minimal change  Anticipated length of stay: 9-14 days  Continued Stay Criteria/Rationale: Patient has rule 20 evaluation scheduled in 9 days and his commitment expires in 14 days.  Medical/Physical: No medical concerns.  Precautions:   Behavioral Orders   Procedures    Cheeking Precautions (behavioral units)    Code 1 - Restrict to Unit    Elopement precautions    Routine Programming     As clinically indicated    Status 15     Every 15 minutes.     Plan: Psychiatric supervision of medication management, Patient will participate in therapeutic milieu, and CTC will provide case management and discharge planning.  Rationale for change in precautions or plan: No changes.    __________________  I have reviewed this note. Mariola Prasad MD

## 2021-10-04 NOTE — PLAN OF CARE
Problem: Behavioral Health Plan of Care  Goal: Plan of Care Review  Outcome: Improving  Flowsheets (Taken 10/4/2021 9468)  Plan of Care Reviewed With: patient  Progress: improving  Patient Agreement with Plan of Care: (Compliant with tx plan-Disagrees with diagnosis) agrees with comment (describe)     Problem: Mood Impairment (Manic or Hypomanic Signs/Symptoms)  Goal: Improved Mood Symptoms (Manic/Hypomanic Signs/Symptoms)  Outcome: Improving     Problem: Behavior Regulation Impairment (Psychotic Signs/Symptoms)  Goal: Improved Behavioral Control (Psychotic Signs/Symptoms)  Outcome: Improving     Antonio continues to spend day walking up and down dalal moving lips as though in conversation-avoids interaction with others-polite in brief interactions

## 2021-10-04 NOTE — PLAN OF CARE
"  Problem: Sleep Disturbance  Goal: Adequate Sleep/Rest  Outcome: No Change   Rec'd pt. Sleeping w/ regular non-labored respirations and no reported or observed distress.  Observed to have slept well for approx 7 Hrs on all Q 15 \" rounds w/o reported or observed distress.  Safe, therapeutic environment maintained.   "

## 2021-10-04 NOTE — PLAN OF CARE
Assessment/Intervention/Current Symtoms and Care Coordination:    Current Symptoms include the following: patient has been pacing    Chart Review    Met with team to discuss patient care    Henry from [facility] called to reschedule rule 20 examination to 10/13 12-2pm. Dr. Natalee Pickett will do examination. Called guardian and CCM to inform of change.     Observed patient's commitment is due to  on 10/18/21, called CCM Laurel ([phone]) again and left a VMM to inquire if they were going to file an extension.    Left VMM with patient's guardian informing her of commitment expiration date of 10/18/2021 and requesting she connect with [facility] regarding extension.    2:57 pm, Left M with  Supervisor, Polly Condon [phone] [email or web address] informing her that patient's commitment is set to  in 2 weeks and requesting return call regarding [facility]'s intention to file for extension. Left [facility] contact information.    Discharge Plan or Goal:  Pending stabilization & development of a safe discharge plan.  Considerations include:  Group home in [facility] per patient request.    Barriers to Discharge:  Patient requires further psychiatric stabilization due to current symptomology and Medication management with possible adjustments    Referral Status:  No referrals made    Legal Status:  [employer] have verified legal guardian(s) as: Daylin Umanzor, [employer], [phone] , .   Patient is under MI commitment in [facility], provisional discharge revoked. .   Patient has been ordered to comply with a Rule 20.01 evaluation for competency to proceed with criminal charges and Rule 20.02 evaluation for Mental Illness or Cognitive Impairment associated with , GM charges he is accused of.

## 2021-10-05 PROCEDURE — 124N000002 HC R&B MH UMMC

## 2021-10-05 PROCEDURE — 99232 SBSQ HOSP IP/OBS MODERATE 35: CPT | Mod: GC | Performed by: PSYCHIATRY & NEUROLOGY

## 2021-10-05 PROCEDURE — 250N000013 HC RX MED GY IP 250 OP 250 PS 637

## 2021-10-05 RX ADMIN — OLANZAPINE 40 MG: 20 TABLET, ORALLY DISINTEGRATING ORAL at 18:03

## 2021-10-05 RX ADMIN — Medication 25 MCG: at 18:04

## 2021-10-05 NOTE — PLAN OF CARE
Pt denies SI.  Pt did a lot of pacing and noted talking to self.  PT ate supper.  Medication compliant with also compliant with having his room locked for 15 minutes after taking his evening medications. Pt kept to self.  No interaction with other patients noted.  Pt states does not know why he's here.  Pts states he was medications complainants at the [facility] facility prior to coming here.    Problem: Adult Inpatient Plan of Care  Goal: Plan of Care Review  Outcome: No Change  Goal: Patient-Specific Goal (Individualized)  Outcome: No Change  Goal: Absence of Hospital-Acquired Illness or Injury  Outcome: No Change  Goal: Optimal Comfort and Wellbeing  Outcome: No Change  Goal: Readiness for Transition of Care  Outcome: No Change     Problem: Behavioral Disturbance  Goal: Behavioral Disturbance  Description: Signs and symptoms of listed problems will be absent or manageable by discharge or transition of care.  Outcome: No Change

## 2021-10-05 NOTE — PLAN OF CARE
Assessment/Intervention/Current Symtoms and Care Coordination:    Current Symptoms include the following:     Chart Review    Met with team to discuss patient care.    Called patient's guardian and requested verbal consent to send records to patient's  for recommit.    Spoke with Laurel at [facility] who stated they intend on doing a recommit. Laurel requested the examiner's statement and also a harper note. She also requested records. Her email is [email or web address] and work mobile is [phone]. REX Owens is also working with patient. His current waiver is , Deja needs to do a new [employer] assessment, which can be open for 60 days, but his symptoms are too acute to complete an assessment.    Scanned and emailed signed examiner's statement and harper note to Laurel and including that records need to be subpoena'd and gave her HIMS email and fax.     Discharge Plan or Goal:  Pending stabilization & development of a safe discharge plan.  Considerations include: Adult foster care in [facility].    Barriers to Discharge:  Patient requires further psychiatric stabilization due to current symptomology and Medication management with possible adjustments    Referral Status:  None made    Legal Status:  [employer] have verified legal guardian(s) as: Daylin Umanzor, [employer], [phone] , .   Patient is under MI commitment in [facility], provisional discharge revoked. .   Patient has been ordered to comply with a Rule 20.01 evaluation for competency to proceed with criminal charges and Rule 20.02 evaluation for Mental Illness or Cognitive Impairment associated with ,  charges he is accused of.

## 2021-10-05 NOTE — PROGRESS NOTES
Pt denies SI, SIB, Hallucination though he is seen talking to self . Pt states he does not belong here and has never had any symptoms of mental illness . He walks the hallway nonstop talking to self . He offers no complaints but received nicotine gum once this shift .

## 2021-10-05 NOTE — PROGRESS NOTES
".cm      ----------------------------------------------------------------------------------------------------------  [facility]  Psychiatric Progress Note  Hospital Day #35    Anderson Perez MRN# [id number]   Age: 40 year old YOB: 1980   Date of Admission: 8/30/2021     Subjective   The patient was discussed with the treatment team and chart notes were reviewed.      Identifier: Anderson Perez is a 40 year old male with a past psychiatric history of bipolar disorder with psychosis vs schizoaffective disorder bipolar type, antisocial personality disorder, and intellectual disability admitted from the  ER on 08/31/2021 due to concern for out of control behaviors, aggression and psychosis in the context of medication refusal despite Cardoza.    Sleep:  7 hours (10/05/21 0611)  Prescribed Medications: Taken as prescribed with ginger ale  PRN Psychiatric Medications: alum & mag hydroxide-simethicone, haloperidol lactate **AND** diphenhydrAMINE **AND** LORazepam, haloperidol, hydrOXYzine, pediatric electrolyte, polyethylene glycol, sodium chloride     None    Overnight Nursing Notes/Staff Report:    Nursing notes were reviewed. No acute events overnight. . Anderson said he does not know why he is here. He was pacing the hallway and talking to self.    Patient interview:   Anderson is seen while pacing the main hallway.  He is more approachable and stops to talk with the team. He requests some more pedialyte and the team proposes some Gatorade instead and he is agreeable. He  States \" I am fine\". He would like to know when he will be discharging. He is agreeable to staying a couple of weeks while his placement is being arranged.  He is also agreeable to meeting with the team in the conference room tomorrow instead of the hallway. He denies any concerns.       ROS   ROS was negative unless noted above.     Objective   Vitals:  Declined. Most recent from 9/20/21  Vitals:    " "09/09/21 1545 09/15/21 1600 09/19/21 1558 09/20/21 1620   Resp:   14    TempSrc: Tympanic Oral  Oral   Weight:       Height:           Mental Status Examination:  Orientation:  Grossly intact  General: Pacing up and down the dalal during meeting. Stops to chat with team.   Appearance:  Dressed in hospital scrubs, hair is buzzed short.  Behavior: More approachable. Somewhat cooperative.   Eye Contact:  Minimal, only brief when turning at either end of the hallway  Psychomotor:  Pacing hallway; no catatonia present  Speech:  Appropriate volume, and rate  Language: Appears to be fluent in English   Mood:  \"fine\"  Affect:  Generally flat, with minor reactivity; irritability continues to be reduced from time of admission, but still present intermittently  Thought Process:  Limited engagement with team during today's assessment  Thought Content: Unable to assess due to brevity of encounter  Associations:  None  Insight:  Poor, does not understand why he is here.   Judgment:  poor  Attention Span: limited  Concentration:  limited  Recent and Remote Memory:  Grossly intact  Fund of Knowledge: Suspect lower than average   Allergies     Allergies   Allergen Reactions     Bee Venom Anaphylaxis     Clindamycin Hives     Morphine Hives        Labs & Imaging   No results found for this or any previous visit (from the past 24 hour(s)).     Assessment   Diagnostic Impression:  Anderson Perez is a 40 year old male with a past psychiatric history of bipolar disorder with psychosis vs schizoaffective disorder bipolar type, antisocial personality disorder, and intellectual disability admitted from the  ER on 08/31/2021 due to concern for out of control behaviors, aggression and psychosis in the context of medication refusal despite Cardoza. Patient was recently released from [facility] to a group home. Presentation to the ED via ambulance after police contacted and provisional discharge rescinded. Group home noting escalating aggressive " behavior, delusional thinking, and medication refusal despite Cardoza. PT refusal to discuss problems and largely denied all past history, medications, or any need for care.     His reported symptoms of delusions, psychosis, and erratic/aggressive behavior are consistent with prior presentations of psychosis.  Optimization of medications to target these symptom clusters in addition to determining facility placement after discharge will be targets for treatment during this admission.      Given that he currently has PTA report of out of control behaviors, aggression and psychosis, patient warrants inpatient psychiatric hospitalization to maintain his safety. [facility] other option.    Principal Diagnosis:     Psychosis, Unspecified (Schizoaffective disorder vs Bipolar Affective Disorder with Psychosis)    Secondary psychiatric diagnoses of concern this admission:     Intellectual Disability    Antisocial personality disorder    Psychiatric course:  Anderson Perez was admitted to [facility] following revocation of provisional discharge and on a court hold. His PTA olanzapine 20mg PO QDaily was resumed, as pt had been refusing at group home; per conversation with guardian, had planned to start on GRIGGS, but does not think that this was actually started, as patient was refusing medications, which prompted admission in setting of decompensation. Pt was converted to ODT formulation given concerns for cheeking medications; while initially resistant, pt was agreeable with taking ODT olanzapine as long as it was given with ginger ale, which was accommodated. Refused blood draw to get olanzapine level on 9/13. Olanzapine dose increased to 40mg on 9/21 given limited efficacy at current dose. PRN for agitation/aggression was changed to haloperidol 5mg, lorazepam 2mg, and diphenhydramine 50mg, with PO haloperidol available. 10/05 noticed that he was more approachable and was ready to  seat with the team in the conference room.     Anderson Perez continued to meet criteria for inpatient hospitalization medication optimization, inpatient stabilization, and appropriate discharge planning.     Medical course:   Anderson Perez was physically examined by the ED prior to being transferred to the unit and was found to be medically stable and appropriate for admission.      Plan   Today's Changes:     Re-commitment paperwork started.   _______________________________________________________________________  Psychiatric diagnoses and management:  Principal Diagnosis:     Psychosis, Unspecified (Schizoaffective disorder vs Bipolar Affective Disorder with Psychosis)  o Increase olanzapine to 40mg ODT qDaily  o Will consider GRIGGS at later date following initial stabilization    Secondary Psychiatric Diagnoses:     Intellectual disability  o Per , IQ estimated to be 63  o NTD    Antisocial Personality Disorder  o NTD    Additional Planning:    Continue to monitor for and stabilize: aggression, psychosis and disorganization    Patient will be treated in therapeutic milieu with appropriate individual and group therapies as described.    Scheduled Medications (summary):    OLANZapine zydis  40 mg Oral Daily with supper     Vitamin D3  25 mcg Oral Daily       PRN Medications (summary):  alum & mag hydroxide-simethicone, haloperidol lactate **AND** diphenhydrAMINE **AND** LORazepam, haloperidol, hydrOXYzine, pediatric electrolyte, polyethylene glycol, sodium chloride    Discontinued Medications (& Rationale):    None    Consults:    None    Legal Status:    Committed with Cardoza     SIO:    No    Pt has not required locked seclusion or restraints in the past 24 hours to maintain safety, please refer to RN documentation for further details.    Disposition:    Pending availability of bed for placement at [facility]. Exploring other options like group homes for him.     Safety Assessment:   Behavioral  Orders   Procedures     Cheeking Precautions (behavioral units)     Code 1 - Restrict to Unit     Elopement precautions     Routine Programming     As clinically indicated     Status 15     Every 15 minutes.      ____________________________________________________________________  Pertinent Medical diagnoses and management:    Chronic Hepatitis C  o Continue to monitor  ____________________________________________________________________  Patient seen and discussed with attending physician, Dr. Mike Bobo, who is in agreement with my assessment and plan.      Marlon Apodaca, PGY-1 (Psychiatry)  [facility]    Attestation:  This patient has been seen and evaluated by me, Mike Bobo MD.  I have discussed this patient with the house staff team including the resident and medical student and I agree with the findings and plan in this note.    I have reviewed today's vital signs, medications, labs and imaging. Mike Bobo MD , PhD.

## 2021-10-06 PROCEDURE — 124N000002 HC R&B MH UMMC

## 2021-10-06 PROCEDURE — 99232 SBSQ HOSP IP/OBS MODERATE 35: CPT | Mod: GC | Performed by: PSYCHIATRY & NEUROLOGY

## 2021-10-06 PROCEDURE — 250N000013 HC RX MED GY IP 250 OP 250 PS 637

## 2021-10-06 RX ADMIN — Medication 500 ML: at 12:19

## 2021-10-06 RX ADMIN — OLANZAPINE 40 MG: 20 TABLET, ORALLY DISINTEGRATING ORAL at 17:11

## 2021-10-06 RX ADMIN — Medication 25 MCG: at 17:11

## 2021-10-06 ASSESSMENT — ACTIVITIES OF DAILY LIVING (ADL)
HYGIENE/GROOMING: INDEPENDENT
LAUNDRY: UNABLE TO COMPLETE
ORAL_HYGIENE: INDEPENDENT
DRESS: SCRUBS (BEHAVIORAL HEALTH)

## 2021-10-06 NOTE — PLAN OF CARE
Assessment/Intervention/Current Symtoms and Care Coordination:    Current Symptoms include the following: Pacing, responding to internal stimuli    Chart Review    Met with team to discuss patient care    Guardian left VMM at 0932 AM providing verbal consent to share information and records with [facility].    Guardian emailed copy patient's insurance card. Printed and put is his chart. Daylin's email is [email or web address]    Discharge Plan or Goal:  Pending stabilization & development of a safe discharge plan.  Considerations include: New referral to group home    Barriers to Discharge:  Patient requires further psychiatric stabilization due to current symptomology and Medication management with possible adjustments    Referral Status:  No referrals made today    Legal Status:  [employer] have verified legal guardian(s) as: Daylin Umanzor, [employer], [phone] , 08-PR-.   Patient is under MI commitment in [facility], provisional discharge revoked. 08-PR-.   Patient has been ordered to comply with a Rule 20.01 evaluation for competency to proceed with criminal charges and Rule 20.02 evaluation for Mental Illness or Cognitive Impairment associated with 08-CR-,  charges he is accused of.

## 2021-10-06 NOTE — PLAN OF CARE
Patient calm and cooperative.  Flat affect, denies SI/SIB, depression/anxiety.  Patient paces hallway most of the time this shift.  Patient c/o blister on his left foot.  Bacitracin ointment and bandaid placed per request.  Patient seemed responding to internal stimuli.  No aggressive behavior noted, will continue to monitor closely.

## 2021-10-06 NOTE — PROGRESS NOTES
".cm      ----------------------------------------------------------------------------------------------------------  [facility]  Psychiatric Progress Note  Hospital Day #36    Anderson Perez MRN# [id number]   Age: 40 year old YOB: 1980   Date of Admission: 8/30/2021     Subjective   The patient was discussed with the treatment team and chart notes were reviewed.      Identifier: Anderson Perez is a 40 year old male with a past psychiatric history of bipolar disorder with psychosis vs schizoaffective disorder bipolar type, antisocial personality disorder, and intellectual disability admitted from the  ER on 08/31/2021 due to concern for out of control behaviors, aggression and psychosis in the context of medication refusal despite Cardoza.    Sleep:  7 hours (10/06/21 0600)  Prescribed Medications: Taken as prescribed with ginger ale  PRN Psychiatric Medications: alum & mag hydroxide-simethicone, haloperidol lactate **AND** diphenhydrAMINE **AND** LORazepam, haloperidol, hydrOXYzine, pediatric electrolyte, polyethylene glycol, sodium chloride     None    Overnight Nursing Notes/Staff Report:    Nursing notes were reviewed. No acute events overnight. Requested and received Nicotine gum. He was pacing the hallway and talking to self.    Patient interview:   Anderson is seen while pacing the main hallway. He is not agreeable with meeting the team in the conference room. He  States \" I am fine\". He would like to know when he will be discharging. He denies any concerns.       ROS   ROS was negative unless noted above.     Objective   Vitals:  Declined. Most recent from 9/20/21  Vitals:    09/09/21 1545 09/15/21 1600 09/19/21 1558 09/20/21 1620   Resp:   14    TempSrc: Tympanic Oral  Oral   Weight:       Height:           Mental Status Examination:  Orientation:  Grossly intact  General: Pacing up and down the dalal during meeting. Stops to chat with team.   Appearance:  Dressed " "in hospital scrubs, hair is buzzed short.  Behavior: More approachable. Somewhat cooperative.   Eye Contact:  Minimal, only brief when turning at either end of the hallway  Psychomotor:  Pacing hallway; no catatonia present  Speech:  Appropriate volume, and rate  Language: Appears to be fluent in English   Mood:  \"fine\"  Affect:  Generally flat, with minor reactivity; irritability continues to be reduced from time of admission, but still present intermittently  Thought Process:  Limited engagement with team during today's assessment  Thought Content: Unable to assess due to brevity of encounter  Associations:  None  Insight:  Poor, does not understand why he is here.   Judgment:  poor  Attention Span: limited  Concentration:  limited  Recent and Remote Memory:  Grossly intact  Fund of Knowledge: Suspect lower than average   Allergies     Allergies   Allergen Reactions     Bee Venom Anaphylaxis     Clindamycin Hives     Morphine Hives        Labs & Imaging   No results found for this or any previous visit (from the past 24 hour(s)).     Assessment   Diagnostic Impression:  Anderson Perez is a 40 year old male with a past psychiatric history of bipolar disorder with psychosis vs schizoaffective disorder bipolar type, antisocial personality disorder, and intellectual disability admitted from the  ER on 08/31/2021 due to concern for out of control behaviors, aggression and psychosis in the context of medication refusal despite Cardoza. Patient was recently released from [facility] to a group home. Presentation to the ED via ambulance after police contacted and provisional discharge rescinded. Group home noting escalating aggressive behavior, delusional thinking, and medication refusal despite Cardoza. PT refusal to discuss problems and largely denied all past history, medications, or any need for care.     His reported symptoms of delusions, psychosis, and erratic/aggressive behavior are consistent with prior presentations " of psychosis.  Optimization of medications to target these symptom clusters in addition to determining facility placement after discharge will be targets for treatment during this admission.      Given that he currently has PTA report of out of control behaviors, aggression and psychosis, patient warrants inpatient psychiatric hospitalization to maintain his safety. Disposition pending availability of [facility] placement or other option.    Principal Diagnosis:     Psychosis, Unspecified (Schizoaffective disorder vs Bipolar Affective Disorder with Psychosis)    Secondary psychiatric diagnoses of concern this admission:     Intellectual Disability    Antisocial personality disorder    Psychiatric course:  Anderson Perez was admitted to [facility] following revocation of provisional discharge and on a court hold. His PTA olanzapine 20mg PO QDaily was resumed, as pt had been refusing at group home; per conversation with guardian, had planned to start on GRIGGS, but does not think that this was actually started, as patient was refusing medications, which prompted admission in setting of decompensation. Pt was converted to ODT formulation given concerns for cheeking medications; while initially resistant, pt was agreeable with taking ODT olanzapine as long as it was given with ginger ale, which was accommodated. Refused blood draw to get olanzapine level on 9/13. Olanzapine dose increased to 40mg on 9/21 given limited efficacy at current dose. PRN for agitation/aggression was changed to haloperidol 5mg, lorazepam 2mg, and diphenhydramine 50mg, with PO haloperidol available. 10/05 noticed that he was more approachable and was ready to seat with the team in the conference room.     Anderson Perez continued to meet criteria for inpatient hospitalization medication optimization, inpatient stabilization, and appropriate discharge planning.     Medical course:   Anderson Perez was physically examined by the ED prior to being  transferred to the unit and was found to be medically stable and appropriate for admission.      Plan   Today's Changes:     None  _______________________________________________________________________  Psychiatric diagnoses and management:  Principal Diagnosis:     Psychosis, Unspecified (Schizoaffective disorder vs Bipolar Affective Disorder with Psychosis)  o Increase olanzapine to 40mg ODT qDaily  o Will consider GRIGGS at later date following initial stabilization    Secondary Psychiatric Diagnoses:     Intellectual disability  o Per , IQ estimated to be 63  o NTD    Antisocial Personality Disorder  o NTD    Additional Planning:    Continue to monitor for and stabilize: aggression, psychosis and disorganization    Patient will be treated in therapeutic milieu with appropriate individual and group therapies as described.    Scheduled Medications (summary):    OLANZapine zydis  40 mg Oral Daily with supper     Vitamin D3  25 mcg Oral Daily       PRN Medications (summary):  alum & mag hydroxide-simethicone, haloperidol lactate **AND** diphenhydrAMINE **AND** LORazepam, haloperidol, hydrOXYzine, pediatric electrolyte, polyethylene glycol, sodium chloride    Discontinued Medications (& Rationale):    None    Consults:    None    Legal Status:    Committed with [employer] ADAMES:    No    Pt has not required locked seclusion or restraints in the past 24 hours to maintain safety, please refer to RN documentation for further details.    Disposition:    Pending availability of bed for placement at [facility]. Exploring other options like group homes for him.     Safety Assessment:   Behavioral Orders   Procedures     Cheeking Precautions (behavioral units)     Code 1 - Restrict to Unit     Elopement precautions     He can have his personal clothing     Routine Programming     As clinically indicated     Status 15     Every 15 minutes.      ____________________________________________________________________  Pertinent  Medical diagnoses and management:    Chronic Hepatitis C  o Continue to monitor  ____________________________________________________________________  Patient seen and discussed with attending physician, Dr. Mike Bobo, who is in agreement with my assessment and plan.      Marlon Apodaca, PGY-1 (Psychiatry)  [facility]      Attestation:  This patient has been seen and evaluated by me, Mike Bobo MD.  I have discussed this patient with the house staff team including the resident and medical student and I agree with the findings and plan in this note.    I have reviewed today's vital signs, medications, labs and imaging. Mike Bobo MD , PhD.

## 2021-10-06 NOTE — PLAN OF CARE
Problem: Behavior Regulation Impairment (Psychotic Signs/Symptoms)  Goal: Improved Behavioral Control (Psychotic Signs/Symptoms)  Outcome: No Change   Pt is always calm , pleasant , spends all evening walking the halls talking to himself, he is med compliant .

## 2021-10-07 PROCEDURE — 99231 SBSQ HOSP IP/OBS SF/LOW 25: CPT | Mod: GC | Performed by: PSYCHIATRY & NEUROLOGY

## 2021-10-07 PROCEDURE — 124N000002 HC R&B MH UMMC

## 2021-10-07 PROCEDURE — 250N000013 HC RX MED GY IP 250 OP 250 PS 637

## 2021-10-07 RX ADMIN — OLANZAPINE 40 MG: 20 TABLET, ORALLY DISINTEGRATING ORAL at 17:44

## 2021-10-07 RX ADMIN — Medication 500 ML: at 15:17

## 2021-10-07 RX ADMIN — Medication 25 MCG: at 17:44

## 2021-10-07 ASSESSMENT — ACTIVITIES OF DAILY LIVING (ADL)
LAUNDRY: WITH SUPERVISION
ORAL_HYGIENE: INDEPENDENT
HYGIENE/GROOMING: INDEPENDENT
DRESS: INDEPENDENT;SCRUBS (BEHAVIORAL HEALTH)

## 2021-10-07 NOTE — PLAN OF CARE
Problem: Sleep Disturbance  Goal: Adequate Sleep/Rest  Outcome: Improving     Patient had approximately 7 hours of sleep, no known concerns this shift, safety checks in place every 15 minutes. No PRN given or requested this shift. Will continue to monitor as needed

## 2021-10-07 NOTE — PLAN OF CARE
Nursing Plan of care   Problem: Behavior Regulation Impairment (Psychotic Signs/Symptoms)  Goal: Improved Behavioral Control (Psychotic Signs/Symptoms)  Outcome: No Change  Pt has been visible in the milieu; mostly kept to himself as usual; requested to take his medication today without reminder; noted quietly responding to internal stimuli; denied medication side effect; medication compliant; ate dinner; good appetite; No sign of SI/SIB was noted; NO PRN was requested or administered. No shower; Will continue to monitor and assess.

## 2021-10-07 NOTE — PLAN OF CARE
Assessment/Intervention/Current Symptoms and Care Coordination  - chart review  - team meeting  - met with patient at his request - walked in dalal with him, he asked if there are any updates on his possible discharge planning options. Informed him that as of today I have not heard any updates from his  regarding discussion of exploring further group home / alternative placement options.     Discharge Plan or Goal:  Pending stabilization & development of a safe discharge plan.  Considerations include: New referral to group home     Barriers to Discharge:  Patient requires further psychiatric stabilization due to current symptomology and Medication management with possible adjustments     Referral Status:  No referrals made today     Legal Status:  Patient is under MI commitment with [employer] in [facility], provisional discharge revoked. 08-PR-.     [employer] have verified legal guardian(s) as: Daylin Umanzor, [employer], [phone] , 08-PR-.     Rule 20 evaluation/assessment scheduled for Wednesday 10/13/2021 12:00pm via ITV - currently scheduled

## 2021-10-07 NOTE — PLAN OF CARE
Problem: Adult Inpatient Plan of Care  Goal: Plan of Care Review  Outcome: No Change  Flowsheets (Taken 10/7/2021 1300)  Plan of Care Reviewed With: patient    Pt paced the dalal for the majority of the shift and ate his meals. Denied all mx sx's and is currently waiting for placement either to a group home or a [facility]. Pt states whichever place has an opening first, he would like to discharge to even though he prefers going to an [facility].

## 2021-10-07 NOTE — PROGRESS NOTES
".cm      ----------------------------------------------------------------------------------------------------------  [facility]  Psychiatric Progress Note  Hospital Day #37    Anderson Perez MRN# [id number]   Age: 40 year old YOB: 1980   Date of Admission: 8/30/2021     Subjective   The patient was discussed with the treatment team and chart notes were reviewed.      Identifier: Anderson Perez is a 40 year old male with a past psychiatric history of bipolar disorder with psychosis vs schizoaffective disorder bipolar type, antisocial personality disorder, and intellectual disability admitted from the  ER on 08/31/2021 due to concern for out of control behaviors, aggression and psychosis in the context of medication refusal despite Cardoza.    Sleep:  7 hours (10/07/21 0556)  Prescribed Medications: Taken as prescribed with ginger ale  PRN Psychiatric Medications: alum & mag hydroxide-simethicone, haloperidol lactate **AND** diphenhydrAMINE **AND** LORazepam, haloperidol, hydrOXYzine, pediatric electrolyte, polyethylene glycol, sodium chloride     None    Overnight Nursing Notes/Staff Report:    Nursing notes were reviewed. No acute events overnight. Requested and received Nicotine gum. He was pacing the hallway as usual.    Patient interview:   Anderson is seen while pacing the main hallway. He states he is \" fine\". He would like to know when he will be discharging. He denies any concerns.  His BM are regular and he says his appetite is good.      ROS   ROS was negative unless noted above.     Objective   Vitals:  /75 (BP Location: Left arm)   Pulse 77   Temp 98.2  F (36.8  C) (Oral)   Resp 14   Ht 1.829 m (6')   Wt 82.7 kg (182 lb 4.8 oz)   SpO2 98%   BMI 24.72 kg/m    Mental Status Examination:  Orientation:  Grossly intact  General: Pacing up and down the dalal during meeting. Stops to chat with team.   Appearance:  Dressed in hospital scrubs, hair is " "buzzed short.  Behavior: More approachable. Somewhat cooperative.   Eye Contact:  Minimal, only brief when turning at either end of the hallway  Psychomotor:  Pacing hallway; no catatonia present  Speech:  Appropriate volume, and rate  Language: Appears to be fluent in English   Mood:  \"fine\"  Affect:  Generally flat, with minor reactivity; irritability continues to be reduced from time of admission, but still present intermittently  Thought Process:  Limited engagement with team during interview  Thought Content: Unable to assess due to brevity of encounter  Associations:  None  Insight:  Poor, does not understand why he is here.   Judgment:  poor  Attention Span: limited  Concentration:  limited  Recent and Remote Memory:  Grossly intact  Fund of Knowledge: Suspect lower than average   Allergies     Allergies   Allergen Reactions     Bee Venom Anaphylaxis     Clindamycin Hives     Morphine Hives        Labs & Imaging   No results found for this or any previous visit (from the past 24 hour(s)).     Assessment   Diagnostic Impression:  Anderson Perez is a 40 year old male with a past psychiatric history of bipolar disorder with psychosis vs schizoaffective disorder bipolar type, antisocial personality disorder, and intellectual disability admitted from the  ER on 08/31/2021 due to concern for out of control behaviors, aggression and psychosis in the context of medication refusal despite Cardoza. Patient was recently [facility] to a group home. Presentation to the ED via ambulance after police contacted and provisional discharge rescinded. Group home noting escalating aggressive behavior, delusional thinking, and medication refusal despite Cardoza. PT refusal to discuss problems and largely denied all past history, medications, or any need for care.     His reported symptoms of delusions, psychosis, and erratic/aggressive behavior are consistent with prior presentations of psychosis.  Optimization of " medications to target these symptom clusters in addition to determining facility placement after discharge will be targets for treatment during this admission.      Given that he currently has PTA report of out of control behaviors, aggression and psychosis, patient warrants inpatient psychiatric hospitalization to maintain his safety. Disposition pending availability of [facility] placement or other option.    Principal Diagnosis:     Psychosis, Unspecified (Schizoaffective disorder vs Bipolar Affective Disorder with Psychosis)    Secondary psychiatric diagnoses of concern this admission:     Intellectual Disability    Antisocial personality disorder    Psychiatric course:  Anderson Perez was admitted to [facility] following revocation of provisional discharge and on a court hold. His PTA olanzapine 20mg PO QDaily was resumed, as pt had been refusing at group home; per conversation with guardian, had planned to start on GRIGGS, but does not think that this was actually started, as patient was refusing medications, which prompted admission in setting of decompensation. Pt was converted to ODT formulation given concerns for cheeking medications; while initially resistant, pt was agreeable with taking ODT olanzapine as long as it was given with ginger ale, which was accommodated. Refused blood draw to get olanzapine level on 9/13. Olanzapine dose increased to 40mg on 9/21 given limited efficacy at current dose. PRN for agitation/aggression was changed to haloperidol 5mg, lorazepam 2mg, and diphenhydramine 50mg, with PO haloperidol available. 10/05 noticed that he was more approachable and was ready to seat with the team in the conference room. 10/7: stable on current medications; still paced the hallways.     Anderson Perez continued to meet criteria for inpatient hospitalization medication optimization, inpatient stabilization, and appropriate discharge planning.     Medical course:   Anderson Perez was physically  examined by the ED prior to being transferred to the unit and was found to be medically stable and appropriate for admission.      Plan   Today's Changes:     None  _______________________________________________________________________  Psychiatric diagnoses and management:  Principal Diagnosis:     Psychosis, Unspecified (Schizoaffective disorder vs Bipolar Affective Disorder with Psychosis)  o Increase olanzapine to 40mg ODT qDaily  o Will consider GRIGGS at later date following initial stabilization    Secondary Psychiatric Diagnoses:     Intellectual disability  o Per , IQ estimated to be 63  o NTD    Antisocial Personality Disorder  o NTD    Additional Planning:    Continue to monitor for and stabilize: aggression, psychosis and disorganization    Patient will be treated in therapeutic milieu with appropriate individual and group therapies as described.    Scheduled Medications (summary):    OLANZapine zydis  40 mg Oral Daily with supper     Vitamin D3  25 mcg Oral Daily       PRN Medications (summary):  alum & mag hydroxide-simethicone, haloperidol lactate **AND** diphenhydrAMINE **AND** LORazepam, haloperidol, hydrOXYzine, pediatric electrolyte, polyethylene glycol, sodium chloride    Discontinued Medications (& Rationale):    None    Consults:    None    Legal Status:    Committed with [employer] CASTILLOO:    No    Pt has not required locked seclusion or restraints in the past 24 hours to maintain safety, please refer to RN documentation for further details.    Disposition:    Pending availability of bed for placement at [facility]. Exploring other options like group homes for him.     Safety Assessment:   Behavioral Orders   Procedures     Cheeking Precautions (behavioral units)     Code 1 - Restrict to Unit     Elopement precautions     He can have his personal clothing     Routine Programming     As clinically indicated     Status 15     Every 15 minutes.       ____________________________________________________________________  Pertinent Medical diagnoses and management:    Chronic Hepatitis C  o Continue to monitor  ____________________________________________________________________  Patient seen and discussed with attending physician, Dr. Mike Bobo, who is in agreement with my assessment and plan.    Paige Phelan, MS3  [facility] [employer]    I was present with the medical student who participated in the service and in the documentation of the note. I have verified the history and medical decision making. I agree with the assessment and plan of care as documented in the note.     Marlon Apodaca, PGY-1 (Psychiatry)  [facility]    Attestation:  This patient has been seen and evaluated by me, Mike Bobo MD.  I have discussed this patient with the house staff team including the resident and medical student and I agree with the findings and plan in this note.    I have reviewed today's vital signs, medications, labs and imaging. Mike Bobo MD , PhD.

## 2021-10-08 PROCEDURE — 250N000013 HC RX MED GY IP 250 OP 250 PS 637

## 2021-10-08 PROCEDURE — 124N000002 HC R&B MH UMMC

## 2021-10-08 PROCEDURE — 258N000001 HC RX 258

## 2021-10-08 PROCEDURE — 250N000009 HC RX 250

## 2021-10-08 PROCEDURE — 99232 SBSQ HOSP IP/OBS MODERATE 35: CPT | Mod: GC | Performed by: PSYCHIATRY & NEUROLOGY

## 2021-10-08 RX ORDER — ACETAMINOPHEN 325 MG/1
650 TABLET ORAL EVERY 8 HOURS PRN
Status: DISCONTINUED | OUTPATIENT
Start: 2021-10-08 | End: 2021-12-28 | Stop reason: HOSPADM

## 2021-10-08 RX ORDER — GINSENG 100 MG
CAPSULE ORAL 2 TIMES DAILY
Status: DISCONTINUED | OUTPATIENT
Start: 2021-10-08 | End: 2021-10-18

## 2021-10-08 RX ADMIN — Medication 25 MCG: at 18:01

## 2021-10-08 RX ADMIN — Medication 500 ML: at 20:44

## 2021-10-08 RX ADMIN — Medication 500 ML: at 12:20

## 2021-10-08 RX ADMIN — Medication 500 ML: at 17:21

## 2021-10-08 RX ADMIN — ACETAMINOPHEN 650 MG: 325 TABLET, FILM COATED ORAL at 14:49

## 2021-10-08 RX ADMIN — OLANZAPINE 40 MG: 20 TABLET, ORALLY DISINTEGRATING ORAL at 18:01

## 2021-10-08 RX ADMIN — BACITRACIN ZINC: 500 OINTMENT TOPICAL at 20:43

## 2021-10-08 ASSESSMENT — ACTIVITIES OF DAILY LIVING (ADL)
HYGIENE/GROOMING: INDEPENDENT
DRESS: INDEPENDENT
ORAL_HYGIENE: INDEPENDENT

## 2021-10-08 NOTE — PROGRESS NOTES
".cm      ----------------------------------------------------------------------------------------------------------  [facility]  Psychiatric Progress Note  Hospital Day #38    Anderson Perez MRN# [id number]   Age: 40 year old YOB: 1980   Date of Admission: 8/30/2021     Subjective   The patient was discussed with the treatment team and chart notes were reviewed.      Identifier: Anderson Perez is a 40 year old male with a past psychiatric history of bipolar disorder with psychosis vs schizoaffective disorder bipolar type, antisocial personality disorder, and intellectual disability admitted from the  ER on 08/31/2021 due to concern for out of control behaviors, aggression and psychosis in the context of medication refusal despite Cardoza.    Sleep:  7 hours (10/08/21 0600)  Prescribed Medications: Taken as prescribed with ginger ale  PRN Psychiatric Medications: acetaminophen, alum & mag hydroxide-simethicone, haloperidol lactate **AND** diphenhydrAMINE **AND** LORazepam, haloperidol, hydrOXYzine, pediatric electrolyte, polyethylene glycol, sodium chloride     pedialyte    Overnight Nursing Notes/Staff Report:    Nursing notes were reviewed. No acute events overnight. Requested and received pedialyte. He was pacing the hallway as usual. More receptive and pleasant on approach. Prefers going to [facility].     Patient interview:   Anderson is seen while pacing the main hallway. He states he is \" fine\". He would like to know when he will be discharging. He denies any concerns.      ROS   ROS was negative unless noted above.     Objective   Vitals:  /75 (BP Location: Left arm)   Pulse 77   Temp 97.7  F (36.5  C)   Resp 16   Ht 1.829 m (6')   Wt 82.7 kg (182 lb 4.8 oz)   SpO2 100%   BMI 24.72 kg/m    Mental Status Examination:  Orientation:  Grossly intact  General: Pacing up and down the dalal during meeting.   Appearance:  Dressed in hospital scrubs, hair is buzzed " "short.  Behavior: More approachable. Somewhat cooperative.   Eye Contact:  Minimal, only brief when turning at either end of the hallway  Psychomotor:  Pacing hallway; no catatonia present  Speech:  Appropriate volume, and rate  Language: Appears to be fluent in English   Mood:  \"fine\"  Affect:  Generally flat, with minor reactivity; irritability continues to be reduced from time of admission, but still present intermittently  Thought Process:  Limited engagement with team during interview  Thought Content: Unable to assess due to brevity of encounter  Associations:  None  Insight:  Poor, does not understand why he is here.   Judgment:  poor  Attention Span: limited  Concentration:  limited  Recent and Remote Memory:  Grossly intact  Fund of Knowledge: Suspect lower than average   Allergies     Allergies   Allergen Reactions     Bee Venom Anaphylaxis     Clindamycin Hives     Morphine Hives        Labs & Imaging   No results found for this or any previous visit (from the past 24 hour(s)).     Assessment   Diagnostic Impression:  Anderson Perez is a 40 year old male with a past psychiatric history of bipolar disorder with psychosis vs schizoaffective disorder bipolar type, antisocial personality disorder, and intellectual disability admitted from the  ER on 08/31/2021 due to concern for out of control behaviors, aggression and psychosis in the context of medication refusal despite Cardoza. Patient was recently released from [facility] to a group home. Presentation to the ED via ambulance after police contacted and provisional discharge rescinded. Group home noting escalating aggressive behavior, delusional thinking, and medication refusal despite Cardoza. PT refusal to discuss problems and largely denied all past history, medications, or any need for care.     His reported symptoms of delusions, psychosis, and erratic/aggressive behavior are consistent with prior presentations of psychosis.  Optimization of medications " to target these symptom clusters in addition to determining facility placement after discharge will be targets for treatment during this admission.      Given that he currently has PTA report of out of control behaviors, aggression and psychosis, patient warrants inpatient psychiatric hospitalization to maintain his safety. Disposition pending availability of [facility] placement or other option.    Principal Diagnosis:     Psychosis, Unspecified (Schizoaffective disorder vs Bipolar Affective Disorder with Psychosis)    Secondary psychiatric diagnoses of concern this admission:     Intellectual Disability    Antisocial personality disorder    Psychiatric course:  Anderson Perez was admitted to [facility] following revocation of provisional discharge and on a court hold. His PTA olanzapine 20mg PO QDaily was resumed, as pt had been refusing at group home; per conversation with guardian, had planned to start on GRIGGS, but does not think that this was actually started, as patient was refusing medications, which prompted admission in setting of decompensation. Pt was converted to ODT formulation given concerns for cheeking medications; while initially resistant, pt was agreeable with taking ODT olanzapine as long as it was given with ginger ale, which was accommodated. Refused blood draw to get olanzapine level on 9/13. Olanzapine dose increased to 40mg on 9/21 given limited efficacy at current dose. PRN for agitation/aggression was changed to haloperidol 5mg, lorazepam 2mg, and diphenhydramine 50mg, with PO haloperidol available. 10/05 noticed that he was more approachable and was ready to seat with the team in the conference room. 10/8: stable on current medications; still paced the hallways.     Anderson Perez continued to meet criteria for inpatient hospitalization medication optimization, inpatient stabilization, and appropriate discharge planning.     Medical course:   Anderson Perez was physically examined by  the ED prior to being transferred to the unit and was found to be medically stable and appropriate for admission.      Plan   Today's Changes:     None  _______________________________________________________________________  Psychiatric diagnoses and management:  Principal Diagnosis:     Psychosis, Unspecified (Schizoaffective disorder vs Bipolar Affective Disorder with Psychosis)  o Increase olanzapine to 40mg ODT qDaily  o Will consider GRIGGS at later date following initial stabilization    Secondary Psychiatric Diagnoses:     Intellectual disability  o Per , IQ estimated to be 63  o NTD    Antisocial Personality Disorder  o NTD    Additional Planning:    Continue to monitor for and stabilize: aggression, psychosis and disorganization    Patient will be treated in therapeutic milieu with appropriate individual and group therapies as described.    Scheduled Medications (summary):    bacitracin   Topical BID     OLANZapine zydis  40 mg Oral Daily with supper     Vitamin D3  25 mcg Oral Daily       PRN Medications (summary):  acetaminophen, alum & mag hydroxide-simethicone, haloperidol lactate **AND** diphenhydrAMINE **AND** LORazepam, haloperidol, hydrOXYzine, pediatric electrolyte, polyethylene glycol, sodium chloride    Discontinued Medications (& Rationale):    None    Consults:    None    Legal Status:    Committed with [employer] CASTILLOO:    No    Pt has not required locked seclusion or restraints in the past 24 hours to maintain safety, please refer to RN documentation for further details.    Disposition:    Pending availability of bed for placement at [facility]. Exploring other options like group homes for him.     Safety Assessment:   Behavioral Orders   Procedures     Cheeking Precautions (behavioral units)     Code 1 - Restrict to Unit     Elopement precautions     He can have his personal clothing     Routine Programming     As clinically indicated     Status 15     Every 15 minutes.       ____________________________________________________________________  Pertinent Medical diagnoses and management:    Chronic Hepatitis C  o Continue to monitor  ____________________________________________________________________  Patient seen and discussed with attending physician, Dr. Mike Bobo, who is in agreement with my assessment and plan.    Marlon Apodaca, PGY-1 (Psychiatry)  [facility]    Attestation:  This patient has been seen and evaluated by me, Mike Bobo MD.  I have discussed this patient with the house staff team including the resident and medical student and I agree with the findings and plan in this note.    I have reviewed today's vital signs, medications, labs and imaging. Mike Bobo MD , PhD.

## 2021-10-08 NOTE — PLAN OF CARE
Assessment/Intervention/Current Symptoms and Care Coordination  - chart review  - team meeting     Discharge Plan or Goal:  Pending stabilization & development of a safe discharge plan.  Considerations include: New referral to group home     Barriers to Discharge:  Patient requires further psychiatric stabilization due to current symptomology and Medication management with possible adjustments     Referral Status:  No referrals made today     Legal Status:  [facility] is under [facility] commitment with [facility], provisional discharge revoked. 08-PR-.      [employer] [employer] have verified legal guardian(s) as: Daylin Umanzor, [employer], [phone] , 08-PR-.      Rule 20 evaluation/assessment scheduled for Wednesday 10/13/2021 12:00pm via ITV - currently scheduled

## 2021-10-08 NOTE — PLAN OF CARE
"Nursing Plan of care   Problem: Behavior Regulation Impairment (Psychotic Signs/Symptoms)  Goal: Improved Behavioral Control (Psychotic Signs/Symptoms)  Outcome: Improving  Flowsheets (Taken 10/7/2021 2109)  Mutually Determined Action Steps (Improved Behavioral Control): verbalizes personal treatment goal  Pt had a good shift; has been pacing in the hallway; medication complaint; denied medication side effect; requested and received PRN Pedialyte and was happy about it; getting more receptive and pleasant on approach; his answer to  assessment was short and brief; denied mental health symptoms and stated \"I am fine\"; took no shower; good appetite; will continue to monitor and assess.    "

## 2021-10-08 NOTE — PLAN OF CARE
"  Problem: Behavior Regulation Impairment (Psychotic Signs/Symptoms)  Goal: Improved Behavioral Control (Psychotic Signs/Symptoms)  Outcome: Improving   \"I'm good. I want to find out when I can go. They want to send me to [facility]. I may be here for another year waiting. I don't want to be here that long. This is like a holding place here. I don't have health insurance. \" Patient has been pacing the dalal most the shift often talking to self. Denies hallucinations, depression, anxiety and suicidal thoughts. C/o pain from left foot blister, on top of foot. Requesting ointment and band aide for the blister and was agreeable to taking Tylenol. Writer text Dr. Apodaca for Tylenol and Bacitracin order.   "

## 2021-10-09 PROCEDURE — 124N000002 HC R&B MH UMMC

## 2021-10-09 PROCEDURE — 250N000013 HC RX MED GY IP 250 OP 250 PS 637

## 2021-10-09 PROCEDURE — 258N000001 HC RX 258

## 2021-10-09 RX ADMIN — BACITRACIN ZINC: 500 OINTMENT TOPICAL at 20:33

## 2021-10-09 RX ADMIN — OLANZAPINE 40 MG: 20 TABLET, ORALLY DISINTEGRATING ORAL at 17:42

## 2021-10-09 RX ADMIN — Medication 500 ML: at 17:11

## 2021-10-09 RX ADMIN — BACITRACIN ZINC: 500 OINTMENT TOPICAL at 12:07

## 2021-10-09 RX ADMIN — Medication 25 MCG: at 17:42

## 2021-10-09 RX ADMIN — Medication 500 ML: at 08:32

## 2021-10-09 RX ADMIN — Medication 500 ML: at 20:35

## 2021-10-09 ASSESSMENT — ACTIVITIES OF DAILY LIVING (ADL)
DRESS: INDEPENDENT
LAUNDRY: WITH SUPERVISION
HYGIENE/GROOMING: INDEPENDENT
ORAL_HYGIENE: INDEPENDENT

## 2021-10-09 NOTE — PLAN OF CARE
Pacing dalal, but interacting intermittantly with staff and peers.   Pt pleasant and animated, showing bright affect.   Pt anxious for discharge.

## 2021-10-09 NOTE — PLAN OF CARE
Problem: Behavioral Health Plan of Care  Goal: Plan of Care Review  Outcome: Improving  Flowsheets (Taken 10/9/2021 1252)  Plan of Care Reviewed With: patient  Progress: improving  Patient Agreement with Plan of Care: agrees with comment (describe)     Problem: Mood Impairment (Manic or Hypomanic Signs/Symptoms)  Goal: Improved Mood Symptoms (Manic/Hypomanic Signs/Symptoms)  Outcome: Improving  Flowsheets (Taken 10/9/2021 1252)  Mutually Determined Action Steps (Improved Mood Symptoms): acknowledges progress     Problem: Behavior Regulation Impairment (Psychotic Signs/Symptoms)  Goal: Improved Behavioral Control (Psychotic Signs/Symptoms)  Outcome: Improving  Flowsheets (Taken 10/9/2021 1252)  Mutually Determined Action Steps (Improved Behavioral Control): verbalizes personal treatment goal     Anderson continues pacing up and down dalal throughout day-states he likes to hike for eight hours at a time outside-is walking more slowly this afternoon-reports mood is good and he feels he is doing well-more spontaneous and verbal in social interactions-discussed blister and reports is improving-observed at times moving lips as though in conversation with another, but significantly decreased as compared to last week-

## 2021-10-10 PROCEDURE — 250N000013 HC RX MED GY IP 250 OP 250 PS 637

## 2021-10-10 PROCEDURE — 124N000002 HC R&B MH UMMC

## 2021-10-10 RX ADMIN — BACITRACIN ZINC: 500 OINTMENT TOPICAL at 20:12

## 2021-10-10 RX ADMIN — OLANZAPINE 40 MG: 20 TABLET, ORALLY DISINTEGRATING ORAL at 18:13

## 2021-10-10 RX ADMIN — Medication 25 MCG: at 18:13

## 2021-10-10 RX ADMIN — BACITRACIN ZINC: 500 OINTMENT TOPICAL at 11:28

## 2021-10-10 ASSESSMENT — MIFFLIN-ST. JEOR: SCORE: 1857.93

## 2021-10-10 NOTE — PLAN OF CARE
Problem: Behavioral Health Plan of Care  Goal: Plan of Care Review  Outcome: Improving  Flowsheets (Taken 10/10/2021 1252)  Plan of Care Reviewed With: patient  Progress: improving  Patient Agreement with Plan of Care: (Disagrees with PRN) agrees with comment (describe)     Problem: Behavior Regulation Impairment (Psychotic Signs/Symptoms)  Goal: Improved Behavioral Control (Psychotic Signs/Symptoms)  Outcome: Improving  Flowsheets (Taken 10/10/2021 1252)  Mutually Determined Action Steps (Improved Behavioral Control): identifies future-oriented goal     Problem: Social Isolation  Goal: Increased Social Interaction  Outcome: Improving    Anderson active on unit throughout day-does continue to walk in dalal, freq moving lips as though in conversation, but less freq and not constant as was last week-laughing and talking with select peers-social with staff-compliant with cleaning of blisters and application of Bacitracin and Bandaids-smiling and pleasant in interactions-continues to decline Covid test and flu vaccine

## 2021-10-10 NOTE — PLAN OF CARE
Pt appeared to sleep 6.5 hours. Pt was up to pace the halls for approx 15 minutes and declined any interventions for sleep. No PRNs given or requested. No medical or behavioral events this shift.      Problem: Sleep Disturbance  Goal: Adequate Sleep/Rest  Outcome: Declining

## 2021-10-11 PROCEDURE — 99232 SBSQ HOSP IP/OBS MODERATE 35: CPT | Mod: GC | Performed by: PSYCHIATRY & NEUROLOGY

## 2021-10-11 PROCEDURE — 250N000013 HC RX MED GY IP 250 OP 250 PS 637

## 2021-10-11 PROCEDURE — 124N000002 HC R&B MH UMMC

## 2021-10-11 RX ADMIN — Medication 25 MCG: at 17:44

## 2021-10-11 RX ADMIN — OLANZAPINE 40 MG: 20 TABLET, ORALLY DISINTEGRATING ORAL at 17:44

## 2021-10-11 ASSESSMENT — ACTIVITIES OF DAILY LIVING (ADL)
HYGIENE/GROOMING: INDEPENDENT
DRESS: SCRUBS (BEHAVIORAL HEALTH)
ORAL_HYGIENE: INDEPENDENT
ORAL_HYGIENE: INDEPENDENT
LAUNDRY: WITH SUPERVISION
HYGIENE/GROOMING: INDEPENDENT
LAUNDRY: WITH SUPERVISION
DRESS: SCRUBS (BEHAVIORAL HEALTH)

## 2021-10-11 NOTE — PLAN OF CARE
Assessment/Intervention/Current Symptoms and Care Coordination    Attended team meeting and reviewed chart notes.    Pt approached writer asking when and where he would be discharged to. He was thinking [facility]. Writer stated he would probably discharge to a group home but was unsure of the timing. He appeared to understand.        Discharge Plan or Goal  Discharge to group home      Barriers to Discharge   Pt is going through the recommitment process with [facility].  KT Barragan is working with [facility] for placement  Pt will have a R20 hearing 10/13/2021 at 12:00 via ITV-[employer] the [facility] is aware      Referral Status  Declined [facility] and [facility]    Legal Status  Pt PD was revoked

## 2021-10-11 NOTE — PLAN OF CARE
Patient pleasant and cooperative.  Paces hallway most of the time this shift.  Flat affect, denies SI/SIB, depression/anxiety.  Good appetite, refused dressing change to blister on Left foot.  Patient socialized at times with peers and watched T.V.  No aggressive behavior noted.  Will continue to monitor closely.

## 2021-10-11 NOTE — PLAN OF CARE
BEHAVIORAL TEAM DISCUSSION    Participants: Dr. Bobo, resident Marlon Apodaca, medical students Kellen, RN Deanna Otto, CTC Siobhan Irving  Progress: moderate, pt appears to be more approachable and social with peers.  Anticipated length of stay: pt is committed with [facility], his CCM is working on placement  Continued Stay Criteria/Rationale: pt is committed with [facility]  Medical/Physical: see IM consult  Precautions:   Behavioral Orders   Procedures     Cheeking Precautions (behavioral units)     Code 1 - Restrict to Unit     Elopement precautions     He can have his personal clothing     Routine Programming     As clinically indicated     Status 15     Every 15 minutes.     Plan: provide a psychological assessment and manage medications per psychiatry, staff to provide a safe and therapeutic milieu, groups will be offered, CTC to coordinate discharge planning with CCM with [facility].  Rationale for change in precautions or plan: no change

## 2021-10-11 NOTE — PROGRESS NOTES
"      ----------------------------------------------------------------------------------------------------------  [facility]  Psychiatric Progress Note  Hospital Day #41    Anderson Perez MRN# [id number]   Age: 40 year old YOB: 1980   Date of Admission: 8/30/2021     Subjective   The patient was discussed with the treatment team and chart notes were reviewed.      Identifier: Anderson Perez is a 40 year old male with a past psychiatric history of bipolar disorder with psychosis vs schizoaffective disorder bipolar type, antisocial personality disorder, and intellectual disability admitted from the  ER on 08/31/2021 due to concern for out of control behaviors, aggression and psychosis in the context of medication refusal despite Cardoza.    Sleep:  6.75 hours (10/11/21 0627)  Prescribed Medications: Taken as prescribed with ginger ale  PRN Psychiatric Medications: acetaminophen, alum & mag hydroxide-simethicone, haloperidol lactate **AND** diphenhydrAMINE **AND** LORazepam, haloperidol, hydrOXYzine, polyethylene glycol, sodium chloride     none    Overnight Nursing Notes/Staff Report:    Nursing notes were reviewed. No acute events overnight. He was pacing the hallway as usual. More receptive and pleasant on approach. Prefers going to [facility].     Per Guardian:  Anderson's guardian was called to inform her about the re-commitment process. She was thankful for the call and wanted to know if Anderson has been calling his girlfriend.     Patient interview:   Anderson is seen in the conference room today. He states he is \"good\". He would like to know when he will be discharging and says that he prefers to be close to [place]. He denies any concerns. He reports that he is friendly and talks to staff. He has no feet pain today, no blisters. He would like to leave so that he can \"conquer the world.\"      ROS   ROS was negative unless noted above.     Objective   Vitals:  /71   " "Pulse 80   Temp 97  F (36.1  C) (Tympanic)   Resp 14   Ht 1.829 m (6')   Wt 90.5 kg (199 lb 8 oz)   SpO2 97%   BMI 27.06 kg/m    Mental Status Examination:  Orientation:  Grossly intact  General: alert, awake.   Appearance:  Dressed in hospital scrubs, hair is buzzed short.  Behavior: More approachable. Cooperative. Accepted to be interviewed in the conference room.   Eye Contact:  Minimal  Psychomotor:  no catatonia present  Speech:  Appropriate volume, and rate  Language: Appears to be fluent in English   Mood:  \"good\"  Affect:  Generally flat, with minor reactivity; irritability continues to be reduced from time of admission, but still present intermittently   Thought Process:  Limited engagement with team during interview; linear and coherent  Thought Content: No AH/VH/SI/HI.  Associations:  None  Insight:  Poor, does not understand why he is here.   Judgment:  poor  Attention Span: limited  Concentration:  limited  Recent and Remote Memory:  Grossly intact  Fund of Knowledge: Suspect lower than average   Allergies     Allergies   Allergen Reactions     Bee Venom Anaphylaxis     Clindamycin Hives     Morphine Hives        Labs & Imaging   No results found for this or any previous visit (from the past 24 hour(s)).     Assessment   Diagnostic Impression:  Anderson Perez is a 40 year old male with a past psychiatric history of bipolar disorder with psychosis vs schizoaffective disorder bipolar type, antisocial personality disorder, and intellectual disability admitted from the  ER on 08/31/2021 due to concern for out of control behaviors, aggression and psychosis in the context of medication refusal despite Cardoza. Patient was recently released from [facility] to a group home. Presentation to the ED via ambulance after police contacted and provisional discharge rescinded. Group home noting escalating aggressive behavior, delusional thinking, and medication refusal despite Cardoza. PT refusal to discuss problems " and largely denied all past history, medications, or any need for care.     His reported symptoms of delusions, psychosis, and erratic/aggressive behavior are consistent with prior presentations of psychosis.  Optimization of medications to target these symptom clusters in addition to determining facility placement after discharge will be targets for treatment during this admission.      Given that he currently has PTA report of out of control behaviors, aggression and psychosis, patient warrants inpatient psychiatric hospitalization to maintain his safety. Disposition pending availability of [facility] placement or other option.    Principal Diagnosis:     Psychosis, Unspecified (Schizoaffective disorder vs Bipolar Affective Disorder with Psychosis)    Secondary psychiatric diagnoses of concern this admission:     Intellectual Disability    Antisocial personality disorder    Psychiatric course:  Anderson Perez was admitted to [facility] following revocation of provisional discharge and on a court hold. His PTA olanzapine 20mg PO QDaily was resumed, as pt had been refusing at group home; per conversation with guardian, had planned to start on GRIGGS, but does not think that this was actually started, as patient was refusing medications, which prompted admission in setting of decompensation. Pt was converted to ODT formulation given concerns for cheeking medications; while initially resistant, pt was agreeable with taking ODT olanzapine as long as it was given with ginger ale, which was accommodated. Refused blood draw to get olanzapine level on 9/13. Olanzapine dose increased to 40mg on 9/21 given limited efficacy at current dose. PRN for agitation/aggression was changed to haloperidol 5mg, lorazepam 2mg, and diphenhydramine 50mg, with PO haloperidol available. 10/05 noticed that he was more approachable and was ready to seat with the team in the conference room. 10/8: stable on current medications; still paced the  hallways. 10/11: patient interview in conference room today instead of usually pacing in the hallway.     Anderson Perez continued to meet criteria for inpatient hospitalization medication optimization, inpatient stabilization, and appropriate discharge planning.     Medical course:   Anderson Perez was physically examined by the ED prior to being transferred to the unit and was found to be medically stable and appropriate for admission.      Plan   Today's Changes:     None  _______________________________________________________________________  Psychiatric diagnoses and management:  Principal Diagnosis:     Psychosis, Unspecified (Schizoaffective disorder vs Bipolar Affective Disorder with Psychosis)  o Increase olanzapine to 40mg ODT qDaily  o Will consider GRIGGS at later date following initial stabilization    Secondary Psychiatric Diagnoses:     Intellectual disability  o Per , IQ estimated to be 63  o NTD    Antisocial Personality Disorder  o NTD    Additional Planning:    Continue to monitor for and stabilize: aggression, psychosis and disorganization    Patient will be treated in therapeutic milieu with appropriate individual and group therapies as described.    Scheduled Medications (summary):    bacitracin   Topical BID     OLANZapine zydis  40 mg Oral Daily with supper     Vitamin D3  25 mcg Oral Daily       PRN Medications (summary):  acetaminophen, alum & mag hydroxide-simethicone, haloperidol lactate **AND** diphenhydrAMINE **AND** LORazepam, haloperidol, hydrOXYzine, polyethylene glycol, sodium chloride    Discontinued Medications (& Rationale):    None    Consults:    None    Legal Status:    Committed with [facility]:    No    Pt has not required locked seclusion or restraints in the past 24 hours to maintain safety, please refer to RN documentation for further details.    Disposition:    Pending availability of bed for placement at [facility]. Exploring other options like group homes  for him.     Safety Assessment:   Behavioral Orders   Procedures     Cheeking Precautions (behavioral units)     Code 1 - Restrict to Unit     Elopement precautions     He can have his personal clothing     Routine Programming     As clinically indicated     Status 15     Every 15 minutes.      ____________________________________________________________________  Pertinent Medical diagnoses and management:    Chronic Hepatitis C  o Continue to monitor  ____________________________________________________________________  Patient seen and discussed with attending physician, Dr. Mike Bobo, who is in agreement with my assessment and plan.    Paige Phelan, MS3  [facility] [employer]    Marlon Apodaca, PGY-1 (Psychiatry)  [facility]    Attestation:  This patient has been seen and evaluated by me, Mike Bobo MD.  I have discussed this patient with the house staff team including the resident and medical student and I agree with the findings and plan in this note.    I have reviewed today's vital signs, medications, labs and imaging. Mike Bobo MD , PhD.

## 2021-10-11 NOTE — PLAN OF CARE
Patient still pacing dalal; albeit, at slower pace.   Pt compliant with meds and social with peers at times.   Pt anxious for Discharge;  hopes placement in [place] where his child lives.

## 2021-10-11 NOTE — PLAN OF CARE
Patient continues pacing the halls and stops briefly to talk withstaff and peers. Flat blunted affect, mood is calm. Denies mental health symptoms including SI/SIB. Patient declined COVID test when offered stating he already had the vaccine. States he does not want flu shot due to his Confucianism beliefs. Anderson says he feels good and healthy and that his LDL cholesterol is good because he walks for exercise and due to pain at times, he does not lift weights. Patient denies all mental health symptoms including SI/SIB/AVH/Anxiety/Depression.  Asked for Pedialyte, informed it was discontinued yesterday. Patient has good appetite/intake. He is med compliant.

## 2021-10-11 NOTE — PLAN OF CARE
Pt appeared to sleep 6.75 hours. No PRNs given or requested. No medical or behavioral events this shift.      Problem: Sleep Disturbance  Goal: Adequate Sleep/Rest  Outcome: Improving

## 2021-10-12 PROCEDURE — 124N000002 HC R&B MH UMMC

## 2021-10-12 PROCEDURE — 250N000013 HC RX MED GY IP 250 OP 250 PS 637

## 2021-10-12 PROCEDURE — 99231 SBSQ HOSP IP/OBS SF/LOW 25: CPT | Mod: GC | Performed by: PSYCHIATRY & NEUROLOGY

## 2021-10-12 RX ADMIN — Medication 25 MCG: at 16:32

## 2021-10-12 RX ADMIN — BACITRACIN ZINC: 500 OINTMENT TOPICAL at 08:29

## 2021-10-12 RX ADMIN — OLANZAPINE 40 MG: 20 TABLET, ORALLY DISINTEGRATING ORAL at 16:32

## 2021-10-12 ASSESSMENT — ACTIVITIES OF DAILY LIVING (ADL)
DRESS: SCRUBS (BEHAVIORAL HEALTH)
ORAL_HYGIENE: INDEPENDENT
HYGIENE/GROOMING: INDEPENDENT

## 2021-10-12 NOTE — PROGRESS NOTES
"      ----------------------------------------------------------------------------------------------------------  [facility]  Psychiatric Progress Note  Hospital Day #42    Anderson Perez MRN# [id number]   Age: 40 year old YOB: 1980   Date of Admission: 8/30/2021     Subjective   The patient was discussed with the treatment team and chart notes were reviewed.      Identifier: Anderson Perez is a 40 year old male with a past psychiatric history of bipolar disorder with psychosis vs schizoaffective disorder bipolar type, antisocial personality disorder, and intellectual disability admitted from the  ER on 08/31/2021 due to concern for out of control behaviors, aggression and psychosis in the context of medication refusal despite Cardoza.    Sleep:  6.75 hours (10/12/21 0600)  Prescribed Medications: Taken as prescribed with ginger ale  PRN Psychiatric Medications: acetaminophen, alum & mag hydroxide-simethicone, haloperidol lactate **AND** diphenhydrAMINE **AND** LORazepam, haloperidol, hydrOXYzine, polyethylene glycol, sodium chloride     none    Overnight Nursing Notes/Staff Report:    Nursing notes were reviewed. No acute events overnight. He was pacing the hallway as usual. More receptive and pleasant on approach. Plans to discharge to group home, [facility], or [facility].     Patient interview:   Anderson is seen in the conference room today. He states he is \"okay\".He denies any concerns.  He has no feet pain today and allowed team to examine his feet. He would like to leave so that he can \"get on with life.\" He brings up [facility] in [place], [place] as a new possibility and states that he has been there before.      ROS   ROS was negative unless noted above.     Objective   Vitals:  /74 (BP Location: Right arm)   Pulse 85   Temp (!) 96  F (35.6  C) (Tympanic)   Resp 14   Ht 1.829 m (6')   Wt 90.5 kg (199 lb 8 oz)   SpO2 98%   BMI 27.06 kg/m    Mental Status " "Examination:  Orientation:  Grossly intact  General: alert, awake.   Appearance:  Dressed in hospital scrubs, hair is buzzed short.  Behavior: More approachable. Cooperative. Accepted to be interviewed in the conference room. More conversational than before.   Eye Contact:  Minimal  Psychomotor:  no catatonia present  Speech:  Appropriate volume, and rate  Language: Appears to be fluent in English   Mood:  \"okay\"  Affect:  Generally flat, with minor reactivity; irritability continues to be reduced from time of admission, but still present intermittently   Thought Process:  Limited engagement with team during interview; linear and coherent  Thought Content: No AH/VH/SI/HI.  Associations:  None  Insight:  Poor, does not understand why he is here.   Judgment:  poor  Attention Span: limited  Concentration:  limited  Recent and Remote Memory:  Grossly intact  Fund of Knowledge: Suspect lower than average   Allergies     Allergies   Allergen Reactions     Bee Venom Anaphylaxis     Clindamycin Hives     Morphine Hives        Labs & Imaging   No results found for this or any previous visit (from the past 24 hour(s)).     Assessment   Diagnostic Impression:  Anderson Perez is a 40 year old male with a past psychiatric history of bipolar disorder with psychosis vs schizoaffective disorder bipolar type, antisocial personality disorder, and intellectual disability admitted from the  ER on 08/31/2021 due to concern for out of control behaviors, aggression and psychosis in the context of medication refusal despite Cardoza. Patient was recently released from [facility] to a group home. Presentation to the ED via ambulance after police contacted and provisional discharge rescinded. Group home noting escalating aggressive behavior, delusional thinking, and medication refusal despite Cardoza. PT refusal to discuss problems and largely denied all past history, medications, or any need for care.     His reported symptoms of delusions, " psychosis, and erratic/aggressive behavior are consistent with prior presentations of psychosis.  Optimization of medications to target these symptom clusters in addition to determining facility placement after discharge will be targets for treatment during this admission.      Given that he currently has PTA report of out of control behaviors, aggression and psychosis, patient warrants inpatient psychiatric hospitalization to maintain his safety. Disposition pending availability of [facility] placement or other option.    Principal Diagnosis:     Psychosis, Unspecified (Schizoaffective disorder vs Bipolar Affective Disorder with Psychosis)    Secondary psychiatric diagnoses of concern this admission:     Intellectual Disability    Antisocial personality disorder    Psychiatric course:  Anderson Perez was admitted to [facility] following revocation of provisional discharge and on a court hold. His PTA olanzapine 20mg PO QDaily was resumed, as pt had been refusing at group home; per conversation with guardian, had planned to start on GRIGGS, but does not think that this was actually started, as patient was refusing medications, which prompted admission in setting of decompensation. Pt was converted to ODT formulation given concerns for cheeking medications; while initially resistant, pt was agreeable with taking ODT olanzapine as long as it was given with ginger ale, which was accommodated. Refused blood draw to get olanzapine level on 9/13. Olanzapine dose increased to 40mg on 9/21 given limited efficacy at current dose. PRN for agitation/aggression was changed to haloperidol 5mg, lorazepam 2mg, and diphenhydramine 50mg, with PO haloperidol available. 10/05 noticed that he was more approachable and was ready to seat with the team in the conference room. 10/8: stable on current medications; still paced the hallways. 10/11: patient interview in conference room today instead of usually pacing in the hallway.  10/12: patient  interview conducted in conference room again.     Anderson Perez continued to meet criteria for inpatient hospitalization medication optimization, inpatient stabilization, and appropriate discharge planning.     Medical course:   Anderson Perez was physically examined by the ED prior to being transferred to the unit and was found to be medically stable and appropriate for admission.      Plan   Today's Changes:     None  _______________________________________________________________________  Psychiatric diagnoses and management:  Principal Diagnosis:     Psychosis, Unspecified (Schizoaffective disorder vs Bipolar Affective Disorder with Psychosis)  o Increase olanzapine to 40mg ODT qDaily  o Will consider GRIGGS at later date following initial stabilization    Secondary Psychiatric Diagnoses:     Intellectual disability  o Per , IQ estimated to be 63  o NTD    Antisocial Personality Disorder  o NTD    Additional Planning:    Continue to monitor for and stabilize: aggression, psychosis and disorganization    Patient will be treated in therapeutic milieu with appropriate individual and group therapies as described.    Scheduled Medications (summary):    bacitracin   Topical BID     OLANZapine zydis  40 mg Oral Daily with supper     Vitamin D3  25 mcg Oral Daily       PRN Medications (summary):  acetaminophen, alum & mag hydroxide-simethicone, haloperidol lactate **AND** diphenhydrAMINE **AND** LORazepam, haloperidol, hydrOXYzine, polyethylene glycol, sodium chloride    Discontinued Medications (& Rationale):    None    Consults:    None    Legal Status:    Committed with Cardoza     SIO:    No    Pt has not required locked seclusion or restraints in the past 24 hours to maintain safety, please refer to RN documentation for further details.    Disposition:    Pending availability of bed for placement at [facility]. Exploring other options like group homes for him.     Safety Assessment:   Behavioral Orders    Procedures     Cheeking Precautions (behavioral units)     Code 1 - Restrict to Unit     Elopement precautions     He can have his personal clothing     Routine Programming     As clinically indicated     Status 15     Every 15 minutes.      ____________________________________________________________________  Pertinent Medical diagnoses and management:    Chronic Hepatitis C  o Continue to monitor  ____________________________________________________________________  Patient seen and discussed with attending physician, Dr. Mike Bobo, who is in agreement with my assessment and plan.    Paige Phelan, MS3  [facility] [employer]    Marlon Apodaca, PGY-1 (Psychiatry)  [facility]    Attestation:  This patient has been seen and evaluated by me, Mike Bobo MD.  I have discussed this patient with the house staff team including the resident and medical student and I agree with the findings and plan in this note.    I have reviewed today's vital signs, medications, labs and imaging. Mike Bobo MD , PhD.

## 2021-10-12 NOTE — PLAN OF CARE
Problem: Behavior Regulation Impairment (Psychotic Signs/Symptoms)  Goal: Improved Behavioral Control (Psychotic Signs/Symptoms)  Outcome: Improving   Patient has been pacing the hallway almost continuously this morning. Denies depression, anxiety, hallucinations and suicidal thoughts. Patient states when is not here enjoys walking, hiking in the woods, camping. Blister on top of left foot is closed and healing. Patient is not wearing flip flops to give feet a rest. Is wearing the hospital socks.

## 2021-10-12 NOTE — PLAN OF CARE
Assessment/Intervention/Current Symptoms and Care Coordination      Attended team meeting and reviewed chart notes.    Writer spoke with CHRISTIAN Barragan regarding [facility]. Laurel reports that [facility] be in contact with [facility] to complete the [facility] waiver as pt is in [facility]. Deja is also looking at placements that might accept pt. Laurel also mentioned pt is still on the wait list for [facility] and that she is looking at a [facility].         Discharge Plan or Goal  [facility] or [facility]    Barriers to Discharge   Needs funding through [facility]  Needs to be accepted to a group home or [facility]      Referral Status  None made      Legal Status  PD has been revoked and pt is due to be recommitted.

## 2021-10-13 PROCEDURE — 99231 SBSQ HOSP IP/OBS SF/LOW 25: CPT | Mod: GC | Performed by: PSYCHIATRY & NEUROLOGY

## 2021-10-13 PROCEDURE — 124N000002 HC R&B MH UMMC

## 2021-10-13 PROCEDURE — 250N000013 HC RX MED GY IP 250 OP 250 PS 637

## 2021-10-13 RX ADMIN — BACITRACIN ZINC: 500 OINTMENT TOPICAL at 12:20

## 2021-10-13 RX ADMIN — Medication 25 MCG: at 18:20

## 2021-10-13 RX ADMIN — OLANZAPINE 40 MG: 20 TABLET, ORALLY DISINTEGRATING ORAL at 18:20

## 2021-10-13 ASSESSMENT — ACTIVITIES OF DAILY LIVING (ADL)
DRESS: SCRUBS (BEHAVIORAL HEALTH);INDEPENDENT
ORAL_HYGIENE: PROMPTS
HYGIENE/GROOMING: PROMPTS
LAUNDRY: WITH SUPERVISION

## 2021-10-13 NOTE — PLAN OF CARE
"Nursing plan of care   Problem: Behavioral Health Plan of Care  Goal: Adheres to Safety Considerations for Self and Others  Outcome: Improving     Problem: Behavior Regulation Impairment (Psychotic Signs/Symptoms)  Goal: Improved Behavioral Control (Psychotic Signs/Symptoms)  Outcome: Improving   Pt had a good shift; spent most of the shift pacing in the hallway; stated blisters on his feet is healed and he doesn't need attention; declined scheduled bacitracin; was calm and cooperative with routine nursing care; medication compliant; stated, \" I am fine\" and denied all MH assessment with \"No\" answer; poor insight and judgement; good eye contact; good appetite; denied medication side effect; will continue to monitor and assess.          "

## 2021-10-13 NOTE — PLAN OF CARE
Assessment/Intervention/Current Symptoms and Care Coordination   Attended team meeting and reviewed chart notes.     Patient had Rule 20 assessment today, ended interview early after becoming frustrated that  was not fully up to date on his case/situation,  believed patient was in a group home, did not know they were in hospital.    Spoke with patient, he was pleasant, stated no needs from case management today. Will follow up tomorrow with  about recommitment.     Discharge Plan or Goal  Pt to discharge to group home, [facility] or [facility]     Barriers to Discharge   Needs funding through [employer]  Needs to be accepted to a group home or [facility]     Referral Status  None made     Legal Status  PD has been revoked and pt is due to be recommitted.

## 2021-10-13 NOTE — PLAN OF CARE
"Problem: Behavioral Health Plan of Care  Goal: Adheres to Safety Considerations for Self and Others  Outcome: Improving  Intervention: Develop and Maintain Individualized Safety Plan  Recent Flowsheet Documentation  Taken 10/13/2021 1000 by Rola Jean RN  Safety Measures:    self-directed behavior promoted    suicide check-in completed    Mental Health Nursing Assessment    Shift Summary: Anderson was observed to pace the hallways this morning, slightly moving his lips as if talking to himself. Writer unable to understand or hear what pt was saying. Upon approach, pt quickly reported that he was \"good, and I'm not suicidal or having hallucinations.\" Pt appears possibly responding to internal stimuli at this time AEB mumbling to self, continuous pacing, and distractibility during conversation. He remained calm throughout the morning, with no self injurious or aggressive behaviors. At 1200, pt had a Rule 20 assessment which he reports went poorly and ended early because \"I yelled at her.\" Pt appeared upset at this time, and writer attempted to discuss his assessment, but he was distracted by tattoo on writer's arm and changed the subject to tattoos and immediately presented calm. Bacitracin applied to blister on pt's L medial foot and Band-Aid provided at pt's request. He denies all physical concerns and appetite was WDL. Pt declined his ordered covid test stating \"I got both shots.\" At this time, pt continues to pace and be present in the hallway. Anderson is monitored status 15 and is placed on cheeking and elopement precautions.    Patient rates depression 0*   Patient rates anxiety at 0*   Patient describes mood as \" good \" and affect is flat and blunted      PRN Medications: none this shift  Medication Compliance: bacitracin applied as ordered, no other medications ordered for this shift    Physical Complaints: denies concerns  Medication AE: none stated/observed  GI/: WDL  Appetite: WDL  ADLS: Pt appears " slightly unkempt with neglected hygiene, prompts provided for oral cares  Skin: Blister present on L foot. No other concerns stated.      VS reviewed: VSS . Patient denies  pain.    Patient evaluation continues. Assessed mood,anxiety,thoughts and behavior. Patient is encouraged to participate in groups and assisted to develop healthy coping skills.     Length of stay: 43    Refer to daily team meeting notes for individualized plan of care. Nursing will continue to assess.    * Scale is offered as scale of 1 to 10 with 10 being the worst

## 2021-10-13 NOTE — PROGRESS NOTES
"      ----------------------------------------------------------------------------------------------------------  [facility]  Psychiatric Progress Note  Hospital Day #43    Anderson Perez MRN# [id number]   Age: 40 year old YOB: 1980   Date of Admission: 8/30/2021     Subjective   The patient was discussed with the treatment team and chart notes were reviewed.      Identifier: Anderson Perez is a 40 year old male with a past psychiatric history of bipolar disorder with psychosis vs schizoaffective disorder bipolar type, antisocial personality disorder, and intellectual disability admitted from the  ER on 08/31/2021 due to concern for out of control behaviors, aggression and psychosis in the context of medication refusal despite Cardoza.    Sleep:  6.5 hours (10/13/21 0600)  Prescribed Medications: Taken as prescribed with ginger ale  PRN Psychiatric Medications: acetaminophen, alum & mag hydroxide-simethicone, haloperidol lactate **AND** diphenhydrAMINE **AND** LORazepam, haloperidol, hydrOXYzine, polyethylene glycol, sodium chloride     none    Overnight Nursing Notes/Staff Report:    Nursing notes were reviewed. No acute events overnight. He was pacing the hallway as usual. Requested his HS medication without reminder.      Patient interview:   Anderson is seen in the conference room today. He would like to know when he is discharging and where to. He is open to going to group homes including  [facility] in [place], [place] or [facility]. He states that he has lived at [facility] before. He denies any concerns. Stays calm when another patient abruptly interrupts the interview. States \"You all have a lot to deal with. I don't think I can do this profession\".      ROS   ROS was negative unless noted above.     Objective   Vitals:  /75 (BP Location: Left arm)   Pulse 84   Temp 97.3  F (36.3  C) (Oral)   Resp 14   Ht 1.829 m (6')   Wt 90.5 kg (199 lb 8 oz)   " "SpO2 98%   BMI 27.06 kg/m    Mental Status Examination:  Orientation:  Grossly intact  General: alert, awake.   Appearance:  Dressed in hospital scrubs, hair is buzzed short.  Behavior: More approachable. Cooperative. Accepted to be interviewed in the conference room. Stays calm when another patient abruptly interrupts the interview.  More conversational than before.   Eye Contact:  Good  Psychomotor:  no catatonia present  Speech:  Appropriate volume, and rate  Language: Appears to be fluent in English   Mood:  \"okay\"  Affect:  Generally flat, with minor reactivity; irritability continues to be reduced from time of admission.  Thought Process:  Good engagement with team during interview; linear and coherent  Thought Content: No AH/VH/SI/HI.  Associations:  None  Insight:  Poor, does not understand why he is here.   Judgment:  Poor, stopped taking medications.   Attention Span: limited  Concentration:  limited  Recent and Remote Memory:  Grossly intact  Fund of Knowledge: Suspect lower than average   Allergies     Allergies   Allergen Reactions     Bee Venom Anaphylaxis     Clindamycin Hives     Morphine Hives        Labs & Imaging   No results found for this or any previous visit (from the past 24 hour(s)).     Assessment   Diagnostic Impression:  Anderson Perez is a 40 year old male with a past psychiatric history of bipolar disorder with psychosis vs schizoaffective disorder bipolar type, antisocial personality disorder, and intellectual disability admitted from the  ER on 08/31/2021 due to concern for out of control behaviors, aggression and psychosis in the context of medication refusal despite Cardoza. Patient was recently released from [facility] to a group home. Presentation to the ED via ambulance after police contacted and provisional discharge rescinded. Group home noting escalating aggressive behavior, delusional thinking, and medication refusal despite Cardoza. PT refusal to discuss problems and largely " denied all past history, medications, or any need for care.     His reported symptoms of delusions, psychosis, and erratic/aggressive behavior are consistent with prior presentations of psychosis.  Optimization of medications to target these symptom clusters in addition to determining facility placement after discharge will be targets for treatment during this admission.      Given that he currently has PTA report of out of control behaviors, aggression and psychosis, patient warrants inpatient psychiatric hospitalization to maintain his safety. [facility]    Principal Diagnosis:     Psychosis, Unspecified (Schizoaffective disorder vs Bipolar Affective Disorder with Psychosis)    Secondary psychiatric diagnoses of concern this admission:     Intellectual Disability    Antisocial personality disorder    Psychiatric course:  Anderson Perez was admitted to [facility] following revocation of provisional discharge and on a court hold. His PTA olanzapine 20mg PO QDaily was resumed, as pt had been refusing at group home; per conversation with guardian, had planned to start on GRIGGS, but does not think that this was actually started, as patient was refusing medications, which prompted admission in setting of decompensation. Pt was converted to ODT formulation given concerns for cheeking medications; while initially resistant, pt was agreeable with taking ODT olanzapine as long as it was given with ginger ale, which was accommodated. Refused blood draw to get olanzapine level on 9/13. Olanzapine dose increased to 40mg on 9/21 given limited efficacy at current dose. PRN for agitation/aggression was changed to haloperidol 5mg, lorazepam 2mg, and diphenhydramine 50mg, with PO haloperidol available. 10/05 noticed that he was more approachable and was ready to seat with the team in the conference room. 10/8: stable on current medications; still paced the hallways. 10/11:  patient interview in conference room today instead of usually pacing in the hallway.  10/12: patient interview conducted in conference room again. 10/13 patient interview conducted in conference room again.     Anderson Perez continued to meet criteria for inpatient hospitalization medication optimization, inpatient stabilization, and appropriate discharge planning.     Medical course:   Anderson Perez was physically examined by the ED prior to being transferred to the unit and was found to be medically stable and appropriate for admission.      Plan   Today's Changes:     None  _______________________________________________________________________  Psychiatric diagnoses and management:  Principal Diagnosis:     Psychosis, Unspecified (Schizoaffective disorder vs Bipolar Affective Disorder with Psychosis)  o Increase olanzapine to 40mg ODT qDaily  o Will consider GRIGGS at later date following initial stabilization    Secondary Psychiatric Diagnoses:     Intellectual disability  o Per , IQ estimated to be 63  o NTD    Antisocial Personality Disorder  o NTD    Additional Planning:    Continue to monitor for and stabilize: aggression, psychosis and disorganization    Patient will be treated in therapeutic milieu with appropriate individual and group therapies as described.    Scheduled Medications (summary):    bacitracin   Topical BID     OLANZapine zydis  40 mg Oral Daily with supper     Vitamin D3  25 mcg Oral Daily       PRN Medications (summary):  acetaminophen, alum & mag hydroxide-simethicone, haloperidol lactate **AND** diphenhydrAMINE **AND** LORazepam, haloperidol, hydrOXYzine, polyethylene glycol, sodium chloride    Discontinued Medications (& Rationale):    None    Consults:    None    Legal Status:    Committed with [employer] ADAMES:    No    Pt has not required locked seclusion or restraints in the past 24 hours to maintain safety, please refer to RN documentation for further  details.    Disposition:    Pending availability of bed for placement at [facility]. Exploring other options like group homes for him.     Safety Assessment:   Behavioral Orders   Procedures     Cheeking Precautions (behavioral units)     Code 1 - Restrict to Unit     Elopement precautions     He can have his personal clothing     Routine Programming     As clinically indicated     Status 15     Every 15 minutes.      ____________________________________________________________________  Pertinent Medical diagnoses and management:    Chronic Hepatitis C  o Continue to monitor  ____________________________________________________________________  Patient seen and discussed with attending physician, Dr. Mike Bobo, who is in agreement with my assessment and plan.    Paige Phelan, MS3  [employer] [employer]    Marlon Apodaca, PGY-1 (Psychiatry)  [employer] [employer]    Attestation:  This patient has been seen and evaluated by me, Mike Bobo MD.  I have discussed this patient with the house staff team including the resident and medical student and I agree with the findings and plan in this note.    I have reviewed today's vital signs, medications, labs and imaging. Mike Bobo MD , PhD.

## 2021-10-13 NOTE — PLAN OF CARE
Nursing plan of care   Problem: Behavior Regulation Impairment (Psychotic Signs/Symptoms)  Goal: Improved Behavioral Control (Psychotic Signs/Symptoms)  Outcome: Improving  Pt has been pacing up and down the hallway for most part of the shift; appeared to be more calmer and open to conversation; requested for his HS medication without reminder and cooperative with room lock for 15 minute; pt's stated current concern is what is next and when he will be discharged. Denied SI/SIB; appeared responding; good appetite; denied issue with sleep; good eye contact; will continue to monitor and assess.

## 2021-10-14 PROCEDURE — 124N000002 HC R&B MH UMMC

## 2021-10-14 PROCEDURE — 250N000013 HC RX MED GY IP 250 OP 250 PS 637

## 2021-10-14 RX ADMIN — BACITRACIN ZINC: 500 OINTMENT TOPICAL at 18:04

## 2021-10-14 RX ADMIN — Medication 25 MCG: at 18:03

## 2021-10-14 RX ADMIN — OLANZAPINE 40 MG: 20 TABLET, ORALLY DISINTEGRATING ORAL at 18:03

## 2021-10-14 ASSESSMENT — ACTIVITIES OF DAILY LIVING (ADL)
ORAL_HYGIENE: PROMPTS
DRESS: SCRUBS (BEHAVIORAL HEALTH);INDEPENDENT
LAUNDRY: WITH SUPERVISION
HYGIENE/GROOMING: PROMPTS

## 2021-10-14 ASSESSMENT — MIFFLIN-ST. JEOR: SCORE: 1863.37

## 2021-10-14 NOTE — PLAN OF CARE
Problem: Sleep Disturbance  Goal: Adequate Sleep/Rest  Outcome: Improving    Pt in bed sleeping at start of shift, breathing quiet and unlabored. No signs of pain nor discomfort. No PRN medications needed. Appears to have slept 6.75 hours.    Pt continues on Cheeking and Elopement precautions with no related events noted above.     Will continue to monitor and assess pt.

## 2021-10-14 NOTE — PROGRESS NOTES
"      ----------------------------------------------------------------------------------------------------------  [facility]  Psychiatric Progress Note  Hospital Day #44    Anderson Perez MRN# [id number]   Age: 40 year old YOB: 1980   Date of Admission: 8/30/2021     Subjective   The patient was discussed with the treatment team and chart notes were reviewed.      Identifier: Anderson Perez is a 40 year old male with a past psychiatric history of bipolar disorder with psychosis vs schizoaffective disorder bipolar type, antisocial personality disorder, and intellectual disability admitted from the  ER on 08/31/2021 due to concern for out of control behaviors, aggression and psychosis in the context of medication refusal despite Cardoza.    Sleep:  6.75 hours (10/14/21 0558)  Prescribed Medications: Taken as prescribed with ginger ale  PRN Psychiatric Medications: acetaminophen, alum & mag hydroxide-simethicone, haloperidol lactate **AND** diphenhydrAMINE **AND** LORazepam, haloperidol, hydrOXYzine, polyethylene glycol, sodium chloride     None given     Overnight Nursing Notes/Staff Report:    Nursing notes were reviewed. No acute events overnight. Spent most of the day pacing the hallway.  Calm and cooperative, med compliant, keeps to self.  Did not go to groups.   Appears to have slept 6.75 hours.      Patient interview:   Anderson is seen in the conference room today. States he does not really want to talk but is agreeable to meet.  States \"I'm alright\" and asks for any updates regarding his discharge and placement.   \"I'd really like to get out of here if I can.\"  Interactions are calm and cooperative.     ROS   ROS was negative unless noted above.     Objective   Vitals:  /71 (BP Location: Left arm)   Pulse 82   Temp 97.5  F (36.4  C) (Tympanic)   Resp 14   Ht 1.829 m (6')   Wt 91 kg (200 lb 11.2 oz)   SpO2 100%   BMI 27.22 kg/m    Mental Status " "Examination:  Orientation:  Grossly intact  General: alert, awake.   Appearance:  Dressed in hospital scrubs, hair is buzzed short.  Behavior: More approachable. Cooperative. Accepted to be interviewed in the conference room.  More conversational than before.   Eye Contact:  Good  Psychomotor:  no catatonia present  Speech:  Appropriate volume, and rate  Language: Appears to be fluent in English   Mood:  \"good\"  Affect:  Generally flat, with minor reactivity; irritability continues to be reduced from time of admission.  Thought Process:  Good engagement with team during interview; linear and coherent  Thought Content: No AH/VH/SI/HI.  Associations:  None  Insight:  Poor, does not understand why he is here.   Judgment:  Poor, stopped taking medications.   Attention Span: limited  Concentration:  limited  Recent and Remote Memory:  Adequate during twenty minute conversation    Fund of Knowledge: Suspect lower than average   Allergies     Allergies   Allergen Reactions     Bee Venom Anaphylaxis     Clindamycin Hives     Morphine Hives        Labs & Imaging   No results found for this or any previous visit (from the past 24 hour(s)).     Assessment   Diagnostic Impression:  Anderson Perez is a 40 year old male with a past psychiatric history of bipolar disorder with psychosis vs schizoaffective disorder bipolar type, antisocial personality disorder, and intellectual disability admitted from the  ER on 08/31/2021 due to concern for out of control behaviors, aggression and psychosis in the context of medication refusal despite Cardoza. Patient was recently released from [facility] to a group home. Presentation to the ED via ambulance after police contacted and provisional discharge rescinded. Group home noting escalating aggressive behavior, delusional thinking, and medication refusal despite Cardoza. PT refusal to discuss problems and largely denied all past history, medications, or any need for care.     His reported " symptoms of delusions, psychosis, and erratic/aggressive behavior are consistent with prior presentations of psychosis.  Optimization of medications to target these symptom clusters in addition to determining facility placement after discharge will be targets for treatment during this admission.      Given that he currently has PTA report of out of control behaviors, aggression and psychosis, patient warrants inpatient psychiatric hospitalization to maintain his safety. Disposition pending availability of [facility] placement or other option.    Principal Diagnosis:     Psychosis, Unspecified (Schizoaffective disorder vs Bipolar Affective Disorder with Psychosis)    Secondary psychiatric diagnoses of concern this admission:     Intellectual Disability    Antisocial personality disorder    Psychiatric course:  Anderson Perez was admitted to [facility] following revocation of provisional discharge and on a court hold. His PTA olanzapine 20mg PO QDaily was resumed, as pt had been refusing at group home; per conversation with guardian, had planned to start on GRIGGS, but does not think that this was actually started, as patient was refusing medications, which prompted admission in setting of decompensation. Pt was converted to ODT formulation given concerns for cheeking medications; while initially resistant, pt was agreeable with taking ODT olanzapine as long as it was given with ginger ale, which was accommodated. Refused blood draw to get olanzapine level on 9/13. Olanzapine dose increased to 40mg on 9/21 given limited efficacy at current dose. PRN for agitation/aggression was changed to haloperidol 5mg, lorazepam 2mg, and diphenhydramine 50mg, with PO haloperidol available. 10/05 noticed that he was more approachable and was ready to seat with the team in the conference room. 10/8: stable on current medications; still paced the hallways. 10/11: patient interview in conference room today instead of usually pacing in the  [facility].  10/12: patient interview conducted in conference room again. 10/13 patient interview conducted in conference room again. 10/14 interview in conference room.    Anderson Perez continued to meet criteria for inpatient hospitalization medication optimization, inpatient stabilization, and appropriate discharge planning.     Medical course:   Anderson Perez was physically examined by the ED prior to being transferred to the unit and was found to be medically stable and appropriate for admission.      Plan   Today's Changes:     None  _______________________________________________________________________  Psychiatric diagnoses and management:  Principal Diagnosis:     Psychosis, Unspecified (Schizoaffective disorder vs Bipolar Affective Disorder with Psychosis)  o Increase olanzapine to 40mg ODT qDaily  o Will consider GRIGGS at later date following initial stabilization    Secondary Psychiatric Diagnoses:     Intellectual disability  o Per , IQ estimated to be 63  o NTD    Antisocial Personality Disorder  o NTD    Additional Planning:    Continue to monitor for and stabilize: aggression, psychosis and disorganization    Patient will be treated in therapeutic milieu with appropriate individual and group therapies as described.    Scheduled Medications (summary):    bacitracin   Topical BID     OLANZapine zydis  40 mg Oral Daily with supper     Vitamin D3  25 mcg Oral Daily       PRN Medications (summary):  acetaminophen, alum & mag hydroxide-simethicone, haloperidol lactate **AND** diphenhydrAMINE **AND** LORazepam, haloperidol, hydrOXYzine, polyethylene glycol, sodium chloride    Discontinued Medications (& Rationale):    None    Consults:    None    Legal Status:    Committed with [employer] CASTILLOO:    No    Pt has not required locked seclusion or restraints in the past 24 hours to maintain safety, please refer to RN documentation for further details.    Disposition:    Pending availability of bed  for placement at [facility]. Exploring other options like group homes for him.     Safety Assessment:   Behavioral Orders   Procedures     Cheeking Precautions (behavioral units)     Code 1 - Restrict to Unit     Elopement precautions     He can have his personal clothing     Routine Programming     As clinically indicated     Status 15     Every 15 minutes.      ____________________________________________________________________  Pertinent Medical diagnoses and management:    Chronic Hepatitis C  o Continue to monitor  ____________________________________________________________________  Patient seen and discussed with attending physician    Tabby Zaragoza, MS3  [employer] [facility] [employer]    Marlon Apodaca, PGY-1 (Psychiatry)  [facility] [facility]    Attestation:  IMargareth M.D., saw and evaluated the patient with the medical student and the resident physician. I have reviewed all labs and vital signs. I have reviewed the patient's care with the multidisciplinary treatment team, both before and after the interview. I have reviewed the above progress note, which reflects the team's treatment plan appropriately.

## 2021-10-14 NOTE — PLAN OF CARE
"Nursing Plan of care   Problem: Behavior Regulation Impairment (Psychotic Signs/Symptoms)  Goal: Improved Behavioral Control (Psychotic Signs/Symptoms)  Outcome: Improving  Flowsheets (Taken 10/14/2021 1816)  Mutually Determined Action Steps (Improved Behavioral Control):   verbalizes personal treatment goal   identifies future-oriented goal   Pt was visible and active in the milieu; paced in the hallway most of the shift; attended no group; encouraged to take shower; denied MH symptoms and stated, \" You don't have to check in with me, I am doing great and everything is ok\"; medication compliant; denied medication side effect; good appetite and eye contact; no SI/SIB noted; had a good shift;blister on his left lateral  foot appeared improving and healing; bacitracin was applied and no sign of infection; will continue to monitor and assess.    "

## 2021-10-14 NOTE — PLAN OF CARE
Assessment/Intervention/Current Symptoms and Care Coordination   Attended team meeting and reviewed chart notes.     Left message with [facility]  Laruel Light inquiring about recommitment process.  Spoke with patient, he was pleasant, stated no needs from case management today.     Discharge Plan or Goal  Pt to discharge to group home, [facility] or [facility]     Barriers to Discharge   Needs funding [facility]  Needs to be accepted to a group home or [facility]     Referral Status  None made     Legal Status  PD has been revoked and pt is due to be recommitted.

## 2021-10-14 NOTE — PLAN OF CARE
"Pt is calm and pleasant. He reports his mood is \"always good.\" Pt denies all MH sx. He paces in the dalal, uses headphones. He states he does not go to grps, and keeps to self. See PCS for additional shift asmnt.  "

## 2021-10-15 PROCEDURE — 250N000013 HC RX MED GY IP 250 OP 250 PS 637

## 2021-10-15 PROCEDURE — 124N000002 HC R&B MH UMMC

## 2021-10-15 RX ADMIN — OLANZAPINE 40 MG: 20 TABLET, ORALLY DISINTEGRATING ORAL at 17:51

## 2021-10-15 RX ADMIN — Medication 25 MCG: at 17:51

## 2021-10-15 RX ADMIN — BACITRACIN ZINC: 500 OINTMENT TOPICAL at 07:57

## 2021-10-15 ASSESSMENT — ACTIVITIES OF DAILY LIVING (ADL)
ORAL_HYGIENE: INDEPENDENT;PROMPTS
DRESS: SCRUBS (BEHAVIORAL HEALTH);INDEPENDENT
HYGIENE/GROOMING: INDEPENDENT;PROMPTS
LAUNDRY: WITH SUPERVISION

## 2021-10-15 NOTE — PLAN OF CARE
Assessment/Intervention/Current Symptoms and Care Coordination   Attended team meeting and reviewed chart notes.     Spoke with Polly Varghese with [facility]. She sent notice for remote zoom hearing for October 20th at 3:30 pm. These were forwarded to court tv team.    Spoke with patient, he was pleasant, stated no needs from case management today.      Discharge Plan or Goal  Pt to discharge to group home, [facility] or [facility]     Barriers to Discharge   Needs funding through [employer]  Needs to be accepted to a group home or [facility]     Referral Status  None made     Legal Status  PD has been revoked and pt is due to be recommitted. Hearing will be October 20th at 12:30

## 2021-10-15 NOTE — PROGRESS NOTES
----------------------------------------------------------------------------------------------------------  [facility]  Psychiatric Progress Note  Hospital Day #45    Anderson Perez MRN# [id number]   Age: 40 year old YOB: 1980   Date of Admission: 8/30/2021     Subjective   The patient was discussed with the treatment team and chart notes were reviewed.      Identifier: Anderson Perez is a 40 year old male with a past psychiatric history of bipolar disorder with psychosis vs schizoaffective disorder bipolar type, antisocial personality disorder, and intellectual disability admitted from the  ER on 08/31/2021 due to concern for out of control behaviors, aggression and psychosis in the context of medication refusal despite Cardoza.    Sleep:  7 hours (10/15/21 0600)  Prescribed Medications: Taken as prescribed with ginger ale  PRN Psychiatric Medications: acetaminophen, alum & mag hydroxide-simethicone, haloperidol lactate **AND** diphenhydrAMINE **AND** LORazepam, haloperidol, hydrOXYzine, polyethylene glycol, sodium chloride     None given     Overnight Nursing Notes/Staff Report:    Staff notes were reviewed. No acute events overnight.     Patient interview:   Anderson denies to be seen in the conference room today. States he does not want to talk because there is no progress in getting placement for him. Denies any concerns. The court hearing for his re-commitment will hold on 10/20/2021.      ROS   ROS was negative unless noted above.     Objective   Vitals:  /80 (BP Location: Left arm)   Pulse 86   Temp 97  F (36.1  C) (Tympanic)   Resp 14   Ht 1.829 m (6')   Wt 91 kg (200 lb 11.2 oz)   SpO2 97%   BMI 27.22 kg/m    Mental Status Examination:  Orientation:  Grossly intact  General: alert, awake.   Appearance:  Dressed in hospital scrubs, hair is buzzed short.  Behavior: More approachable. Less cooperative. Declined to be interviewed in the  "conference room.  Less conversational than before.   Eye Contact:  Good  Psychomotor:  no catatonia present  Speech:  Appropriate volume, and rate  Language: Appears to be fluent in English   Mood:  \"fine\"  Affect:  Generally flat, with minor reactivity; irritability continues to be reduced from time of admission.  Thought Process:  Limited engagement with team during interview; linear and coherent  Thought Content: No SI/HI.  Associations:  None  Insight:  Poor, does not understand why he is here.   Judgment:  Poor, stopped taking medications.   Attention Span: limited   Concentration:  limited  Recent and Remote Memory:  Adequate during twenty minute conversation    Fund of Knowledge: Suspect lower than average   Allergies     Allergies   Allergen Reactions     Bee Venom Anaphylaxis     Clindamycin Hives     Morphine Hives        Labs & Imaging   No results found for this or any previous visit (from the past 24 hour(s)).     Assessment   Diagnostic Impression:  Anderson Perez is a 40 year old male with a past psychiatric history of bipolar disorder with psychosis vs schizoaffective disorder bipolar type, antisocial personality disorder, and intellectual disability admitted from the  ER on 08/31/2021 due to concern for out of control behaviors, aggression and psychosis in the context of medication refusal despite Cardoza. Patient was recently released from [facility] to a group home. Presentation to the ED via ambulance after police contacted and provisional discharge rescinded. Group home noting escalating aggressive behavior, delusional thinking, and medication refusal despite Cardoza. PT refusal to discuss problems and largely denied all past history, medications, or any need for care.     His reported symptoms of delusions, psychosis, and erratic/aggressive behavior are consistent with prior presentations of psychosis.  Optimization of medications to target these symptom clusters in addition to determining facility " placement after discharge will be targets for treatment during this admission.      Given that he currently has PTA report of out of control behaviors, aggression and psychosis, patient warrants inpatient psychiatric hospitalization to maintain his safety. Disposition pending availability of [facility] placement or other option.    Principal Diagnosis:     Psychosis, Unspecified (Schizoaffective disorder vs Bipolar Affective Disorder with Psychosis)    Secondary psychiatric diagnoses of concern this admission:     Intellectual Disability    Antisocial personality disorder    Psychiatric course:  Anderson Perez was admitted to [facility] following revocation of provisional discharge and on a court hold. His PTA olanzapine 20mg PO QDaily was resumed, as pt had been refusing at group home; per conversation with guardian, had planned to start on GRIGGS, but does not think that this was actually started, as patient was refusing medications, which prompted admission in setting of decompensation. Pt was converted to ODT formulation given concerns for cheeking medications; while initially resistant, pt was agreeable with taking ODT olanzapine as long as it was given with ginger ale, which was accommodated. Refused blood draw to get olanzapine level on 9/13. Olanzapine dose increased to 40mg on 9/21 given limited efficacy at current dose. PRN for agitation/aggression was changed to haloperidol 5mg, lorazepam 2mg, and diphenhydramine 50mg, with PO haloperidol available. 10/05 noticed that he was more approachable and was ready to seat with the team in the conference room. 10/8: stable on current medications; still paced the hallways. 10/11: patient interview in conference room today instead of usually pacing in the hallway.  10/12: patient interview conducted in conference room again. 10/13 patient interview conducted in conference room again. 10/14 interview in conference room.    Anderson Perez continued to meet criteria for  inpatient hospitalization medication optimization, inpatient stabilization, and appropriate discharge planning.     Medical course:   Anderson Perez was physically examined by the ED prior to being transferred to the unit and was found to be medically stable and appropriate for admission.      Plan   Today's Changes:     None  _______________________________________________________________________  Psychiatric diagnoses and management:  Principal Diagnosis:     Psychosis, Unspecified (Schizoaffective disorder vs Bipolar Affective Disorder with Psychosis)  o Increase olanzapine to 40mg ODT qDaily  o Will consider GRIGGS at later date following initial stabilization    Secondary Psychiatric Diagnoses:     Intellectual disability  o Per , IQ estimated to be 63  o NTD    Antisocial Personality Disorder  o NTD    Additional Planning:    Continue to monitor for and stabilize: aggression, psychosis and disorganization    Patient will be treated in therapeutic milieu with appropriate individual and group therapies as described.    Scheduled Medications (summary):    bacitracin   Topical BID     OLANZapine zydis  40 mg Oral Daily with supper     Vitamin D3  25 mcg Oral Daily       PRN Medications (summary):  acetaminophen, alum & mag hydroxide-simethicone, haloperidol lactate **AND** diphenhydrAMINE **AND** LORazepam, haloperidol, hydrOXYzine, polyethylene glycol, sodium chloride    Discontinued Medications (& Rationale):    None    Consults:    None    Legal Status:    Committed with Cardoza     SIO:    No    Pt has not required locked seclusion or restraints in the past 24 hours to maintain safety, please refer to RN documentation for further details.    Disposition:    Pending availability of bed for placement at [facility]. Exploring other options like group homes for him.     Safety Assessment:   Behavioral Orders   Procedures     Cheeking Precautions (behavioral units)     Code 1 - Restrict to Unit     Elopement  precautions     He can have his personal clothing     Routine Programming     As clinically indicated     Status 15     Every 15 minutes.      ____________________________________________________________________  Pertinent Medical diagnoses and management:    Chronic Hepatitis C  o Continue to monitor  ____________________________________________________________________  Patient seen and discussed with attending physician.    Marlon Apodaca, PGY-1 (Psychiatry)  [facility]    Attestation:  Attending Attestation:    IMargareth M.D., saw and evaluated the patient with the resident physician. I have reviewed all labs and vital signs. I have reviewed the patient's care with the multidisciplinary treatment team, both before and after the interview. I have reviewed the above progress note, which reflects the team's treatment plan appropriately.

## 2021-10-15 NOTE — PLAN OF CARE
Problem: Adult Inpatient Plan of Care  Goal: Optimal Comfort and Wellbeing  Outcome: Improving     Pt visible in the milieu walking the hallways but socially withdrawn. He denies mental health symptoms but appears responding to internal stimuli. Pt calm on unit and has displayed no behavioral concerns. No medications scheduled this shift and no request or indication for prn medications. Eating meals in the lounge. No additional concerns at this time. Will continue to assess and offer support.

## 2021-10-15 NOTE — PLAN OF CARE
"Nursing plan of care   Problem: Behavior Regulation Impairment (Psychotic Signs/Symptoms)  Goal: Improved Behavioral Control (Psychotic Signs/Symptoms)  Outcome: Improving  Flowsheets (Taken 10/14/2021 1816)  Mutually Determined Action Steps (Improved Behavioral Control):    verbalizes personal treatment goal    identifies future-oriented goal   Pt has been pacing in the hallway; calm and pleasant; medication compliant; denied medication side effect; denied SI/SIB, depression and anxiety; enjoyed popcorn with peers; stated, \"I am fine, I am doing OK\" ; good eye contact; ate dinner and good appetite; will continue to monitor and assess.     "

## 2021-10-15 NOTE — PLAN OF CARE
Problem: Sleep Disturbance  Goal: Adequate Sleep/Rest  Outcome: No Change     Pt in bed sleeping at start of shift and all through out the night. Breathing quiet and unlabored. No signs of pain nor discomfort. No PRN medications needed. Appears to have slept 7 hours.    Pt continues on Cheeking and Elopement precautions with no related events noted above.     Will continue to monitor and assess pt.

## 2021-10-16 PROCEDURE — 250N000013 HC RX MED GY IP 250 OP 250 PS 637

## 2021-10-16 PROCEDURE — 124N000002 HC R&B MH UMMC

## 2021-10-16 RX ADMIN — OLANZAPINE 40 MG: 20 TABLET, ORALLY DISINTEGRATING ORAL at 18:31

## 2021-10-16 RX ADMIN — Medication 25 MCG: at 18:31

## 2021-10-16 ASSESSMENT — ACTIVITIES OF DAILY LIVING (ADL)
LAUNDRY: WITH SUPERVISION
HYGIENE/GROOMING: INDEPENDENT;PROMPTS
ORAL_HYGIENE: INDEPENDENT;PROMPTS
DRESS: SCRUBS (BEHAVIORAL HEALTH);INDEPENDENT

## 2021-10-16 NOTE — PLAN OF CARE
"  Problem: Behavioral Disturbance  Goal: Behavioral Disturbance  Description: Signs and symptoms of listed problems will be absent or manageable by discharge or transition of care.  Outcome: Improving     Pt visible in the milieu for the majority of the day. He is mostly withdrawn from social interactions but is pleasant if engaged. Spent much of the shift pacing the hallways. Eating meals without issue. Pt declined his bacitracin for his foot, as he reports \"It's ok.\" No indication or request for prn medications. Pt denies mental health symptoms or safety concerns. No additional concerns at this time. Will continue to assess and offer support.   "

## 2021-10-16 NOTE — PLAN OF CARE
Problem: Sleep Disturbance  Goal: Adequate Sleep/Rest  Outcome: No Change    Pt in bed sleeping at start of shift. Breathing quiet and unlabored. No signs of pain or discomfort.  Appears to have slept 6.5 hours. No PRN medications needed this shift.  Pt continues on  Cheeking and Elopement precautions with no related events noted this shift. Will continue to monitor and assess pt.

## 2021-10-17 PROCEDURE — 124N000002 HC R&B MH UMMC

## 2021-10-17 PROCEDURE — 250N000013 HC RX MED GY IP 250 OP 250 PS 637

## 2021-10-17 RX ADMIN — OLANZAPINE 40 MG: 20 TABLET, ORALLY DISINTEGRATING ORAL at 16:06

## 2021-10-17 RX ADMIN — Medication 25 MCG: at 16:06

## 2021-10-17 ASSESSMENT — ACTIVITIES OF DAILY LIVING (ADL)
LAUNDRY: WITH SUPERVISION
HYGIENE/GROOMING: INDEPENDENT
LAUNDRY: WITH SUPERVISION
DRESS: SCRUBS (BEHAVIORAL HEALTH);INDEPENDENT
HYGIENE/GROOMING: INDEPENDENT;PROMPTS
ORAL_HYGIENE: INDEPENDENT;PROMPTS
ORAL_HYGIENE: INDEPENDENT
DRESS: SCRUBS (BEHAVIORAL HEALTH)

## 2021-10-17 ASSESSMENT — MIFFLIN-ST. JEOR: SCORE: 1869.72

## 2021-10-17 NOTE — PLAN OF CARE
Problem: Behavioral Health Plan of Care  Goal: Adheres to Safety Considerations for Self and Others  Outcome: No Change   Pt slept 6 hours this night,he continues to refuse covid testing as a safety measure for self and others,he remains on cheeking and elopement precautions,no roommate

## 2021-10-17 NOTE — PLAN OF CARE
Nursing plan of care   Problem: Behavior Regulation Impairment (Psychotic Signs/Symptoms)  Goal: Improved Behavioral Control (Psychotic Signs/Symptoms)  Outcome: Improving  Flowsheets (Taken 10/16/2021 1902)  Mutually Determined Action Steps (Improved Behavioral Control): identifies future-oriented goal  Pt has been pacing in the hallway with calm and controled behavior; appeared to be responding to internal stimuli; denied SI/SIB,Depression, AVH and anxiety; medication compliant; cooperative with 15 minute room lock to monitor cheeking; ate dinner and snack; good appetite; declined scheduled bacteriocin  and stated the blistered site is almost healed; attended no group; took no shower;  will continue to monitor and assess

## 2021-10-17 NOTE — PLAN OF CARE
Problem: Behavior Regulation Impairment (Manic or Hypomanic Signs/Symptoms)  Goal: Improved Impulse Control (Manic/Hypomanic Signs/Symptoms)  Outcome: Improving     Pt visible in the milieu for the majority of the day. He is minimally social with others and communicated more spontaneously this shift. Pt denies the presence of mental health symptoms. Does appear responding to internal stimuli. No behavioral concerns. Declined his scheduled bacitracin. No indication or request for prn medications. Eating meals without issue. Will continue to assess and offer support.

## 2021-10-18 PROCEDURE — 124N000002 HC R&B MH UMMC

## 2021-10-18 PROCEDURE — 99231 SBSQ HOSP IP/OBS SF/LOW 25: CPT | Mod: GC | Performed by: PSYCHIATRY & NEUROLOGY

## 2021-10-18 PROCEDURE — 250N000013 HC RX MED GY IP 250 OP 250 PS 637

## 2021-10-18 RX ADMIN — Medication 25 MCG: at 19:03

## 2021-10-18 RX ADMIN — OLANZAPINE 40 MG: 20 TABLET, ORALLY DISINTEGRATING ORAL at 19:04

## 2021-10-18 ASSESSMENT — ACTIVITIES OF DAILY LIVING (ADL)
ORAL_HYGIENE: INDEPENDENT
DRESS: INDEPENDENT
ORAL_HYGIENE: INDEPENDENT
HYGIENE/GROOMING: INDEPENDENT
DRESS: INDEPENDENT
ADLS_ACUITY_SCORE: 5
LAUNDRY: WITH SUPERVISION
ADLS_ACUITY_SCORE: 5
HYGIENE/GROOMING: INDEPENDENT
ADLS_ACUITY_SCORE: 5
LAUNDRY: WITH SUPERVISION

## 2021-10-18 NOTE — PLAN OF CARE
Problem: Sleep Disturbance  Goal: Adequate Sleep/Rest  Outcome: Improving   Pt slept 7 hours this night, respirations easy,pt continues to refuse  covid testing at this time

## 2021-10-18 NOTE — PLAN OF CARE
Problem: Behavior Regulation Impairment (Psychotic Signs/Symptoms)  Goal: Improved Behavioral Control (Psychotic Signs/Symptoms)  Outcome: Improving   Nursing Assessment    Recent Vitals: Refused     General Shift Summary  Pacing at beginning of shift. Somewhat social with peers. Pt appears responding occasionally, although less than usual. Denies Mh sx. Pt worried about how long they will be here, frustrated that they feel nothing is being done. Pt wants social work to check in with them. Discussed discharge planning with patient.     Patient is medication compliant and reported no side effects. No PRN medications given.     Hygiene and appetite adequate.     Davey Moody, RN

## 2021-10-18 NOTE — PLAN OF CARE
BEHAVIORAL TEAM DISCUSSION    Participants: Dr. Nima Prasad, resident Marlon Apodaca, RN Natacha Saini, two medical students Paige and Tabby, CTC Siobhan Irving  Progress: pt is improving, he is able to engage in short conversations regarding his care.  Anticipated length of stay: when a safe placement is arranged  Continued Stay Criteria/Rationale: pt's PD has been revoked. He is waiting for his recommit hearing on Wednesday 10/20/2021.  Medical/Physical: see IM consult- some foot discomfort (Blisters)  Precautions:   Behavioral Orders   Procedures     Cheeking Precautions (behavioral units)     Code 1 - Restrict to Unit     Elopement precautions     He can have his personal clothing     Routine Programming     As clinically indicated     Status 15     Every 15 minutes.     Plan: provide a psychological assessment daily and manage medications per psychiatry, staff to provide a safe and therapeutic milieu, CTC to coordinate discharge plans with KT Barragan from [facility].  Rationale for change in precautions or plan: no change    Attestation:  I have reviewed this note. Mariola Prasad MD

## 2021-10-18 NOTE — PLAN OF CARE
Assessment/Intervention/Current Symptoms and Care Coordination    Attended team meeting and reviewed chart notes.    Writer spoke briefly with pt to report the discharge plan is a group home. He has his recommit court date on Wednesday at 3:30.    Pt is also on the list for [facility].    Writer received the 'court treatment plan' and 'court hearing date and time' to be given to pt. He was relieved that he did not have to attend in person.      Discharge Plan or Goal  Discharge to group home      Barriers to Discharge   No group home available at this time  No available bed at [facility]      Referral Status  None made        Legal Status  Pt's PD was revoked  He has a recommit court hearing on Wednesday at 3:30.

## 2021-10-18 NOTE — PROGRESS NOTES
----------------------------------------------------------------------------------------------------------  [facility]  Psychiatric Progress Note  Hospital Day #48    nAderson Perez MRN# [id number]   Age: 40 year old YOB: 1980   Date of Admission: 8/30/2021     Subjective   The patient was discussed with the treatment team and chart notes were reviewed.      Identifier: Anderson Perez is a 40 year old male with a past psychiatric history of bipolar disorder with psychosis vs schizoaffective disorder bipolar type, antisocial personality disorder, and intellectual disability admitted from the  ER on 08/31/2021 due to concern for out of control behaviors, aggression and psychosis in the context of medication refusal despite Cardoza.    Sleep:  7 hours (10/18/21 0600)  Prescribed Medications: Taken as prescribed with ginger ale  PRN Psychiatric Medications: acetaminophen, alum & mag hydroxide-simethicone, haloperidol lactate **AND** diphenhydrAMINE **AND** LORazepam, haloperidol, hydrOXYzine, polyethylene glycol, sodium chloride     None given     Overnight Nursing Notes/Staff Report:    Staff notes were reviewed. No acute events over the weekend.     Patient interview:   Anderson denies to be seen in the conference room today. States he does not want to talk. Denies any concerns.      ROS   ROS was negative unless noted above.     Objective   Vitals:  /77 (BP Location: Right arm)   Pulse 82   Temp 97.6  F (36.4  C) (Tympanic)   Resp 14   Ht 1.829 m (6')   Wt 91.7 kg (202 lb 1.6 oz)   SpO2 96%   BMI 27.41 kg/m    Mental Status Examination:  Orientation:  Grossly intact  General: alert, awake.   Appearance:  Dressed in hospital scrubs, hair is buzzed short.  Behavior: Pacing the hallway. More approachable. Declined to be interviewed in the conference room.  Less conversational than before.   Eye Contact:  Good  Psychomotor:  no catatonia present  Speech:   "Appropriate volume, and rate  Language: Appears to be fluent in English   Mood:  \"fine\"  Affect:  Generally flat, with minor reactivity; irritability continues to be reduced from time of admission.  Thought Process:  Limited engagement with team during interview; linear and coherent  Thought Content: No AH/VH/SI/HI.  Associations:  None  Insight:  Poor, does not understand why he is here.   Judgment:  Poor, stopped taking medications.   Attention Span: limited  Concentration:  limited  Recent and Remote Memory:  Grossly intact  Fund of Knowledge: Suspect lower than average   Allergies     Allergies   Allergen Reactions     Bee Venom Anaphylaxis     Clindamycin Hives     Morphine Hives        Labs & Imaging   No results found for this or any previous visit (from the past 24 hour(s)).     Assessment   Diagnostic Impression:  Anderson Perez is a 40 year old male with a past psychiatric history of bipolar disorder with psychosis vs schizoaffective disorder bipolar type, antisocial personality disorder, and intellectual disability admitted from the  ER on 08/31/2021 due to concern for out of control behaviors, aggression and psychosis in the context of medication refusal despite Cardoza. Patient was recently released from [facility] to a group home. Presentation to the ED via ambulance after police contacted and provisional discharge rescinded. Group home noting escalating aggressive behavior, delusional thinking, and medication refusal despite Cardoza. PT refusal to discuss problems and largely denied all past history, medications, or any need for care.     His reported symptoms of delusions, psychosis, and erratic/aggressive behavior are consistent with prior presentations of psychosis.  Optimization of medications to target these symptom clusters in addition to determining facility placement after discharge will be targets for treatment during this admission.      Given that he currently has PTA report of out of control " behaviors, aggression and psychosis, patient warrants inpatient psychiatric hospitalization to maintain his safety. Disposition pending availability of [facility] placement or other option.    Principal Diagnosis:     Psychosis, Unspecified (Schizoaffective disorder vs Bipolar Affective Disorder with Psychosis)    Secondary psychiatric diagnoses of concern this admission:     Intellectual Disability    Antisocial personality disorder    Psychiatric course:  Anderson Perez was admitted to [facility] following revocation of provisional discharge and on a court hold. His PTA olanzapine 20mg PO QDaily was resumed, as pt had been refusing at group home; per conversation with guardian, had planned to start on GRIGGS, but does not think that this was actually started, as patient was refusing medications, which prompted admission in setting of decompensation. Pt was converted to ODT formulation given concerns for cheeking medications; while initially resistant, pt was agreeable with taking ODT olanzapine as long as it was given with ginger ale, which was accommodated. Refused blood draw to get olanzapine level on 9/13. Olanzapine dose increased to 40mg on 9/21 given limited efficacy at current dose. PRN for agitation/aggression was changed to haloperidol 5mg, lorazepam 2mg, and diphenhydramine 50mg, with PO haloperidol available. 10/05 noticed that he was more approachable and was ready to seat with the team in the conference room. 10/8: stable on current medications; still paced the hallways. 10/11: patient interview in conference room today instead of usually pacing in the hallway.  10/12: patient interview conducted in conference room again. 10/13 patient interview conducted in conference room again. 10/14 interview in conference room.    Anderson Perez continued to meet criteria for inpatient hospitalization medication optimization, inpatient stabilization, and appropriate discharge planning.     Medical course:   Anderson  ALON Perez was physically examined by the ED prior to being transferred to the unit and was found to be medically stable and appropriate for admission.      Plan   Today's Changes:     None  _______________________________________________________________________  Psychiatric diagnoses and management:  Principal Diagnosis:     Psychosis, Unspecified (Schizoaffective disorder vs Bipolar Affective Disorder with Psychosis)  o Increase olanzapine to 40mg ODT qDaily  o Will consider GRIGGS at later date following initial stabilization    Secondary Psychiatric Diagnoses:     Intellectual disability  o Per , IQ estimated to be 63  o NTD    Antisocial Personality Disorder  o NTD    Additional Planning:    Continue to monitor for and stabilize: aggression, psychosis and disorganization    Patient will be treated in therapeutic milieu with appropriate individual and group therapies as described.    Scheduled Medications (summary):    OLANZapine zydis  40 mg Oral Daily with supper     Vitamin D3  25 mcg Oral Daily       PRN Medications (summary):  acetaminophen, alum & mag hydroxide-simethicone, haloperidol lactate **AND** diphenhydrAMINE **AND** LORazepam, haloperidol, hydrOXYzine, polyethylene glycol, sodium chloride    Discontinued Medications (& Rationale):    None    Consults:    None    Legal Status:    Committed with [employer] ADAMES:    No    Pt has not required locked seclusion or restraints in the past 24 hours to maintain safety, please refer to RN documentation for further details.    Disposition:    Pending availability of bed for placement at [facility]. Exploring other options like group homes for him.     Safety Assessment:   Behavioral Orders   Procedures     Cheeking Precautions (behavioral units)     Code 1 - Restrict to Unit     Elopement precautions     He can have his personal clothing     Routine Programming     As clinically indicated     Status 15     Every 15 minutes.       ____________________________________________________________________  Pertinent Medical diagnoses and management:    Chronic Hepatitis C  o Continue to monitor  ____________________________________________________________________  Patient seen and discussed with attending physician, Dr. Mariola Prasad, who is in agreement with my assessment and plan.    Marlon Apodaca, PGY-1 (Psychiatry)  [facility]    Attestation:  This patient has been seen and evaluated by me, Mariola Prasad MD.  I have discussed this patient with the psychiatry resident and I agree with the findings and plan in this note.    I have reviewed today's vital signs, medications, labs and imaging. Mariola Prasad MD

## 2021-10-19 PROCEDURE — 99231 SBSQ HOSP IP/OBS SF/LOW 25: CPT | Mod: GC | Performed by: PSYCHIATRY & NEUROLOGY

## 2021-10-19 PROCEDURE — 250N000013 HC RX MED GY IP 250 OP 250 PS 637

## 2021-10-19 PROCEDURE — 124N000002 HC R&B MH UMMC

## 2021-10-19 RX ADMIN — OLANZAPINE 40 MG: 20 TABLET, ORALLY DISINTEGRATING ORAL at 17:49

## 2021-10-19 RX ADMIN — Medication 25 MCG: at 17:49

## 2021-10-19 ASSESSMENT — ACTIVITIES OF DAILY LIVING (ADL)
ADLS_ACUITY_SCORE: 5
DRESS: INDEPENDENT
ADLS_ACUITY_SCORE: 5
HYGIENE/GROOMING: INDEPENDENT
ADLS_ACUITY_SCORE: 5
LAUNDRY: WITH SUPERVISION
ADLS_ACUITY_SCORE: 5
ORAL_HYGIENE: INDEPENDENT

## 2021-10-19 NOTE — PLAN OF CARE
Problem: Behavioral Health Plan of Care  Goal: Plan of Care Review  Outcome: Improving  Flowsheets (Taken 10/18/2021 2057)  Plan of Care Reviewed With: patient  Progress: improving  Patient Agreement with Plan of Care: agrees     Problem: Behavior Regulation Impairment (Psychotic Signs/Symptoms)  Goal: Improved Behavioral Control (Psychotic Signs/Symptoms)  Outcome: Improving  Flowsheets (Taken 10/18/2021 2057)  Mutually Determined Action Steps (Improved Behavioral Control): identifies future-oriented goal     Anderson continues to spend time walking in dalal-stops by TV and lounge briefly at times-states he does like to watch television and so occupying time walking-less freq moving lips-does appear preoccupied

## 2021-10-19 NOTE — PROGRESS NOTES
"      ----------------------------------------------------------------------------------------------------------  [facility]  Psychiatric Progress Note  Hospital Day #49    Anderson Perez MRN# [id number]   Age: 40 year old YOB: 1980   Date of Admission: 8/30/2021     Subjective   The patient was discussed with the treatment team and chart notes were reviewed.      Identifier: Anderson Perez is a 40 year old male with a past psychiatric history of bipolar disorder with psychosis vs schizoaffective disorder bipolar type, antisocial personality disorder, and intellectual disability admitted from the  ER on 08/31/2021 due to concern for out of control behaviors, aggression and psychosis in the context of medication refusal despite Cardoza.    Sleep:  7 hours (10/19/21 0600)  Prescribed Medications: Taken as prescribed with ginger ale  PRN Psychiatric Medications: acetaminophen, alum & mag hydroxide-simethicone, haloperidol lactate **AND** diphenhydrAMINE **AND** LORazepam, haloperidol, hydrOXYzine, polyethylene glycol, sodium chloride       Overnight Nursing Notes/Staff Report:    Staff notes were reviewed. No acute events over the weekend. Pacing hallways.     Patient interview:   Anderson was interviewed in the hallway while he was pacing. States no concerns and says that he is \"all good.\"      ROS   ROS was negative unless noted above.     Objective   Vitals:  /68 (BP Location: Left arm)   Pulse 83   Temp 97  F (36.1  C) (Tympanic)   Resp 14   Ht 1.829 m (6')   Wt 91.7 kg (202 lb 1.6 oz)   SpO2 99%   BMI 27.41 kg/m    Mental Status Examination:  Orientation:  Grossly intact  General: alert, awake.   Appearance:  Dressed in hospital scrubs, hair is buzzed short.  Behavior: Pacing the hallway. More approachable. Declined to be interviewed in the conference room.  Less conversational than before.   Eye Contact:  Good  Psychomotor:  no catatonia present  Speech:  " "Appropriate volume, and rate  Language: Appears to be fluent in English   Mood:  \"all good\"  Affect:  Generally flat, with minor reactivity; irritability continues to be reduced from time of admission.  Thought Process:  Limited engagement with team during interview; linear and coherent  Thought Content: No AH/VH/SI/HI.  Associations:  None  Insight:  Poor, does not understand why he is here.   Judgment:  Poor, stopped taking medications.   Attention Span: limited  Concentration:  limited  Recent and Remote Memory:  Grossly intact  Fund of Knowledge: Suspect lower than average   Allergies     Allergies   Allergen Reactions    Bee Venom Anaphylaxis    Clindamycin Hives    Morphine Hives        Labs & Imaging   No results found for this or any previous visit (from the past 24 hour(s)).     Assessment   Diagnostic Impression:  Anderson Perez is a 40 year old male with a past psychiatric history of bipolar disorder with psychosis vs schizoaffective disorder bipolar type, antisocial personality disorder, and intellectual disability admitted from the  ER on 08/31/2021 due to concern for out of control behaviors, aggression and psychosis in the context of medication refusal despite Cardoza. Patient was recently released from [facility] to a group home. Presentation to the ED via ambulance after police contacted and provisional discharge rescinded. Group home noting escalating aggressive behavior, delusional thinking, and medication refusal despite Cardoza. PT refusal to discuss problems and largely denied all past history, medications, or any need for care.     His reported symptoms of delusions, psychosis, and erratic/aggressive behavior are consistent with prior presentations of psychosis.  Optimization of medications to target these symptom clusters in addition to determining facility placement after discharge will be targets for treatment during this admission.      Given that he currently has PTA report of out of control " behaviors, aggression and psychosis, patient warrants inpatient psychiatric hospitalization to maintain his safety. Disposition pending availability of [facility] placement or other option.    Principal Diagnosis:   Psychosis, Unspecified (Schizoaffective disorder vs Bipolar Affective Disorder with Psychosis)    Secondary psychiatric diagnoses of concern this admission:   Intellectual Disability  Antisocial personality disorder    Psychiatric course:  Anderson Perez was admitted to [facility] following revocation of provisional discharge and on a court hold. His PTA olanzapine 20mg PO QDaily was resumed, as pt had been refusing at group home; per conversation with guardian, had planned to start on GRIGGS, but does not think that this was actually started, as patient was refusing medications, which prompted admission in setting of decompensation. Pt was converted to ODT formulation given concerns for cheeking medications; while initially resistant, pt was agreeable with taking ODT olanzapine as long as it was given with ginger ale, which was accommodated. Refused blood draw to get olanzapine level on 9/13. Olanzapine dose increased to 40mg on 9/21 given limited efficacy at current dose. PRN for agitation/aggression was changed to haloperidol 5mg, lorazepam 2mg, and diphenhydramine 50mg, with PO haloperidol available. 10/05 noticed that he was more approachable and was ready to seat with the team in the conference room. 10/8: stable on current medications; still paced the hallways. 10/11: patient interview in conference room today instead of usually pacing in the hallway.  10/12: patient interview conducted in conference room again. 10/13 patient interview conducted in conference room again. 10/14 interview in conference room. 10/19: no changes in medications. Patient stable on current medications.     Anderson Perez continued to meet criteria for inpatient hospitalization medication optimization, inpatient  stabilization, and appropriate discharge planning.     Medical course:   Anderson Perez was physically examined by the ED prior to being transferred to the unit and was found to be medically stable and appropriate for admission.      Plan   Today's Changes:   None  _______________________________________________________________________  Psychiatric diagnoses and management:  Principal Diagnosis:   Psychosis, Unspecified (Schizoaffective disorder vs Bipolar Affective Disorder with Psychosis)  Increase olanzapine to 40mg ODT qDaily  Will consider GRIGGS at later date following initial stabilization    Secondary Psychiatric Diagnoses:   Intellectual disability  Per , IQ estimated to be 63  NTD  Antisocial Personality Disorder  NTD    Additional Planning:  Continue to monitor for and stabilize: aggression, psychosis and disorganization  Patient will be treated in therapeutic milieu with appropriate individual and group therapies as described.    Scheduled Medications (summary):   OLANZapine zydis  40 mg Oral Daily with supper    Vitamin D3  25 mcg Oral Daily       PRN Medications (summary):  acetaminophen, alum & mag hydroxide-simethicone, haloperidol lactate **AND** diphenhydrAMINE **AND** LORazepam, haloperidol, hydrOXYzine, polyethylene glycol, sodium chloride    Discontinued Medications (& Rationale):  None    Consults:  None    Legal Status:  Committed with [employer] CASTILLOO:  No  Pt has not required locked seclusion or restraints in the past 24 hours to maintain safety, please refer to RN documentation for further details.    Disposition:  Pending availability of bed for placement at [facility]. Exploring other options like group homes for him.     Safety Assessment:   Behavioral Orders   Procedures    Cheeking Precautions (behavioral units)    Code 1 - Restrict to Unit    Elopement precautions     He can have his personal clothing    Routine Programming     As clinically indicated    Status 15     Every 15  minutes.      ____________________________________________________________________  Pertinent Medical diagnoses and management:  Chronic Hepatitis C  Continue to monitor  ____________________________________________________________________  Patient seen and discussed with attending physician, Dr. Mariola Prasad, who is in agreement with my assessment and plan.    Paige Phelan, MS3  [facility] [employer]    I was present with the medical student who participated in the service and in the documentation of the note. I have verified the history and medical decision making. I agree with the assessment and plan of care as documented in the note.     Marlon Apodaca, PGY-1 (Psychiatry)  [facility]    Attestation:  This patient has been seen and evaluated by me, Mariola Prasad MD.  I have discussed this patient with the psychiatry resident and I agree with the findings and plan in this note.    I have reviewed today's vital signs, medications, labs and imaging. Mariola Prasad MD

## 2021-10-19 NOTE — PLAN OF CARE
Problem: Behavioral Health Plan of Care  Goal: Plan of Care Review  Outcome: Improving  Flowsheets (Taken 10/19/2021 1405)  Plan of Care Reviewed With: patient  Progress: improving  Patient Agreement with Plan of Care: agrees     Problem: Behavior Regulation Impairment (Psychotic Signs/Symptoms)  Goal: Improved Behavioral Control (Psychotic Signs/Symptoms)  Outcome: Improving  Flowsheets (Taken 10/19/2021 1405)  Mutually Determined Action Steps (Improved Behavioral Control): identifies future-oriented goal     Anderson continues walking in dalal-freq moving lips as though in conversation-freq appears preoccupied-continues to decline OT grp-was looking through window to see what peers were working on and nodding his approval to them-briefly answered final admit profile questions, but walked away while talking

## 2021-10-19 NOTE — PLAN OF CARE
Assessment/Intervention/Current Symptoms and Care Coordination    Attended team meeting and reviewed chart notes.    Pt spends his time walking the halls. He has minimal interaction with peers. Pt's [facility] is working on CADI waiver for a group home.        Discharge Plan or Goal  Discharge to group home.    Pt is still on the list for [facility] and [facility]        Barriers to Discharge   Needs CADI funding and acceptance to group home      Referral Status  None made        Legal Status  Pt's PD was revoked. He will have his recommit hearing tomorrow 10/20 @ 3:30.

## 2021-10-19 NOTE — PLAN OF CARE
Problem: Behavioral Health Plan of Care  Goal: Plan of Care Review  10/18/2021 2101 by Natacha Saini, RN  Outcome: Improving  Flowsheets (Taken 10/18/2021 1430)  Plan of Care Reviewed With: patient  Progress: improving  Patient Agreement with Plan of Care: agrees  This AM Anderson declined bacitracin stating blisters have completely healed-Although continues moving lips as though in conversation, this behavior is significantly decreased as compared to last week-Anderson initiates conversation with others-participates in brief social conversations with staff and peers-appears more relaxed-continues to pace in dalal most of day

## 2021-10-20 PROCEDURE — 250N000013 HC RX MED GY IP 250 OP 250 PS 637

## 2021-10-20 PROCEDURE — 99231 SBSQ HOSP IP/OBS SF/LOW 25: CPT | Mod: GC | Performed by: PSYCHIATRY & NEUROLOGY

## 2021-10-20 PROCEDURE — 124N000002 HC R&B MH UMMC

## 2021-10-20 RX ADMIN — Medication 25 MCG: at 16:56

## 2021-10-20 RX ADMIN — OLANZAPINE 40 MG: 20 TABLET, ORALLY DISINTEGRATING ORAL at 16:56

## 2021-10-20 NOTE — PLAN OF CARE
Problem: Behavioral Health Plan of Care  Goal: Plan of Care Review  Outcome: Improving  Flowsheets (Taken 10/20/2021 1637)  Plan of Care Reviewed With: patient  Progress: improving  Patient Agreement with Plan of Care: agrees     Problem: Behavior Regulation Impairment (Psychotic Signs/Symptoms)  Goal: Improved Behavioral Control (Psychotic Signs/Symptoms)  Outcome: Improving  Flowsheets (Taken 10/20/2021 1637)  Mutually Determined Action Steps (Improved Behavioral Control): identifies future-oriented goal     Anderson continues to spend day walking in dalal-brief interactions with others, but does not engage in conversation-less moving lips while walking in dalal

## 2021-10-20 NOTE — PLAN OF CARE
Problem: Behavioral Health Plan of Care  Goal: Plan of Care Review  Outcome: Improving  Flowsheets (Taken 10/20/2021 1637)  Plan of Care Reviewed With: patient  Progress: improving  Patient Agreement with Plan of Care: agrees     Problem: Behavior Regulation Impairment (Psychotic Signs/Symptoms)  Goal: Improved Behavioral Control (Psychotic Signs/Symptoms)  Outcome: Improving  Flowsheets (Taken 10/20/2021 1637)  Mutually Determined Action Steps (Improved Behavioral Control): identifies future-oriented goal     Anderson continues to spend day walking in daall-brief interactions with others, but does not engage in conversation-less moving lips while walking in dalal

## 2021-10-20 NOTE — PLAN OF CARE
Problem: Behavior Regulation Impairment (Psychotic Signs/Symptoms)  Goal: Improved Behavioral Control (Psychotic Signs/Symptoms)  Outcome: Improving   Pt pleasant polite , continous walking halls , talking to himself . He goes to bed at 2000 after taking his meds with dinner.

## 2021-10-20 NOTE — PLAN OF CARE
"Nursing plan of care   Problem: Behavior Regulation Impairment (Psychotic Signs/Symptoms)  Goal: Improved Behavioral Control (Psychotic Signs/Symptoms)  Outcome: No Change  Received report from staff about pt's 3 yrs old son want to talk to the pt since the pt didn't talk to child's mother, this writer asked the pt if he is willing to accept/ make a phone call to his 3 yrs old son and pt stated, \" No, I don't have a son\"; pt spent most of the shift pacing in the hallway and intermittently socializing with peers with brief conversation; was attended a video court appointment for recommitment, but received no decision from the court yet; was tense and restless post court hearing but calmer as shift progress;  ate dinner and snack; good appetite; denied medication side effect; denied SI/SIB,AVH, depression and anxiety. Requested and took his scheduled medication without reminder; will continue to monitor and assess.       "

## 2021-10-20 NOTE — PLAN OF CARE
Assessment/Intervention/Current Symptoms and Care Coordination    Attended team meeting and reviewed chart notes.    Pt will have his recommitment hearing today at 3:30 with [facility].        Discharge Plan or Goal  To a group home  Pt is on the wait list for [facility]      Barriers to Discharge   No placement available      Referral Status  None made        Legal Status  Pt's PD was revoked  He has a recommit hearing today

## 2021-10-20 NOTE — PROGRESS NOTES
"      ----------------------------------------------------------------------------------------------------------  [facility]  Psychiatric Progress Note  Hospital Day #50    Anderson Perez MRN# [id number]   Age: 40 year old YOB: 1980   Date of Admission: 8/30/2021     Subjective   The patient was discussed with the treatment team and chart notes were reviewed.      Identifier: Anderson Perez is a 40 year old male with a past psychiatric history of bipolar disorder with psychosis vs schizoaffective disorder bipolar type, antisocial personality disorder, and intellectual disability admitted from the  ER on 08/31/2021 due to concern for out of control behaviors, aggression and psychosis in the context of medication refusal despite Cardoza.    Sleep:  6.75 hours (10/20/21 0600)  Prescribed Medications: Taken as prescribed with ginger ale  PRN Psychiatric Medications: acetaminophen, alum & mag hydroxide-simethicone, haloperidol lactate **AND** diphenhydrAMINE **AND** LORazepam, haloperidol, hydrOXYzine, polyethylene glycol, sodium chloride       Overnight Nursing Notes/Staff Report:    Staff notes were reviewed. Patient slept 6.25 hours. No prns. No acute behavioral events. Pacing hallways. Talks to self at times.     Patient interview:   Anderson was interviewed in the hallway while he was pacing. Says that he is \"good\" and does not want to talk. No acute concerns reported.       ROS   ROS was negative unless noted above.     Objective   Vitals:  /76   Pulse 66   Temp (!) 96.7  F (35.9  C) (Tympanic)   Resp 15   Ht 1.829 m (6')   Wt 91.7 kg (202 lb 1.6 oz)   SpO2 97%   BMI 27.41 kg/m    Mental Status Examination:  Orientation:  Grossly intact  General: alert, awake.   Appearance:  Dressed in hospital scrubs, hair is buzzed short.  Behavior: Pacing the hallway. Less conversational than before.   Eye Contact:  Good  Psychomotor:  no catatonia present  Speech:  " "Appropriate volume, and rate  Language: Appears to be fluent in English    Mood:  \"good\"  Affect:  Generally flat, with minor reactivity; irritability continues to be reduced from time of admission.  Thought Process:  Limited engagement with team during interview; linear and coherent  Thought Content: No AH/VH/SI/HI.  Associations:  None  Insight:  Poor, does not understand why he is here.   Judgment:  Poor, stopped taking medications.   Attention Span: limited  Concentration:  limited  Recent and Remote Memory:  Grossly intact  Fund of Knowledge: Suspect lower than average   Allergies     Allergies   Allergen Reactions    Bee Venom Anaphylaxis    Clindamycin Hives    Morphine Hives        Labs & Imaging   No results found for this or any previous visit (from the past 24 hour(s)).     Assessment   Diagnostic Impression:  Anderson Perez is a 40 year old male with a past psychiatric history of bipolar disorder with psychosis vs schizoaffective disorder bipolar type, antisocial personality disorder, and intellectual disability admitted from the  ER on 08/31/2021 due to concern for out of control behaviors, aggression and psychosis in the context of medication refusal despite Cardoza. Patient was recently released from [facility] to a group home. Presentation to the ED via ambulance after police contacted and provisional discharge rescinded. Group home noting escalating aggressive behavior, delusional thinking, and medication refusal despite Cardoza. PT refusal to discuss problems and largely denied all past history, medications, or any need for care.     His reported symptoms of delusions, psychosis, and erratic/aggressive behavior are consistent with prior presentations of psychosis.  Optimization of medications to target these symptom clusters in addition to determining facility placement after discharge will be targets for treatment during this admission.      Given that he currently has PTA report of out of control " behaviors, aggression and psychosis, patient warrants inpatient psychiatric hospitalization to maintain his safety. Disposition pending availability of [facility] placement or other option.    Principal Diagnosis:   Psychosis, Unspecified (Schizoaffective disorder vs Bipolar Affective Disorder with Psychosis)    Secondary psychiatric diagnoses of concern this admission:   Intellectual Disability  Antisocial personality disorder    Psychiatric course:  Anderson Perez was admitted to [facility] following revocation of provisional discharge and on a court hold. His PTA olanzapine 20mg PO QDaily was resumed, as pt had been refusing at group home; per conversation with guardian, had planned to start on GRIGGS, but does not think that this was actually started, as patient was refusing medications, which prompted admission in setting of decompensation. Pt was converted to ODT formulation given concerns for cheeking medications; while initially resistant, pt was agreeable with taking ODT olanzapine as long as it was given with ginger ale, which was accommodated. Refused blood draw to get olanzapine level on 9/13. Olanzapine dose increased to 40mg on 9/21 given limited efficacy at current dose. PRN for agitation/aggression was changed to haloperidol 5mg, lorazepam 2mg, and diphenhydramine 50mg, with PO haloperidol available. 10/05 noticed that he was more approachable and was ready to seat with the team in the conference room. 10/8: stable on current medications; still paced the hallways. 10/11: patient interview in conference room today instead of usually pacing in the hallway.  10/12: patient interview conducted in conference room again. 10/13 patient interview conducted in conference room again. 10/14 interview in conference room. 10/19: no changes in medications. Patient stable on current medications. 10/20: awaiting discharge based on facility availability.     Anderson Perez continued to meet criteria for inpatient  hospitalization medication optimization, inpatient stabilization, and appropriate discharge planning.     Medical course:   Anderson Perez was physically examined by the ED prior to being transferred to the unit and was found to be medically stable and appropriate for admission.      Plan   Today's Changes:   None  _______________________________________________________________________  Psychiatric diagnoses and management:  Principal Diagnosis:   Psychosis, Unspecified (Schizoaffective disorder vs Bipolar Affective Disorder with Psychosis)  Increase olanzapine to 40mg ODT qDaily  Will consider GRIGGS at later date following initial stabilization    Secondary Psychiatric Diagnoses:   Intellectual disability  Per , IQ estimated to be 63  NTD  Antisocial Personality Disorder  NTD    Additional Planning:  Continue to monitor for and stabilize: aggression, psychosis and disorganization  Patient will be treated in therapeutic milieu with appropriate individual and group therapies as described.    Scheduled Medications (summary):   OLANZapine zydis  40 mg Oral Daily with supper    Vitamin D3  25 mcg Oral Daily       PRN Medications (summary):  acetaminophen, alum & mag hydroxide-simethicone, haloperidol lactate **AND** diphenhydrAMINE **AND** LORazepam, haloperidol, hydrOXYzine, polyethylene glycol, sodium chloride    Discontinued Medications (& Rationale):  None    Consults:  None    Legal Status:  Committed with Cardoza     SIO:  No  Pt has not required locked seclusion or restraints in the past 24 hours to maintain safety, please refer to RN documentation for further details.    Disposition:  Pending availability of bed for placement at [facility]. Exploring other options like group homes for him.     Safety Assessment:   Behavioral Orders   Procedures    Cheeking Precautions (behavioral units)    Code 1 - Restrict to Unit    Elopement precautions     He can have his personal clothing    Routine Programming     As  clinically indicated    Status 15     Every 15 minutes.      ____________________________________________________________________  Pertinent Medical diagnoses and management:  Chronic Hepatitis C  Continue to monitor  ____________________________________________________________________  Patient seen and discussed with attending physician, Dr. Mariola Prasad, who is in agreement with my assessment and plan.    Paige Phelan, MS3  [employer]    I was present with the medical student who participated in the service and in the documentation of the note. I have verified the history and medical decision making. I agree with the assessment and plan of care as documented in the note.     This patient has been discussed with and seen by my attending who agrees with my assessment and plan.     John Huitron MD  PGY2 Psychiatry Resident    Attestation:  This patient has been seen and evaluated by me, Mariola Prasad MD.  I have discussed this patient with the psychiatry resident and I agree with the findings and plan in this note.    I have reviewed today's vital signs, medications, labs and imaging. Mariola Prasad MD

## 2021-10-21 PROCEDURE — 124N000002 HC R&B MH UMMC

## 2021-10-21 PROCEDURE — 250N000013 HC RX MED GY IP 250 OP 250 PS 637

## 2021-10-21 RX ADMIN — OLANZAPINE 40 MG: 20 TABLET, ORALLY DISINTEGRATING ORAL at 16:36

## 2021-10-21 RX ADMIN — Medication 25 MCG: at 16:36

## 2021-10-21 ASSESSMENT — ACTIVITIES OF DAILY LIVING (ADL)
HYGIENE/GROOMING: INDEPENDENT
ORAL_HYGIENE: INDEPENDENT
DRESS: INDEPENDENT
LAUNDRY: WITH SUPERVISION

## 2021-10-21 ASSESSMENT — MIFFLIN-ST. JEOR: SCORE: 1871.53

## 2021-10-21 NOTE — PLAN OF CARE
Problem: Sleep Disturbance  Goal: Adequate Sleep/Rest  Outcome: No Change    Antonio appeared to sleep a total of 6.5 hours this night shift.  No prns or snacks given or requested.

## 2021-10-21 NOTE — PLAN OF CARE
Assessment/Intervention/Current Symptoms and Care Coordination    Attended team meeting and reviewed chart notes.    Writer spoke with pt's [facility] ([phone]) to make sure we get a court hold as pt's commitment is up tomorrow. She will work on this today.      Discharge Plan or Goal  To group home      Barriers to Discharge   No placement is available at this time      Referral Status  Pt is on the list for [facility]  [facility]        Legal Status  Pd has been revoked  Pt next exam is on 11/2- pt's  wants another examination

## 2021-10-21 NOTE — PROGRESS NOTES
"      ----------------------------------------------------------------------------------------------------------  [facility]  Psychiatric Progress Note  Hospital Day #51    Anderson Perez MRN# [id number]   Age: 40 year old YOB: 1980   Date of Admission: 8/30/2021     Subjective   The patient was discussed with the treatment team and chart notes were reviewed.      Identifier: Anderson Perez is a 40 year old male with a past psychiatric history of bipolar disorder with psychosis vs schizoaffective disorder bipolar type, antisocial personality disorder, and intellectual disability admitted from the  ER on 08/31/2021 due to concern for out of control behaviors, aggression and psychosis in the context of medication refusal despite Cardoza.    Sleep:  6.5 hours (10/21/21 0600)  Prescribed Medications: Taken as prescribed with ginger ale  PRN Psychiatric Medications: acetaminophen, alum & mag hydroxide-simethicone, haloperidol lactate **AND** diphenhydrAMINE **AND** LORazepam, haloperidol, hydrOXYzine, polyethylene glycol, sodium chloride       Overnight Nursing Notes/Staff Report:    Staff notes were reviewed. Patient slept 6.5 hours. No prns. No acute behavioral events. Pacing hallways. Med compliant. Reportedly, pt's 3 year old son wanted to speak to patient but patient responded with \"No, I don't have a son.\" Additionally, patient had court appointment for recommitment: status not yet determined. Patient tense after hearing but become calmer as evening progressed. See nursing notes for full details.     Patient interview:   Anderson was interviewed in the hallway while he was pacing. Says that he is \"all good.\" No acute concerns reported. He reports no SI/HI.  However, he is asking whether he can wear his \"regular clothes.\" He also brought up the fact that he had a court hearing and that his \"rule 20 ends\" so that he will be discharged in a few days. This was not confirmed " "with his  yet.      ROS   ROS was negative unless noted above.     Objective   Vitals:  /76   Pulse 66   Temp (!) 96.7  F (35.9  C) (Tympanic)   Resp 15   Ht 1.829 m (6')   Wt 91.7 kg (202 lb 1.6 oz)   SpO2 97%   BMI 27.41 kg/m    Mental Status Examination:  Orientation:  Grossly intact  General: alert, awake.   Appearance:  Dressed in hospital scrubs, hair is buzzed short.  Behavior: Pacing the hallway. More conversational than yesterday.   Eye Contact:  Good  Psychomotor:  no catatonia present  Speech:  Appropriate volume, and rate  Language: Appears to be fluent in English    Mood:  \"good\"  Affect:  Generally flat, with minor reactivity; irritability continues to be reduced from time of admission.  Thought Process:  Limited engagement with team during interview; linear and coherent  Thought Content: No AH/VH/SI/HI.  Associations:  None  Insight:  Poor, does not understand why he is here.   Judgment:  Poor, stopped taking medications.   Attention Span: limited  Concentration:  limited  Recent and Remote Memory:  Grossly intact  Fund of Knowledge: Suspect lower than average   Allergies     Allergies   Allergen Reactions     Bee Venom Anaphylaxis     Clindamycin Hives     Morphine Hives        Labs & Imaging   No results found for this or any previous visit (from the past 24 hour(s)).     Assessment   Diagnostic Impression:  Anderson Perez is a 40 year old male with a past psychiatric history of bipolar disorder with psychosis vs schizoaffective disorder bipolar type, antisocial personality disorder, and intellectual disability admitted from the  ER on 08/31/2021 due to concern for out of control behaviors, aggression and psychosis in the context of medication refusal despite Cardoza. Patient was recently released from [facility] to a group home. Presentation to the ED via ambulance after police contacted and provisional discharge rescinded. Group home noting escalating aggressive behavior, " delusional thinking, and medication refusal despite Cardoza. PT refusal to discuss problems and largely denied all past history, medications, or any need for care.     His reported symptoms of delusions, psychosis, and erratic/aggressive behavior are consistent with prior presentations of psychosis.  Optimization of medications to target these symptom clusters in addition to determining facility placement after discharge will be targets for treatment during this admission.      Given that he currently has PTA report of out of control behaviors, aggression and psychosis, patient warrants inpatient psychiatric hospitalization to maintain his safety. Disposition pending availability of [facility] placement or other option.    Principal Diagnosis:     Psychosis, Unspecified (Schizoaffective disorder vs Bipolar Affective Disorder with Psychosis)    Secondary psychiatric diagnoses of concern this admission:     Intellectual Disability    Antisocial personality disorder    Psychiatric course:  Anderson Perez was admitted to [facility] following revocation of provisional discharge and on a court hold. His PTA olanzapine 20mg PO QDaily was resumed, as pt had been refusing at group home; per conversation with guardian, had planned to start on GRIGGS, but does not think that this was actually started, as patient was refusing medications, which prompted admission in setting of decompensation. Pt was converted to ODT formulation given concerns for cheeking medications; while initially resistant, pt was agreeable with taking ODT olanzapine as long as it was given with ginger ale, which was accommodated. Refused blood draw to get olanzapine level on 9/13. Olanzapine dose increased to 40mg on 9/21 given limited efficacy at current dose. PRN for agitation/aggression was changed to haloperidol 5mg, lorazepam 2mg, and diphenhydramine 50mg, with PO haloperidol available. 10/05 noticed that he was more approachable and was ready to seat with  the team in the conference room. 10/8: stable on current medications; still paced the hallways. 10/11: patient interview in conference room today instead of usually pacing in the hallway.  10/12: patient interview conducted in conference room again. 10/13 patient interview conducted in conference room again. 10/14 interview in conference room. 10/19: no changes in medications. Patient stable on current medications. 10/20: awaiting discharge based on facility availability.     Anderson Perez continued to meet criteria for inpatient hospitalization medication optimization, inpatient stabilization, and appropriate discharge planning.     Medical course:   Anderson Perez was physically examined by the ED prior to being transferred to the unit and was found to be medically stable and appropriate for admission.      Plan   Today's Changes:     None  _______________________________________________________________________  Psychiatric diagnoses and management:  Principal Diagnosis:     Psychosis, Unspecified (Schizoaffective disorder vs Bipolar Affective Disorder with Psychosis)  o Increase olanzapine to 40mg ODT qDaily  o Will consider GRIGGS at later date following initial stabilization    Secondary Psychiatric Diagnoses:     Intellectual disability  o Per , IQ estimated to be 63  o NTD    Antisocial Personality Disorder  o NTD    Additional Planning:    Continue to monitor for and stabilize: aggression, psychosis and disorganization    Patient will be treated in therapeutic milieu with appropriate individual and group therapies as described.    Scheduled Medications (summary):    OLANZapine zydis  40 mg Oral Daily with supper     Vitamin D3  25 mcg Oral Daily       PRN Medications (summary):  acetaminophen, alum & mag hydroxide-simethicone, haloperidol lactate **AND** diphenhydrAMINE **AND** LORazepam, haloperidol, hydrOXYzine, polyethylene glycol, sodium chloride    Discontinued Medications (&  Rationale):    None    Consults:    None    Legal Status:    Committed with Cardoza     SIO:    No    Pt has not required locked seclusion or restraints in the past 24 hours to maintain safety, please refer to RN documentation for further details.    Disposition:    Pending availability of bed for placement at [facility]. Exploring other options like group homes for him.     Safety Assessment:   Behavioral Orders   Procedures     Cheeking Precautions (behavioral units)     Code 1 - Restrict to Unit     Elopement precautions     He can have his personal clothing     Routine Programming     As clinically indicated     Status 15     Every 15 minutes.      ____________________________________________________________________  Pertinent Medical diagnoses and management:    Chronic Hepatitis C  o Continue to monitor  ____________________________________________________________________  Patient seen and discussed with attending physician, Dr. Fiona MD, who is in agreement with my assessment and plan.    Paige Phelan, MS3  [employer]    Resident Attestation:   I was present with the medical student who participated in the service and in the documentation of the note.  I have verified the history and personally performed the physical exam, mental status exam, and medical decision making. I agree with the assessment and plan of care as documented in the note.    Mary Nix MD  10/21/2021 6:21 PM  PGY-1 Psychiatry Resident  Pager: [phone]    Attending Attestation:  I, Margareth Fang M.D., saw and evaluated the patient with the medical student and the resident physician. I have reviewed all labs and vital signs. I have reviewed the patient's care with the multidisciplinary treatment team, both before and after the interview. I have reviewed the above progress note, which reflects the team's treatment plan appropriately.

## 2021-10-21 NOTE — PLAN OF CARE
"  Problem: Behavioral Health Plan of Care  Goal: Plan of Care Review  Outcome: Improving  Flowsheets (Taken 10/21/2021 1159)  Plan of Care Reviewed With: patient  Progress: improving  Patient Agreement with Plan of Care: (Does not believe he is ill) agrees with comment (describe)     Problem: Behavior Regulation Impairment (Psychotic Signs/Symptoms)  Goal: Improved Behavioral Control (Psychotic Signs/Symptoms)  Outcome: Improving  Flowsheets (Taken 10/21/2021 1159)  Mutually Determined Action Steps (Improved Behavioral Control): identifies future-oriented goal     Antonio continues to spend day walking in [place]-occasionally stops and talks for few sentences, but overall avoids conversation-is pleasant in brief interactions-continues to deny all sxs of illness-1430 Antonio post being informed he will be placed on court hold requested evening Zyprexa at 1400-When asked if he was upset, as he appeared anxious, Anderson responded \"I'm fine.\"-post being informed it was too early for evening Zyprexa, but could receive PRN responded, \"I'm not upset. I just want to get it done.\"-pleasant in interactions and accepting of medication guidelines-continued walking in [place]  "

## 2021-10-22 PROCEDURE — 124N000002 HC R&B MH UMMC

## 2021-10-22 PROCEDURE — 250N000009 HC RX 250: Performed by: STUDENT IN AN ORGANIZED HEALTH CARE EDUCATION/TRAINING PROGRAM

## 2021-10-22 PROCEDURE — 250N000013 HC RX MED GY IP 250 OP 250 PS 637

## 2021-10-22 RX ORDER — GINSENG 100 MG
CAPSULE ORAL 3 TIMES DAILY PRN
Status: DISCONTINUED | OUTPATIENT
Start: 2021-10-22 | End: 2021-12-28 | Stop reason: HOSPADM

## 2021-10-22 RX ADMIN — BACITRACIN ZINC: 500 OINTMENT TOPICAL at 13:16

## 2021-10-22 RX ADMIN — Medication 25 MCG: at 15:46

## 2021-10-22 RX ADMIN — OLANZAPINE 40 MG: 20 TABLET, ORALLY DISINTEGRATING ORAL at 15:47

## 2021-10-22 ASSESSMENT — ACTIVITIES OF DAILY LIVING (ADL)
HYGIENE/GROOMING: INDEPENDENT
DRESS: INDEPENDENT
ORAL_HYGIENE: INDEPENDENT

## 2021-10-22 NOTE — PROGRESS NOTES
"      ----------------------------------------------------------------------------------------------------------  [facility]  Psychiatric Progress Note  Hospital Day #52    Anderson Perez MRN# [id number]   Age: 40 year old YOB: 1980   Date of Admission: 8/30/2021     Subjective   The patient was discussed with the treatment team and chart notes were reviewed.      Identifier: Anderson Perez is a 40 year old male with a past psychiatric history of bipolar disorder with psychosis vs schizoaffective disorder bipolar type, antisocial personality disorder, and intellectual disability admitted from the  ER on 08/31/2021 due to concern for out of control behaviors, aggression and psychosis in the context of medication refusal despite Cardoza.    Vitals: VSS and WNL  Sleep:  6.5 hours (10/21/21 0600)  Prescribed Medications: Taken as prescribed with ginger ale  PRN Psychiatric Medications: None given    Overnight Nursing Notes/Staff Report:    Staff notes were reviewed. Patient slept 7 hours. Patient was told he would be placed on court hold as commitment expires today. Patient asked for evening Zyprexa earlier and when asked if he is upset, he responds with \"I'm not upset. I just want to get it done.\" No prns. No acute behavioral events. Pacing hallways. Med compliant. Patient's  See nursing notes for full details.     Patient interview:   Anderson was interviewed in the hallway while he was pacing. Says that he is \"fine.\" He would like medicine for his foot blister. He also talked about his tattoos today and showed the team each one's meaning. He also talked about how his friend passed away when describing one of his tattoos. He also brought up that he has 16 doctorate degrees.       ROS   ROS was negative unless noted above.    Blister on toe.     Objective   Vitals:  /74 (BP Location: Right arm)   Pulse 78   Temp 97.6  F (36.4  C) (Tympanic)   Resp 16   Ht 1.829 m " "(6')   Wt 91.9 kg (202 lb 8 oz)   SpO2 98%   BMI 27.46 kg/m      Mental Status Examination:  Orientation:  Grossly intact  General: alert, awake.   Appearance:  Dressed in hospital scrubs, hair is buzzed short.  Behavior: Pacing the hallway. More conversational today.   Eye Contact:  Good  Psychomotor:  no catatonia present  Speech:  Appropriate volume, and rate  Language: Appears to be fluent in English    Mood:  \"fine\"  Affect:  Generally flat, with minor reactivity; irritability continues to be reduced from time of admission.  Thought Process:  Limited engagement with team during interview; linear and coherent  Thought Content: No AH/VH/SI/HI. Some delusional content   Associations:  None  Insight:  Poor, does not understand why he is here.   Judgment:  Questionable.   Attention Span: limited  Concentration:  limited  Recent and Remote Memory:  Grossly intact  Fund of Knowledge: Suspect lower than average     Allergies     Allergies   Allergen Reactions     Bee Venom Anaphylaxis     Clindamycin Hives     Morphine Hives        Labs & Imaging   No results found for this or any previous visit (from the past 24 hour(s)).     Assessment   Diagnostic Impression:  Anderson Perez is a 40 year old male with a past psychiatric history of bipolar disorder with psychosis vs schizoaffective disorder bipolar type, antisocial personality disorder, and intellectual disability admitted from the  ER on 08/31/2021 due to concern for out of control behaviors, aggression and psychosis in the context of medication refusal despite Cardoza. Patient was recently released from [facility] to a group home. Presentation to the ED via ambulance after police contacted and provisional discharge rescinded. Group home noting escalating aggressive behavior, delusional thinking, and medication refusal despite Cardoza. PT refusal to discuss problems and largely denied all past history, medications, or any need for care.     His reported symptoms of " delusions, psychosis, and erratic/aggressive behavior are consistent with prior presentations of psychosis.  Optimization of medications to target these symptom clusters in addition to determining facility placement after discharge will be targets for treatment during this admission.      Given that he currently has PTA report of out of control behaviors, aggression and psychosis, patient warrants inpatient psychiatric hospitalization to maintain his safety. Disposition pending availability of [facility] placement or other option.    Principal Diagnosis:     Psychosis, Unspecified (Schizoaffective disorder vs Bipolar Affective Disorder with Psychosis)    Secondary psychiatric diagnoses of concern this admission:     Intellectual Disability    Antisocial personality disorder    Psychiatric course:  Anderson Perez was admitted to [facility] following revocation of provisional discharge and on a court hold. His PTA olanzapine 20mg PO QDaily was resumed, as pt had been refusing at group home; per conversation with guardian, had planned to start on GRIGGS, but does not think that this was actually started, as patient was refusing medications, which prompted admission in setting of decompensation. Pt was converted to ODT formulation given concerns for cheeking medications; while initially resistant, pt was agreeable with taking ODT olanzapine as long as it was given with ginger ale, which was accommodated. Refused blood draw to get olanzapine level on 9/13. Olanzapine dose increased to 40mg on 9/21 given limited efficacy at current dose. PRN for agitation/aggression was changed to haloperidol 5mg, lorazepam 2mg, and diphenhydramine 50mg, with PO haloperidol available. 10/05 noticed that he was more approachable and was ready to seat with the team in the conference room. 10/8: stable on current medications; still paced the hallways. 10/11: patient interview in conference room today instead of usually pacing in the hallway.   10/12: patient interview conducted in conference room again. 10/13 patient interview conducted in conference room again. 10/14 interview in conference room. 10/19: no changes in medications. Patient stable on current medications. 10/20: awaiting discharge based on facility availability. 10/22: no medication changes. Stable on current medications.      Anderson Perez continued to meet criteria for inpatient hospitalization medication optimization, inpatient stabilization, and appropriate discharge planning.     Medical course:   Anderson Perez was physically examined by the ED prior to being transferred to the unit and was found to be medically stable and appropriate for admission.      Plan   Today's Changes:     bacitracin for patient's foot blister    CTC to follow up with recommitment process   _______________________________________________________________________  Psychiatric diagnoses and management:  Principal Diagnosis:     Psychosis, Unspecified (Schizoaffective disorder vs Bipolar Affective Disorder with Psychosis)  o Increase olanzapine to 40mg ODT qDaily  o Will consider GRIGGS at later date following initial stabilization    Secondary Psychiatric Diagnoses:     Intellectual disability  o Per , IQ estimated to be 63  o NTD    Antisocial Personality Disorder  o NTD    Additional Planning:    Continue to monitor for and stabilize: aggression, psychosis and disorganization    Patient will be treated in therapeutic milieu with appropriate individual and group therapies as described.    Scheduled Medications (summary):    OLANZapine zydis  40 mg Oral Daily with supper     Vitamin D3  25 mcg Oral Daily       PRN Medications (summary):  acetaminophen, alum & mag hydroxide-simethicone, haloperidol lactate **AND** diphenhydrAMINE **AND** LORazepam, haloperidol, hydrOXYzine, polyethylene glycol, sodium chloride    Discontinued Medications (& Rationale):    None    Consults:    None    Legal  Status:    Committed with Cardoza - expires 11/4 11/1 - recommitment examination    SIO:    No    Pt has not required locked seclusion or restraints in the past 24 hours to maintain safety, please refer to RN documentation for further details.     Disposition:    Pending availability of bed for placement at [facility]. Exploring other options like group homes for him.     Safety Assessment:   Behavioral Orders   Procedures     Cheeking Precautions (behavioral units)     Code 1 - Restrict to Unit     Elopement precautions     He can have his personal clothing     Routine Programming     As clinically indicated     Status 15     Every 15 minutes.      ____________________________________________________________________  Pertinent Medical diagnoses and management:    Chronic Hepatitis C  o Continue to monitor  ____________________________________________________________________  Patient seen and discussed with attending physician, Dr. Fiona MD, who is in agreement with my assessment and plan.    Paige Phelan, MS3  [employer]    Resident Attestation:   I was present with the medical student who participated in the service and in the documentation of the note.  I have verified the history and personally performed the physical exam, mental status exam, and medical decision making. I agree with the assessment and plan of care as documented in the note.    Mary Nix MD  10/22/2021 10:46 PM  PGY-1 Psychiatry Resident  Pager: [phone]    Attending Attestation:  I, Margareth Fang M.D., saw and evaluated the patient with the medical student and the resident physician. I have reviewed all labs and vital signs. I have reviewed the patient's care with the multidisciplinary treatment team, both before and after the interview. I have reviewed the above progress note, which reflects the team's treatment plan appropriately.

## 2021-10-22 NOTE — PLAN OF CARE
Problem: Behavioral Disturbance  Goal: Behavioral Disturbance  Description: Signs and symptoms of listed problems will be absent or manageable by discharge or transition of care.  Outcome: Improving   Patient has been pacing the dalal majority of shift. Denies depression, anxiety and hallucinations. Has a blister on left baby toe requesting salve for it. Bacitracin ordered and applied with Band-Aid.

## 2021-10-22 NOTE — PLAN OF CARE
Nursing Plan of care   Problem: Behavior Regulation Impairment (Psychotic Signs/Symptoms)  Goal: Improved Behavioral Control (Psychotic Signs/Symptoms)  Outcome: No Change  Flowsheets (Taken 10/21/2021 2028)  Mutually Determined Action Steps (Improved Behavioral Control): other (see comments)  Pt has been visible in the milieu and spent the shift pacing in the hallway. Intermittently makes short comments about staff/peers conversation and ask for other pts name he didn't met before; requested and received medication without reminder; mostly calm and kept to himself; ate dinner; good appetite; no medication side effect was reported or noted; retired to bed around 2000 as usual. Will continue to monitor and assess.

## 2021-10-22 NOTE — PLAN OF CARE
Assessment/Intervention/Current Symptoms and Care Coordination    Attended team meeting and reviewed chart notes.    Writer gave pt his recommit paperwork. He was not very happy with being recommitted. Writer also spoke with pt later about his new exam date and time. He said he was not going to attend.    Pt approached writer stating he would like to return to either [facility] group home in \[facility]"" or [facility] in [place]. Writer relayed this information to [facility]. Writer also asked how the CADI funding was going and if there were any leads on a group home.            Discharge Plan or Goal  To group home    Pt on the list for [facility]      Barriers to Discharge   Pt needs CADI waiver then needs to be accepted to group home      Referral Status  None made        Legal Status  Pt is committed with Cardoza order

## 2021-10-23 PROCEDURE — 124N000002 HC R&B MH UMMC

## 2021-10-23 PROCEDURE — 250N000013 HC RX MED GY IP 250 OP 250 PS 637

## 2021-10-23 RX ADMIN — OLANZAPINE 40 MG: 20 TABLET, ORALLY DISINTEGRATING ORAL at 16:30

## 2021-10-23 RX ADMIN — Medication 25 MCG: at 16:30

## 2021-10-23 NOTE — PLAN OF CARE
Pt appeared to sleep 6.75 hours. Pt up to request juice, affect blunted. No PRNs given or requested. No medical or behavioral events this shift.      Problem: Sleep Disturbance  Goal: Adequate Sleep/Rest  Outcome: Declining

## 2021-10-23 NOTE — PLAN OF CARE
Problem: Behavioral Health Plan of Care  Goal: Plan of Care Review  Recent Flowsheet Documentation  Taken 10/22/2021 2000 by Shekhar Walls RN  Plan of Care Reviewed With: patient  Patient Agreement with Plan of Care: agrees     Patient still pacing dalal, but not as vigorously as before.  Pt interacting with staff and peers,  smiling at times.  Pt admits to feeling anxious and disappointed that he was not discharged today.

## 2021-10-23 NOTE — PLAN OF CARE
Problem: Behavioral Health Plan of Care  Goal: Plan of Care Review  Outcome: Improving  Flowsheets (Taken 10/23/2021 1031)  Plan of Care Reviewed With: patient  Progress: improving  Patient Agreement with Plan of Care: (not fully in agreement with DX and TX) agrees with comment (describe)     Problem: Behavior Regulation Impairment (Manic or Hypomanic Signs/Symptoms)  Goal: Improved Impulse Control (Manic/Hypomanic Signs/Symptoms)  Outcome: Improving  Flowsheets (Taken 10/23/2021 1031)  Mutually Determined Action Steps (Improved Impulse Control): (Mood euthymic-calm and able to accept delayed DC) other (see comments)     Problem: Behavior Regulation Impairment (Psychotic Signs/Symptoms)  Goal: Improved Behavioral Control (Psychotic Signs/Symptoms)  Outcome: Improving    Antonio continues walking in dalal-often, but not constantly, moving lips as though in conversation-occ brief interactions with staff and peers, but quickly returns to walking in the dalal-pleasant in interactions-joking with select staff

## 2021-10-24 PROCEDURE — 250N000013 HC RX MED GY IP 250 OP 250 PS 637

## 2021-10-24 PROCEDURE — 124N000002 HC R&B MH UMMC

## 2021-10-24 RX ADMIN — BACITRACIN ZINC: 500 OINTMENT TOPICAL at 08:58

## 2021-10-24 RX ADMIN — OLANZAPINE 40 MG: 20 TABLET, ORALLY DISINTEGRATING ORAL at 16:16

## 2021-10-24 RX ADMIN — Medication 25 MCG: at 16:16

## 2021-10-24 ASSESSMENT — ACTIVITIES OF DAILY LIVING (ADL)
LAUNDRY: WITH SUPERVISION
ORAL_HYGIENE: INDEPENDENT
HYGIENE/GROOMING: INDEPENDENT
DRESS: INDEPENDENT

## 2021-10-24 ASSESSMENT — MIFFLIN-ST. JEOR: SCORE: 1875.62

## 2021-10-24 NOTE — PLAN OF CARE
Problem: Behavioral Health Plan of Care  Goal: Plan of Care Review  Outcome: Improving  Flowsheets (Taken 10/24/2021 0915)  Plan of Care Reviewed With: patient  Progress: improving  Patient Agreement with Plan of Care: agrees     Problem: Mood Impairment (Manic or Hypomanic Signs/Symptoms)  Goal: Improved Mood Symptoms (Manic/Hypomanic Signs/Symptoms)  Outcome: Improving  Flowsheets (Taken 10/24/2021 0915)  Mutually Determined Action Steps (Improved Mood Symptoms): acknowledges progress     Problem: Behavior Regulation Impairment (Psychotic Signs/Symptoms)  Goal: Improved Behavioral Control (Psychotic Signs/Symptoms)  Outcome: Improving     Anderson NASH at beginning of shift, walking in dalal-pleasant in interactions-[id number] Blister left small toe cleaned, Bacitracin and Bandaid applied-area healing well-Anderson agrees to walk in hospital slippers and refrain from using sandals which are rubbing on toes and top of feet-Anderson periodically engaging in brief appropriate social conversation-tolerating medication well

## 2021-10-24 NOTE — PLAN OF CARE
Problem: Behavioral Health Plan of Care  Goal: Plan of Care Review  Recent Flowsheet Documentation  Taken 10/23/2021 2000 by Shekhar Walls RN  Plan of Care Reviewed With: patient  Patient Agreement with Plan of Care: agrees     Patient pacing dalal much of shift, but stopping to engage with staff and peers.   Patient still anxious for discharge, and assured writer that he would continue meds. After discharge.

## 2021-10-25 PROCEDURE — 99232 SBSQ HOSP IP/OBS MODERATE 35: CPT | Mod: GC | Performed by: PSYCHIATRY & NEUROLOGY

## 2021-10-25 PROCEDURE — 250N000013 HC RX MED GY IP 250 OP 250 PS 637

## 2021-10-25 PROCEDURE — 124N000002 HC R&B MH UMMC

## 2021-10-25 PROCEDURE — 250N000013 HC RX MED GY IP 250 OP 250 PS 637: Performed by: STUDENT IN AN ORGANIZED HEALTH CARE EDUCATION/TRAINING PROGRAM

## 2021-10-25 RX ORDER — SALIVA STIMULANT COMB. NO.3
1 SPRAY, NON-AEROSOL (ML) MUCOUS MEMBRANE 4 TIMES DAILY PRN
Status: DISCONTINUED | OUTPATIENT
Start: 2021-10-25 | End: 2021-12-28 | Stop reason: HOSPADM

## 2021-10-25 RX ADMIN — Medication 1 SPRAY: at 14:43

## 2021-10-25 RX ADMIN — Medication 25 MCG: at 17:34

## 2021-10-25 RX ADMIN — OLANZAPINE 40 MG: 20 TABLET, ORALLY DISINTEGRATING ORAL at 17:34

## 2021-10-25 NOTE — PLAN OF CARE
BEHAVIORAL TEAM DISCUSSION    Participants: Dr. Isaacs, residents Mary Nix and John Huitron, medical students Alfonso, RN Natacha Saini, CTC Siobhan Irving  Progress: moderate improvement, pt might be at baseline  Anticipated length of stay: until group home is available  Continued Stay Criteria/Rationale: pt is committed with Cardoza order  Medical/Physical: blisters on his feet  Precautions:   Behavioral Orders   Procedures     Cheeking Precautions (behavioral units)     Code 1 - Restrict to Unit     Elopement precautions     He can have his personal clothing     Routine Programming     As clinically indicated     Status 15     Every 15 minutes.     Plan: provide a psychological assessment and manage medications per psychiatry, staff to provide a safe and therapeutic milieu, CTC to coordinate discharge planning with [facility] with [facility]  Rationale for change in precautions or plan: no change

## 2021-10-25 NOTE — PLAN OF CARE
Problem: Adult Inpatient Plan of Care  Goal: Plan of Care Review  Recent Flowsheet Documentation  Taken 10/24/2021 2000 by Shekhar Walls RN  Plan of Care Reviewed With: patient     Problem: Behavioral Health Plan of Care  Goal: Plan of Care Review  Recent Flowsheet Documentation  Taken 10/24/2021 2000 by Shekhar Walls RN  Plan of Care Reviewed With: patient  Patient Agreement with Plan of Care: agrees     Patient pacing dalal much of shift, but engages in conversation intermittently.   Pt vehemently assures writer that he will continue meds after discharge.  Pt anxious for discharge.

## 2021-10-25 NOTE — PLAN OF CARE
Problem: Behavioral Health Plan of Care  Goal: Plan of Care Review  Outcome: Improving  Flowsheets (Taken 10/25/2021 1237)  Plan of Care Reviewed With: patient  Progress: improving  Patient Agreement with Plan of Care: (thinks hospitalization is unnecessary) agrees with comment (describe)     Problem: Social Isolation  Goal: Increased Social Interaction  Outcome: Improving     Problem: Behavior Regulation Impairment (Psychotic Signs/Symptoms)  Goal: Improved Behavioral Control (Psychotic Signs/Symptoms)  Outcome: Improving     Anderson active on unit throughout day-more spontaneous interactions with staff and peers-saying goodbye to peers as they discharge-declined offer to wear personal clothes because he could not have belt-declined offer made to use arm bands on belt loops to secure pants-declined Covid 19 test-continues odd and freq illogical comments, but no anger or irritability, even when expressing disappointment he remains hospitalized-Antonio states he is not having problems with current Zyprexa dose, in fact likes it because it helps him relax-Antonio accepted Biotene spray one spray at 1438 for complaints of dry mouth-1520 Reports effective relief of dry mouth

## 2021-10-25 NOTE — PLAN OF CARE
Problem: Adult Inpatient Plan of Care  Goal: Plan of Care Review  Recent Flowsheet Documentation  Taken 10/25/2021 1800 by Shekhar Walls RN  Plan of Care Reviewed With: patient      Patient again pacing calmly in dalal; and , again, stopping to interact with staff and peers.  Patient hopes to discharge soon, and again assures writer that he will continue medication after leaving.

## 2021-10-25 NOTE — PROGRESS NOTES
"    ----------------------------------------------------------------------------------------------------------  [facility], [place]   Psychiatric Progress Note  Hospital Day #55    Identifier: Anderson Perez is a 40 year old male with a past psychiatric history of bipolar disorder with psychosis vs schizoaffective disorder bipolar type, antisocial personality disorder, and intellectual disability admitted from the  ER on 08/31/2021 due to concern for out of control behaviors, aggression and psychosis in the context of medication refusal despite Cardoza.     Interim History:   The patient's care was discussed with the treatment team and chart notes were reviewed.    Vitals: VSS  Sleep: 6.75 hours (10/25/21 0600)  Scheduled medications: Took all scheduled medications as prescribed  PRN medications: No psychiatric prns given    Interim events: No events    Staff Report:   \"Anderson NASH at beginning of shift, walking in dalal-pleasant in interactions-[id number] Blister left small toe cleaned, Bacitracin and Bandaid applied-area healing well-Anderson agrees to walk in hospital slippers and refrain from using sandals which are rubbing on toes and top of feet-Anderson periodically engaging in brief appropriate social conversation-tolerating medication well\"    \"pleasant in interactions-joking with select staff\"     Subjective:     Patient Interview:  Anderson Perez was interviewed in the hallway. He is walking a marathon, as he does daily. Reports \"I have the body of an olympic athlete. I don't sit still because you get blood clots and stuff.\" He has been noticing a dry mouth, especially in the mornings. Foot blister has improved. Asks to wear his outside clothes.     The risks, benefits, alternatives and side effects of any medication changes have been discussed and are understood by the patient and other caregivers.    Review of systems:   Patient has no bothersome physical symptoms  Patient denies " "acute concerns      Objective:   /76   Pulse 79   Temp 97.1  F (36.2  C) (Tympanic)   Resp 14   Ht 1.829 m (6')   Wt 92.3 kg (203 lb 6.4 oz)   SpO2 97%   BMI 27.59 kg/m    Weight is 203 lbs 6.4 oz  Body mass index is 27.59 kg/m .    Mental Status Exam:  Orientation:  Grossly intact  General: alert, awake.   Appearance:  Dressed in hospital scrubs, hair is buzzed short.  Behavior: Pacing the hallway. Cooperative, calm  Eye Contact:  Good  Psychomotor:  no catatonia present. Walks all day long  Speech:  Appropriate volume, and rate  Language: Appears to be fluent in English    Mood:  \"okay\"  Affect:  Generally flat, with minor reactivity; irritability continues to be reduced from time of admission.  Thought Process:  Limited engagement with team during interview; linear and coherent  Thought Content: No AH/VH/SI/HI. Some delusional content   Associations:  None  Insight:  Poor, does not understand why he is here.   Judgment:  Questionable.   Attention Span: limited  Concentration:  limited  Recent and Remote Memory:  Grossly intact  Fund of Knowledge: Historical diagnosis of intellectual disability (est. IQ 63)        Allergies:     Allergies   Allergen Reactions     Bee Venom Anaphylaxis     Clindamycin Hives     Morphine Hives        Labs:   None new.     Assessment    Primary psychiatric diagnosis:   Psychosis, Unspecified (Schizoaffective disorder vs Bipolar Affective Disorder with Psychosis)    Secondary psychiatric diagnoses of concern this admission:   Intellectual disability  Antisocial personality disorder     Diagnostic Impression: Anderson Perez is a 40 year old male with a past psychiatric history of bipolar disorder with psychosis vs schizoaffective disorder bipolar type, antisocial personality disorder, and intellectual disability admitted from the  ER on 08/31/2021 due to concern for out of control behaviors, aggression and psychosis in the context of medication refusal despite " Cardoza. Patient was recently [facility] from [facility] to a group home. Presentation to the ED via ambulance after police contacted and provisional discharge rescinded. Group home noting escalating aggressive behavior, delusional thinking, and medication refusal despite Cardoza. PT refusal to discuss problems and largely denied all past history, medications, or any need for care. His reported symptoms of delusions, psychosis, and erratic/aggressive behavior are consistent with prior presentations of psychosis.      Psychiatric Hospital course: Anderson Perez was admitted to [facility] following revocation of provisional discharge and on a court hold. His PTA olanzapine 20mg PO QDaily was resumed, as pt had been refusing at group home; per conversation with guardian, had planned to start on GRIGGS, but does not think that this was actually started, as patient was refusing medications, which prompted admission in setting of decompensation. Pt was converted to ODT formulation given concerns for cheeking medications; while initially resistant, pt was agreeable with taking ODT olanzapine as long as it was given with ginger ale, which was accommodated. Olanzapine dose increased to 40mg on 9/21 given limited efficacy at current dose. PRN for agitation/aggression was changed to haloperidol 5mg, lorazepam 2mg, and diphenhydramine 50mg, with PO haloperidol available. 10/05 noticed that he was more approachable and was ready to seat with the team in the conference room. Patient has since been stable on current medications, awaiting placement.    Medical course: Anderson Perez was physically examined by the ED prior to being transferred to the unit and was found to be medically stable and appropriate for admission.     Plan   Principal Diagnosis:   # Psychosis, Unspecified (Schizoaffective disorder vs Bipolar Affective Disorder with Psychosis)    Secondary psychiatric diagnoses of concern this admission:   # Intellectual  disability  # Antisocial personality disorder     Psychotropic Medications:  Modify:  No changes    Continue Scheduled:  -Olanzapine ODT 40mg qPM    Continue PRN:  -Acetaminophen 650 mg Q6H PRN for pain and fever  -Hydroxyzine 25 mg Q6H PRN for anxiety  -Haldol 5mg prn q6h for agitation  -Haldol 5mg + diphenhydramine 50mg + lorazepam 2mg prn for severe agitation/psychosis  -Maalox 30 ml Q4H PRN for indigestion  -Miralax prn for constipation    Additional Plans:  -Patient will be treated in therapeutic milieu with appropriate individual and group therapies as described.  -Consider GRIGGS in the future    Medical diagnoses to be addressed this admission:    # Chronic Hep C  NTD    #Dry mouth  -Biotene started 10/25    #Foot blister/lesion  -Bacitracin ointment prn    Consults: none    Legal Status: Cardoza  Committed     Safety Assessment:   Behavioral Orders   Procedures     Cheeking Precautions (behavioral units)     Code 1 - Restrict to Unit     Elopement precautions     He can have his personal clothing     Routine Programming     As clinically indicated     Status 15     Every 15 minutes.       SIO: No    Disposition: Pending bed availability at [facility] or group home. Pending stabilization, medication optimization, & development of a safe discharge plan.    Mary Nix MD  10/25/2021 9:30 AM  PGY-1 Psychiatry Resident  Pager: [phone]    Attending Attestation:  This patient has been seen and evaluated by me, Glenn Isaacs.  I have discussed this patient with the psychiatry resident and I agree with the findings and plan in this note.    I have reviewed today's vital signs, medications, labs and imaging.     Glenn Adhikari MD on 10/25/2021 at 9:20 PM

## 2021-10-25 NOTE — PLAN OF CARE
Assessment/Intervention/Current Symptoms and Care Coordination    Attended team meeting and reviewed chart notes.    Writer left a message with pt's CCM Laurel regarding how discharge plans are progressing.      Discharge Plan or Goal  To group home  [facility]        Barriers to Discharge   Pt needs to be accepted to group home      Referral Status  [facility]        Legal Status  Committed with harper order

## 2021-10-26 PROCEDURE — 250N000013 HC RX MED GY IP 250 OP 250 PS 637

## 2021-10-26 PROCEDURE — 124N000002 HC R&B MH UMMC

## 2021-10-26 PROCEDURE — 99232 SBSQ HOSP IP/OBS MODERATE 35: CPT | Mod: GC | Performed by: PSYCHIATRY & NEUROLOGY

## 2021-10-26 PROCEDURE — 250N000013 HC RX MED GY IP 250 OP 250 PS 637: Performed by: STUDENT IN AN ORGANIZED HEALTH CARE EDUCATION/TRAINING PROGRAM

## 2021-10-26 RX ORDER — PROPRANOLOL HYDROCHLORIDE 10 MG/1
10 TABLET ORAL 2 TIMES DAILY
Status: DISCONTINUED | OUTPATIENT
Start: 2021-10-26 | End: 2021-10-28 | Stop reason: CLARIF

## 2021-10-26 RX ADMIN — Medication 1 SPRAY: at 11:27

## 2021-10-26 RX ADMIN — Medication 1 SPRAY: at 15:29

## 2021-10-26 RX ADMIN — PROPRANOLOL HYDROCHLORIDE 10 MG: 10 TABLET ORAL at 20:56

## 2021-10-26 RX ADMIN — PROPRANOLOL HYDROCHLORIDE 10 MG: 10 TABLET ORAL at 12:18

## 2021-10-26 RX ADMIN — Medication 25 MCG: at 16:16

## 2021-10-26 RX ADMIN — OLANZAPINE 40 MG: 20 TABLET, ORALLY DISINTEGRATING ORAL at 16:16

## 2021-10-26 ASSESSMENT — ACTIVITIES OF DAILY LIVING (ADL): HYGIENE/GROOMING: INDEPENDENT

## 2021-10-26 NOTE — PLAN OF CARE
"Shift update: Anderson continues to pace throughout the day. He says \"I walk a marathon every day... I walk because I want to leave here.\" He engages in conversation, and then he said that he was kicked out of here and  banned from ([place]) last time he was here for having sex with staff members. He fixated on this for a about 5 minutes. Denies intent or urges sexually, monitoring.     Took first dose of propanolol to target potential akathesia around noon. Still pacing after the dose, but maybe a little slower.     Mood/Affect: Anxious to leave, fixated on sexual content, stopped when asked to stop discussing this subject.     Hallucinations/Psychosis: Denies - has some paranoia or delusional thinking    Activity/Participation: Does not want to participate in group   PRN Meds: Biotene for dry mouth - says it helps  Appetite: good     Medical: toe blister no longer bothering patient.        "

## 2021-10-26 NOTE — PLAN OF CARE
Assessment/Intervention/Current Symptoms and Care Coordination    Attended team meeting and reviewed chart notes.    Writer spoke with [facility]  Deja regarding placement. Deja has called [facility] to see if the they can complete the waiver and what would be the wait in date and time. She will keep writer posted. If [place] is out a ways Deja stated she can do the waiver. Pt is suppose to complete waiver in the county they are in.         Discharge Plan or Goal  To group home        Barriers to Discharge   Needs CADI funding and placement      Referral Status  Deja has been making some calls        Legal Status  Committed with harper order

## 2021-10-26 NOTE — PROGRESS NOTES
"    ----------------------------------------------------------------------------------------------------------  [facility], [place]   Psychiatric Progress Note  Hospital Day #56    Identifier: Anderson Perez is a 40 year old male with a past psychiatric history of bipolar disorder with psychosis vs schizoaffective disorder bipolar type, antisocial personality disorder, and intellectual disability admitted from the ER on 08/31/2021 due to concern for out of control behaviors, aggression and psychosis in the context of medication refusal despite Cardoza.     Interim History:   The patient's care was discussed with the treatment team and chart notes were reviewed.    Vitals: VSS  Sleep: 6.5 hours (10/26/21 0600)  Scheduled medications: Took all scheduled medications as prescribed  PRN medications: No psychiatric prns given    Interim events: No events    Staff Report:   -\"active on unit throughout day-more spontaneous interactions with staff and peers-saying goodbye to peers as they discharge\"  -\"continues odd and freq illogical comments, but no anger or irritability, even when expressing disappointment he remains hospitalized\"  -biotene helped dry mouth     Subjective:     Patient Interview:  Anderson Perez was interviewed in the hallway. Stated \"My only question today is where I'm gonna go from here.\" Declined to conversation much further.     The risks, benefits, alternatives and side effects of any medication changes have been discussed and are understood by the patient and other caregivers.    Review of systems:   Patient has no bothersome physical symptoms  Patient denies acute concerns    Objective:   /73 (BP Location: Left arm)   Pulse 89   Temp 96.9  F (36.1  C) (Tympanic)   Resp 14   Ht 1.829 m (6')   Wt 92.3 kg (203 lb 6.4 oz)   SpO2 97%   BMI 27.59 kg/m    Weight is 203 lbs 6.4 oz  Body mass index is 27.59 kg/m .    Mental Status Exam:   Orientation:  Grossly " "intact  General: alert, awake.   Appearance:  Dressed in hospital scrubs, hair is buzzed short.  Behavior: Pacing the hallway. Calm, disengaged  Eye Contact: Adequate  Psychomotor:  No catatonia present. Walks all day long  Speech:  Appropriate volume, and rate  Language: Appears to be fluent in English  Mood:  \"I only have one question\"  Affect:  Generally blunted, with minimal reactivity; irritability continues to be reduced from time of admission.  Thought Process:  Limited engagement with team during interview; linear and coherent  Thought Content: No AH/VH/SI/HI. No delusional content   Associations: Difficult to assess due to limited interview  Insight:  Historically poor, does not understand why he is here.   Judgment:  Good - adherent to medications.   Attention Span: limited  Concentration:  limited  Recent and Remote Memory:  Grossly intact  Fund of Knowledge: Historical diagnosis of intellectual disability (est. IQ 63)  Gait: normal       Allergies:     Allergies   Allergen Reactions     Bee Venom Anaphylaxis     Clindamycin Hives     Morphine Hives        Labs:   None new.     Assessment    Primary psychiatric diagnosis:   Psychosis, Unspecified (Schizoaffective disorder vs Bipolar Affective Disorder with Psychosis)    Secondary psychiatric diagnoses of concern this admission:   Intellectual disability  Antisocial personality disorder     Diagnostic Impression: Anderson Perez is a 40 year old male with a past psychiatric history of bipolar disorder with psychosis vs schizoaffective disorder bipolar type, antisocial personality disorder, and intellectual disability admitted from the  ER on 08/31/2021 due to concern for out of control behaviors, aggression and psychosis in the context of medication refusal despite Cardoza. Patient was recently released from [facility] to a group home. Presentation to the ED via ambulance after police contacted and provisional discharge rescinded. Group home noting escalating " aggressive behavior, delusional thinking, and medication refusal despite Cardoza. PT refusal to discuss problems and largely denied all past history, medications, or any need for care. His reported symptoms of delusions, psychosis, and erratic/aggressive behavior are consistent with prior presentations of psychosis.    Psychiatric Hospital course: Anderson Perez was admitted to [facility] following revocation of provisional discharge and on a court hold. His PTA olanzapine 20mg PO QDaily was resumed, as pt had been refusing at group home; per conversation with guardian, had planned to start on GRIGGS, but does not think that this was actually started, as patient was refusing medications, which prompted admission in setting of decompensation. Pt was converted to ODT formulation given concerns for cheeking medications; while initially resistant, pt was agreeable with taking ODT olanzapine as long as it was given with ginger ale, which was accommodated. Olanzapine dose increased to 40mg on 9/21 given limited efficacy at current dose. PRN for agitation/aggression was changed to haloperidol 5mg, lorazepam 2mg, and diphenhydramine 50mg, with PO haloperidol available. 10/05 noticed that he was more approachable and was ready to seat with the team in the conference room. Patient has since been stable on current medications, awaiting placement. He does pace the hallways almost nonstop, so propanolol 10mg BID was started out of concern for possible akathisia on 10/26.    Medical course: Anderson Perez was physically examined by the ED prior to being transferred to the unit and was found to be medically stable and appropriate for admission.     Plan   Principal Diagnosis:   # Psychosis, Unspecified (Schizoaffective disorder vs Bipolar Affective Disorder with Psychosis)    Secondary psychiatric diagnoses of concern this admission:   # Intellectual disability  # Antisocial personality disorder     Psychotropic  Medications:  Modify:  -Start propanolol 10mg BID for possible akathisia    Continue Scheduled:  -Olanzapine ODT 40mg qPM    Continue PRN:  -Acetaminophen 650 mg Q6H PRN for pain and fever  -Hydroxyzine 25 mg Q6H PRN for anxiety  -Haldol 5mg prn q6h for agitation  -Haldol 5mg + diphenhydramine 50mg + lorazepam 2mg prn for severe agitation/psychosis  -Maalox 30 ml Q4H PRN for indigestion  -Miralax prn for constipation    Additional Plans:  -Patient will be treated in therapeutic milieu with appropriate individual and group therapies as described.  -Consider GRIGGS in the future    Medical diagnoses to be addressed this admission:    # Chronic Hep C  NTD    #Dry mouth  -Biotene started 10/25    #Foot blister/lesion, resolving  -Bacitracin ointment prn    Consults: none    Legal Status: Cardoza  Committed   Legal guardian    Safety Assessment:   Behavioral Orders   Procedures     Cheeking Precautions (behavioral units)     Code 1 - Restrict to Unit     Routine Programming     As clinically indicated     Status 15     Every 15 minutes.     SIO: No    Disposition: Pending bed availability at [facility] or group home. Pending stabilization, medication optimization, & development of a safe discharge plan.    This patient was seen and discussed with my attending physician.  Mary Nix MD  PGY-1 Psychiatry Resident  Pager: [phone]      This patient has been seen and evaluated by me, Glenn Isaacs.  I have discussed this patient with the psychiatry resident and I agree with the findings and plan in this note.    I have reviewed today's vital signs, medications, labs and imaging.     Glenn Adhikari MD on 10/26/2021 at 6:32 PM

## 2021-10-27 PROCEDURE — 250N000013 HC RX MED GY IP 250 OP 250 PS 637: Performed by: STUDENT IN AN ORGANIZED HEALTH CARE EDUCATION/TRAINING PROGRAM

## 2021-10-27 PROCEDURE — 250N000013 HC RX MED GY IP 250 OP 250 PS 637

## 2021-10-27 PROCEDURE — 99233 SBSQ HOSP IP/OBS HIGH 50: CPT | Mod: GC | Performed by: PSYCHIATRY & NEUROLOGY

## 2021-10-27 PROCEDURE — 124N000002 HC R&B MH UMMC

## 2021-10-27 RX ADMIN — OLANZAPINE 40 MG: 20 TABLET, ORALLY DISINTEGRATING ORAL at 17:26

## 2021-10-27 RX ADMIN — Medication 1 SPRAY: at 15:56

## 2021-10-27 RX ADMIN — PROPRANOLOL HYDROCHLORIDE 10 MG: 10 TABLET ORAL at 09:34

## 2021-10-27 RX ADMIN — Medication 1 SPRAY: at 09:22

## 2021-10-27 RX ADMIN — Medication 25 MCG: at 17:26

## 2021-10-27 RX ADMIN — PROPRANOLOL HYDROCHLORIDE 10 MG: 10 TABLET ORAL at 20:57

## 2021-10-27 RX ADMIN — Medication 1 SPRAY: at 13:49

## 2021-10-27 ASSESSMENT — ACTIVITIES OF DAILY LIVING (ADL)
LAUNDRY: WITH SUPERVISION
HYGIENE/GROOMING: INDEPENDENT
ORAL_HYGIENE: INDEPENDENT
DRESS: INDEPENDENT

## 2021-10-27 NOTE — PLAN OF CARE
Assessment/Intervention/Current Symptoms and Care Coordination    Attended team meeting and reviewed chart notes.    Writer checked in with pt to say nothing is new at this time. Writer will call [facility] ([phone]) to see how things are progressing.        Discharge Plan or Goal  Discharge to group home      Barriers to Discharge   Needs placement and CADI waiver      Referral Status  None made        Legal Status  Committed with Cardoza order

## 2021-10-27 NOTE — PROGRESS NOTES
----------------------------------------------------------------------------------------------------------  [facility], [place]   Psychiatric Progress Note  Hospital Day #57    Identifier: Anderson Perez is a 40 year old male with a past psychiatric history of bipolar disorder with psychosis vs schizoaffective disorder bipolar type, antisocial personality disorder, and intellectual disability admitted from the ER on 08/31/2021 due to concern for out of control behaviors, aggression and psychosis in the context of medication refusal despite Cardoza.     Interim History:   The patient's care was discussed with the treatment team and chart notes were reviewed.    Vitals: VSS  Sleep: 6.5 hours (10/27/21 0551)  Scheduled medications: Took all scheduled medications as prescribed  PRN medications: No psychiatric prns given    Interim events: No events    Staff Report:   -declined KATHIA Garcia, who introduced herself as his gf, called but patient declined  -told staff he was banned from [place] for having sex with staff members     Subjective:     Patient Interview:  Anderson Perez was interviewed in the hallway. Wonders when he'll be able to discharge home. Reports taking propanolol without issue. Does endorse some dizziness this morning, but doesn't think it's related to the medication. Endorses good dry mouth relief with biotene.     Later in the day he was seen in the hallway walking, and discussed his past social history, growing up in [facility] [place] [facility], graduating from high school, and working in his early 20's.  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  He also lived on [place] farm [place]  in [place] for 3 years, and enjoyed this very much but left because he felt to isolated.  He was very pleasant and conversant, and noted significant distress being on a locked unit, and  "generally prefers to be outdoors.       The risks, benefits, alternatives and side effects of any medication changes have been discussed and are understood by the patient and other caregivers.    Review of systems:   Patient has no bothersome physical symptoms  Patient denies acute concerns    Objective:   BP 93/61 (BP Location: Left arm)   Pulse 72   Temp 97  F (36.1  C) (Tympanic)   Resp 14   Ht 1.829 m (6')   Wt 92.3 kg (203 lb 6.4 oz)   SpO2 100%   BMI 27.59 kg/m    Weight is 203 lbs 6.4 oz  Body mass index is 27.59 kg/m .    Mental Status Exam:   Orientation:  Grossly intact  General: alert, awake.   Appearance:  Dressed in hospital scrubs, hair is buzzed short.  Behavior: Pacing the hallway. Calm, cooperative  Eye Contact: Adequate  Psychomotor:  No catatonia present. Walks all day long - concerning for possible akathisia  Speech:  Appropriate volume, and rate  Language: Appears to be fluent in English  Mood:  \"good\"  Affect:  Generally blunted, with minimal reactivity  Thought Process:  Limited engagement with team during interview; linear and coherent  Thought Content: No AH/VH/SI/HI. No delusional content   Associations: Difficult to assess due to limited interview  Insight:  Historically poor, does not understand why he is here.   Judgment:  Good - adherent to medications.   Attention Span: limited  Concentration:  limited  Recent and Remote Memory:  Grossly intact  Fund of Knowledge: Historical diagnosis of intellectual disability (est. IQ 63)  Gait: normal       Allergies:     Allergies   Allergen Reactions     Bee Venom Anaphylaxis     Clindamycin Hives     Morphine Hives        Labs:   None new.     Assessment    Primary psychiatric diagnosis:   Psychosis, Unspecified (Schizoaffective disorder vs Bipolar Affective Disorder with Psychosis)    Secondary psychiatric diagnoses of concern this admission:   Intellectual disability  Antisocial personality disorder     Diagnostic Impression: Anderson CHI" Chris is a 40 year old male with a past psychiatric history of bipolar disorder with psychosis vs schizoaffective disorder bipolar type, antisocial personality disorder, and intellectual disability admitted from the  ER on 08/31/2021 due to concern for out of control behaviors, aggression and psychosis in the context of medication refusal despite Cardoza. Patient was recently [facility] from [facility] to a group home. Presentation to the ED via ambulance after police contacted and provisional discharge rescinded. Group home noting escalating aggressive behavior, delusional thinking, and medication refusal despite Cardoza. PT refusal to discuss problems and largely denied all past history, medications, or any need for care. His reported symptoms of delusions, psychosis, and erratic/aggressive behavior are consistent with prior presentations of psychosis.    Psychiatric Hospital course: Anderson Perez was admitted to [facility] following revocation of provisional discharge and on a court hold. His PTA olanzapine 20mg PO QDaily was resumed, as pt had been refusing at group home; per conversation with guardian, had planned to start on GRIGGS, but does not think that this was actually started, as patient was refusing medications, which prompted admission in setting of decompensation. Pt was converted to ODT formulation given concerns for cheeking medications; while initially resistant, pt was agreeable with taking ODT olanzapine as long as it was given with ginger ale, which was accommodated. Olanzapine dose increased to 40mg on 9/21 given limited efficacy at current dose. PRN for agitation/aggression was changed to haloperidol 5mg, lorazepam 2mg, and diphenhydramine 50mg, with PO haloperidol available. 10/05 noticed that he was more approachable and was ready to seat with the team in the conference room. Patient has since been stable on current medications, awaiting placement. He does pace the hallways almost nonstop, so  propanolol 10mg BID was started out of concern for possible akathisia on 10/26.    Medical course: Anderson Perez was physically examined by the ED prior to being transferred to the unit and was found to be medically stable and appropriate for admission.     Plan   Principal Diagnosis:   # Psychosis, Unspecified (Schizoaffective disorder vs Bipolar Affective Disorder with Psychosis)    Secondary psychiatric diagnoses of concern this admission:   # Intellectual disability  # Antisocial personality disorder     Psychotropic Medications:  Modify:  -Start propanolol 10mg BID for possible akathisia    Continue Scheduled:  -Olanzapine ODT 40mg qPM    Continue PRN:  -Acetaminophen 650 mg Q6H PRN for pain and fever  -Hydroxyzine 25 mg Q6H PRN for anxiety  -Haldol 5mg prn q6h for agitation  -Haldol 5mg + diphenhydramine 50mg + lorazepam 2mg prn for severe agitation/psychosis  -Maalox 30 ml Q4H PRN for indigestion  -Miralax prn for constipation    Additional Plans:  -Patient will be treated in therapeutic milieu with appropriate individual and group therapies as described.  -Consider GRIGGS in the future    Medical diagnoses to be addressed this admission:    # Chronic Hep C  NTD    #Dry mouth  -Biotene started 10/25    #Foot blister/lesion, resolving  -Bacitracin ointment prn    Consults: none    Legal Status: Cardoza  Committed   Legal guardian    Safety Assessment:   Behavioral Orders   Procedures     Cheeking Precautions (behavioral units)     Code 1 - Restrict to Unit     Routine Programming     As clinically indicated     Status 15     Every 15 minutes.     SIO: No    Disposition: Pending bed availability at [facility] or group home. Pending stabilization, medication optimization, & development of a safe discharge plan.    This patient was seen and discussed with my attending physician.  Mary Nix MD  PGY-1 Psychiatry Resident  Pager: [phone]    This patient has been seen and evaluated by me, Glenn Isaacs.  I  have discussed this patient with the psychiatry resident and I agree with the findings and plan in this note.    I have reviewed today's vital signs, medications, labs and imaging.     Glenn Adhikari MD on 10/28/2021 at 12:39 AM

## 2021-10-27 NOTE — PLAN OF CARE
Problem: Behavioral Health Plan of Care  Goal: Plan of Care Review  Outcome: Improving  Flowsheets (Taken 10/27/2021 1054)  Plan of Care Reviewed With: patient  Progress: improving  Patient Agreement with Plan of Care: agrees     Problem: Mood Impairment (Manic or Hypomanic Signs/Symptoms)  Goal: Improved Mood Symptoms (Manic/Hypomanic Signs/Symptoms)  Outcome: Improving  Flowsheets (Taken 10/27/2021 1054)  Mutually Determined Action Steps (Improved Mood Symptoms): engages in physical activity     Problem: Behavior Regulation Impairment (Psychotic Signs/Symptoms)  Goal: Improved Behavioral Control (Psychotic Signs/Symptoms)  Outcome: Improving  Flowsheets (Taken 10/27/2021 1054)  Mutually Determined Action Steps (Improved Behavioral Control): identifies future-oriented goal     Anderson continues walking in [place] most of day-per Anderson, he is feeling less restless and generally more comfortable with propranolol-continues to decline grps, although will stop by activities in the lounge and engage briefly with staff and peers-pleasant in interactions

## 2021-10-27 NOTE — PLAN OF CARE
Problem: Sleep Disturbance  Goal: Adequate Sleep/Rest  Outcome: No Change    Patient appeared to sleep 6.5 hours this night shift.  No prns or snacks given or requested.  No concerns were reported or noted.

## 2021-10-27 NOTE — PLAN OF CARE
Nursing Plan of care   Problem: Behavioral Health Plan of Care  Goal: Develops/Participates in Therapeutic [place] to Support Successful Transition  Outcome: No Change     Problem: Behavior Regulation Impairment (Psychotic Signs/Symptoms)  Goal: Improved Behavioral Control (Psychotic Signs/Symptoms)  Outcome: Improving  Flowsheets (Taken 10/26/2021 2112)  Mutually Determined Action Steps (Improved Behavioral Control):    identifies future-oriented goal    verbalizes personal treatment goal   Pt has been visible and pacing in the hallway; mostly kept to himself; calm and pleasant on approach; denied MH symptoms of SI/SIB,AVH, depression and anxiety; ate dinner; good appetite; denied medication side effect; denied COVID swab and denied having COVID symptom; This evening a person, who self described as pt's girlfriend, Mila called the unit more than once to talk to the pt but pt declined;  will continue to monitor and encourage to do COVID test.

## 2021-10-28 PROCEDURE — 99232 SBSQ HOSP IP/OBS MODERATE 35: CPT | Mod: GC | Performed by: PSYCHIATRY & NEUROLOGY

## 2021-10-28 PROCEDURE — 250N000013 HC RX MED GY IP 250 OP 250 PS 637: Performed by: STUDENT IN AN ORGANIZED HEALTH CARE EDUCATION/TRAINING PROGRAM

## 2021-10-28 PROCEDURE — 124N000002 HC R&B MH UMMC

## 2021-10-28 PROCEDURE — 250N000013 HC RX MED GY IP 250 OP 250 PS 637

## 2021-10-28 RX ADMIN — Medication 1 SPRAY: at 19:12

## 2021-10-28 RX ADMIN — PROPRANOLOL HYDROCHLORIDE 10 MG: 10 TABLET ORAL at 08:24

## 2021-10-28 RX ADMIN — Medication 1 SPRAY: at 13:01

## 2021-10-28 RX ADMIN — Medication 25 MCG: at 17:41

## 2021-10-28 RX ADMIN — OLANZAPINE 40 MG: 20 TABLET, ORALLY DISINTEGRATING ORAL at 17:40

## 2021-10-28 RX ADMIN — Medication 1 SPRAY: at 08:24

## 2021-10-28 NOTE — PROGRESS NOTES
----------------------------------------------------------------------------------------------------------  [facility], [place]   Psychiatric Progress Note  Hospital Day #58    Identifier: Anderson Perez is a 40 year old male with a past psychiatric history of bipolar disorder with psychosis vs schizoaffective disorder bipolar type, antisocial personality disorder, and intellectual disability admitted from the ER on 08/31/2021 due to concern for out of control behaviors, aggression and psychosis in the context of medication refusal despite Cardoza.     Interim History:   The patient's care was discussed with the treatment team and chart notes were reviewed.    Vitals: VSS  Sleep: 6.5 hours (10/28/21 0600)  Scheduled medications: Took all scheduled medications as prescribed  PRN medications: No psychiatric prns given    Interim events: No events    Staff Report:   -feeling less restless and generally more comfortable with propranolol  -also stated that his pacing is not related to a medication side effect but rather that he likes to exercise  -briefly engages with staff and peers in the lounge, continues to decline groups  -denied all MH symptoms   -declined COVID swab     Subjective:     Patient Interview:  Anderson Perez was interviewed in the hallway. He's doing well. Notes that he walks because he enjoys physical activity at baseline and feels claustrophobic being on a locked unit, not because he's restless from medications. Asks to discontinue propanolol if it's not needed for another reason. Otherwise, no questions or concerned.      He was seen again in the afternoon and requested discharge after having been hospitalized at [facility] and then [facility] for several months, and now here.      The risks, benefits, alternatives and side effects of any medication changes have been discussed and are understood by the patient and guardian.    Review of systems:   Patient has no  "bothersome physical symptoms  Patient denies acute concerns    Objective:   /78 (BP Location: Left arm)   Pulse 94   Temp 97  F (36.1  C) (Tympanic)   Resp 14   Ht 1.829 m (6')   Wt 92.3 kg (203 lb 6.4 oz)   SpO2 97%   BMI 27.59 kg/m    Weight is 203 lbs 6.4 oz  Body mass index is 27.59 kg/m .    Mental Status Exam:   Orientation:  Grossly intact  General: alert, awake.   Appearance:  Dressed in hospital scrubs, hair is buzzed short.  Behavior: Pacing the hallway. Calm, cooperative  Eye Contact: Adequate  Psychomotor:  No catatonia present. Walks all day long - patient's PTA baseline.   Speech:  Appropriate volume, and rate  Language: Appears to be fluent in English  Mood:  \"good\"  Affect:  Neutral, anxious while talking about feeling claustrophobic on the unit  Thought Process: Linear and coherent  Thought Content: No AH/VH/SI/HI. No delusional content   Associations: Intact  Insight: Good, accepting of medications.   Judgment:  Good - adherent to medications.    Attention Span: not assessed  Concentration:  not assessed  Recent and Remote Memory:  Grossly intact  Fund of Knowledge: Historical diagnosis of intellectual disability (est. IQ 63)  Gait: normal       Allergies:     Allergies   Allergen Reactions     Bee Venom Anaphylaxis     Clindamycin Hives     Morphine Hives        Labs:   None new.     Assessment    Primary psychiatric diagnosis:   Psychosis, Unspecified (Schizoaffective disorder vs Bipolar Affective Disorder with Psychosis)    Secondary psychiatric diagnoses of concern this admission:   Intellectual disability (IQ 63)  Antisocial personality disorder     Diagnostic Impression: Anderson CHI Chris is a 40 year old male with a past psychiatric history of bipolar disorder with psychosis vs schizoaffective disorder bipolar type, antisocial personality disorder, and intellectual disability admitted from the  ER on 08/31/2021 due to concern for out of control behaviors, aggression and " psychosis in the context of medication refusal despite Cardoza. Patient was recently released from [facility] to a group home. Presentation to the ED via ambulance after police contacted and provisional discharge rescinded. Group home noting escalating aggressive behavior, delusional thinking, and medication refusal despite Cardoza. PT refusal to discuss problems and largely denied all past history, medications, or any need for care. His reported symptoms of delusions, psychosis, and erratic/aggressive behavior are consistent with prior presentations of psychosis.    Psychiatric Hospital course: Anderson Perez was admitted to [facility] following revocation of provisional discharge and on a court hold. His PTA olanzapine 20mg PO QDaily was resumed, as pt had been refusing at group home; per conversation with guardian, had planned to start on GRIGGS, but does not think that this was actually started, as patient was refusing medications, which prompted admission in setting of decompensation. Pt was converted to ODT formulation given concerns for cheeking medications; while initially resistant, pt was agreeable with taking ODT olanzapine as long as it was given with ginger ale, which was accommodated. Olanzapine dose increased to 40mg on 9/21 given limited efficacy at current dose. PRN for agitation/aggression was changed to haloperidol 5mg, lorazepam 2mg, and diphenhydramine 50mg, with PO haloperidol available. 10/05 noticed that he was more approachable and was ready to seat with the team in the conference room. Patient has since been stable on current medications, awaiting placement. He was started on a brief trail of propanolol for akathisia, but this was discontinued due to lack of improvement in pacing and patient attesting that a high level of physical activity was his baseline.    Medical course: Anderson Perez was physically examined by the ED prior to being transferred to the unit and was found to be medically  stable and appropriate for admission.     Plan   Principal Diagnosis:   # Psychosis, Unspecified (Schizoaffective disorder vs Bipolar Affective Disorder with Psychosis)    Secondary psychiatric diagnoses of concern this admission:   # Intellectual disability  # Antisocial personality disorder     Psychotropic Medications:  Modify:  -Propanolol discontinued    Continue Scheduled:  -Olanzapine ODT 40mg qPM    Continue PRN:  -Acetaminophen 650 mg Q6H PRN for pain and fever  -Hydroxyzine 25 mg Q6H PRN for anxiety  -Haldol 5mg prn q6h for agitation  -Haldol 5mg + diphenhydramine 50mg + lorazepam 2mg prn for severe agitation/psychosis  -Maalox 30 ml Q4H PRN for indigestion  -Miralax prn for constipation    Additional Plans:  -Patient will be treated in therapeutic milieu with appropriate individual and group therapies as described.  -Consider GRIGGS in the future    Medical diagnoses to be addressed this admission:    # Chronic Hep C  NTD    #Dry mouth  -Biotene started 10/25    #Foot blister/lesion, resolving  -Bacitracin ointment prn    Consults: none    Legal Status: Cardoza  Committed   Legal guardian    Safety Assessment:   Behavioral Orders   Procedures     Cheeking Precautions (behavioral units)     Code 1 - Restrict to Unit     Routine Programming     As clinically indicated     Status 15     Every 15 minutes.     SIO: No    Disposition: Pending bed availability at [facility] or group home. Patient is stable, medications optimized.     This patient was seen and discussed with my attending physician.  Mary Nix MD  PGY-1 Psychiatry Resident  Pager: [phone]    This patient has been seen and evaluated by me, Glenn Isaacs.  I have discussed this patient with the psychiatry resident and I agree with the findings and plan in this note.    I have reviewed today's vital signs, medications, labs and imaging.     Glenn Adhikari MD on 10/28/2021 at 5:29 PM

## 2021-10-28 NOTE — PLAN OF CARE
Nursing Plan of care   Problem: Behavior Regulation Impairment (Psychotic Signs/Symptoms)  Goal: Improved Behavioral Control (Psychotic Signs/Symptoms)  Outcome: Improving  Flowsheets (Taken 10/27/2021 2114)  Mutually Determined Action Steps (Improved Behavioral Control): verbalizes personal treatment goal  Pt had a good shift; spent most of the shift pacing in the hallway; denied medication side effect and stated that his pacing is not related to a medication side effect but rather he likes to exercise. Pt took shower today with encouragement and done his laundry. Ate dinner with good appetite; denied SI/SIB,AVH, depression and anxiety; requested and received PRN biotene for dry mouth once; declined COVID swab and stated that he already took both shot of COVID vaccine. Will continue to monitor and assess.

## 2021-10-28 NOTE — PLAN OF CARE
Assessment/Intervention/Current Symptoms and Care Coordination    Attended team meeting and reviewed chart notes.    Writer left REX GONZALES Deja a message to see if things are progressing.     Writer spoke with the guardian for an update on how things were going. She reported that pt's girlfriend has been texting her saying she knows what will help pt recover and that she wants to talk with him. The guardian gave permission for girlfriend to speak with pt however when writer asked pt if he wants to talk with her he stated no.         Discharge Plan or Goal  To group home      Barriers to Discharge   [facility]      Referral Status  None made        Legal Status  Committed with meredith  Pt has the 2nd exam on November 2nd

## 2021-10-28 NOTE — PLAN OF CARE
Plan of care   Problem: Sleep Disturbance  Goal: Adequate Sleep/Rest  Outcome: Improving  Received pt sleeping in his bed with regular unlabored respiration; No PRN was requested or administered; appeared to have slept for 6.5 hrs this shift; will continue to monitor and assess.

## 2021-10-28 NOTE — PLAN OF CARE
Problem: Behavioral Health Plan of Care  Goal: Plan of Care Review  Outcome: Improving  Flowsheets (Taken 10/28/2021 1443)  Plan of Care Reviewed With: patient  Progress: improving  Patient Agreement with Plan of Care: (Believes he should be discharged independently) agrees with comment (describe)     Problem: Mood Impairment (Manic or Hypomanic Signs/Symptoms)  Goal: Improved Mood Symptoms (Manic/Hypomanic Signs/Symptoms)  Outcome: Improving  Flowsheets (Taken 10/28/2021 1443)  Mutually Determined Action Steps (Improved Mood Symptoms):   engages in physical activity   identifies personal treatment goal     Anderson active on unit throughout day-social with staff and peers-appears relaxed-joking about LOS, although disappointed continues hospitalized-Anderson pleasant in interactions-does at times make odd or grandiose statements-considerate of others on unit

## 2021-10-29 PROCEDURE — 99232 SBSQ HOSP IP/OBS MODERATE 35: CPT | Mod: GC | Performed by: PSYCHIATRY & NEUROLOGY

## 2021-10-29 PROCEDURE — 124N000002 HC R&B MH UMMC

## 2021-10-29 PROCEDURE — 250N000013 HC RX MED GY IP 250 OP 250 PS 637

## 2021-10-29 RX ADMIN — OLANZAPINE 40 MG: 20 TABLET, ORALLY DISINTEGRATING ORAL at 17:59

## 2021-10-29 RX ADMIN — Medication 25 MCG: at 17:59

## 2021-10-29 RX ADMIN — Medication 1 SPRAY: at 15:51

## 2021-10-29 RX ADMIN — Medication 1 SPRAY: at 08:34

## 2021-10-29 RX ADMIN — Medication 1 SPRAY: at 14:36

## 2021-10-29 NOTE — PROGRESS NOTES
"    ----------------------------------------------------------------------------------------------------------  [facility], [place]   Psychiatric Progress Note  Hospital Day #59    Identifier: Anderson Perez is a 40 year old male with a past psychiatric history of bipolar disorder with psychosis vs schizoaffective disorder bipolar type, antisocial personality disorder, and intellectual disability admitted from the ER on 08/31/2021 due to concern for out of control behaviors, aggression and psychosis in the context of medication refusal despite Cardoza.     Interim History:   The patient's care was discussed with the treatment team and chart notes were reviewed.    Vitals: VSS  Sleep: 6.5 hours (10/29/21 0600)  Scheduled medications: Took all scheduled medications as prescribed  PRN medications: No psychiatric prns given    Interim events: No events    Staff Report:   -joked about his LOS  -denied all MH symptoms   -calm, cooperative  -makes delusional states     Subjective:     Patient Interview:  Anderson Perez was interviewed in the hallway. He's doing well. Talked about how he doesn't need to be here or need medications because he has 16 doctorate degrees and 2 masters degrees. Also that he makes \"1 million dollars every two weeks, like a doctor surgeon.\" He states that he may not be able to continue his medications after discharge because he doesn't have insurance. Would like assistance with this.     In terms of group homes, he'd like to go to either [facility] or one in the [facility].    The risks, benefits, alternatives and side effects of any medication changes have been discussed and are understood by the patient and guardian.    Review of systems:   Patient has no bothersome physical symptoms  Patient denies acute concerns    Objective:   /86 (BP Location: Left arm)   Pulse 86   Temp 97.6  F (36.4  C) (Tympanic)   Resp 14   Ht 1.829 m (6')   Wt 92.3 kg " "(203 lb 6.4 oz)   SpO2 98%   BMI 27.59 kg/m    Weight is 203 lbs 6.4 oz  Body mass index is 27.59 kg/m .    Mental Status Exam:   Orientation:  Grossly intact  General: alert, awake.   Appearance:  Dressed in hospital scrubs, hair is buzzed short.  Behavior: Pacing the hallway. Calm, cooperative  Eye Contact: Adequate  Psychomotor:  No catatonia present. Walks all day long - patient's PTA baseline.   Speech:  Appropriate volume, and rate  Language: Appears to be fluent in English  Mood:  \"good\"  Affect:  Neutral, anxious while talking about feeling claustrophobic on the unit  Thought Process: Linear and coherent  Thought Content: No AH/VH/SI/HI. No delusional content   Associations: Intact  Insight: Good, accepting of medications.   Judgment:  Good - adherent to medications.    Attention Span: not assessed  Concentration:  not assessed  Recent and Remote Memory:  Grossly intact  Fund of Knowledge: Historical diagnosis of intellectual disability (est. IQ 63)  Gait: normal       Allergies:     Allergies   Allergen Reactions     Bee Venom Anaphylaxis     Clindamycin Hives     Morphine Hives        Labs:   None new.     Assessment    Primary psychiatric diagnosis:   Psychosis, Unspecified (Schizoaffective disorder vs Bipolar Affective Disorder with Psychosis)    Secondary psychiatric diagnoses of concern this admission:   Intellectual disability (IQ 63)  Antisocial personality disorder     Diagnostic Impression: Anderson CHI Orlagabriela is a 40 year old male with a past psychiatric history of bipolar disorder with psychosis vs schizoaffective disorder bipolar type, antisocial personality disorder, and intellectual disability admitted from the  ER on 08/31/2021 due to concern for out of control behaviors, aggression and psychosis in the context of medication refusal despite Cardoza. Patient was recently released from [facility] to a group home. Presentation to the ED via ambulance after police contacted and provisional discharge " rescinded. Group home noted escalating aggressive behavior, delusional thinking, and medication refusal despite Cardoza. PT refusal to discuss problems and largely denied all past history, medications, or any need for care. His reported symptoms of delusions, psychosis, and erratic/aggressive behavior are consistent with prior presentations of psychosis.    Psychiatric Hospital course: Anderson Perez was admitted to [facility] following revocation of provisional discharge and on a court hold. His PTA olanzapine 20mg PO QDaily was resumed, as pt had been refusing at group home; per conversation with guardian, had planned to start on GRIGGS, but does not think that this was actually started, as patient was refusing medications, which prompted admission in setting of decompensation. Pt was converted to ODT formulation given concerns for cheeking medications; while initially resistant, pt was agreeable with taking ODT olanzapine as long as it was given with ginger ale, which was accommodated. Olanzapine dose increased to 40mg on 9/21 given limited efficacy at current dose. PRN for agitation/aggression was changed to haloperidol 5mg, lorazepam 2mg, and diphenhydramine 50mg, with PO haloperidol available. 10/05 noticed that he was more approachable and was ready to seat with the team in the conference room. Patient has since been stable on current medications, awaiting placement. He was started on a brief trail of propanolol for akathisia, but this was discontinued due to lack of improvement in pacing and patient attesting that a high level of physical activity was his baseline.    Medical course: Anderson Perez was physically examined by the ED prior to being transferred to the unit and was found to be medically stable and appropriate for admission.     Plan   Principal Diagnosis:   # Psychosis, Unspecified (Schizoaffective disorder vs Bipolar Affective Disorder with Psychosis)    Secondary psychiatric diagnoses of  concern this admission:   # Intellectual disability  # Antisocial personality disorder     Psychotropic Medications:  Modify:  No changes    Continue Scheduled:  -Olanzapine ODT 40mg qPM    Continue PRN:  -Acetaminophen 650 mg Q6H PRN for pain and fever  -Hydroxyzine 25 mg Q6H PRN for anxiety  -Haldol 5mg prn q6h for agitation  -Haldol 5mg + diphenhydramine 50mg + lorazepam 2mg prn for severe agitation/psychosis  -Maalox 30 ml Q4H PRN for indigestion  -Miralax prn for constipation    Additional Plans:  -Patient will be treated in therapeutic milieu with appropriate individual and group therapies as described.    Medical diagnoses to be addressed this admission:    # Chronic Hep C  NTD    #Dry mouth  -Biotene started 10/25    #Foot blister/lesion, resolved  -Bacitracin ointment prn    Consults: none    Legal Status: Cardoza  Committed   Legal guardian    Safety Assessment:   Behavioral Orders   Procedures     Cheeking Precautions (behavioral units)     Code 1 - Restrict to Unit     Routine Programming     As clinically indicated     Status 15     Every 15 minutes.     SIO: No    Disposition: Pending bed availability at group home or [facility]. Patient is stable, medications optimized.     This patient was seen and discussed with my attending physician.  Mary Nix MD  PGY-1 Psychiatry Resident  Pager: [phone]    This patient has been seen and evaluated by me, Glenn Isaacs.  I have discussed this patient with the psychiatry resident and I agree with the findings and plan in this note.    I have reviewed today's vital signs, medications, labs and imaging.     Glenn Adhikari MD on 10/29/2021 at 5:10 PM

## 2021-10-29 NOTE — PLAN OF CARE
Assessment/Intervention/Current Symptoms and Care Coordination    Attended team meeting and reviewed chart notes.    Writer left CADI worker Deja ([phone]) a message requesting and update. CHRISTIAN Barragan ([phone]).    Deja called back stating she heard from [facility] and they are out 4 weeks. She will do the [facility] next week and will need to call pt to try and interview pt. She also has been emailing group homes for openings. She has called [facility] but they do not have openings.      Discharge Plan or Goal  To group home      Barriers to Discharge    needs placement      Referral Status  None made      Legal Status  Committed with [employer]

## 2021-10-29 NOTE — PLAN OF CARE
Plan of care   Problem: Sleep Disturbance  Goal: Adequate Sleep/Rest  Outcome: Improving   Received pt sleeping in bed with regular unlabored respiration; No PRN was requested or administered; pt appeared to have slept for 6.5 hrs; will continue to monitor and assess.

## 2021-10-29 NOTE — PLAN OF CARE
"  Problem: Behavioral Health Plan of Care  Goal: Plan of Care Review  Outcome: Improving  Flowsheets (Taken 10/29/2021 1414)  Plan of Care Reviewed With: patient  Progress: improving  Patient Agreement with Plan of Care: (Denies illness) agrees with comment (describe)     Problem: Mood Impairment (Manic or Hypomanic Signs/Symptoms)  Goal: Improved Mood Symptoms (Manic/Hypomanic Signs/Symptoms)  Outcome: Improving  Flowsheets (Taken 10/29/2021 1414)  Mutually Determined Action Steps (Improved Mood Symptoms): engages in physical activity     Anderson active on unit-continues to decline grp-spent day walking in [place]-overall good spirits-observed by staff opening blinds to young female peer room and to quickly close while exclaiming,\"Oh no, she saw me!\"-Emir [facility], [facility] discussed boundaries and appropriate behaviors with Antonio who received redirection and agreed to abide by expectations   "

## 2021-10-30 PROCEDURE — 124N000002 HC R&B MH UMMC

## 2021-10-30 PROCEDURE — 250N000013 HC RX MED GY IP 250 OP 250 PS 637

## 2021-10-30 RX ADMIN — Medication 1 SPRAY: at 16:46

## 2021-10-30 RX ADMIN — Medication 1 SPRAY: at 07:57

## 2021-10-30 RX ADMIN — Medication 1 SPRAY: at 13:02

## 2021-10-30 RX ADMIN — Medication 25 MCG: at 17:29

## 2021-10-30 RX ADMIN — OLANZAPINE 40 MG: 20 TABLET, ORALLY DISINTEGRATING ORAL at 16:46

## 2021-10-30 NOTE — PLAN OF CARE
"Nursing Plan of care   Problem: Behavior Regulation Impairment (Psychotic Signs/Symptoms)  Goal: Improved Behavioral Control (Psychotic Signs/Symptoms)  Outcome: Improving  Flowsheets (Taken 10/30/2021 1822)  Mutually Determined Action Steps (Improved Behavioral Control):    verbalizes personal treatment goal    identifies future-oriented goal   Pt has been pacing up and down the hallway; calm and social with peers and staff; medication compliant; stated he is \"fine and ready to check out\" ; notified him that care team is working on safe discharge but not definitive day and place yet. Denied SI/SIB,AVH, depression and anxiety; ate dinner with good appetite; will continue to monitor and assess.     "

## 2021-10-30 NOTE — PLAN OF CARE
Nursing plan of care   Problem: Behavior Regulation Impairment (Psychotic Signs/Symptoms)  Goal: Improved Behavioral Control (Psychotic Signs/Symptoms)  Outcome: Improving  Flowsheets (Taken 10/28/2021 2108)  Mutually Determined Action Steps (Improved Behavioral Control): verbalizes personal treatment goal  Pt was active and visible in the milieu; spent the shift pacing in the hallway; occasionally social with staff and peers, but otherwise kept to himself; denied SI/SIB,AVH, depression and anxiety. Calm and cooperative; denied medication side effect and medication compliant; ate diner with good appetite;  no safety concern to report this shift; will continue to monitor and assess.

## 2021-10-30 NOTE — PLAN OF CARE
Problem: Adult Inpatient Plan of Care  Goal: Absence of Hospital-Acquired Illness or Injury  Intervention: Identify and Manage Fall Risk  Recent Flowsheet Documentation  Taken 10/30/2021 0115 by Alyssa Mensah, RN  Safety Promotion/Fall Prevention:    safety round/check completed    clutter free environment maintained     Problem: Behavioral Health Plan of Care  Goal: Adheres to Safety Considerations for Self and Others  Intervention: Develop and Maintain Individualized Safety Plan  Recent Flowsheet Documentation  Taken 10/30/2021 0115 by Alyssa Mensah, RN  Safety Measures: safety rounds completed     Problem: Sleep Disturbance  Goal: Adequate Sleep/Rest  Outcome: No Change     Patient was sleeping at start of shift and through out the night. No signs of pain nor discomfort noted. No PRN medications given. He appeared to have slept 6.5 hours. Will continue to monitor and assess pt.

## 2021-10-30 NOTE — PLAN OF CARE
Problem: Adult Inpatient Plan of Care  Goal: Readiness for Transition of Care  Flowsheets (Taken 10/30/2021 3067)  Concerns to be Addressed:   mental health   developmental      Pt visible in the milieu for the majority of the day. He is withdrawn socially but occasionally engages with others. No scheduled medications this shift. Pt requested and received biotene mouth spray x2. Eating meals without issue. He denies SI/SIB, as well as AH/VH. Pt occasionally appears responding to internal stimuli but less so than previous days. No additional concerns at this time. Will continue to assess and offer support.

## 2021-10-31 PROCEDURE — 250N000013 HC RX MED GY IP 250 OP 250 PS 637

## 2021-10-31 PROCEDURE — 124N000002 HC R&B MH UMMC

## 2021-10-31 RX ADMIN — OLANZAPINE 40 MG: 20 TABLET, ORALLY DISINTEGRATING ORAL at 17:13

## 2021-10-31 RX ADMIN — Medication 1 SPRAY: at 10:12

## 2021-10-31 RX ADMIN — Medication 25 MCG: at 17:13

## 2021-10-31 ASSESSMENT — ACTIVITIES OF DAILY LIVING (ADL)
DRESS: SCRUBS (BEHAVIORAL HEALTH);INDEPENDENT
ORAL_HYGIENE: INDEPENDENT
LAUNDRY: WITH SUPERVISION
HYGIENE/GROOMING: INDEPENDENT

## 2021-10-31 NOTE — PLAN OF CARE
Problem: Behavioral Health Plan of Care  Goal: Plan of Care Review  Recent Flowsheet Documentation  Taken 10/31/2021 1400 by RENEE BURROWS  Plan of Care Reviewed With: patient  Patient Agreement with Plan of Care: agrees     Pt has been active and visible in the milieu, pacing up and down hallway. H is withdrawn socially but is calm and polite to peers and staff when approachable. PRN biotin given x1. He ate breakfast and lunch with good appetitive.

## 2021-10-31 NOTE — PLAN OF CARE
Nursing Plan of care   Problem: Behavioral Health Plan of Care  Goal: Adheres to Safety Considerations for Self and Others  Outcome: Improving     Problem: Behavior Regulation Impairment (Psychotic Signs/Symptoms)  Goal: Improved Behavioral Control (Psychotic Signs/Symptoms)  Outcome: Improving  Pt has been pacing up and down in the hallway with calm and controled behavior. Was targeted by a pt who is on 2:1 SIO and pt handled the situation well; pt was cooperative with following redirection; ate dinner with good appetite; denied medication side effect ; denied SI/SIB, AVH, depression and anxiety; will continue to monitor and assess.

## 2021-10-31 NOTE — PLAN OF CARE
Night Shift Summary (10/30/21 into 10/31/21)    Pt is in bed sleeping at start of shift. Breathing quiet and unlabored. Appears to have slept 7 hours. Writer unable to assess SI,SIB,HI, and pain as patient slept all shift. No episodes or events occurred this shift. Patient remains on standard 15 minute safety checks.    Will continue to monitor and assess.

## 2021-11-01 PROCEDURE — 124N000002 HC R&B MH UMMC

## 2021-11-01 PROCEDURE — 250N000013 HC RX MED GY IP 250 OP 250 PS 637

## 2021-11-01 PROCEDURE — 99232 SBSQ HOSP IP/OBS MODERATE 35: CPT | Performed by: PSYCHIATRY & NEUROLOGY

## 2021-11-01 RX ADMIN — Medication 1 SPRAY: at 16:34

## 2021-11-01 RX ADMIN — Medication 1 SPRAY: at 09:10

## 2021-11-01 RX ADMIN — Medication 1 SPRAY: at 14:21

## 2021-11-01 RX ADMIN — OLANZAPINE 40 MG: 20 TABLET, ORALLY DISINTEGRATING ORAL at 19:39

## 2021-11-01 RX ADMIN — Medication 25 MCG: at 19:39

## 2021-11-01 ASSESSMENT — ACTIVITIES OF DAILY LIVING (ADL)
LAUNDRY: WITH SUPERVISION
HYGIENE/GROOMING: INDEPENDENT
HYGIENE/GROOMING: INDEPENDENT
LAUNDRY: WITH SUPERVISION
DRESS: INDEPENDENT
ORAL_HYGIENE: INDEPENDENT
ORAL_HYGIENE: INDEPENDENT
DRESS: INDEPENDENT

## 2021-11-01 NOTE — PROGRESS NOTES
"    ----------------------------------------------------------------------------------------------------------  [facility], [place]   Psychiatric Progress Note  Hospital Day #62    Identifier: Anderson Perez is a 40 year old male with a past psychiatric history of bipolar disorder with psychosis vs schizoaffective disorder bipolar type, antisocial personality disorder, and intellectual disability admitted from the ER on 08/31/2021 due to concern for out of control behaviors, aggression and psychosis in the context of medication refusal despite Cardoza.     Interim History:   The patient's care was discussed with the treatment team and chart notes were reviewed.    Vitals: VSS  Sleep: 7 hours (11/01/21 0600)  Scheduled medications: Took all scheduled medications as prescribed  PRN medications: No psychiatric prns given    Interim events:   -\"Was targeted by a pt who is on 2:1 SIO and pt handled the situation well\"    Staff Report:   -Denied all MH symptoms  -Calm, cooperative  -Told staff he's \"fine and ready to check out\"     Subjective:     Patient Interview:  Anderson Perez was interviewed in the hallway. He asked about discharge plans, states \"I've been here too long already.\" Calm and cooperative while talking about the steps we're taking towards discharge.      The risks, benefits, alternatives and side effects of any medication changes have been discussed and are understood by the patient and guardian.    Review of systems:   Patient has no bothersome physical symptoms  Patient denies acute concerns    Objective:   BP (!) 140/81 (BP Location: Right arm)   Pulse 86   Temp (!) 96.5  F (35.8  C) (Tympanic)   Resp 16   Ht 1.829 m (6')   Wt 92.3 kg (203 lb 6.4 oz)   SpO2 99%   BMI 27.59 kg/m    Weight is 203 lbs 6.4 oz  Body mass index is 27.59 kg/m .    Mental Status Exam:   Orientation:  Grossly intact  General: alert, awake.   Appearance:  Dressed in hospital scrubs, hair " "is buzzed short.  Behavior: Pacing the hallway. Calm, cooperative  Eye Contact: Adequate  Psychomotor:  No catatonia present. Walks all day long - patient's PTA baseline.   Speech:  Appropriate volume, and rate  Language: Appears to be fluent in English  Mood:  \"good\"  Affect:  Neutral, appropriate, restricted range  Thought Process: Linear and coherent  Thought Content: No AH/VH/SI/HI. No delusional content   Associations: Intact  Insight: Good, accepting of medications.   Judgment:  Good - adherent to medications.    Attention Span: not assessed  Concentration:  not assessed  Recent and Remote Memory:  Grossly intact  Fund of Knowledge: Historical diagnosis of intellectual disability (est. IQ 63)  Gait: normal       Allergies:     Allergies   Allergen Reactions     Bee Venom Anaphylaxis     Clindamycin Hives     Morphine Hives        Labs:   None new.     Assessment    Primary psychiatric diagnosis:   Psychosis, Unspecified (Schizoaffective disorder vs Bipolar Affective Disorder with Psychosis)    Secondary psychiatric diagnoses of concern this admission:   Intellectual disability (IQ 63)    Diagnostic Impression: Anderson Perez is a 40 year old male with a past psychiatric history of bipolar disorder with psychosis vs schizoaffective disorder bipolar type, antisocial personality disorder, and intellectual disability admitted from the  ER on 08/31/2021 due to concern for out of control behaviors, aggression and psychosis in the context of medication refusal despite Cardoza. Patient was recently released from [facility] to a group home. Presentation to the ED via ambulance after police contacted and provisional discharge rescinded. Group home noted escalating aggressive behavior, delusional thinking, and medication refusal despite Cardoza. PT refusal to discuss problems and largely denied all past history, medications, or any need for care. His reported symptoms of delusions, psychosis, and erratic/aggressive behavior " are consistent with prior presentations of psychosis.    Psychiatric Hospital course: Anderson Perez was admitted to [facility] following revocation of provisional discharge and on a court hold. His PTA olanzapine 20mg PO QDaily was resumed, as pt had been refusing at group home; per conversation with guardian, had planned to start on GRIGGS, but does not think that this was actually started, as patient was refusing medications, which prompted admission in setting of decompensation. Pt was converted to ODT formulation given concerns for cheeking medications; while initially resistant, pt was agreeable with taking ODT olanzapine as long as it was given with ginger ale, which was accommodated. Olanzapine dose increased to 40mg on 9/21 given limited efficacy at current dose. PRN for agitation/aggression was changed to haloperidol 5mg, lorazepam 2mg, and diphenhydramine 50mg, with PO haloperidol available. 10/05 noticed that he was more approachable and was ready to seat with the team in the conference room. Patient has since been stable on current medications, awaiting placement. He was started on a brief trail of propanolol for akathisia, but this was discontinued due to lack of improvement in pacing and patient attesting that a high level of physical activity was his baseline.    Medical course: Anderson Perez was physically examined by the ED prior to being transferred to the unit and was found to be medically stable and appropriate for admission.     Plan   Principal Diagnosis:   # Psychosis, Unspecified (Schizoaffective disorder vs Bipolar Affective Disorder with Psychosis)    Secondary psychiatric diagnoses of concern this admission:   # Intellectual disability    Psychotropic Medications:  Modify:  No changes    Continue Scheduled:  -Olanzapine ODT 40mg qPM    Continue PRN:  -Acetaminophen 650 mg Q6H PRN for pain and fever  -Hydroxyzine 25 mg Q6H PRN for anxiety  -Haldol 5mg prn q6h for agitation  -Haldol 5mg  + diphenhydramine 50mg + lorazepam 2mg prn for severe agitation/psychosis  -Maalox 30 ml Q4H PRN for indigestion  -Miralax prn for constipation    Additional Plans:  -Patient will be treated in therapeutic milieu with appropriate individual and group therapies as described.    Medical diagnoses to be addressed this admission:    # Chronic Hep C  NTD    #Dry mouth  -Biotene started 10/25    #Foot blister/lesion, resolved  -Bacitracin ointment prn    Consults: none    Legal Status: Cardoza  Committed - recommitment hearing 11/1  Legal guardian    Safety Assessment:   Behavioral Orders   Procedures     Cheeking Precautions (behavioral units)     Code 1 - Restrict to Unit     Routine Programming     As clinically indicated     Status 15     Every 15 minutes.     SIO: No    Disposition: Pending CADI waiver. Likely to group home (including [facility], patient prefers either [facility] or one in the [facility]), or [facility]. Patient is stable, medications optimized.     This patient was seen and discussed with my attending physician.  Mary Nix MD  PGY-1 Psychiatry Resident  Pager: [phone]      This patient has been seen and evaluated by me, Glenn Isaacs.  I have discussed this patient with the psychiatry resident and I agree with the findings and plan in this note.    I have reviewed today's vital signs, medications, labs and imaging.     Glenn Adhikari MD on 11/1/2021 at 5:23 PM

## 2021-11-01 NOTE — PLAN OF CARE
Problem: Sleep Disturbance  Goal: Adequate Sleep/Rest  Outcome: No Change    At the start of this shift, patient was sleeping in his room. Breathing non labored. Slept for about 7 hours. No safety concerns.

## 2021-11-01 NOTE — PLAN OF CARE
BEHAVIORAL TEAM DISCUSSION    Participants: Dr. Isaacs, resident Mary Nix, RN Natacha Saini, two medical students Dread and Ismael, CTC Siobhan Irving  Progress: moderate improvement, pt might be at baseline  Anticipated length of stay: until placement is available  Continued Stay Criteria/Rationale: pt is committed with Cardoza order  Medical/Physical: see IM Consult  Precautions:   Behavioral Orders   Procedures     Cheeking Precautions (behavioral units)     Code 1 - Restrict to Unit     Routine Programming     As clinically indicated     Status 15     Every 15 minutes.     Plan: provide a psychological assessment and manage medications per psychiatry, staff to provide a safe and therapeutic milieu, CTC to work with [facility] CCM and CADI CM for placement.  Rationale for change in precautions or plan: no change

## 2021-11-01 NOTE — PLAN OF CARE
Assessment/Intervention/Current Symptoms and Care Coordination    Attended team meeting and reviewed chart notes.    Pt spoke briefly with CADI CHRISTIAN Short from [facility] to complete [facility] referral.    Pt to have his second exam today at 1:00pm.    According to CCM Laurel from [facility], pt agreed to the recommit. Laurel will fax the order when she gets it.    Discharge Plan or Goal  To group home    Barriers to Discharge   Needs CADI in place  Needs group home to accept pt      Referral Status  None made        Legal Status  Committed with Cardoza order

## 2021-11-01 NOTE — PLAN OF CARE
"  Problem: Behavioral Health Plan of Care  Goal: Plan of Care Review  11/1/2021 1656 by Natacha Saini RN  Outcome: Improving  Flowsheets (Taken 11/1/2021 1656)  Plan of Care Reviewed With: patient  Progress: improving  Patient Agreement with Plan of Care: (Denies mental illness) agrees with comment (describe)    Problem: Mood Impairment (Manic or Hypomanic Signs/Symptoms)  Goal: Improved Mood Symptoms (Manic/Hypomanic Signs/Symptoms)  11/1/2021 1656 by Natacha Saini RN  Outcome: Improving  Flowsheets (Taken 11/1/2021 1656)  Mutually Determined Action Steps (Improved Mood Symptoms): engages in physical activity    Problem: Behavior Regulation Impairment (Psychotic Signs/Symptoms)  Goal: Improved Behavioral Control (Psychotic Signs/Symptoms)  Outcome: Improving  Flowsheets (Taken 11/1/2021 1648)  Mutually Determined Action Steps (Improved Behavioral Control): identifies future-oriented goal    Anderson while walking in dalal commented to RN, \"If I'm still here on my birthday on [birth year] something fun better happen!\"-When asked what would be fun responded, \"Donuts!\"-Antonio active on unit this evening[facility] [place] Star Wars movie with peers-brief social interactions with peers-continues delusional statements-pleasant in interactions   "

## 2021-11-01 NOTE — PLAN OF CARE
Problem: Behavioral Health Plan of Care  Goal: Plan of Care Review  Outcome: Improving  Flowsheets (Taken 11/1/2021 1648)  Plan of Care Reviewed With: patient  Progress: improving  Patient Agreement with Plan of Care: (Believes he should be discharged to [facility]) agrees with comment (describe)     Problem: Mood Impairment (Manic or Hypomanic Signs/Symptoms)  Goal: Improved Mood Symptoms (Manic/Hypomanic Signs/Symptoms)  Outcome: Improving  Flowsheets (Taken 11/1/2021 1648)  Mutually Determined Action Steps (Improved Mood Symptoms): identifies personal treatment goal     Problem: Behavior Regulation Impairment (Psychotic Signs/Symptoms)  Goal: Improved Behavioral Control (Psychotic Signs/Symptoms)  Outcome: Improving  Flowsheets (Taken 11/1/2021 1648)  Mutually Determined Action Steps (Improved Behavioral Control): identifies future-oriented goal     Anderson continues to spend almost all of his time walking in dalal-appropriately managed aggressive statements made towards him by peer, although frustrated and angry towards peer, able to verbalize feelings appropriately and refrain from reacting-avoiding peer as much as possible on unit-continues to mostly talk to others in brief walk by statements, does initiate more interactions-continues delusional statements

## 2021-11-02 PROCEDURE — 124N000002 HC R&B MH UMMC

## 2021-11-02 PROCEDURE — 250N000013 HC RX MED GY IP 250 OP 250 PS 637

## 2021-11-02 PROCEDURE — 99232 SBSQ HOSP IP/OBS MODERATE 35: CPT | Mod: GC | Performed by: PSYCHIATRY & NEUROLOGY

## 2021-11-02 RX ADMIN — Medication 25 MCG: at 18:10

## 2021-11-02 RX ADMIN — OLANZAPINE 40 MG: 20 TABLET, ORALLY DISINTEGRATING ORAL at 18:10

## 2021-11-02 RX ADMIN — Medication 1 SPRAY: at 10:45

## 2021-11-02 RX ADMIN — Medication 1 SPRAY: at 16:53

## 2021-11-02 ASSESSMENT — MIFFLIN-ST. JEOR: SCORE: 1889.22

## 2021-11-02 NOTE — PLAN OF CARE
Pt pleasant , social , talkative , enjoys walking the hallway. Med compliant , denies si or hallucinations

## 2021-11-02 NOTE — PLAN OF CARE
Assessment/Intervention/Current Symptoms and Care Coordination    Attended team meeting and reviewed chart notes.    Writer spoke with pt regarding next steps. Pt's CADI  is working on the paperwork and is also contacting group homes for openings.        Discharge Plan or Goal  To group home      Barriers to Discharge   Needs to be accepted into a group home      Referral Status  None made until CADI goes through        Legal Status  Pt is recommitted with meredith order-KT [facility]agan from [facility] will fax order after  signs.

## 2021-11-02 NOTE — PROGRESS NOTES
"    ----------------------------------------------------------------------------------------------------------  [facility], [place]   Psychiatric Progress Note  Hospital Day #63    Identifier: Anderson Perez is a 40 year old male with a past psychiatric history of bipolar disorder with psychosis vs schizoaffective disorder bipolar type, and intellectual disability admitted from the ER on 08/31/2021 due to concern for out of control behaviors, aggression and psychosis.     Interim History:   The patient's care was discussed with the treatment team and chart notes were reviewed.    Vitals: VSS  Sleep: 7 hours (11/02/21 0600)  Scheduled medications: Took all scheduled medications as prescribed  PRN medications: No psychiatric prns given    Interim events:   No events    Staff Report:   -Ongoing delusional statements  -Pleasant and cooperative  -Watched star wars sitting last evening  -\"Anderson continues to spend almost all of his time walking in dalal-appropriately managed aggressive statements made towards him by peer, although frustrated and angry towards peer, able to verbalize feelings appropriately and refrain from reacting-avoiding peer as much as possible on unit\"     Subjective:     Patient Interview:  Anderson Perez was interviewed in the hallway. He asked about any discharge updates, and otherwise had no questions or concerns. He watched Star Wars last night, which he really enjoyed. Star Wars is one of his favorite movie series, and he's looking forward to watching another tonight.    The risks, benefits, alternatives and side effects of any medication changes have been discussed and are understood by the patient and guardian.    Review of systems:   Patient has no bothersome physical symptoms  Patient denies acute concerns    Objective:   /68 (BP Location: Left arm)   Pulse 73   Temp 97  F (36.1  C) (Tympanic)   Resp 12   Ht 1.829 m (6')   Wt 93.6 kg (206 lb 6.4 " "oz)   SpO2 100%   BMI 27.99 kg/m    Weight is 206 lbs 6.4 oz  Body mass index is 27.99 kg/m .    Mental Status Exam:  Orientation:  Grossly intact  General: alert, awake.   Appearance:  Dressed in hospital scrubs, hair is buzzed short.  Behavior: Pacing the hallway. Calm, cooperative.  Eye Contact: Adequate  Psychomotor:  No catatonia present. Walks all day long - patient's PTA baseline.   Speech:  Appropriate volume, and rate  Language: Appears to be fluent in English  Mood:  \"good\"  Affect:  Neutral, appropriate, restricted range  Thought Process: Linear and coherent throughout conversation  Thought Content: No AH/VH/SI/HI. No delusional content.  Associations: Intact  Insight: Good, accepting of medications.   Judgment:  Good - adherent to medications.    Attention Span: not assessed  Concentration:  not assessed  Recent and Remote Memory:  Grossly intact  Fund of Knowledge: Historical diagnosis of intellectual disability (est. IQ 63)  Gait: normal       Allergies:     Allergies   Allergen Reactions     Bee Venom Anaphylaxis     Clindamycin Hives     Morphine Hives        Labs:   None new.     Assessment    Primary psychiatric diagnosis:   Psychosis, Unspecified (Schizoaffective disorder vs Bipolar Affective Disorder with Psychosis)    Secondary psychiatric diagnoses of concern this admission:   Intellectual disability (IQ 63)    Diagnostic Impression: Anderson Perez is a 40 year old male with a past psychiatric history of bipolar disorder with psychosis vs schizoaffective disorder bipolar type, antisocial personality disorder, and intellectual disability admitted from the  ER on 08/31/2021 due to concern for out of control behaviors, aggression and psychosis in the context of medication refusal despite Cardoza. Patient was recently released from [facility] to a group home. Presentation to the ED via ambulance after police contacted and provisional discharge rescinded. Group home noted escalating aggressive " behavior, delusional thinking, and medication refusal despite Cardoza. PT refusal to discuss problems and largely denied all past history, medications, or any need for care. His reported symptoms of delusions, psychosis, and erratic/aggressive behavior are consistent with prior presentations of psychosis.    Psychiatric Hospital course: Anderson Perez was admitted to [facility] following revocation of provisional discharge and on a court hold. His PTA olanzapine 20mg PO QDaily was resumed, as pt had been refusing at group home; per conversation with guardian, had planned to start on GRIGGS, but does not think that this was actually started, as patient was refusing medications, which prompted admission in setting of decompensation. Pt was converted to ODT formulation given concerns for cheeking medications; while initially resistant, pt was agreeable with taking ODT olanzapine as long as it was given with ginger ale, which was accommodated. Olanzapine dose increased to 40mg on 9/21 given limited efficacy at current dose. PRN for agitation/aggression was changed to haloperidol 5mg, lorazepam 2mg, and diphenhydramine 50mg, with PO haloperidol available. 10/05 noticed that he was more approachable and was ready to seat with the team in the conference room. Patient has since been stable on current medications, awaiting placement. He was started on a brief trail of propanolol for akathisia, but this was discontinued due to lack of improvement in pacing and patient attesting that a high level of physical activity was his baseline.    Medical course: Anderson Perez was physically examined by the ED prior to being transferred to the unit and was found to be medically stable and appropriate for admission.     Plan   Principal Diagnosis:   # Psychosis, Unspecified (Schizoaffective disorder vs Bipolar Affective Disorder with Psychosis)    Secondary psychiatric diagnoses of concern this admission:   # Intellectual  disability    Psychotropic Medications:  Modify:  No changes    Continue Scheduled:  -Olanzapine ODT 40mg qPM    Continue PRN:  -Acetaminophen 650 mg Q6H PRN for pain and fever  -Hydroxyzine 25 mg Q6H PRN for anxiety  -Haldol 5mg prn q6h for agitation  -Haldol 5mg + diphenhydramine 50mg + lorazepam 2mg prn for severe agitation/psychosis  -Maalox 30 ml Q4H PRN for indigestion  -Miralax prn for constipation    Additional Plans:  -Patient will be treated in therapeutic milieu with appropriate individual and group therapies as described.    Medical diagnoses to be addressed this admission:    # Chronic Hep C  NTD    #Dry mouth  -Biotene started 10/25    #Foot blister/lesion, resolved  -Bacitracin ointment prn    Consults: none    Legal Status: Cardoza  Committed - recommitted 11/1   Legal guardian    Safety Assessment:   Behavioral Orders   Procedures     Cheeking Precautions (behavioral units)     Code 1 - Restrict to Unit     Routine Programming     As clinically indicated     Status 15     Every 15 minutes.     SIO: No    Disposition: Pending CADI waiver. Likely to group home (including [facility], patient prefers either [facility] or one in the [facility]), or [facility]. Patient is stable, medications optimized.     This patient was seen and discussed with my attending physician.  Mary Nix MD  PGY-1 Psychiatry Resident  Pager: [phone]    This patient has been seen and evaluated by me, Glenn Isaacs.  I have discussed this patient with the psychiatry resident and I agree with the findings and plan in this note.    I have reviewed today's vital signs, medications, labs and imaging.     Glenn Adhikari MD on 11/2/2021 at 10:00 PM

## 2021-11-02 NOTE — PLAN OF CARE
Problem: Suicidal Behavior  Goal: Suicidal Behavior is Absent or Managed  Outcome: Completed    RN Assessment:  SI/Self harm:  pt denies  Aggression/agitation/HI:  none reported or observed  AVH:  pt denies  Sleep: 7 hours documented on NOC shift  PRN Med: Biotin spray for dry mouth  Medication AE: none reported or observed  Physical Complaints/Issues: pt denies  I & O: eating and drinking well  ADLs: independent  Visits: none this shift  Vitals:  WNL  COVID 19 Assessment: negative  Milieu Participation: did not attend programming, though is visible all day pacing in the hallway  Behavior: appears tense, though affect relaxes when approached. Pt denies any MH concerns, states he paces the hallway because he doesn't like to be in his room. Pleasant and cooperative in interactions. No behavioral concerns.    No other concerns at this time. Nursing will continue to monitor and assess.

## 2021-11-03 PROCEDURE — 250N000013 HC RX MED GY IP 250 OP 250 PS 637: Performed by: STUDENT IN AN ORGANIZED HEALTH CARE EDUCATION/TRAINING PROGRAM

## 2021-11-03 PROCEDURE — 250N000013 HC RX MED GY IP 250 OP 250 PS 637

## 2021-11-03 PROCEDURE — 99232 SBSQ HOSP IP/OBS MODERATE 35: CPT | Mod: GC | Performed by: PSYCHIATRY & NEUROLOGY

## 2021-11-03 PROCEDURE — 124N000002 HC R&B MH UMMC

## 2021-11-03 RX ADMIN — Medication 1 SPRAY: at 16:56

## 2021-11-03 RX ADMIN — Medication 1 SPRAY: at 09:18

## 2021-11-03 RX ADMIN — Medication 25 MCG: at 17:15

## 2021-11-03 RX ADMIN — OLANZAPINE 40 MG: 20 TABLET, ORALLY DISINTEGRATING ORAL at 17:15

## 2021-11-03 ASSESSMENT — ACTIVITIES OF DAILY LIVING (ADL)
HYGIENE/GROOMING: INDEPENDENT
LAUNDRY: WITH SUPERVISION
ORAL_HYGIENE: INDEPENDENT
DRESS: INDEPENDENT

## 2021-11-03 NOTE — PLAN OF CARE
Assessment/Intervention/Current Symptoms and Care Coordination    Attended team meeting and reviewed chart notes.    Writer left message with CHRISTIAN Barragan to fax court order to the unit as his commitment expires tomorrow.        Discharge Plan or Goal  Discharge to group home.        Barriers to Discharge   Needs placement      Referral Status  [facility] is looking into placements        Legal Status  Committed with Cardoza Order

## 2021-11-03 NOTE — PROGRESS NOTES
----------------------------------------------------------------------------------------------------------  [facility], [place]   Psychiatric Progress Note  Hospital Day #64    Identifier: Anderson Perez is a 40 year old male with a past psychiatric history of bipolar disorder with psychosis vs schizoaffective disorder bipolar type, and intellectual disability admitted from the ER on 08/31/2021 due to concern for out of control behaviors, aggression and psychosis.     Interim History:   The patient's care was discussed with the treatment team and chart notes were reviewed.    Vitals: VSS  Sleep: 7 hours (11/03/21 0555)  Scheduled medications: Took all scheduled medications as prescribed  PRN medications: No psychiatric prns given    Interim events:   No events    Staff Report:   -CADI  is working on next steps and contacting group homes for openings  -Pleasant, cooperative, social, talkative     Subjective:     Patient Interview:  Anderson Perez was interviewed in the hallway. He asked about any discharge updates, and otherwise had no questions or concerns. Emphasizes again that he wants to go to a group home in the [facility]. He did not watch Star Wars last night because the other patient who he was going to watch it with discharged.      The risks, benefits, alternatives and side effects of any medication changes have been discussed and are understood by the patient and guardian.    Review of systems:   Patient has no bothersome physical symptoms  Patient denies acute concerns    Objective:   /70   Pulse 77   Temp 97.1  F (36.2  C) (Tympanic)   Resp 16   Ht 1.829 m (6')   Wt 93.6 kg (206 lb 6.4 oz)   SpO2 96%   BMI 27.99 kg/m    Weight is 206 lbs 6.4 oz  Body mass index is 27.99 kg/m .    Mental Status Exam:  Orientation:  Grossly intact  General: alert, awake.   Appearance:  Dressed in hospital scrubs  Behavior: Sitting talking to another patient in  "shelley. Calm, cooperative.  Eye Contact: Good  Psychomotor:  No catatonia present. Walking slightly less.   Speech:  Appropriate volume, and rate  Language: Appears to be fluent in English  Mood:  \"good\"  Affect:  Neutral, appropriate, restricted range  Thought Process: Linear and coherent throughout conversation  Thought Content: No AH/VH/SI/HI. No delusional content.  Associations: Intact  Insight: Good, accepting of medications.   Judgment:  Good - adherent to medications.    Attention Span: not assessed  Concentration:  not assessed  Recent and Remote Memory:  Grossly intact  Fund of Knowledge: Historical diagnosis of intellectual disability (est. IQ 63)  Gait: normal       Allergies:     Allergies   Allergen Reactions     Bee Venom Anaphylaxis     Clindamycin Hives     Morphine Hives        Labs:   None new.     Assessment    Primary psychiatric diagnosis:   Psychosis, Unspecified (Schizoaffective disorder vs Bipolar Affective Disorder with Psychosis)    Secondary psychiatric diagnoses of concern this admission:   Intellectual disability (IQ 63)    Diagnostic Impression: Anderson Perez is a 40 year old male with a past psychiatric history of bipolar disorder with psychosis vs schizoaffective disorder bipolar type, antisocial personality disorder, and intellectual disability admitted from the  ER on 08/31/2021 due to concern for out of control behaviors, aggression and psychosis in the context of medication refusal despite Cardoza. Patient was recently released from [facility] to a group home. Presentation to the ED via ambulance after police contacted and provisional discharge rescinded. Group home noted escalating aggressive behavior, delusional thinking, and medication refusal despite Cardoza. PT refusal to discuss problems and largely denied all past history, medications, or any need for care. His reported symptoms of delusions, psychosis, and erratic/aggressive behavior are consistent with prior presentations " of psychosis.    Psychiatric Hospital course: Anderson Perez was admitted to [facility] following revocation of provisional discharge and on a court hold. His PTA olanzapine 20mg PO QDaily was resumed, as pt had been refusing at group home; per conversation with guardian, had planned to start on GRIGGS, but does not think that this was actually started, as patient was refusing medications, which prompted admission in setting of decompensation. Pt was converted to ODT formulation given concerns for cheeking medications; while initially resistant, pt was agreeable with taking ODT olanzapine as long as it was given with ginger ale, which was accommodated. Olanzapine dose increased to 40mg on 9/21 given limited efficacy at current dose. PRN for agitation/aggression was changed to haloperidol 5mg, lorazepam 2mg, and diphenhydramine 50mg, with PO haloperidol available. 10/05 noticed that he was more approachable and was ready to seat with the team in the conference room. Patient has since been stable on current medications, awaiting placement. He was started on a brief trail of propanolol for akathisia, but this was discontinued due to lack of improvement in pacing and patient attesting that a high level of physical activity was his baseline.    Medical course: Anderson Perez was physically examined by the ED prior to being transferred to the unit and was found to be medically stable and appropriate for admission.     Plan   Principal Diagnosis:   # Psychosis, Unspecified (Schizoaffective disorder vs Bipolar Affective Disorder with Psychosis)    Secondary psychiatric diagnoses of concern this admission:   # Intellectual disability    Psychotropic Medications:  Modify:  No changes    Continue Scheduled:  -Olanzapine ODT 40mg qPM    Continue PRN:  -Acetaminophen 650 mg Q6H PRN for pain and fever  -Hydroxyzine 25 mg Q6H PRN for anxiety  -Haldol 5mg prn q6h for agitation  -Haldol 5mg + diphenhydramine 50mg + lorazepam 2mg  prn for severe agitation/psychosis  -Maalox 30 ml Q4H PRN for indigestion  -Miralax prn for constipation    Additional Plans:  -Patient will be treated in therapeutic milieu with appropriate individual and group therapies as described.    Medical diagnoses to be addressed this admission:    # Chronic Hep C  NTD    #Dry mouth  -Biotene started 10/25    #Foot blister/lesion, resolved  -Bacitracin ointment prn    Consults: none    Legal Status: Cardoza  Committed - recommitted 11/1   Legal guardian    Safety Assessment:   Behavioral Orders   Procedures     Cheeking Precautions (behavioral units)     Code 1 - Restrict to Unit     Routine Programming     As clinically indicated     Status 15     Every 15 minutes.     SIO: No    Disposition: Pending CADI waiver. Likely to group home (including [facility], patient prefers either [facility] or one in the [facility]), or [facility]. Patient is stable, medications optimized.     This patient was seen and discussed with my attending physician.  Mary Nix MD  PGY-1 Psychiatry Resident  Pager: [phone]      This patient has been seen and evaluated by me, Glenn Isaacs.  I have discussed this patient with the psychiatry resident and I agree with the findings and plan in this note.    I have reviewed today's vital signs, medications, labs and imaging.     Glenn Adhikari MD on 11/3/2021 at 10:35 PM

## 2021-11-03 NOTE — PLAN OF CARE
"  Problem: General Plan of Care (Inpatient Behavioral)  Goal: Individualization/Patient Specific Goal (IP Behavioral)  Description: The patient and/or their representative will achieve their patient-specific goals related to the plan of care.    The patient-specific goals include:  Outcome: Improving  Flowsheets  Taken 11/3/2021 1728 by Neelima Taylor RN  Patient Strengths:   Adherent to medication regime   Engagement in hobbies, walking/ pacing     Pt was active and visible in the milieu; spent the shift pacing in the hallway; presented with calm and full range affect; reported doing \"good\"; denied SI/SIB,AVH,depression and anxiety; medication compliant; denied medication side effect; ate dinner with good appetite; will continue to monitor and assess.     "

## 2021-11-03 NOTE — PLAN OF CARE
Problem: Mood Impairment (Manic or Hypomanic Signs/Symptoms)  Goal: Improved Mood Symptoms (Manic/Hypomanic Signs/Symptoms)  Outcome: Improving    Pt spent most of the shift pacing the hallway. Pt was pleasant and cooperative and tried to be helpful with staff. Affect was blunted. Pt denied SI/SIB/HI/AVH. Pt had a prn of biotene in the morning. Pt had no medications scheduled for the daytime.

## 2021-11-04 PROCEDURE — 99233 SBSQ HOSP IP/OBS HIGH 50: CPT | Mod: GC | Performed by: PSYCHIATRY & NEUROLOGY

## 2021-11-04 PROCEDURE — 124N000002 HC R&B MH UMMC

## 2021-11-04 PROCEDURE — 250N000013 HC RX MED GY IP 250 OP 250 PS 637

## 2021-11-04 RX ADMIN — Medication 25 MCG: at 17:08

## 2021-11-04 RX ADMIN — OLANZAPINE 40 MG: 20 TABLET, ORALLY DISINTEGRATING ORAL at 17:08

## 2021-11-04 RX ADMIN — Medication 1 SPRAY: at 08:20

## 2021-11-04 RX ADMIN — Medication 1 SPRAY: at 11:07

## 2021-11-04 ASSESSMENT — MIFFLIN-ST. JEOR: SCORE: 1899.2

## 2021-11-04 NOTE — PLAN OF CARE
Assessment/Intervention/Current Symptoms and Care Coordination    Attended team meeting and reviewed chart notes.    Pt's court papers came. A Copy was given to patient, a copy was placed in the chart and another to be scanned into the electronic chart.         Discharge Plan or Goal  Discharge to group home        Barriers to Discharge   Needs placement acceptance      Referral Status  [facility] is working on placement        Legal Status  committed with Cardoza order

## 2021-11-04 NOTE — PROGRESS NOTES
----------------------------------------------------------------------------------------------------------  [facility], [place]   Psychiatric Progress Note  Hospital Day #65    Identifier: Anderson Perez is a 40 year old male with a past psychiatric history of bipolar disorder with psychosis vs schizoaffective disorder bipolar type, and intellectual disability admitted from the ER on 08/31/2021 due to concern for out of control behaviors, aggression and psychosis.     Interim History:   The patient's care was discussed with the treatment team and chart notes were reviewed.    Vitals: VSS  Sleep: 6.75 hours (11/04/21 0600)  Scheduled medications: Took all scheduled medications as prescribed  PRN medications: No psychiatric prns given    Interim events:   -2 IRTS contacted    Staff Report:   -Denies all MH symptoms   -Pleasant, cooperative, social, talkative      Subjective:     Patient Interview:  Anderson Perez was interviewed in the hallway. He asked about any discharge updates, and otherwise had no questions or concerns. Did not watch any movies yesterday.     The risks, benefits, alternatives and side effects of any medication changes have been discussed and are understood by the patient and guardian.    Review of systems:   Patient has no bothersome physical symptoms  Patient denies acute concerns    Objective:   /79 (BP Location: Left arm)   Pulse 89   Temp 97  F (36.1  C) (Tympanic)   Resp 14   Ht 1.829 m (6')   Wt 94.6 kg (208 lb 9.6 oz)   SpO2 97%   BMI 28.29 kg/m    Weight is 208 lbs 9.6 oz  Body mass index is 28.29 kg/m .    Mental Status Exam:  Orientation:  Grossly intact  General: alert, awake.   Appearance:  Dressed in hospital scrubs  Behavior: Sitting talking to another patient in [facility]. Calm, cooperative.  Eye Contact: Good  Psychomotor:  No catatonia present. Walking in the hallways, typical for him at baseline.   Speech:  Appropriate volume, and  "rate  Language: Appears to be fluent in English  Mood:  \"good\"  Affect:  Neutral, appropriate, restricted range  Thought Process: Linear and coherent throughout conversation  Thought Content: No AH/VH/SI/HI. No delusional content.  Associations: Intact  Insight: Good, accepting of medications.   Judgment:  Good - adherent to medications.    Attention Span: not assessed  Concentration:  not assessed  Recent and Remote Memory:  Grossly intact  Fund of Knowledge: Historical diagnosis of intellectual disability (est. IQ 63)  Gait: normal       Allergies:     Allergies   Allergen Reactions     Bee Venom Anaphylaxis     Clindamycin Hives     Morphine Hives        Labs:   None new.     Assessment    Primary psychiatric diagnosis:   Psychosis, Unspecified (Schizoaffective disorder vs Bipolar Affective Disorder with Psychosis)    Secondary psychiatric diagnoses of concern this admission:   Intellectual disability (IQ 63)    Diagnostic Impression: Anderson Perez is a 40 year old male with a past psychiatric history of bipolar disorder with psychosis vs schizoaffective disorder bipolar type, and intellectual disability admitted from the  ER on 08/31/2021 due to concern for out of control behaviors, aggression and psychosis in the context of medication refusal despite Cardoza. Patient was recently released from [facility] to a group home. Presentation to the ED via ambulance after police contacted and provisional discharge rescinded. Group home noted escalating aggressive behavior, delusional thinking, and medication refusal despite Cardoza. PT refusal to discuss problems and largely denied all past history, medications, or any need for care. His reported symptoms of delusions, psychosis, and erratic/aggressive behavior are consistent with prior presentations of psychosis vs schizoaffective disorder bipolar type.    Psychiatric Hospital course: Anderson Perez was admitted to [facility] following revocation of provisional " discharge and on a court hold. His PTA olanzapine 20mg PO QDaily was resumed, as pt had been refusing at group home; per conversation with guardian, had planned to start on GRIGGS, but does not think that this was actually started, as patient was refusing medications, which prompted admission in setting of decompensation. Pt was converted to ODT formulation given concerns for cheeking medications; while initially resistant, pt was agreeable with taking ODT olanzapine as long as it was given with ginger ale, which was accommodated. Olanzapine dose increased to 40mg on 9/21 given limited efficacy at current dose. PRN for agitation/aggression was changed to haloperidol 5mg, lorazepam 2mg, and diphenhydramine 50mg, with PO haloperidol available. 10/05 noticed that he was more approachable and was ready to seat with the team in the conference room. Patient has since been stable on current medications, awaiting placement. He was started on a brief trail of propanolol for akathisia, but this was discontinued due to lack of improvement in pacing and patient attesting that a high level of physical activity was his baseline.    Medical course: Anderson Perez was physically examined by the ED prior to being transferred to the unit and was found to be medically stable and appropriate for admission.     Plan   Principal Diagnosis:   # Psychosis, Unspecified (Schizoaffective disorder vs Bipolar Affective Disorder with Psychosis)    Secondary psychiatric diagnoses of concern this admission:   # Intellectual disability    Psychotropic Medications:  Modify:  No changes    Continue Scheduled:  -Olanzapine ODT 40mg qPM    Continue PRN:  -Acetaminophen 650 mg Q6H PRN for pain and fever  -Hydroxyzine 25 mg Q6H PRN for anxiety  -Haldol 5mg prn q6h for agitation  -Haldol 5mg + diphenhydramine 50mg + lorazepam 2mg prn for severe agitation/psychosis  -Maalox 30 ml Q4H PRN for indigestion  -Miralax prn for constipation    Additional  Plans:  -Patient will be treated in therapeutic milieu with appropriate individual and group therapies as described.    Medical diagnoses to be addressed this admission:    # Chronic Hep C  NTD    #Dry mouth  -Biotene started 10/25    #Foot blister/lesion, resolved  -Bacitracin ointment prn    Consults: none    Legal Status: Cardoza  Committed - recommitted 11/1   Legal guardian    Safety Assessment:   Behavioral Orders   Procedures     Cheeking Precautions (behavioral units)     Code 1 - Restrict to Unit     Routine Programming     As clinically indicated     Status 15     Every 15 minutes.     SIO: No    Disposition: Pending CADI waiver. Likely to group home (including [facility], patient prefers either [facility] or one in the [place] area). Also on the list for [facility]. Patient is stable, medications optimized.     This patient was seen and discussed with my attending physician.  Mary Nix MD  PGY-1 Psychiatry Resident  Pager: [phone]      This patient has been seen and evaluated by me, Glenn Isaacs.  I have discussed this patient with the psychiatry resident and I agree with the findings and plan in this note.    I have reviewed today's vital signs, medications, labs and imaging.     Glenn Adhikari MD on 11/7/2021 at 11:17 PM

## 2021-11-04 NOTE — PLAN OF CARE
Problem: Sleep Disturbance  Goal: Adequate Sleep/Rest  Outcome: No Change   Patient observed sleeping throughout all 15 minutes rounding with easy and non labored respirations. No safety concerns. Observed to have slept for 6.75 hours. No prn meds given/requested.

## 2021-11-04 NOTE — PLAN OF CARE
Nursing Plan of care   Problem: Behavioral Health Plan of Care  Goal: Develops/Participates in Therapeutic Bivins to Support Successful Transition  Outcome: Improving   Pt has been active and visible in the milieu; spent the shift pacing in the hallway and briefly watched TV in the lounge; encouraged to attend group but declined; requested his scheduled medication and received as ordered; Denied SI/SIB,HI,depression,AVH and anxiety; ate dinner with good appetite. Around 1805, a women identified self as pt.'s wife/girlfriend called the unit and demanded to talk to the pt; she was crying and sobbing over the phone desperate to talk to the pt C/O having medical emergency of DVT and potentially dying from it. She empathized that the pt is needs to know about her DVT and  to take care of their 3 yrs old son incase she die. She has been calling the unit for so many times and in many occassions was told the pt is not willing to talk to her, but she kept calling the unit anyway's. Will continue to monitor and provide support.

## 2021-11-04 NOTE — PLAN OF CARE
Pt has had an uneventful shift, he spends a majority of his time pacing in the hallway. He presents with a full range affect. Pt has no scheduled meds, he received PRN biotene spray x2. Pt currently denies mental health symptoms.

## 2021-11-05 PROCEDURE — 250N000013 HC RX MED GY IP 250 OP 250 PS 637

## 2021-11-05 PROCEDURE — 99233 SBSQ HOSP IP/OBS HIGH 50: CPT | Mod: GC | Performed by: PSYCHIATRY & NEUROLOGY

## 2021-11-05 PROCEDURE — 124N000002 HC R&B MH UMMC

## 2021-11-05 RX ADMIN — Medication 1 SPRAY: at 14:37

## 2021-11-05 RX ADMIN — Medication 1 SPRAY: at 16:10

## 2021-11-05 RX ADMIN — Medication 1 SPRAY: at 08:11

## 2021-11-05 RX ADMIN — OLANZAPINE 40 MG: 20 TABLET, ORALLY DISINTEGRATING ORAL at 16:11

## 2021-11-05 RX ADMIN — Medication 25 MCG: at 16:11

## 2021-11-05 ASSESSMENT — ACTIVITIES OF DAILY LIVING (ADL)
HYGIENE/GROOMING: INDEPENDENT
ORAL_HYGIENE: INDEPENDENT
DRESS: INDEPENDENT
HYGIENE/GROOMING: INDEPENDENT
LAUNDRY: WITH SUPERVISION
LAUNDRY: WITH SUPERVISION
ORAL_HYGIENE: INDEPENDENT
DRESS: INDEPENDENT

## 2021-11-05 NOTE — PLAN OF CARE
Problem: Sleep Disturbance  Goal: Adequate Sleep/Rest  Outcome: No Change    Patient was sleeping when shift commenced and slept through all 15 minutes checks. Breathing was non labored. No prns given. No safety concerns. Slept for about 7.0 hours.

## 2021-11-05 NOTE — PLAN OF CARE
Problem: Behavior Regulation Impairment (Manic or Hypomanic Signs/Symptoms)  Goal: Improved Impulse Control (Manic/Hypomanic Signs/Symptoms)  Outcome: No Change     Problem: Mood Impairment (Manic or Hypomanic Signs/Symptoms)  Goal: Improved Mood Symptoms (Manic/Hypomanic Signs/Symptoms)  Outcome: No Change     Patient is withdrawn, guarded, pleasant, and cooperative.  He is up on unit pacing halls today.  Patient is dismissive with assessment questions denying anxiety, depression, SI, HI, SIB, and hallucinations.  He does appear preoccupied at times and is observed talking to self.  Patient denies pain.  He remains safe on unit.  Will continue to monitor. Pura Clark RN

## 2021-11-05 NOTE — PROGRESS NOTES
"    ----------------------------------------------------------------------------------------------------------  [facility], [place]   Psychiatric Progress Note  Hospital Day #66    Identifier: Anderson Perez is a 40 year old male with a past psychiatric history of bipolar disorder with psychosis vs schizoaffective disorder bipolar type, and intellectual disability admitted from the ER on 08/31/2021 due to concern for out of control behaviors, aggression and psychosis.     Interim History:   The patient's care was discussed with the treatment team and chart notes were reviewed.    Vitals: VSS  Sleep: 7 hours (11/05/21 0600)  Scheduled medications: Took all scheduled medications as prescribed  PRN medications: No psychiatric prns given    Interim events:   No events    Staff Report:   -Denies all MH symptoms   -Gf called unit crying and hoping to speak with Antonio, patient declined to speak with her     Subjective:     Patient Interview:  Anderson Perez was interviewed in the hallway. He asked about any discharge updates, and otherwise had no questions or concerns. Said he doesn't want to talk to his ex-girlfriend, \"I don't like her, I don't want her in my life.\" Didn't want to talk about that topic further.      The risks, benefits, alternatives and side effects of any medication changes have been discussed and are understood by the patient and guardian.    Review of systems:   Patient has no bothersome physical symptoms  Patient denies acute concerns    Objective:   /78 (BP Location: Right arm)   Pulse 75   Temp 97.4  F (36.3  C) (Tympanic)   Resp 14   Ht 1.829 m (6')   Wt 94.6 kg (208 lb 9.6 oz)   SpO2 98%   BMI 28.29 kg/m    Weight is 208 lbs 9.6 oz  Body mass index is 28.29 kg/m .    Mental Status Exam:  Orientation:  Grossly intact  General: alert, awake.   Appearance:  Dressed in hospital scrubs  Behavior: Walking hallways. Calm, cooperative.  Eye Contact: " "Good  Psychomotor:  No catatonia present. Walking in the hallways, typical for him at baseline.   Speech:  Appropriate volume, and rate  Language: Appears to be fluent in English  Mood:  \"good\"  Affect:  Neutral, appropriate, restricted range  Thought Process: Linear and coherent throughout conversation  Thought Content: No AH/VH/SI/HI. No delusional content.  Associations: Intact  Insight: Good, accepting of medications.   Judgment:  Good - adherent to medications, choosing to avoid unhealthy relationships  Attention Span: not assessed  Concentration:  not assessed  Recent and Remote Memory:  Grossly intact  Fund of Knowledge: Historical diagnosis of intellectual disability (est. IQ 63)  Gait: normal       Allergies:     Allergies   Allergen Reactions     Bee Venom Anaphylaxis     Clindamycin Hives     Morphine Hives        Labs:   None new.     Assessment    Primary psychiatric diagnosis:   Psychosis, Unspecified (Schizoaffective disorder vs Bipolar Affective Disorder with Psychosis)    Secondary psychiatric diagnoses of concern this admission:   Intellectual disability (IQ 63)    Diagnostic Impression: Anderson Perez is a 40 year old male with a past psychiatric history of bipolar disorder with psychosis vs schizoaffective disorder bipolar type, and intellectual disability admitted from the  ER on 08/31/2021 due to concern for out of control behaviors, aggression and psychosis in the context of medication refusal despite Cardoza. Patient was recently released from [facility] to a group home. Presentation to the ED via ambulance after police contacted and provisional discharge rescinded. Group home noted escalating aggressive behavior, delusional thinking, and medication refusal despite Cardoza. PT refusal to discuss problems and largely denied all past history, medications, or any need for care. His reported symptoms of delusions, psychosis, and erratic/aggressive behavior are consistent with prior presentations of " psychosis vs schizoaffective disorder bipolar type.    Psychiatric Hospital course: Anderson Perez was admitted to [facility] following revocation of provisional discharge and on a court hold. His PTA olanzapine 20mg PO QDaily was resumed, as pt had been refusing at group home; per conversation with guardian, had planned to start on GRIGGS, but does not think that this was actually started, as patient was refusing medications, which prompted admission in setting of decompensation. Pt was converted to ODT formulation given concerns for cheeking medications; while initially resistant, pt was agreeable with taking ODT olanzapine as long as it was given with ginger ale, which was accommodated. Olanzapine dose increased to 40mg on 9/21 given limited efficacy at current dose. PRN for agitation/aggression was changed to haloperidol 5mg, lorazepam 2mg, and diphenhydramine 50mg, with PO haloperidol available. 10/05 noticed that he was more approachable and was ready to seat with the team in the conference room. Patient has since been stable on current medications, awaiting placement. He was started on a brief trail of propanolol for akathisia, but this was discontinued due to lack of improvement in pacing and patient attesting that a high level of physical activity was his baseline.    Medical course: Anderson Perez was physically examined by the ED prior to being transferred to the unit and was found to be medically stable and appropriate for admission.     Plan   Principal Diagnosis:   # Psychosis, Unspecified (Schizoaffective disorder vs Bipolar Affective Disorder with Psychosis)    Secondary psychiatric diagnoses of concern this admission:   # Intellectual disability    Psychotropic Medications:  Modify:  No changes    Continue Scheduled:  -Olanzapine ODT 40mg qPM    Continue PRN:  -Acetaminophen 650 mg Q6H PRN for pain and fever  -Hydroxyzine 25 mg Q6H PRN for anxiety  -Haldol 5mg prn q6h for agitation  -Haldol 5mg +  diphenhydramine 50mg + lorazepam 2mg prn for severe agitation/psychosis  -Maalox 30 ml Q4H PRN for indigestion  -Miralax prn for constipation    Additional Plans:  -Patient will be treated in therapeutic milieu with appropriate individual and group therapies as described.    Medical diagnoses to be addressed this admission:    # Chronic Hep C  NTD    #Dry mouth  -Biotene started 10/25    #Foot blister/lesion, resolved  -Bacitracin ointment prn    Consults: none    Legal Status: Cardoza  Committed - recommitted 11/1   Legal guardian    Safety Assessment:   Behavioral Orders   Procedures     Cheeking Precautions (behavioral units)     Code 1 - Restrict to Unit     Routine Programming     As clinically indicated     Status 15     Every 15 minutes.     SIO: No    Disposition: Pending CADI waiver. Likely to group home (including [facility], patient prefers either [facility] or one in the [facility]). Also on the list for [facility]. Patient is stable, medications optimized.     This patient was seen and discussed with my attending physician.  Mary Nix MD  PGY-1 Psychiatry Resident  Pager: [phone]    This patient has been seen and evaluated by me, Glenn Isaacs.  I have discussed this patient with the psychiatry resident and I agree with the findings and plan in this note.    I have reviewed today's vital signs, medications, labs and imaging.     Glenn Adhikari MD on 11/7/2021 at 11:18 PM

## 2021-11-05 NOTE — PLAN OF CARE
Assessment/Intervention/Current Symptoms and Care Coordination    Attended team meeting and reviewed chart notes.    Pt continues to walk the dalal. He does not attend groups but takes medication as prescribed.      Discharge Plan or Goal  Discharge to a group home      Barriers to Discharge   Pt has not been accepted to a group home      Referral Status  [facility] is looking into placement/group homes        Legal Status  Committed with Cardoza order

## 2021-11-06 PROCEDURE — 124N000002 HC R&B MH UMMC

## 2021-11-06 PROCEDURE — 250N000013 HC RX MED GY IP 250 OP 250 PS 637

## 2021-11-06 RX ADMIN — Medication 1 SPRAY: at 10:45

## 2021-11-06 RX ADMIN — OLANZAPINE 40 MG: 20 TABLET, ORALLY DISINTEGRATING ORAL at 20:15

## 2021-11-06 RX ADMIN — Medication 1 SPRAY: at 16:20

## 2021-11-06 RX ADMIN — Medication 25 MCG: at 20:15

## 2021-11-06 ASSESSMENT — ACTIVITIES OF DAILY LIVING (ADL)
LAUNDRY: WITH SUPERVISION
ORAL_HYGIENE: INDEPENDENT
DRESS: INDEPENDENT
LAUNDRY: WITH SUPERVISION
HYGIENE/GROOMING: INDEPENDENT
ORAL_HYGIENE: INDEPENDENT
HYGIENE/GROOMING: INDEPENDENT
DRESS: INDEPENDENT

## 2021-11-06 NOTE — PLAN OF CARE
Problem: Adult Inpatient Plan of Care  Goal: Plan of Care Review  Outcome: Improving  Flowsheets (Taken 11/5/2021 2000)  Plan of Care Reviewed With: patient   Patients mood is calm blunted flat affect, he is alert and oriented x 4. Patient is independent of cares, independent of Adls. Patient denies pain, SI, SIB, HI, and AVH. Patient was observed pacing up and down the dalal way, no inappropriate behavior. Patient is medication compliant, no medication side effects endorsed by patient. Patients assessment complete, vs's, food/fluid intake was adequate. Will continue to monitor.

## 2021-11-06 NOTE — PLAN OF CARE
Problem: Behavioral Disturbance  Goal: Behavioral Disturbance  Description: Signs and symptoms of listed problems will be absent or manageable by discharge or transition of care.  Outcome: Improving  Flowsheets (Taken 11/6/2021 1707)  Behavioral Disturbance Assessed: all  Behavioral Disturbance Present:    insight    thought process     Problem: Mood Impairment (Manic or Hypomanic Signs/Symptoms)  Goal: Improved Mood Symptoms (Manic/Hypomanic Signs/Symptoms)  Outcome: Improving  Flowsheets (Taken 11/6/2021 1707)  Mutually Determined Action Steps (Improved Mood Symptoms): engages in physical activity     Problem: Social Isolation  Goal: Increased Social Interaction  Outcome: Improving   Problem: Behavioral Health Plan of Care  Goal: Plan of Care Review  Outcome: Improving  Flowsheets (Taken 11/6/2021 1707)  Plan of Care Reviewed With: patient  Progress: improving  Patient Agreement with Plan of Care: (believes hospitalization is unnecessary) agrees with comment (describe)    Anderson walking in dalal with new male peer engaging in long conversation while walking-appears to enjoy company of peer-continues delusional statements, but pleasant and cooperative-increased social interactions with various peers and staff-Anderson requested evening medications be given later this evening to allow him to remain awake to enjoy activity on unit

## 2021-11-07 PROCEDURE — 124N000002 HC R&B MH UMMC

## 2021-11-07 PROCEDURE — 250N000013 HC RX MED GY IP 250 OP 250 PS 637

## 2021-11-07 RX ADMIN — Medication 1 SPRAY: at 16:26

## 2021-11-07 RX ADMIN — Medication 1 SPRAY: at 08:40

## 2021-11-07 RX ADMIN — Medication 25 MCG: at 19:57

## 2021-11-07 RX ADMIN — OLANZAPINE 40 MG: 20 TABLET, ORALLY DISINTEGRATING ORAL at 19:57

## 2021-11-07 ASSESSMENT — MIFFLIN-ST. JEOR: SCORE: 1898.3

## 2021-11-07 NOTE — PLAN OF CARE
Problem: Sleep Disturbance  Goal: Adequate Sleep/Rest  Outcome: Improving   Patient appeared sleeping during shift, however was intermittently awake for bathroom use and repositioning, did not request any prn's.

## 2021-11-07 NOTE — PLAN OF CARE
Nursing plan of care   Problem: Behavior Regulation Impairment (Psychotic Signs/Symptoms)  Goal: Improved Behavioral Control (Psychotic Signs/Symptoms)  Outcome: Improving  Flowsheets (Taken 11/7/2021 1442)  Mutually Determined Action Steps (Improved Behavioral Control):   verbalizes gratifying activity   verbalizes personal treatment goal  Pt has bebe active and pacing in the hallway; calm and pleasant; denied SI/SIB,AVH, depression and anxiety; requested to take his 1700 scheduled medication at 2000; denied medication side effect; ate diner with good appetite; No safety concern to report this shift; will continue to monitor and assess.

## 2021-11-07 NOTE — PLAN OF CARE
Problem: Behavioral Health Plan of Care  Goal: Plan of Care Review  Outcome: Improving  Flowsheets (Taken 11/7/2021 0859)  Plan of Care Reviewed With: patient  Progress: improving  Patient Agreement with Plan of Care: agrees     Problem: Social Isolation  Goal: Increased Social Interaction  Outcome: Improving   Antonio social with staff and peers throughout the day-freq joking and laughing-pleasant throughout the day-continues pacing most of day, but does have longer periods of time when he is able to sit for period of time and watch television-no concerns voiced

## 2021-11-08 PROCEDURE — 124N000002 HC R&B MH UMMC

## 2021-11-08 PROCEDURE — 250N000013 HC RX MED GY IP 250 OP 250 PS 637: Performed by: STUDENT IN AN ORGANIZED HEALTH CARE EDUCATION/TRAINING PROGRAM

## 2021-11-08 PROCEDURE — 99231 SBSQ HOSP IP/OBS SF/LOW 25: CPT | Mod: GC | Performed by: PSYCHIATRY & NEUROLOGY

## 2021-11-08 RX ORDER — VITAMIN B COMPLEX
25 TABLET ORAL AT BEDTIME
Status: DISCONTINUED | OUTPATIENT
Start: 2021-11-08 | End: 2021-12-28 | Stop reason: HOSPADM

## 2021-11-08 RX ORDER — OLANZAPINE 20 MG/1
40 TABLET, ORALLY DISINTEGRATING ORAL AT BEDTIME
Status: DISCONTINUED | OUTPATIENT
Start: 2021-11-08 | End: 2021-12-28 | Stop reason: HOSPADM

## 2021-11-08 RX ADMIN — Medication 1 SPRAY: at 11:00

## 2021-11-08 RX ADMIN — OLANZAPINE 40 MG: 20 TABLET, ORALLY DISINTEGRATING ORAL at 20:02

## 2021-11-08 RX ADMIN — Medication 25 MCG: at 20:01

## 2021-11-08 RX ADMIN — Medication 1 SPRAY: at 20:04

## 2021-11-08 ASSESSMENT — ACTIVITIES OF DAILY LIVING (ADL)
DRESS: INDEPENDENT
LAUNDRY: WITH SUPERVISION
ORAL_HYGIENE: INDEPENDENT
LAUNDRY: WITH SUPERVISION
ORAL_HYGIENE: INDEPENDENT
HYGIENE/GROOMING: INDEPENDENT
HYGIENE/GROOMING: INDEPENDENT
DRESS: INDEPENDENT

## 2021-11-08 NOTE — PLAN OF CARE
Plan of care   Problem: Sleep Disturbance  Goal: Adequate Sleep/Rest  Outcome: Improving  Received pt sleeping in bed with regular unlabored respiration; No PRN or snack was requested; pt appeared to have slept for 7 hrs; will continue to monitor and assess.

## 2021-11-08 NOTE — PLAN OF CARE
Assessment/Intervention/Current Symptoms and Care Coordination    Attended team meeting and reviewed chart notes.    Pt has been cooperative with medications and is pleasant and social with peers.    Writer left  for [facility] Deja [phone] for an update.      Discharge Plan or Goal  Discharge to group home      Barriers to Discharge   Needs to be accepted to placement      Referral Status  [facility] G[facility] with [facility] is looking at placement options. If she gets enough denials pt can go on the wait list for [facility].        Legal Status  Pt is committed with Cardoza order

## 2021-11-08 NOTE — PROGRESS NOTES
"    ----------------------------------------------------------------------------------------------------------  [facility], [place]   Psychiatric Progress Note  Hospital Day #69    Identifier: Anderson Perez is a 40 year old male with a past psychiatric history of bipolar disorder with psychosis vs schizoaffective disorder bipolar type, and intellectual disability admitted from the ER on 08/31/2021 due to concern for out of control behaviors, aggression and psychosis.     Interim History:   The patient's care was discussed with the treatment team and chart notes were reviewed.    Vitals: VSS  Sleep: 7 hours (11/08/21 0600)  Scheduled medications: Took all scheduled medications as prescribed  PRN medications: No psychiatric prns given    Interim events:   No events    Staff Report:   -Denies all MH symptoms   -Delusional comments   -Noted to be more social and sitting down in milieu more  -Gf called unit hoping to speak with Antonio, patient declined to speak with her     Subjective:     Patient Interview:  Anderson Perez was interviewed in the hallway. He asked about any discharge updates, and otherwise had no questions or concerns. He says he continues to do the \"waiting game.\"      The risks, benefits, alternatives and side effects of any medication changes have been discussed and are understood by the patient and guardian.    Review of systems:   Patient has no bothersome physical symptoms  Patient denies acute concerns    Objective:   /65 (BP Location: Left arm)   Pulse 95   Temp 98.2  F (36.8  C) (Tympanic)   Resp 14   Ht 1.829 m (6')   Wt 94.5 kg (208 lb 6.4 oz)   SpO2 98%   BMI 28.26 kg/m    Weight is 208 lbs 6.4 oz  Body mass index is 28.26 kg/m .    Mental Status Exam:  Orientation:  Grossly intact  General: alert, awake.   Appearance:  Dressed in hospital scrubs  Behavior: Walking hallways. Calm, cooperative.  Eye Contact: Good  Psychomotor:  No catatonia " "present. Walking in the hallways, typical for him at baseline.   Speech:  Appropriate volume, and rate  Language: Appears to be fluent in English  Mood:  \"okay\"  Affect:  Neutral, appropriate, restricted range  Thought Process: Linear and coherent throughout conversation  Thought Content: No AH/VH/SI/HI. No delusional content.  Associations: Intact  Insight: Good, accepting of medications.   Judgment:  Good - adherent to medications, choosing to avoid unhealthy relationships  Attention Span: not assessed  Concentration:  not assessed  Recent and Remote Memory:  Grossly intact  Fund of Knowledge: Historical diagnosis of intellectual disability (est. IQ 63)  Gait: normal       Allergies:     Allergies   Allergen Reactions     Bee Venom Anaphylaxis     Clindamycin Hives     Morphine Hives        Labs:   None new.     Assessment    Primary psychiatric diagnosis:   Psychosis, Unspecified (Schizoaffective disorder vs Bipolar Affective Disorder with Psychosis)    Secondary psychiatric diagnoses of concern this admission:   Intellectual disability (IQ 63)    Diagnostic Impression: Anderson Perez is a 40 year old male with a past psychiatric history of bipolar disorder with psychosis vs schizoaffective disorder bipolar type, and intellectual disability admitted from the  ER on 08/31/2021 due to concern for out of control behaviors, aggression and psychosis in the context of medication refusal despite Cardoza. Patient was recently released from [facility] to a group home. Presentation to the ED via ambulance after police contacted and provisional discharge rescinded. Group home noted escalating aggressive behavior, delusional thinking, and medication refusal despite Cardoza. PT refusal to discuss problems and largely denied all past history, medications, or any need for care. His reported symptoms of delusions, psychosis, and erratic/aggressive behavior are consistent with prior presentations of psychosis vs schizoaffective " disorder bipolar type.    Psychiatric Hospital course: Anderson Perez was admitted to [facility] following revocation of provisional discharge and on a court hold. His PTA olanzapine 20mg PO QDaily was resumed, as pt had been refusing at group home; per conversation with guardian, had planned to start on GRIGGS, but does not think that this was actually started, as patient was refusing medications, which prompted admission in setting of decompensation. Pt was converted to ODT formulation given concerns for cheeking medications; while initially resistant, pt was agreeable with taking ODT olanzapine as long as it was given with ginger ale, which was accommodated. Olanzapine dose increased to 40mg on 9/21 given limited efficacy at current dose. PRN for agitation/aggression was changed to haloperidol 5mg, lorazepam 2mg, and diphenhydramine 50mg, with PO haloperidol available. Over time, he's become more sociable with peers and staff on the unit, and is spending more time sitting in the milieu as opposed to pacing the hallways. Patient has since been stable on current medications for some time, and continues to await placement. He was started on a brief trail of propanolol for akathisia, but this was discontinued due to lack of improvement in pacing and patient attesting that a high level of physical activity was his baseline.    Medical course: Anderson Perez was physically examined by the ED prior to being transferred to the unit and was found to be medically stable and appropriate for admission.     Plan   Principal Diagnosis:   # Psychosis, Unspecified (Schizoaffective disorder vs Bipolar Affective Disorder with Psychosis)    Secondary psychiatric diagnoses of concern this admission:   # Intellectual disability    Psychotropic Medications:  Modify:  No changes    Continue Scheduled:  -Olanzapine ODT 40mg qPM    Continue PRN:  -Acetaminophen 650 mg Q6H PRN for pain and fever  -Hydroxyzine 25 mg Q6H PRN for  anxiety  -Haldol 5mg prn q6h for agitation  -Haldol 5mg + diphenhydramine 50mg + lorazepam 2mg prn for severe agitation/psychosis  -Maalox 30 ml Q4H PRN for indigestion  -Miralax prn for constipation    Additional Plans:  -Patient will be treated in therapeutic milieu with appropriate individual and group therapies as described.    Medical diagnoses to be addressed this admission:    # Chronic Hep C  NTD    #Dry mouth  -Biotene started 10/25    #Foot blister/lesion, resolved  -Bacitracin ointment prn    Consults: none    Legal Status: Cardoza  Committed - recommitted 11/1   Legal guardian    Safety Assessment:   Behavioral Orders   Procedures     Cheeking Precautions (behavioral units)     Code 1 - Restrict to Unit     Routine Programming     As clinically indicated     Status 15     Every 15 minutes.     SIO: No    Disposition: Pending CADI waiver. Likely to group home (including [facility], patient prefers either [facility] or one in the [place] area). Also on the list for [facility]. Patient is stable, medications optimized.     This patient was seen and discussed with my attending physician.  Mary Nix MD  PGY-1 Psychiatry Resident  Pager: [phone]      This patient has been seen and evaluated by me, Glenn Isaacs.  I have discussed this patient with the psychiatry resident and I agree with the findings and plan in this note.    I have reviewed today's vital signs, medications, labs and imaging.     Glenn Adhikari MD on 11/8/2021 at 10:42 PM

## 2021-11-08 NOTE — PLAN OF CARE
BEHAVIORAL TEAM DISCUSSION    Participants: Dr. Isaacs, resident Mary Nix, RN Natacha Saini, two medical students Ismael and Dread, CTC Siobhan Irving  Progress: moderate improvement, pt is pleasant and cooperative  Anticipated length of stay: until placement is found and pt is accepted  Continued Stay Criteria/Rationale: pt is committed with Cardoza order    Medical/Physical:   # Chronic Hep C  NTD     #Dry mouth  -Biotene started 10/25     #Foot blister/lesion, resolved  -Bacitracin ointment prn     Precautions:   Behavioral Orders   Procedures     Cheeking Precautions (behavioral units)     Code 1 - Restrict to Unit     Routine Programming     As clinically indicated     Status 15     Every 15 minutes.     Plan: provide a psychological assessment and manage medications per psychiatry, staff to provide a safe and therapeutic milieu, CTC to coordinate discharge plan with REX GONZALES with [facility].  Rationale for change in precautions or plan: no change

## 2021-11-09 ENCOUNTER — MEDICAL CORRESPONDENCE (OUTPATIENT)
Dept: HEALTH INFORMATION MANAGEMENT | Facility: CLINIC | Age: 41
End: 2021-11-09

## 2021-11-09 PROCEDURE — 124N000002 HC R&B MH UMMC

## 2021-11-09 PROCEDURE — 99232 SBSQ HOSP IP/OBS MODERATE 35: CPT | Mod: GC | Performed by: PSYCHIATRY & NEUROLOGY

## 2021-11-09 PROCEDURE — 250N000013 HC RX MED GY IP 250 OP 250 PS 637: Performed by: STUDENT IN AN ORGANIZED HEALTH CARE EDUCATION/TRAINING PROGRAM

## 2021-11-09 RX ADMIN — Medication 25 MCG: at 19:41

## 2021-11-09 RX ADMIN — OLANZAPINE 40 MG: 20 TABLET, ORALLY DISINTEGRATING ORAL at 19:41

## 2021-11-09 RX ADMIN — Medication 1 SPRAY: at 13:16

## 2021-11-09 ASSESSMENT — MIFFLIN-ST. JEOR: SCORE: 1903.74

## 2021-11-09 NOTE — PLAN OF CARE
Problem: Behavioral Health Plan of Care  Goal: Plan of Care Review  Outcome: Improving  Flowsheets (Taken 11/8/2021 1400)  Plan of Care Reviewed With: patient  Progress: improving  Patient Agreement with Plan of Care: agrees     Problem: Social Isolation  Goal: Increased Social Interaction  Outcome: Improving     Anderson active on unit throughout day-social with staff and peers-smiling and pleasant in interactions-requested Biotene spray for dry mouth which he states is effective

## 2021-11-09 NOTE — PROGRESS NOTES
----------------------------------------------------------------------------------------------------------  [facility], [place]   Psychiatric Progress Note  Hospital Day #70    Identifier: Anderson Perez is a 40 year old male with a past psychiatric history of bipolar disorder with psychosis vs schizoaffective disorder bipolar type, and intellectual disability admitted from the ER on 08/31/2021 due to concern for out of control behaviors, aggression and psychosis. Patient is psychiatrically stable, medications optimized, awaiting placement.     Interim History:   The patient's care was discussed with the treatment team and chart notes were reviewed.    Vitals: VSS  Sleep: 6.5 hours (11/09/21 0600)  Scheduled medications: Took all scheduled medications as prescribed  PRN medications: No psychiatric prns given    Interim events:   No events    Staff Report:   -Denies all MH symptoms   -Noted to be more social and sitting down in milieu more, watched part of Star Wars  -Declined to attend groups     Subjective:     Patient Interview:  Anderson Perez was interviewed in the hallway. He asked about any discharge updates, and otherwise had no questions or concerns. Was glad to hear more discharge options are being explored.     The risks, benefits, alternatives and side effects of any medication changes have been discussed and are understood by the patient and guardian.    Review of systems:   Patient has no bothersome physical symptoms  Patient denies acute concerns    Objective:   /77   Pulse 79   Temp 97.5  F (36.4  C) (Oral)   Resp 14   Ht 1.829 m (6')   Wt 95.1 kg (209 lb 9.6 oz)   SpO2 97%   BMI 28.43 kg/m    Weight is 209 lbs 9.6 oz  Body mass index is 28.43 kg/m .    Mental Status Exam:  Orientation:  Grossly intact  General: alert, awake.   Appearance:  Dressed in hospital scrubs  Behavior: Walking hallways. Calm, cooperative.  Eye Contact: Good  Psychomotor:  No  "catatonia present. Walking in the hallways, typical for him at baseline.   Speech:  Appropriate volume, and rate  Language: Appears to be fluent in English  Mood:  \"okay\"  Affect:  Neutral, appropriate, restricted range  Thought Process: Linear and coherent throughout brief conversation  Thought Content: No AH/VH/SI/HI. No delusional content during interview today.  Associations: Intact  Insight: Good, accepting of medications.   Judgment:  Good - adherent to medications, choosing to avoid unhealthy relationships  Attention Span: not assessed  Concentration:  not assessed  Recent and Remote Memory:  Grossly intact  Fund of Knowledge: Historical diagnosis of intellectual disability (est. IQ 63)  Gait: normal       Allergies:     Allergies   Allergen Reactions     Bee Venom Anaphylaxis     Clindamycin Hives     Morphine Hives        Labs:   None new.     Assessment    Primary psychiatric diagnosis:   Psychosis, Unspecified (Schizoaffective disorder vs Bipolar Affective Disorder with Psychosis)    Secondary psychiatric diagnoses of concern this admission:   Intellectual disability (IQ 63)    Diagnostic Impression: Anderson Perez is a 40 year old male with a past psychiatric history of bipolar disorder with psychosis vs schizoaffective disorder bipolar type, and intellectual disability admitted from the  ER on 08/31/2021 due to concern for out of control behaviors, aggression and psychosis in the context of medication refusal despite Cardoza. Patient was recently released from [facility] to a group home. Presentation to the ED via ambulance after police contacted and provisional discharge rescinded. Group home noted escalating aggressive behavior, delusional thinking, and medication refusal despite Cardoza. PT refusal to discuss problems and largely denied all past history, medications, or any need for care. His reported symptoms of delusions, psychosis, and erratic/aggressive behavior are consistent with prior " presentations of psychosis vs schizoaffective disorder bipolar type.    Of note, patient had previous diagnosis of Antisocial Personality Disorder. As psychosis cleared during hospitalization, aggressive and antagonistic behavior resolved completely. It seems questionable that he has ASPD, rather, that previous behaviors were a result of psychosis and/or substance intoxication.    Psychiatric Hospital course: Anderson Perez was admitted to [facility] following revocation of provisional discharge and on a court hold. His PTA olanzapine 20mg PO QDaily was resumed, as pt had been refusing at group home; per conversation with guardian, had planned to start on GRIGGS, but does not think that this was actually started, as patient was refusing medications, which prompted admission in setting of decompensation. Pt was converted to ODT formulation given concerns for cheeking medications; while initially resistant, pt was agreeable with taking ODT olanzapine as long as it was given with ginger ale, which was accommodated. Olanzapine dose increased to 40mg on 9/21 given limited efficacy at current dose. PRN for agitation/aggression was changed to haloperidol 5mg, lorazepam 2mg, and diphenhydramine 50mg, with PO haloperidol available. Over time, he's become more sociable with peers and staff on the unit, and is spending more time sitting in the milieu as opposed to pacing the hallways. Patient has since been stable on current medications for some time, and continues to await placement. He was started on a brief trail of propanolol for akathisia, but this was discontinued due to lack of improvement in pacing and patient attesting that a high level of physical activity was his baseline.    Medical course: Anderson Perez was physically examined by the ED prior to being transferred to the unit and was found to be medically stable and appropriate for admission.     Plan   Principal Diagnosis:   # Psychosis, Unspecified  (Schizoaffective disorder vs Bipolar Affective Disorder with Psychosis)    Secondary psychiatric diagnoses of concern this admission:   # Intellectual disability    Psychotropic Medications:  Modify:  No changes    Continue Scheduled:  -Olanzapine ODT 40mg qPM    Continue PRN:  -Acetaminophen 650 mg Q6H PRN for pain and fever  -Hydroxyzine 25 mg Q6H PRN for anxiety  -Haldol 5mg prn q6h for agitation  -Haldol 5mg + diphenhydramine 50mg + lorazepam 2mg prn for severe agitation/psychosis  -Maalox 30 ml Q4H PRN for indigestion  -Miralax prn for constipation    Additional Plans:  -Patient will be treated in therapeutic milieu with appropriate individual and group therapies as described.    Medical diagnoses to be addressed this admission:    # Chronic Hep C  NTD    #Dry mouth  -Biotene started 10/25    #Foot blister/lesion, resolved  -Bacitracin ointment prn    Consults: none    Legal Status: Cardoza  Committed - recommitted 11/1   Legal guardian    Safety Assessment:   Behavioral Orders   Procedures     Cheeking Precautions (behavioral units)     Code 1 - Restrict to Unit     Routine Programming     As clinically indicated     Status 15     Every 15 minutes.     SIO: No    Disposition: Patient is stable, medications optimized. Pending placement. See CTC notes for more details.     This patient was seen and discussed with my attending physician.  Mary Nix MD  PGY-1 Psychiatry Resident  Pager: [phone]      This patient has been seen and evaluated by me, Glenn Isaacs.  I have discussed this patient with the psychiatry resident and I agree with the findings and plan in this note.    I have reviewed today's vital signs, medications, labs and imaging.     Glenn Adhikari MD on 11/9/2021 at 10:21 PM

## 2021-11-09 NOTE — PLAN OF CARE
Problem: Behavioral Health Plan of Care  Goal: Plan of Care Review  11/8/2021 2100 by Natacha Saini, RN  Outcome: Improving  Flowsheets (Taken 11/8/2021 2100)  Plan of Care Reviewed With: patient  Progress: improving  Patient Agreement with Plan of Care: agrees  Problem: Social Isolation  Goal: Increased Social Interaction  11/8/2021 2100 by Natacha Saini, RN  Outcome: Improving  11/8/2021 2054 by Natacha Saini, RN  Outcome: Improving     Antonio continues active on unit-does not attend grps, but social with staff and peers-able to sit for period of time and watch Star Wars movie this evening-does continue to pace in dalal most of time

## 2021-11-09 NOTE — PLAN OF CARE
Assessment/Intervention/Current Symptoms and Care Coordination      Attended team meeting and reviewed chart notes.      REX GONZALES Deja called back to say she is looking at [facility] housing so pt will have more money when he discharges.    Writer spoke with [facility] and they suggested writer resubmit request for [facility]. New referral sent to [facility].      Discharge Plan or Goal  To group home      Barriers to Discharge   Needs placement      Referral Status  None made  REX GONZALES looking into group homes        Legal Status  Committed with Cardoza order

## 2021-11-09 NOTE — PLAN OF CARE
"Has been observed pacing the hallway. Denied pain, anxiety and depression. Said no SI or hallucinations and is drew for safety. Pleasant upon approach and cooperative with cares Last bowel movement today. Reviewed plan of care for the day. Ate 100% at breakfast & 100 % of lunch. When encouragement given to attend groups patient replied, \"I don't go to groups.\"     Prn biotene spray given  Refused covid test  Continues on cheeking precautions  "

## 2021-11-10 PROCEDURE — 124N000002 HC R&B MH UMMC

## 2021-11-10 PROCEDURE — 250N000013 HC RX MED GY IP 250 OP 250 PS 637: Performed by: STUDENT IN AN ORGANIZED HEALTH CARE EDUCATION/TRAINING PROGRAM

## 2021-11-10 PROCEDURE — 99231 SBSQ HOSP IP/OBS SF/LOW 25: CPT | Mod: GC | Performed by: PSYCHIATRY & NEUROLOGY

## 2021-11-10 RX ADMIN — Medication 25 MCG: at 18:32

## 2021-11-10 RX ADMIN — Medication 1 SPRAY: at 09:20

## 2021-11-10 RX ADMIN — OLANZAPINE 40 MG: 20 TABLET, ORALLY DISINTEGRATING ORAL at 18:32

## 2021-11-10 NOTE — PROGRESS NOTES
----------------------------------------------------------------------------------------------------------  [facility], [place]   Psychiatric Progress Note  Hospital Day #71    Identifier: Anderson Perez is a 40 year old male with a past psychiatric history of bipolar disorder with psychosis vs schizoaffective disorder bipolar type, and intellectual disability admitted from the ER on 08/31/2021 due to concern for out of control behaviors, aggression and psychosis. Patient is psychiatrically stable, medications optimized, awaiting placement.     Interim History:   The patient's care was discussed with the treatment team and chart notes were reviewed.    Vitals: VSS  Sleep: 6.5 hours (11/10/21 0600)  Scheduled medications: Took all scheduled medications as prescribed  PRN medications: No psychiatric prns given    Interim events:   No events    Staff Report:   -Denies all MH symptoms   -Noted to be more social and sitting down in milieu more, watched part of TV show  -Declined to attend groups     Subjective:     Patient Interview:  Anderson Perez was interviewed in the hallway. He asked about any discharge updates, and otherwise had no questions or concerns. He liked Dr. Isaacs's idea to try to find some new shoes. Notes his size is 13.     The risks, benefits, alternatives and side effects of any medication changes have been discussed and are understood by the patient and guardian.    Review of systems:   Patient has no bothersome physical symptoms  Patient denies acute concerns    Objective:   /71 (BP Location: Left arm)   Pulse 85   Temp 97.7  F (36.5  C) (Tympanic)   Resp 14   Ht 1.829 m (6')   Wt 95.1 kg (209 lb 9.6 oz)   SpO2 99%   BMI 28.43 kg/m    Weight is 209 lbs 9.6 oz  Body mass index is 28.43 kg/m .    Mental Status Exam:  Orientation:  Grossly intact  General: alert, awake.   Appearance:  Dressed in hospital scrubs  Behavior: Walking hallways. Calm,  "cooperative.  Eye Contact: Good  Psychomotor:  No catatonia present. Walking in the hallways, typical for him at baseline.   Speech:  Appropriate volume, and rate  Language: Appears to be fluent in English  Mood:  \"same old\"  Affect:  Neutral, appropriate, restricted range  Thought Process: Linear and coherent throughout brief conversation  Thought Content: No AH/VH/SI/HI. No delusional content during interview today.  Associations: Intact   Insight: Good, accepting of medications.   Judgment:  Good - adherent to medications, choosing to avoid unhealthy relationships  Attention Span: not assessed  Concentration:  not assessed  Recent and Remote Memory:  Grossly intact  Fund of Knowledge: Historical diagnosis of intellectual disability (est. IQ 63)  Gait: normal       Allergies:     Allergies   Allergen Reactions     Bee Venom Anaphylaxis     Clindamycin Hives     Morphine Hives        Labs:   None new.     Assessment    Primary psychiatric diagnosis:   Psychosis, Unspecified (Schizoaffective disorder vs Bipolar Affective Disorder with Psychosis)    Secondary psychiatric diagnoses of concern this admission:   Intellectual disability (IQ 63)    Diagnostic Impression: Anderson Perez is a 40 year old male with a past psychiatric history of bipolar disorder with psychosis vs schizoaffective disorder bipolar type, and intellectual disability admitted from the  ER on 08/31/2021 due to concern for out of control behaviors, aggression and psychosis in the context of medication refusal despite Cardoza. Patient was recently released from [facility] to a group home. Presentation to the ED via ambulance after police contacted and provisional discharge rescinded. Group home noted escalating aggressive behavior, delusional thinking, and medication refusal despite Cardoza. PT refusal to discuss problems and largely denied all past history, medications, or any need for care. His reported symptoms of delusions, psychosis, and " erratic/aggressive behavior are consistent with prior presentations of psychosis vs schizoaffective disorder bipolar type.    Of note, patient had previous diagnosis of Antisocial Personality Disorder. As psychosis cleared during hospitalization, aggressive and antagonistic behavior resolved completely. It seems questionable that he has ASPD, and rather that previous behaviors were a result of psychosis and/or substance intoxication.    Psychiatric Hospital course: Anderson Perez was admitted to [facility] following revocation of provisional discharge and on a court hold. His PTA olanzapine 20mg PO QDaily was resumed, as pt had been refusing at group home; per conversation with guardian, had planned to start on GRIGGS, but does not think that this was actually started, as patient was refusing medications, which prompted admission in setting of decompensation. Pt was converted to ODT formulation given concerns for cheeking medications; while initially resistant, pt was agreeable with taking ODT olanzapine as long as it was given with ginger ale, which was accommodated. Olanzapine dose increased to 40mg on 9/21 given limited efficacy at current dose. PRN for agitation/aggression was changed to haloperidol 5mg, lorazepam 2mg, and diphenhydramine 50mg, with PO haloperidol available. Over time, he's become more sociable with peers and staff on the unit, and is spending more time sitting in the milieu as opposed to pacing the hallways. Patient has since been stable on current medications for some time, and continues to await placement. He was started on a brief trail of propanolol for akathisia, but this was discontinued due to lack of improvement in pacing and patient attesting that a high level of physical activity was his baseline.    Medical course: Anderson Perez was physically examined by the ED prior to being transferred to the unit and was found to be medically stable and appropriate for admission.     Plan    Principal Diagnosis:   # Psychosis, Unspecified (Schizoaffective disorder vs Bipolar Affective Disorder with Psychosis)    Secondary psychiatric diagnoses of concern this admission:   # Intellectual disability    Psychotropic Medications:  Modify:  No changes    Continue Scheduled:  -Olanzapine ODT 40mg qPM    Continue PRN:  -Acetaminophen 650 mg Q6H PRN for pain and fever  -Hydroxyzine 25 mg Q6H PRN for anxiety  -Haldol 5mg prn q6h for agitation  -Haldol 5mg + diphenhydramine 50mg + lorazepam 2mg prn for severe agitation/psychosis  -Maalox 30 ml Q4H PRN for indigestion  -Miralax prn for constipation    Additional Plans:  -Patient will be treated in therapeutic milieu with appropriate individual and group therapies as described.    Medical diagnoses to be addressed this admission:    # Chronic Hep C  NTD    #Dry mouth  -Biotene started 10/25    #Foot blister/lesion, resolved  -Bacitracin ointment prn    Consults: none    Legal Status: Cardoza  Committed - recommitted 11/1   Legal guardian    Safety Assessment:   Behavioral Orders   Procedures     Cheeking Precautions (behavioral units)     Code 1 - Restrict to Unit     Routine Programming     As clinically indicated     Status 15     Every 15 minutes.     SIO: No    Disposition: Patient is stable, medications optimized. Pending placement. See CTC notes for more details.     This patient was seen and discussed with my attending physician.  Mary Nix MD  PGY-1 Psychiatry Resident  Pager: [phone]      This patient has been seen and evaluated by me, Glenn Isaacs.  I have discussed this patient with the psychiatry resident and I agree with the findings and plan in this note.    I have reviewed today's vital signs, medications, labs and imaging.     Glenn Adhikari MD on 11/10/2021 at 9:32 PM

## 2021-11-10 NOTE — PLAN OF CARE
Assessment/Intervention/Current Symptoms and Care Coordination    Attended team meeting and reviewed chart notes.    Staff report pt is pleasant, cooperative and social with staff and select peers. He continues to walk the dalal.    [facility] soon as [facility] Barragan faxes the PPS report that CPA requested.        Discharge Plan or Goal  To group home or IRTs      Barriers to Discharge   Needs to be accepted and then bed available      Referral Status  [facility] is looking into placement at group home        Legal Status  Committed with Cardoza order

## 2021-11-10 NOTE — PLAN OF CARE
Nursing Plan of care   Problem: Behavioral Health Plan of Care  Goal: Develops/Participates in Therapeutic [place] to Support Successful Transition  Outcome: No Change  Pt has been pacing in the hallway; was intermittently sit/ the lounge briefly to watch part of a movie/TV show; calm and pleasant; denied medication side effect; attended no group; took no shower; social and smile at times when he talk to staff; ate 100% of his dinner with good appetite; denied SI/SIB,AVH,depression and anxiety; declined COVID test; will continue to monitor and assess.

## 2021-11-10 NOTE — PLAN OF CARE
Problem: Behavioral Health Plan of Care  Goal: Plan of Care Review  Outcome: Improving  Flowsheets (Taken 11/10/2021 2440)  Plan of Care Reviewed With: patient  Progress: improving  Patient Agreement with Plan of Care: agrees     Problem: Behavior Regulation Impairment (Manic or Hypomanic Signs/Symptoms)  Goal: Improved Impulse Control (Manic/Hypomanic Signs/Symptoms)  Outcome: Improving     Problem: Social Isolation  Goal: Increased Social Interaction  Outcome: Improving     Problem: Behavior Regulation Impairment (Psychotic Signs/Symptoms)  Goal: Improved Behavioral Control (Psychotic Signs/Symptoms)  Outcome: Improving  Flowsheets (Taken 11/10/2021 0029)  Mutually Determined Action Steps (Improved Behavioral Control): identifies future-oriented goal     Anderson social with staff and peers-continues walking in dalal-no overt delusional statements-pleasant in interactions

## 2021-11-11 PROCEDURE — 124N000002 HC R&B MH UMMC

## 2021-11-11 PROCEDURE — 250N000013 HC RX MED GY IP 250 OP 250 PS 637: Performed by: STUDENT IN AN ORGANIZED HEALTH CARE EDUCATION/TRAINING PROGRAM

## 2021-11-11 PROCEDURE — 99232 SBSQ HOSP IP/OBS MODERATE 35: CPT | Mod: GC | Performed by: PSYCHIATRY & NEUROLOGY

## 2021-11-11 RX ADMIN — Medication 25 MCG: at 20:41

## 2021-11-11 RX ADMIN — Medication 1 SPRAY: at 09:43

## 2021-11-11 RX ADMIN — OLANZAPINE 40 MG: 20 TABLET, ORALLY DISINTEGRATING ORAL at 20:41

## 2021-11-11 ASSESSMENT — MIFFLIN-ST. JEOR: SCORE: 1905.1

## 2021-11-11 NOTE — PLAN OF CARE
Problem: Behavioral Health Plan of Care  Goal: Plan of Care Review  Outcome: Improving  Flowsheets (Taken 11/11/2021 1019)  Plan of Care Reviewed With: patient  Progress: improving  Patient Agreement with Plan of Care: agrees     Problem: Mood Impairment (Manic or Hypomanic Signs/Symptoms)  Goal: Improved Mood Symptoms (Manic/Hypomanic Signs/Symptoms)  Outcome: Improving  Flowsheets (Taken 11/11/2021 1019)  Mutually Determined Action Steps (Improved Mood Symptoms): acknowledges progress     Problem: Behavior Regulation Impairment (Psychotic Signs/Symptoms)  Goal: Improved Behavioral Control (Psychotic Signs/Symptoms)  Outcome: Improving  Flowsheets (Taken 11/11/2021 1019)  Mutually Determined Action Steps (Improved Behavioral Control): identifies future-oriented goal    Anderson continues social with staff and peers-walking in dalal most of day-briefly sits to watch television-laughing and joking at times-no overt delusional statements

## 2021-11-11 NOTE — PLAN OF CARE
Assessment/Intervention/Current Symptoms and Care Coordination    Attended team meeting and reviewed chart notes.    Writer waiting to hear back from [facility] for acceptance and bed availability.    Pt is calm and social with peers and staff. He continues to walk the hallway.      Discharge Plan or Goal  [facility]      Barriers to Discharge   Needs placement      Referral Status  [facility] is working on group home placement    [facility]        Legal Status  Pt is committed with Cardoza order

## 2021-11-11 NOTE — PROGRESS NOTES
----------------------------------------------------------------------------------------------------------  [facility], [place]   Psychiatric Progress Note  Hospital Day #72    Identifier: Anderson Perez is a 40 year old male with a past psychiatric history of bipolar disorder with psychosis vs schizoaffective disorder bipolar type, and intellectual disability admitted from the ER on 08/31/2021 due to concern for out of control behaviors, aggression and psychosis. Patient is psychiatrically stable, medications optimized, awaiting placement.     Interim History:   The patient's care was discussed with the treatment team and chart notes were reviewed.    Vitals: VSS  Sleep: 7 hours (11/11/21 0600)  Scheduled medications: Took all scheduled medications as prescribed  PRN medications: No psychiatric prns given    Interim events:   No events    Staff Report:   -Denies all MH symptoms, no delusional statements noted  -Social, laughing with peer     Subjective:     Patient Interview:  Anderson Perez was interviewed in the hallway. He would like to go outside in the [place] if possible. Appreciates efforts to look into this.     The risks, benefits, alternatives and side effects of any medication changes have been discussed and are understood by the patient and guardian.    Review of systems:   Patient has no bothersome physical symptoms  Patient denies acute concerns    Objective:   /74 (BP Location: Left arm)   Pulse 92   Temp 97.5  F (36.4  C) (Tympanic)   Resp 14   Ht 1.829 m (6')   Wt 95.2 kg (209 lb 14.4 oz)   SpO2 100%   BMI 28.47 kg/m    Weight is 209 lbs 14.4 oz  Body mass index is 28.47 kg/m .    Mental Status Exam:  Orientation:  Grossly intact  General: alert, awake.   Appearance:  Dressed in hospital scrubs  Behavior: Walking hallways. Calm, cooperative.  Eye Contact: Good  Psychomotor:  No catatonia present. Walking in the hallways, typical for him at  "baseline.   Speech:  Appropriate volume, and rate  Language: Appears to be fluent in English  Mood:  \"good\"  Affect:  Neutral, appropriate, restricted range  Thought Process: Linear and coherent throughout brief conversation  Thought Content: No AH/VH/SI/HI. No delusional content during interview today.  Associations: Intact   Insight: Good, accepting of medications.   Judgment:  Good - adherent to medications, positive presence in milieu   Attention Span: not assessed  Concentration:  not assessed  Recent and Remote Memory:  Grossly intact  Fund of Knowledge: Historical diagnosis of intellectual disability (est. IQ 63)  Gait: normal       Allergies:     Allergies   Allergen Reactions     Bee Venom Anaphylaxis     Clindamycin Hives     Morphine Hives        Labs:   None new.     Assessment    Primary psychiatric diagnosis:   Psychosis, Unspecified (Schizoaffective disorder vs Bipolar Affective Disorder with Psychosis)    Secondary psychiatric diagnoses of concern this admission:   Intellectual disability (IQ 63)    Diagnostic Impression: Anderson Perez is a 40 year old male with a past psychiatric history of bipolar disorder with psychosis vs schizoaffective disorder bipolar type, and intellectual disability admitted from the  ER on 08/31/2021 due to concern for out of control behaviors, aggression and psychosis in the context of medication refusal despite Cardoza. Patient was recently released from [facility] to a group home. Presentation to the ED via ambulance after police contacted and provisional discharge rescinded. Group home noted escalating aggressive behavior, delusional thinking, and medication refusal despite Cardoza. PT refusal to discuss problems and largely denied all past history, medications, or any need for care. His reported symptoms of delusions, psychosis, and erratic/aggressive behavior are consistent with prior presentations of psychosis vs schizoaffective disorder bipolar type.    Of note, " patient had previous diagnosis of Antisocial Personality Disorder. As psychosis cleared during hospitalization, aggressive and antagonistic behavior resolved completely. It seems questionable that he has ASPD, and rather that previous behaviors were a result of psychosis and/or substance intoxication.    Psychiatric Hospital course: Anderson Perez was admitted to [facility] following revocation of provisional discharge and on a court hold. His PTA olanzapine 20mg PO QDaily was resumed, as pt had been refusing at group home; per conversation with guardian, had planned to start on GRIGGS, but does not think that this was actually started, as patient was refusing medications, which prompted admission in setting of decompensation. Pt was converted to ODT formulation given concerns for cheeking medications; while initially resistant, pt was agreeable with taking ODT olanzapine as long as it was given with ginger ale, which was accommodated. Olanzapine dose increased to 40mg on 9/21 given limited efficacy at current dose. PRN for agitation/aggression was changed to haloperidol 5mg, lorazepam 2mg, and diphenhydramine 50mg, with PO haloperidol available. Over time, he's become more sociable with peers and staff on the unit, and is spending more time sitting in the milieu as opposed to pacing the hallways. Patient has since been stable on current medications for some time, and continues to await placement. He was started on a brief trail of propanolol for akathisia, but this was discontinued due to lack of improvement in pacing and patient attesting that a high level of physical activity was his baseline.    Medical course: Anderson Perez was physically examined by the ED prior to being transferred to the unit and was found to be medically stable and appropriate for admission.     Plan   Principal Diagnosis:   # Psychosis, Unspecified (Schizoaffective disorder vs Bipolar Affective Disorder with Psychosis)    Secondary  psychiatric diagnoses of concern this admission:   # Intellectual disability    Psychotropic Medications:  Modify:  No changes    Continue Scheduled:  -Olanzapine ODT 40mg qPM    Continue PRN:  -Acetaminophen 650 mg Q6H PRN for pain and fever  -Hydroxyzine 25 mg Q6H PRN for anxiety  -Haldol 5mg prn q6h for agitation  -Haldol 5mg + diphenhydramine 50mg + lorazepam 2mg prn for severe agitation/psychosis  -Maalox 30 ml Q4H PRN for indigestion  -Miralax prn for constipation    Additional Plans:  -Patient will be treated in therapeutic milieu with appropriate individual and group therapies as described.    Medical diagnoses to be addressed this admission:    # Chronic Hep C  NTD    #Dry mouth  -Biotene started 10/25    #Foot blister/lesion, resolved  -Bacitracin ointment prn    Consults: none    Legal Status: Cardoza  Committed - recommitted 11/1   Legal guardian    Safety Assessment:   Behavioral Orders   Procedures     Cheeking Precautions (behavioral units)     Code 1 - Restrict to Unit     Routine Programming     As clinically indicated     Status 15     Every 15 minutes.     SIO: No    Disposition: Patient is stable, medications optimized. Pending placement. Please see CTC notes for more details.     This patient was seen and discussed with my attending physician.  Mary Nix MD  PGY-1 Psychiatry Resident  Pager: [phone]      This patient has been seen and evaluated by me, Glenn Isaacs.  I have discussed this patient with the psychiatry resident and I agree with the findings and plan in this note.    I have reviewed today's vital signs, medications, labs and imaging.     Glenn Adhikari MD on 11/11/2021 at 4:10 PM

## 2021-11-11 NOTE — PLAN OF CARE
Nursing plan of care   Problem: Behavioral Health Plan of Care  Goal: Adheres to Safety Considerations for Self and Others  Outcome: Improving   Pt has been active and visible in the milieu; was social with peers and spent longer than usual sitting in the lounge and watching cartoons; pt had a good laughter and joke with peers which I never seen before; took his medication; denied medication side effect; ate dinner with good appetite; encouraged to attend group but declined; after encouragement Ok to take shower tomorrow; denied SI/SIB,AVH, depression and anxiety; will continue to monitor and assess.

## 2021-11-12 PROCEDURE — 250N000013 HC RX MED GY IP 250 OP 250 PS 637: Performed by: STUDENT IN AN ORGANIZED HEALTH CARE EDUCATION/TRAINING PROGRAM

## 2021-11-12 PROCEDURE — 124N000002 HC R&B MH UMMC

## 2021-11-12 PROCEDURE — 99232 SBSQ HOSP IP/OBS MODERATE 35: CPT | Mod: GC | Performed by: PSYCHIATRY & NEUROLOGY

## 2021-11-12 RX ADMIN — Medication 1 SPRAY: at 15:43

## 2021-11-12 RX ADMIN — Medication 1 SPRAY: at 09:20

## 2021-11-12 RX ADMIN — OLANZAPINE 40 MG: 20 TABLET, ORALLY DISINTEGRATING ORAL at 20:25

## 2021-11-12 RX ADMIN — Medication 25 MCG: at 20:25

## 2021-11-12 NOTE — PLAN OF CARE
Problem: Behavioral Health Plan of Care  Goal: Plan of Care Review  Outcome: Improving  Flowsheets (Taken 11/12/2021 1505)  Plan of Care Reviewed With: patient  Progress: improving  Patient Agreement with Plan of Care: agrees     Problem: Mood Impairment (Manic or Hypomanic Signs/Symptoms)  Goal: Improved Mood Symptoms (Manic/Hypomanic Signs/Symptoms)  Outcome: Improving  Flowsheets (Taken 11/12/2021 1505)  Mutually Determined Action Steps (Improved Mood Symptoms): engages in physical activity     Problem: Behavior Regulation Impairment (Psychotic Signs/Symptoms)  Goal: Improved Behavioral Control (Psychotic Signs/Symptoms)  Outcome: Improving  Flowsheets (Taken 11/12/2021 1505)  Mutually Determined Action Steps (Improved Behavioral Control): identifies future-oriented goal     Anderson spending most of time walking in dalal-brief social interaction with staff and peers-c/o feeling bored-declines grp-no overt delusional statements

## 2021-11-12 NOTE — PLAN OF CARE
Assessment/Intervention/Current Symptoms and Care Coordination    Attended team meeting and reviewed chart notes.    [facility].              Discharge Plan or Goal  Group home or [facility]      Barriers to Discharge   Needs bed    Referral Status  In process        Legal Status  Pt committed with Cardoza order.

## 2021-11-12 NOTE — PROGRESS NOTES
----------------------------------------------------------------------------------------------------------  [facility], [place]   Psychiatric Progress Note  Hospital Day #73    Identifier: Anderson Perez is a 40 year old male with a past psychiatric history of bipolar disorder with psychosis vs schizoaffective disorder bipolar type, and intellectual disability admitted from the ER on 08/31/2021 due to concern for out of control behaviors, aggression and psychosis. Patient is psychiatrically stable, medications optimized, awaiting placement.     Interim History:   The patient's care was discussed with the treatment team and chart notes were reviewed.    Vitals: VSS  Sleep: 7 hours (11/12/21 0600)  Scheduled medications: Took all scheduled medications as prescribed  PRN medications: No psychiatric prns given    Interim events:   No events    Staff Report:   -Denies all MH symptoms, no delusional statements noted  -Social with peers in milieu      Subjective:     Patient Interview:  Anderson Perez was interviewed in the hallway. Denies questions or concerns. Disappointed that he can't go into the garden, but remains respectful and calm.     The risks, benefits, alternatives and side effects of any medication changes have been discussed and are understood by the patient and guardian.    Review of systems:   Patient has no bothersome physical symptoms  Patient denies acute concerns    Objective:   BP 99/68 (BP Location: Left arm)   Pulse 80   Temp 97.5  F (36.4  C) (Tympanic)   Resp 14   Ht 1.829 m (6')   Wt 95.2 kg (209 lb 14.4 oz)   SpO2 98%   BMI 28.47 kg/m    Weight is 209 lbs 14.4 oz  Body mass index is 28.47 kg/m .    Mental Status Exam:  Orientation:  Grossly intact  General: alert, awake.   Appearance:  Dressed in hospital scrubs  Behavior: Walking hallways. Calm, cooperative.  Eye Contact: Avoids  Psychomotor:  No catatonia present. Walking in the hallways, typical  "for him at baseline.   Speech:  Appropriate volume, and rate  Language: Appears to be fluent in English  Mood:  \"fine\"  Affect:  Neutral, appropriate, restricted range  Thought Process: Linear and coherent throughout brief conversation  Thought Content: No AH/VH/SI/HI. No delusional content during interview today.  Associations: Intact   Insight: Good, accepting of medications.   Judgment:  Good - adherent to medications, positive presence in milieu   Attention Span: not assessed  Concentration:  not assessed  Recent and Remote Memory:  Grossly intact  Fund of Knowledge: Historical diagnosis of intellectual disability (est. IQ 63)  Gait: normal       Allergies:     Allergies   Allergen Reactions     Bee Venom Anaphylaxis     Clindamycin Hives     Morphine Hives        Labs:   None new.     Assessment    Primary psychiatric diagnosis:   Psychosis, Unspecified (Schizoaffective disorder vs Bipolar Affective Disorder with Psychosis)    Secondary psychiatric diagnoses of concern this admission:   Intellectual disability (IQ 63)    Diagnostic Impression: Anderson Perez is a 40 year old male with a past psychiatric history of bipolar disorder with psychosis vs schizoaffective disorder bipolar type, and intellectual disability admitted from the  ER on 08/31/2021 due to concern for out of control behaviors, aggression and psychosis in the context of medication refusal despite Cardoza. Patient was recently released from [facility] to a group home. Presentation to the ED via ambulance after police contacted and provisional discharge rescinded. Group home noted escalating aggressive behavior, delusional thinking, and medication refusal despite Cardoza. PT refusal to discuss problems and largely denied all past history, medications, or any need for care. His reported symptoms of delusions, psychosis, and erratic/aggressive behavior are consistent with prior presentations of psychosis vs schizoaffective disorder bipolar type.    Of " note, patient had previous diagnosis of Antisocial Personality Disorder. As psychosis cleared during hospitalization, aggressive and antagonistic behavior resolved completely. It seems questionable that he has ASPD, and rather that previous behaviors were a result of psychosis and/or substance intoxication.    Psychiatric Hospital course: Anderson Perez was admitted to [facility] following revocation of provisional discharge and on a court hold. His PTA olanzapine 20mg PO QDaily was resumed, as pt had been refusing at group home; per conversation with guardian, had planned to start on GRIGGS, but does not think that this was actually started, as patient was refusing medications, which prompted admission in setting of decompensation. Pt was converted to ODT formulation given concerns for cheeking medications; while initially resistant, pt was agreeable with taking ODT olanzapine as long as it was given with ginger ale, which was accommodated. Olanzapine dose increased to 40mg on 9/21 given limited efficacy at current dose. PRN for agitation/aggression was changed to haloperidol 5mg, lorazepam 2mg, and diphenhydramine 50mg, with PO haloperidol available. Over time, he's become more sociable with peers and staff on the unit, and is spending more time sitting in the milieu as opposed to pacing the hallways. Patient has since been stable on current medications for some time, and continues to await placement. He was started on a brief trail of propanolol for akathisia, but this was discontinued due to lack of improvement in pacing and patient attesting that a high level of physical activity was his baseline.    Medical course: Anderson Perez was physically examined by the ED prior to being transferred to the unit and was found to be medically stable and appropriate for admission.     Plan   Principal Diagnosis:   # Psychosis, Unspecified (Schizoaffective disorder vs Bipolar Affective Disorder with  Psychosis)    Secondary psychiatric diagnoses of concern this admission:   # Intellectual disability    Psychotropic Medications:  Modify:  No changes    Continue Scheduled:  -Olanzapine ODT 40mg qPM    Continue PRN:  -Acetaminophen 650 mg Q6H PRN for pain and fever  -Hydroxyzine 25 mg Q6H PRN for anxiety  -Haldol 5mg prn q6h for agitation  -Haldol 5mg + diphenhydramine 50mg + lorazepam 2mg prn for severe agitation/psychosis  -Maalox 30 ml Q4H PRN for indigestion  -Miralax prn for constipation    Additional Plans:  -Patient will be treated in therapeutic milieu with appropriate individual and group therapies as described.    Medical diagnoses to be addressed this admission:    # Chronic Hep C  NTD    #Dry mouth  -Biotene started 10/25    #Foot blister/lesion, resolved  -Bacitracin ointment prn    Consults: none    Legal Status: Cardoza  Committed - recommitted 11/1   Legal guardian    Safety Assessment:   Behavioral Orders   Procedures     Cheeking Precautions (behavioral units)     Code 1 - Restrict to Unit     Routine Programming     As clinically indicated     Status 15     Every 15 minutes.     SIO: No    Disposition: Patient is stable, medications optimized. Pending placement. Please see CTC notes for more details.     This patient was seen and discussed with my attending physician.  Mary Nix MD  PGY-1 Psychiatry Resident  Pager: [phone]      This patient has been seen and evaluated by me, Glenn Isaacs.  I have discussed this patient with the psychiatry resident and I agree with the findings and plan in this note.    I have reviewed today's vital signs, medications, labs and imaging.     Glenn Adhikari MD on 11/13/2021 at 12:00 AM

## 2021-11-12 NOTE — PLAN OF CARE
"Nursing plan of care   Problem: Behavioral Health Plan of Care  Goal: Develops/Participates in Therapeutic [place] to Support Successful Transition  Outcome: Improving  Pt was active and visible in the milieu; paced most of the shift and intermittently socialize with peers while watching movies; pt stated he is \"fine\" and doing \"Ok\" ; denied SI/SIB,AVH,depression and anxiety; medication complaint; No PRN medication was requested or administered; attended no group and took no shower; ate dinner with good appetite; will continue to monitor and assess.    "

## 2021-11-12 NOTE — PLAN OF CARE
Problem: Sleep Disturbance  Goal: Adequate Sleep/Rest  Outcome: Improving   Pt slept 7 hours,he continues to refuse ordered covid testing,respirations easy

## 2021-11-13 PROCEDURE — 250N000013 HC RX MED GY IP 250 OP 250 PS 637: Performed by: STUDENT IN AN ORGANIZED HEALTH CARE EDUCATION/TRAINING PROGRAM

## 2021-11-13 PROCEDURE — 124N000002 HC R&B MH UMMC

## 2021-11-13 RX ADMIN — Medication 1 SPRAY: at 10:04

## 2021-11-13 RX ADMIN — OLANZAPINE 40 MG: 20 TABLET, ORALLY DISINTEGRATING ORAL at 20:24

## 2021-11-13 RX ADMIN — Medication 25 MCG: at 20:24

## 2021-11-13 NOTE — PLAN OF CARE
Problem: Sleep Disturbance  Goal: Adequate Sleep/Rest  Outcome: No Change     Patient was observed sleeping from start of shift and through the rest of the night. No complaints of pain nor discomfort. No PRN medications given. Appeared to have slept 7 hours this shift.

## 2021-11-13 NOTE — PLAN OF CARE
Nursing plan of care   Problem: Behavioral Health Plan of Care  Goal: Adheres to Safety Considerations for Self and Others  Outcome: Improving    Pt has been active and visible in the milieu; spent most of the shift pacing in the hallway as usual; social with peers and staff; this evening when he saw a house keeping team waxing behind the nursing desk, he said that he has a degree in house keeping and maintenance, he also stated that he has 10 doctorate degrees. Denied having MH symptoms of SI/SIB,AVH,depression and anxiety. Medication compliant; denied medication side effect; ate dinner; good appetite; will continue to monitor and assess.

## 2021-11-13 NOTE — PLAN OF CARE
Problem: Behavioral Disturbance  Goal: Behavioral Disturbance  Description: Signs and symptoms of listed problems will be absent or manageable by discharge or transition of care.  Outcome: Improving  Flowsheets (Taken 11/13/2021 1322)  Behavioral Disturbance Assessed: all  Behavioral Disturbance Present:    insight    thought process     Pt visible in the milieu for the majority of the day. He spent time walking the hallway but was observed at times sitting for brief moments in the lounge. He interacts minimally but is pleasant when approached. Requested and received Biotene prn for dry mouth. Pt eating meals without issue. Denies SI/SIB and AH/VH. Pt did not appear to be outwardly responding to internal stimuli. No additional concerns at this time. Will continue to assess and offer support.

## 2021-11-14 PROCEDURE — 250N000013 HC RX MED GY IP 250 OP 250 PS 637: Performed by: STUDENT IN AN ORGANIZED HEALTH CARE EDUCATION/TRAINING PROGRAM

## 2021-11-14 PROCEDURE — 124N000002 HC R&B MH UMMC

## 2021-11-14 RX ADMIN — OLANZAPINE 40 MG: 20 TABLET, ORALLY DISINTEGRATING ORAL at 20:00

## 2021-11-14 RX ADMIN — Medication 1 SPRAY: at 13:41

## 2021-11-14 RX ADMIN — Medication 25 MCG: at 20:00

## 2021-11-14 ASSESSMENT — ACTIVITIES OF DAILY LIVING (ADL)
LAUNDRY: WITH SUPERVISION
ORAL_HYGIENE: INDEPENDENT
HYGIENE/GROOMING: INDEPENDENT;PROMPTS

## 2021-11-14 NOTE — PLAN OF CARE
Problem: Sleep Disturbance  Goal: Adequate Sleep/Rest  Outcome: No Change  Patient was observed sleeping at the start of this shift and continued sleeping on all 15 minutes checks. No safety events, no prn meds given. Slept for about 7.0 hours.

## 2021-11-14 NOTE — PLAN OF CARE
Nursing plan of care   Problem: Behavioral Health Plan of Care  Goal: Adheres to Safety Considerations for Self and Others  Outcome: Improving  Pt has been active and visible in the milieu; spent the shift pacing in the hallway and socializing with peers; calm, pleasant and social with peers and staff; declined to attend group; took no shower; medication compliant; No PRN was requested or administered; denied SI/SIB, AVH,depression and anxiety; will continue to monitor and assess.

## 2021-11-14 NOTE — PLAN OF CARE
Problem: Adult Inpatient Plan of Care  Goal: Optimal Comfort and Wellbeing  Outcome: Improving     Pt visible in the milieu for the majority of the day. He spends much of his time walking the hallway. Pt is more spontaneous in his conversing and engaging with others and sat for a short period of time and watched television. Calm and pleasant with no behavioral concerns. Aside from Biotene spray for dry mouth, no indication or request for prn medications. Continues to appear responding to internal stimuli. No safety concerns at this time. Will continue to assess and offer support.     Pt declined his COVID nasal swab.

## 2021-11-15 PROCEDURE — 250N000013 HC RX MED GY IP 250 OP 250 PS 637: Performed by: STUDENT IN AN ORGANIZED HEALTH CARE EDUCATION/TRAINING PROGRAM

## 2021-11-15 PROCEDURE — 99231 SBSQ HOSP IP/OBS SF/LOW 25: CPT | Mod: GC | Performed by: PSYCHIATRY & NEUROLOGY

## 2021-11-15 PROCEDURE — 124N000002 HC R&B MH UMMC

## 2021-11-15 RX ADMIN — OLANZAPINE 40 MG: 20 TABLET, ORALLY DISINTEGRATING ORAL at 18:54

## 2021-11-15 RX ADMIN — Medication 25 MCG: at 18:54

## 2021-11-15 ASSESSMENT — ACTIVITIES OF DAILY LIVING (ADL)
DRESS: SCRUBS (BEHAVIORAL HEALTH);INDEPENDENT
LAUNDRY: WITH SUPERVISION
HYGIENE/GROOMING: INDEPENDENT;PROMPTS
ORAL_HYGIENE: INDEPENDENT;PROMPTS

## 2021-11-15 NOTE — PLAN OF CARE
Problem: Behavior Regulation Impairment (Manic or Hypomanic Signs/Symptoms)  Goal: Improved Impulse Control (Manic/Hypomanic Signs/Symptoms)  Outcome: Improving     Pt visible in the milieu for the majority of the day. Pt socially withdrawn but occasionally engages in spontaneous conversations with others. He is calm and pleasant with no behavioral concerns. Denies SI/SIB, as well as AH/VH. At times it appears pt is speaking to himself in the context of mouthing words. No safety concerns at this time. Will continue to assess and offer support.

## 2021-11-15 NOTE — PLAN OF CARE
BEHAVIORAL TEAM DISCUSSION    Participants: Dr. Isaacs, resident Mary Nix, RN Sierra Ramires, two medical students Ismael and Lamin, Pharm D student Ilan, MITA Irving    Progress: pt appears to be a baseline    Anticipated length of stay: as soon as bed is available at group home or [facility]    Continued Stay Criteria/Rationale: pt is committed with Cardoza order    Medical/Physical: Anderson [facility] Or[facility]o was physically examined by the ED prior to being transferred to the unit and was found to be medically stable and appropriate for admission.     Precautions:   Behavioral Orders   Procedures     Cheeking Precautions (behavioral units)     Code 1 - Restrict to Unit     Routine Programming     As clinically indicated     Status 15     Every 15 minutes.     Plan: provide a psychological assessment and manage medications per psychiatry, staff to provide a safe and therapeutic milieu, CTC to coordinate transfer to either group home or [facility]  Rationale for change in precautions or plan: no change

## 2021-11-15 NOTE — PLAN OF CARE
Problem: Sleep Disturbance  Goal: Adequate Sleep/Rest  Outcome: Improving   Pt slept 7 hrs,respirations easy,he has continued to refuse his covid test

## 2021-11-15 NOTE — PLAN OF CARE
Nursing plan of care   Problem: Adult Inpatient Plan of Care  Goal: Absence of Hospital-Acquired Illness or Injury  Outcome: No Change    Problem: Behavioral Health Plan of Care  Goal: Develops/Participates in Therapeutic [place] to Support Successful Transition  Outcome: Improving     Pt has been active and visible in the milieu; spent the shift watching football game in the lounge with peer and paced in the hallway; presented with calm behavior and full range affect; took his HS scheduled medication; denied medication side effect; reported no issue with sleep and appetite; follow direction and cooperative. Declined COVID swab; denied sign and symptoms of COVID; took no shower; attended no group; Will continue to monitor and assess.

## 2021-11-15 NOTE — PROGRESS NOTES
"    ----------------------------------------------------------------------------------------------------------  [facility], [place]   Psychiatric Progress Note  Hospital Day #76    Identifier: Anderson Perez is a 40 year old male with a past psychiatric history of bipolar disorder with psychosis vs schizoaffective disorder bipolar type, and intellectual disability admitted from the ER on 08/31/2021 due to concern for out of control behaviors, aggression and psychosis. Patient is psychiatrically stable, medications optimized, awaiting placement.     Interim History:   The patient's care was discussed with the treatment team and chart notes were reviewed.    Vitals: VSS  Sleep: 7 hours (11/15/21 0600)  Scheduled medications: Took all scheduled medications as prescribed  PRN medications: No psychiatric prns given    Interim events:   No events    Staff Report:   -Denies all MH symptoms  -Some delusional statements, ex. \"I have 10 doctorate degrees\"  -RTIS  -Social with peers in milieu      Subjective:     Patient Interview:  Anderson Perez was interviewed while sitting in the milieu. Denies questions or concerns. Goal for today is \"I don't know, the same thing I guess, just walking.\"     The risks, benefits, alternatives and side effects of any medication changes have been discussed and are understood by the patient and guardian.    Review of systems:   Patient has no bothersome physical symptoms  Patient denies acute concerns    Objective:   /87 (BP Location: Left arm)   Pulse 74   Temp 98  F (36.7  C) (Tympanic)   Resp 14   Ht 1.829 m (6')   Wt 95.2 kg (209 lb 14.4 oz)   SpO2 98%   BMI 28.47 kg/m    Weight is 209 lbs 14.4 oz  Body mass index is 28.47 kg/m .    Mental Status Exam:  Orientation: Grossly intact  General: alert, awake  Appearance:  Dressed in hospital scrubs  Behavior: Walking hallways. Calm, cooperative  Eye Contact: Avoids  Psychomotor:  No catatonia " "present. Walking in the hallways, typical for him at baseline.   Speech:  Appropriate volume, and rate  Language: Appears to be fluent in English  Mood:  \"pretty good\"  Affect:  Neutral, appropriate, restricted range  Thought Process: Linear and coherent throughout brief conversation  Thought Content: No AH/VH/SI/HI. No delusional content during interview today.  Associations: Intact   Insight: Good, accepting of medications.   Judgment:  Good - adherent to medications, positive presence in milieu   Attention Span: not assessed  Concentration:  not assessed  Recent and Remote Memory:  Grossly intact  Fund of Knowledge: Historical diagnosis of intellectual disability (est. IQ 63)  Gait: normal       Allergies:     Allergies   Allergen Reactions     Bee Venom Anaphylaxis     Clindamycin Hives     Morphine Hives        Labs:   None new.     Assessment    Primary psychiatric diagnosis:   Psychosis, Unspecified (Schizoaffective disorder vs Bipolar Affective Disorder with Psychosis)    Secondary psychiatric diagnoses of concern this admission:   Intellectual disability (IQ 63)    Diagnostic Impression: Anderson Perez is a 40 year old male with a past psychiatric history of bipolar disorder with psychosis vs schizoaffective disorder bipolar type, and intellectual disability admitted from the  ER on 08/31/2021 due to concern for out of control behaviors, aggression and psychosis in the context of medication refusal despite Cardoza. Patient was recently released from [facility] to a group home. Presentation to the ED via ambulance after police contacted and provisional discharge rescinded. Group home noted escalating aggressive behavior, delusional thinking, and medication refusal despite Cardoza. PT refusal to discuss problems and largely denied all past history, medications, or any need for care. His reported symptoms of delusions, psychosis, and erratic/aggressive behavior are consistent with prior presentations of psychosis " vs schizoaffective disorder bipolar type.    Of note, patient had previous diagnosis of Antisocial Personality Disorder. As psychosis cleared during hospitalization, aggressive and antagonistic behavior resolved completely. It seems questionable that he has ASPD, and rather that previous behaviors were a result of psychosis and/or substance intoxication.    Psychiatric Hospital course: Anderson Perez was admitted to [facility] following revocation of provisional discharge and on a court hold. His PTA olanzapine 20mg PO QDaily was resumed, as pt had been refusing at group home; per conversation with guardian, had planned to start on GRIGGS, but does not think that this was actually started, as patient was refusing medications, which prompted admission in setting of decompensation. Pt was converted to ODT formulation given concerns for cheeking medications; while initially resistant, pt was agreeable with taking ODT olanzapine as long as it was given with ginger ale, which was accommodated. Olanzapine dose increased to 40mg on 9/21 given limited efficacy at current dose. PRN for agitation/aggression was changed to haloperidol 5mg, lorazepam 2mg, and diphenhydramine 50mg, with PO haloperidol available. Over time, he's become more sociable with peers and staff on the unit, and is spending more time sitting in the milieu as opposed to pacing the hallways. Patient has since been stable on current medications for some time, and continues to await placement. He was started on a brief trail of propanolol for akathisia, but this was discontinued due to lack of improvement in pacing and patient attesting that a high level of physical activity was his baseline.    Medical course: Anderson Perez was physically examined by the ED prior to being transferred to the unit and was found to be medically stable and appropriate for admission.     Plan   Principal Diagnosis:   # Psychosis, Unspecified (Schizoaffective disorder vs  Bipolar Affective Disorder with Psychosis)    Secondary psychiatric diagnoses of concern this admission:   # Intellectual disability    Psychotropic Medications:  Modify:  No changes    Continue Scheduled:  -Olanzapine ODT 40mg qPM    Continue PRN:  -Acetaminophen 650 mg Q6H PRN for pain and fever  -Hydroxyzine 25 mg Q6H PRN for anxiety  -Haldol 5mg prn q6h for agitation  -Haldol 5mg + diphenhydramine 50mg + lorazepam 2mg prn for severe agitation/psychosis  -Maalox 30 ml Q4H PRN for indigestion  -Miralax prn for constipation    Additional Plans:  -Patient will be treated in therapeutic milieu with appropriate individual and group therapies as described.    Medical diagnoses to be addressed this admission:    # Chronic Hep C  NTD    #Dry mouth  -Biotene started 10/25    #Foot blister/lesion, resolved  -Bacitracin ointment prn    Consults: none    Legal Status: Cardoza  Committed - recommitted 11/1   Legal guardian    Safety Assessment:   Behavioral Orders   Procedures     Cheeking Precautions (behavioral units)     Code 1 - Restrict to Unit     Routine Programming     As clinically indicated     Status 15     Every 15 minutes.     SIO: No    Disposition: Patient is stable, medications optimized. Pending placement. Please see CTC notes for more details.     This patient was seen and discussed with my attending physician.  Mary Nix MD  PGY-1 Psychiatry Resident  Pager: [phone]      This patient has been seen and evaluated by me, Glenn Isaacs.  I have discussed this patient with the psychiatry resident and I agree with the findings and plan in this note.    I have reviewed today's vital signs, medications, labs and imaging.     Glenn Adhikari MD on 11/15/2021 at 4:49 PM

## 2021-11-16 PROCEDURE — 124N000002 HC R&B MH UMMC

## 2021-11-16 PROCEDURE — 99231 SBSQ HOSP IP/OBS SF/LOW 25: CPT | Mod: GC | Performed by: PSYCHIATRY & NEUROLOGY

## 2021-11-16 PROCEDURE — 250N000013 HC RX MED GY IP 250 OP 250 PS 637: Performed by: STUDENT IN AN ORGANIZED HEALTH CARE EDUCATION/TRAINING PROGRAM

## 2021-11-16 RX ORDER — OLANZAPINE 20 MG/1
40 TABLET, ORALLY DISINTEGRATING ORAL AT BEDTIME
Qty: 60 TABLET | Refills: 0 | OUTPATIENT
Start: 2021-11-17 | End: 2024-08-08

## 2021-11-16 RX ORDER — SALIVA STIMULANT COMB. NO.3
1 SPRAY, NON-AEROSOL (ML) MUCOUS MEMBRANE 4 TIMES DAILY PRN
Qty: 44.3 ML | Refills: 0 | OUTPATIENT
Start: 2021-11-16 | End: 2024-08-08

## 2021-11-16 RX ORDER — HALOPERIDOL 5 MG/1
5 TABLET ORAL EVERY 6 HOURS PRN
Qty: 120 TABLET | Refills: 0 | OUTPATIENT
Start: 2021-11-16 | End: 2024-08-08

## 2021-11-16 RX ADMIN — Medication 1 SPRAY: at 14:53

## 2021-11-16 RX ADMIN — Medication 25 MCG: at 19:06

## 2021-11-16 RX ADMIN — OLANZAPINE 40 MG: 20 TABLET, ORALLY DISINTEGRATING ORAL at 19:05

## 2021-11-16 RX ADMIN — Medication 1 SPRAY: at 09:22

## 2021-11-16 ASSESSMENT — ACTIVITIES OF DAILY LIVING (ADL)
ORAL_HYGIENE: INDEPENDENT
DRESS: SCRUBS (BEHAVIORAL HEALTH);INDEPENDENT
HYGIENE/GROOMING: INDEPENDENT

## 2021-11-16 NOTE — PLAN OF CARE
"  Problem: Behavior Regulation Impairment (Manic or Hypomanic Signs/Symptoms)  Goal: Improved Impulse Control (Manic/Hypomanic Signs/Symptoms)  Outcome: Improving  Spoke at length about having his masters in archeology. Talked about dinosaurs, fossils. Told writer twice he has 16 doctorates, one of which is psychology. \"I help my friends with their marriages, it's talk therapy. I charge them $100.00 to $200.00 dollars.\" Talked about what he enjoyed doing as a child. Patient also talked about how he likes to walk/hike in the woods. Spends most of his time alone. \"My birthday is next month and I don't want to be here. I walk so I don't get angry.\" Denies depression, anxiety, hallucinations and suicidal thoughts. Has been pleasant. Has been walking in the hallway most the shift.   "

## 2021-11-16 NOTE — PROGRESS NOTES
"    ----------------------------------------------------------------------------------------------------------  [facility], [place]   Psychiatric Progress Note  Hospital Day #77    Identifier: Anderson Perez is a 40 year old male with a past psychiatric history of bipolar disorder with psychosis vs schizoaffective disorder bipolar type, and intellectual disability admitted from the ER on 08/31/2021 due to concern for out of control behaviors, aggression and psychosis. Patient is psychiatrically stable, medications optimized, awaiting placement.     Interim History:   The patient's care was discussed with the treatment team and chart notes were reviewed.    Vitals: VSS  Sleep: 7 hours (11/16/21 0600)  Scheduled medications: Took all scheduled medications as prescribed  PRN medications: No psychiatric prns given    Interim events:   No events    Staff Report:   -Denies all MH symptoms  -Delusional statement about having sex with staff on the unit previously\"       Subjective:     Patient Interview:  Anderson Perez was interviewed while sitting in the milieu. Denies questions or concerns. He was disappointed to hear that [facility]     The risks, benefits, alternatives and side effects of any medication changes have been discussed and are understood by the patient and guardian.    Review of systems:   Patient has no bothersome physical symptoms  Patient denies acute concerns    Objective:   /74 (BP Location: Right arm)   Pulse 85   Temp 97.7  F (36.5  C) (Tympanic)   Resp 14   Ht 1.829 m (6')   Wt 95.2 kg (209 lb 14.4 oz)   SpO2 98%   BMI 28.47 kg/m    Weight is 209 lbs 14.4 oz  Body mass index is 28.47 kg/m .    Mental Status Exam:  Orientation: Grossly intact  General: alert, awake  Appearance:  Dressed in hospital scrubs  Behavior: Walking hallways. Calm, cooperative, polite  Eye Contact: occasional glances  Psychomotor:  No catatonia present. Walking in " "the hallways, typical for him at baseline.   Speech:  Appropriate volume, and rate  Language: Appears to be fluent in English  Mood:  \"pretty good\"  Affect:  Neutral, appropriate, restricted range  Thought Process: Linear and coherent throughout brief conversation  Thought Content: No AH/VH/SI/HI. No delusional content during interview today.  Associations: Intact   Insight: Good, accepting of medications.   Judgment:  Good - adherent to medications, positive presence in milieu   Attention Span: not assessed  Concentration:  not assessed  Recent and Remote Memory:  Grossly intact  Fund of Knowledge: Historical diagnosis of intellectual disability (est. IQ 63)  Gait: normal       Allergies:     Allergies   Allergen Reactions     Bee Venom Anaphylaxis     Clindamycin Hives     Morphine Hives        Labs:   None new.     Assessment    Primary psychiatric diagnosis:   Psychosis, Unspecified (Schizoaffective disorder vs Bipolar Affective Disorder with Psychosis)    Secondary psychiatric diagnoses of concern this admission:   Intellectual disability (IQ 63)    Diagnostic Impression: Anderson Perez is a 40 year old male with a past psychiatric history of bipolar disorder with psychosis vs schizoaffective disorder bipolar type, and intellectual disability admitted from the  ER on 08/31/2021 due to concern for out of control behaviors, aggression and psychosis in the context of medication refusal despite Cardoza. Patient was recently released from [facility] to a group home. Presentation to the ED via ambulance after police contacted and provisional discharge rescinded. Group home noted escalating aggressive behavior, delusional thinking, and medication refusal despite Cardoza. PT refusal to discuss problems and largely denied all past history, medications, or any need for care. His reported symptoms of delusions, psychosis, and erratic/aggressive behavior are consistent with prior presentations of psychosis vs schizoaffective " disorder bipolar type.    Of note, patient had previous diagnosis of Antisocial Personality Disorder. As psychosis cleared during hospitalization, aggressive and antagonistic behavior resolved completely. It seems questionable that he has ASPD, and rather that previous behaviors were a result of psychosis and/or substance intoxication.    Psychiatric Hospital course: Anderson Perez was admitted to [facility] following revocation of provisional discharge and on a court hold. His PTA olanzapine 20mg PO QDaily was resumed, as pt had been refusing at group home; per conversation with guardian, had planned to start on GRIGGS, but does not think that this was actually started, as patient was declining medications, which prompted admission in setting of decompensation. Pt was converted to ODT formulation given concerns for cheeking medications; while initially resistant, pt was agreeable with taking ODT olanzapine as long as it was given with ginger ale, which was accommodated. Olanzapine dose increased to 40mg on 9/21 given limited efficacy at PTA dose. He was also started on a brief trail of propanolol for akathisia, but this was discontinued due to lack of improvement in pacing and patient attesting that a high level of physical activity was his baseline. Over time, he's become more sociable with peers and staff on the unit, and is spending more time sitting in the milieu as opposed to pacing the hallways. Patient has since been stable on current medications for some time, and continues to await placement.     Medical course: Anderson Perez was physically examined by the ED prior to being transferred to the unit and was found to be medically stable and appropriate for admission.     Plan   Principal Diagnosis:   # Psychosis, Unspecified (Schizoaffective disorder vs Bipolar Affective Disorder with Psychosis)    Secondary psychiatric diagnoses of concern this admission:   # Intellectual disability    Psychotropic  Medications:  Modify:  No changes    Continue Scheduled:  -Olanzapine ODT 40mg qPM    Continue PRN:  -Acetaminophen 650 mg Q6H PRN for pain and fever  -Hydroxyzine 25 mg Q6H PRN for anxiety  -Haldol 5mg prn q6h for agitation  -Haldol 5mg + diphenhydramine 50mg + lorazepam 2mg prn for severe agitation/psychosis  -Maalox 30 ml Q4H PRN for indigestion  -Miralax prn for constipation    Additional Plans:  -Patient will be treated in therapeutic milieu with appropriate individual and group therapies as described.    Medical diagnoses to be addressed this admission:    # Chronic Hep C  NTD    #Dry mouth  -Biotene started 10/25    #Foot blister/lesion, resolved  -Bacitracin ointment prn    Consults: none    Legal Status: Cardoza  Committed - recommitted 11/1   Legal guardian    Safety Assessment:   Behavioral Orders   Procedures     Cheeking Precautions (behavioral units)     Code 1 - Restrict to Unit     Routine Programming     As clinically indicated     Sexual precautions     Status 15     Every 15 minutes.     SIO: No    Disposition: Patient is stable, medications optimized. Pending placement. Please see CTC notes for more details.     This patient was seen and discussed with my attending physician.  Mary Nix MD  PGY-1 Psychiatry Resident  Pager: [phone]      This patient has been seen and evaluated by me, Glenn Isaacs.  I have discussed this patient with the psychiatry resident and I agree with the findings and plan in this note.    I have reviewed today's vital signs, medications, labs and imaging.     Glenn Adhikari MD on 11/16/2021 at 11:34 PM

## 2021-11-16 NOTE — PLAN OF CARE
Pt is pleasant , calm , social , Enjoys walking the dalal , med compliant , denies si or hallucinations , pt refused covid test.Pt states that he has had sex with multiple staff members here in past admissions and that they were not suppose to admit him here but they did anyway

## 2021-11-16 NOTE — PLAN OF CARE
Assessment/Intervention/Current Symptoms and Care Coordination    Attended team meeting and reviewed chart notes.    Pt was declined [facility] to [facility] Writer questioned why since he would not be going there and that he was more stable than at time of admission. They still could not even screen pt.      Writer spoke with REX Short. She has been contacting many facilities without any success. She is now applying to [facility] after so many denials. That will be completed today. She is hoping for a quick response.        Discharge Plan or Goal  Group home placement      Barriers to Discharge   Needs placement to accept him      Referral Status  [facility] GONZALES is working at placement        Legal Status  Pt is committed with Cardoza order

## 2021-11-17 PROCEDURE — 250N000013 HC RX MED GY IP 250 OP 250 PS 637: Performed by: STUDENT IN AN ORGANIZED HEALTH CARE EDUCATION/TRAINING PROGRAM

## 2021-11-17 PROCEDURE — 124N000002 HC R&B MH UMMC

## 2021-11-17 PROCEDURE — 99232 SBSQ HOSP IP/OBS MODERATE 35: CPT | Mod: GC | Performed by: PSYCHIATRY & NEUROLOGY

## 2021-11-17 RX ADMIN — Medication 25 MCG: at 19:35

## 2021-11-17 RX ADMIN — OLANZAPINE 40 MG: 20 TABLET, ORALLY DISINTEGRATING ORAL at 19:35

## 2021-11-17 NOTE — PLAN OF CARE
Pt appeared to sleep 6.75 hours. Pt up for snack. No PRNs given or requested. No medical or behavioral events this shift.      Problem: Sleep Disturbance  Goal: Adequate Sleep/Rest  Outcome: No Change

## 2021-11-17 NOTE — PLAN OF CARE
Problem: Adult Inpatient Plan of Care  Goal: Optimal Comfort and Wellbeing  Outcome: Improving   Pt doing well , smiling , pleasant , walking the halls most of evening , med compliant . Denies si   Pt refused covid test

## 2021-11-17 NOTE — PLAN OF CARE
Problem: Behavioral Health Plan of Care  Goal: Plan of Care Review  Outcome: Improving  Flowsheets (Taken 11/17/2021 1449)  Plan of Care Reviewed With: patient  Progress: improving  Patient Agreement with Plan of Care: (Accepts tx, but wants discharge) agrees with comment (describe)     Problem: Mood Impairment (Manic or Hypomanic Signs/Symptoms)  Goal: Improved Mood Symptoms (Manic/Hypomanic Signs/Symptoms)  Outcome: Improving  Flowsheets (Taken 11/17/2021 7013)  Mutually Determined Action Steps (Improved Mood Symptoms): engages in physical activity     Antonio continues to spend most of day walking in dalal-brief social interactions with others-mostly keeps to self, but pleasant on approach in brief interactions-declines grp-no overt delusional statements

## 2021-11-17 NOTE — PROGRESS NOTES
"    ----------------------------------------------------------------------------------------------------------  [facility], [place]   Psychiatric Progress Note  Hospital Day #78    Identifier: Anderson Perez is a 40 year old male with a past psychiatric history of bipolar disorder with psychosis vs schizoaffective disorder bipolar type, and intellectual disability admitted from the ER on 08/31/2021 due to concern for out of control behaviors, aggression and psychosis. Patient is psychiatrically stable, medications optimized, awaiting placement.     Interim History:   The patient's care was discussed with the treatment team and chart notes were reviewed.    Vitals: VSS  Sleep: 7.75 hours (11/17/21 0557)  Scheduled medications: Took all scheduled medications as prescribed  PRN medications: No psychiatric prns given    Interim events:   No events    Staff Report:   -Denies all MH symptoms  -Refused COVID test     Subjective:     Patient Interview:  Anderson Perez was interviewed as he was doing lapses back and forth in the main hallway. The writer introduced self and explained my role to him. He mentioned he slept \"like a baby\" last night. He told me that his goal is to get out of hospital sooner as \"this is a locked unit and I am claustrophobic\". He denied any side effects from his medications except for dry mouth which he uses mouthwash which helps. Denies questions or concerns.      The risks, benefits, alternatives and side effects of any medication changes have been discussed and are understood by the patient and guardian.    Review of systems:   Patient has no bothersome physical symptoms  Patient denies acute concerns    Objective:   /73   Pulse 84   Temp 97  F (36.1  C) (Tympanic)   Resp 14   Ht 1.829 m (6')   Wt 95.2 kg (209 lb 14.4 oz)   SpO2 97%   BMI 28.47 kg/m    Weight is 209 lbs 14.4 oz  Body mass index is 28.47 kg/m .    Mental Status " "Examination:    Oriented to:  Grossly Oriented  General: Awake and Alert  Appearance:  appears older than stated age, Grooming is adequate, Dressed in hospital attire and appropriate weight  Behavior:  calm and disengaged  Eye Contact:  adequate  Psychomotor:  no abnormal motor symptoms appreciated and restless; no catatonia present  Speech:  appropriate volume/tone, spontaneous and with good articulation  Language: Fluent in English with appropriate syntax and vocabulary.  Mood:  \"OK\"  Affect:  stable, restricted and blunted  Thought Process:  coherent, linear, logical and goal directed  Thought Content: No SI/HI/AH/VH; No apparent delusions  Associations:  intact  Insight:  fair  Judgment:  fair        Allergies:     Allergies   Allergen Reactions     Bee Venom Anaphylaxis     Clindamycin Hives     Morphine Hives        Labs:   None new.     Assessment    Primary psychiatric diagnosis:   Psychosis, Unspecified (Schizoaffective disorder vs Bipolar Affective Disorder with Psychosis)    Secondary psychiatric diagnoses of concern this admission:   Intellectual disability (IQ 63)    Diagnostic Impression: Anderson Perez is a 40 year old male with a past psychiatric history of bipolar disorder with psychosis vs schizoaffective disorder bipolar type, and intellectual disability admitted from the  ER on 08/31/2021 due to concern for out of control behaviors, aggression and psychosis in the context of medication refusal despite Cardoza. Patient was recently released from [facility] to a group home. Presentation to the ED via ambulance after police contacted and provisional discharge rescinded. Group home noted escalating aggressive behavior, delusional thinking, and medication refusal despite Cardoza. PT refusal to discuss problems and largely denied all past history, medications, or any need for care. His reported symptoms of delusions, psychosis, and erratic/aggressive behavior are consistent with prior presentations of " psychosis vs schizoaffective disorder bipolar type.    Of note, patient had previous diagnosis of Antisocial Personality Disorder. As psychosis cleared during hospitalization, aggressive and antagonistic behavior resolved completely. It seems questionable that he has ASPD, and rather that previous behaviors were a result of psychosis and/or substance intoxication.    Psychiatric Hospital course: Anderson Perez was admitted to [facility] following revocation of provisional discharge and on a court hold. His PTA olanzapine 20mg PO QDaily was resumed, as pt had been refusing at group home; per conversation with guardian, had planned to start on GRIGGS, but does not think that this was actually started, as patient was declining medications, which prompted admission in setting of decompensation. Pt was converted to ODT formulation given concerns for cheeking medications; while initially resistant, pt was agreeable with taking ODT olanzapine as long as it was given with ginger ale, which was accommodated. Olanzapine dose increased to 40mg on 9/21 given limited efficacy at PTA dose. He was also started on a brief trail of propanolol for akathisia, but this was discontinued due to lack of improvement in pacing and patient attesting that a high level of physical activity was his baseline. Over time, he's become more sociable with peers and staff on the unit, and is spending more time sitting in the milieu as opposed to pacing the hallways. Patient has since been stable on current medications for some time, and continues to await placement.     Medical course: Anderson Perez was physically examined by the ED prior to being transferred to the unit and was found to be medically stable and appropriate for admission.     Plan   Principal Diagnosis:   # Psychosis, Unspecified (Schizoaffective disorder vs Bipolar Affective Disorder with Psychosis)    Secondary psychiatric diagnoses of concern this admission:   # Intellectual  disability    Psychotropic Medications:  Modify:  No changes    Continue Scheduled:  -Olanzapine ODT 40mg qPM    Continue PRN:  -Acetaminophen 650 mg Q6H PRN for pain and fever  -Hydroxyzine 25 mg Q6H PRN for anxiety  -Haldol 5mg prn q6h for agitation  -Haldol 5mg + diphenhydramine 50mg + lorazepam 2mg prn for severe agitation/psychosis  -Maalox 30 ml Q4H PRN for indigestion  -Miralax prn for constipation    Additional Plans:  -Patient will be treated in therapeutic milieu with appropriate individual and group therapies as described.    Medical diagnoses to be addressed this admission:    # Chronic Hep C  NTD    #Dry mouth  -Biotene started 10/25    #Foot blister/lesion, resolved  -Bacitracin ointment prn    Consults: none    Legal Status: Cardoza  Committed - recommitted 11/1   Legal guardian    Safety Assessment:   Behavioral Orders   Procedures     Cheeking Precautions (behavioral units)     Code 1 - Restrict to Unit     Routine Programming     As clinically indicated     Sexual precautions     Status 15     Every 15 minutes.     SIO: No    Disposition: Patient is stable, medications optimized. Pending placement. Please see CTC notes for more details.     This patient was seen and discussed with my attending physician.    Sammy Angel MD  PGY-1 Psychiatry Resident  Pager: [phone]    This patient has been seen and evaluated by me, Glenn Isaacs.  I have discussed this patient with the psychiatry resident and I agree with the findings and plan in this note.    I have reviewed today's vital signs, medications, labs and imaging.     Glenn Adhikari MD on 11/17/2021 at 10:23 PM

## 2021-11-17 NOTE — PLAN OF CARE
Assessment/Intervention/Current Symptoms and Care Coordination    Attended team meeting and reviewed chart notes.    Pt is taking medication as prescribed and behavior is appropriate.        Discharge Plan or Goal  To group home    Barriers to Discharge   Needs placement      Referral Status  [facility] Short is working on placement        Legal Status  Pt is committed with Cardoza order

## 2021-11-18 PROCEDURE — 99232 SBSQ HOSP IP/OBS MODERATE 35: CPT | Mod: GC | Performed by: PSYCHIATRY & NEUROLOGY

## 2021-11-18 PROCEDURE — 250N000013 HC RX MED GY IP 250 OP 250 PS 637: Performed by: STUDENT IN AN ORGANIZED HEALTH CARE EDUCATION/TRAINING PROGRAM

## 2021-11-18 PROCEDURE — 124N000002 HC R&B MH UMMC

## 2021-11-18 RX ADMIN — Medication 25 MCG: at 20:17

## 2021-11-18 RX ADMIN — OLANZAPINE 40 MG: 20 TABLET, ORALLY DISINTEGRATING ORAL at 20:17

## 2021-11-18 ASSESSMENT — MIFFLIN-ST. JEOR: SCORE: 1924.15

## 2021-11-18 NOTE — PLAN OF CARE
Assessment/Intervention/Current Symptoms and Care Coordination    Attended team meeting and reviewed chart notes.    Writer spoke with pt about the discharge process after he was angry with the team this morning. Pt stated again as he has done before that he is not from [facility] and shouldn't be working with them. Pt stated he has an IQ of 177 so he knows what is going on. Pt appeared to calm down after writer went through the process and explaining the difficulty of placement. He spoke about being at [facility] and then going to a group home. Writer stated this is the same process. Pt stated he is frustrated with being here so long. Writer validated his frustration.    Discharge Plan or Goal    Discharge to group home      Barriers to Discharge   Needs placement      Referral Status  REX GONZALES is working on placement with [facility]      Legal Status  Pt is committed with Cardoza order

## 2021-11-18 NOTE — PLAN OF CARE
"  Problem: Behavioral Health Plan of Care  Goal: Develops/Participates in [facility] [place] to Support Successful Transition  Outcome: Improving  Pt had a good shift; shaved his beard and head; denied MH symptoms of SI/SIB,AVH, depression and anxiety; reported \"feeling great\"; endorsed dry mouth as medication side effect; pt would like to have a Gatorade back on his meal tray and increase portion side of his meal; medication compliant; attended no group; spent the shift pacing in the hallway and socializing with peers; No safety concern to report this shift. Will continue to monitor and assess.    "

## 2021-11-18 NOTE — PLAN OF CARE
Problem: Behavioral Health Plan of Care  Goal: Plan of Care Review  Outcome: Improving  Flowsheets (Taken 11/18/2021 1317)  Plan of Care Reviewed With: patient  Progress: improving  Patient Agreement with Plan of Care: (Disagrees with continued hospitalization) disagrees (describe)     Problem: Social Isolation  Goal: Increased Social Interaction  Outcome: Improving     Anderson expressing frustration today with continued stay in hospital, now hospitalized 80 days-Anderson primarily focused on MDs regarding situation-open to support and redirection from [facility] who followed up with him post outburst-Antonio initially stating he was no longer going to meet with team, but agreed to reconsider-Antonio also agreed to refrain from angry outburst in future-did make grandiose statements while angry, e.g. You shouldn't be holding me here! I'm an !-Spent day walking in dalal-less social today

## 2021-11-18 NOTE — PLAN OF CARE
Problem: Sleep Disturbance  Goal: Adequate Sleep/Rest  Outcome: No Change     Patient was observed sleeping at start of shift and through the rest of the night. No complaints of pain nor discomfort. Pt slept for  7 hours. No PRN medications needed this shift.

## 2021-11-18 NOTE — PROGRESS NOTES
"    ----------------------------------------------------------------------------------------------------------  [facility], [place]   Psychiatric Progress Note  Hospital Day #79    Identifier: Anderson Perez is a 40 year old male with a past psychiatric history of bipolar disorder with psychosis vs schizoaffective disorder bipolar type, and intellectual disability admitted from the ER on 08/31/2021 due to concern for out of control behaviors, aggression and psychosis. Patient is psychiatrically stable, medications optimized, awaiting placement.     Interim History:   The patient's care was discussed with the treatment team and chart notes were reviewed.    Vitals: VSS  Sleep: 7 hours (11/18/21 0558)  Scheduled medications: Took all scheduled medications as prescribed  PRN medications: No psychiatric prns given    Interim events:   No events    Staff Report:   \"-Improving. Pt had a good shift; shaved his beard and head; denied MH symptoms of SI/SIB,AVH, depression and anxiety; reported \"feeling great\"; endorsed dry mouth as medication side effect; pt would like to have a Gatorade back on his meal tray and increase portion side of his meal; medication compliant; attended no group; spent the shift pacing in the hallway and socializing with peers; No safety concern to report this shift. Will continue to monitor and assess.\"     Subjective:     Patient Interview:  Anderson Perez was interviewed as he was pacing in the dalal. The writer talked about his new haircut. As we started the conversation he asked if there has been any updates on his placement. He voiced frustration as the writer had no new updated regarding his placement. He mentioned a couple of options and asked if we could apply to those programs. He was encouraged to bring that up with SW. He got irritable along the conversation and felt frustrated \"you are all students and don't know how to do your job\". He refused to " "continue the conversation.     The risks, benefits, alternatives and side effects of any medication changes have been discussed and are understood by the patient and guardian.    Review of systems:   No sxs except mentioned above.    Objective:   /78 (BP Location: Left arm)   Pulse 96   Temp 97.7  F (36.5  C) (Tympanic)   Resp 12   Ht 1.829 m (6')   Wt 97.1 kg (214 lb 1.6 oz)   SpO2 97%   BMI 29.04 kg/m    Weight is 214 lbs 1.6 oz  Body mass index is 29.04 kg/m .    Mental Status Examination:    Oriented to:  Grossly oriented.  General: Awake and alert x3  Appearance:  appears older than stated age, Grooming is adequate, Dressed in hospital attire and appropriate weight  Behavior:  Frustrated, disengaged  Eye Contact: fair  Psychomotor:  no abnormal motor symptoms appreciated and restless; no catatonia present  Speech:  appropriate volume/tone, spontaneous and with good articulation  Language: Fluent in English with appropriate syntax and vocabulary.  Mood:  \"tired of being here\"  Affect:  Irritable, anxious, flat facial expression  Thought Process:  coherent, linear, logical and goal directed  Thought Content: No SI/HI/AH/VH; No apparent delusions  Associations:  intact  Insight:  Poor due to not knowing the reason being in the hospital  Judgment: Fair          Allergies:     Allergies   Allergen Reactions     Bee Venom Anaphylaxis     Clindamycin Hives     Morphine Hives        Labs:   None new.     Assessment    Primary psychiatric diagnosis:   Psychosis, Unspecified (Schizoaffective disorder vs Bipolar Affective Disorder with Psychosis)    Secondary psychiatric diagnoses of concern this admission:   Intellectual disability (IQ 63)    Diagnostic Impression: Anderson CHI Chris is a 40 year old male with a past psychiatric history of bipolar disorder with psychosis vs schizoaffective disorder bipolar type, and intellectual disability admitted from the  ER on 08/31/2021 due to concern for out of control " behaviors, aggression and psychosis in the context of medication refusal despite Cardoza. Patient was recently released from [facility] to a group home. Presentation to the ED via ambulance after police contacted and provisional discharge rescinded. Group home noted escalating aggressive behavior, delusional thinking, and medication refusal despite Cardoza. PT refusal to discuss problems and largely denied all past history, medications, or any need for care. His reported symptoms of delusions, psychosis, and erratic/aggressive behavior are consistent with prior presentations of psychosis vs schizoaffective disorder bipolar type.    Of note, patient had previous diagnosis of Antisocial Personality Disorder. As psychosis cleared during hospitalization, aggressive and antagonistic behavior resolved completely. It seems questionable that he has ASPD, and rather that previous behaviors were a result of psychosis and/or substance intoxication.    Psychiatric Hospital course: Anderson Perez was admitted to [facility] following revocation of provisional discharge and on a court hold. His PTA olanzapine 20mg PO QDaily was resumed, as pt had been refusing at group home; per conversation with guardian, had planned to start on GRIGGS, but does not think that this was actually started, as patient was declining medications, which prompted admission in setting of decompensation. Pt was converted to ODT formulation given concerns for cheeking medications; while initially resistant, pt was agreeable with taking ODT olanzapine as long as it was given with ginger ale, which was accommodated. Olanzapine dose increased to 40mg on 9/21 given limited efficacy at PTA dose. He was also started on a brief trail of propanolol for akathisia, but this was discontinued due to lack of improvement in pacing and patient attesting that a high level of physical activity was his baseline. Over time, he's become more sociable with peers and staff on the unit,  and is spending more time sitting in the milieu as opposed to pacing the hallways. Patient has since been stable on current medications for some time, and continues to await placement.     Medical course: Anderson Perez was physically examined by the ED prior to being transferred to the unit and was found to be medically stable and appropriate for admission.     Plan   Principal Diagnosis:   # Psychosis, Unspecified (Schizoaffective disorder vs Bipolar Affective Disorder with Psychosis)    Secondary psychiatric diagnoses of concern this admission:   # Intellectual disability    Psychotropic Medications:  Modify:  No changes    Continue Scheduled:  -Olanzapine ODT 40mg qPM    Continue PRN:  -Acetaminophen 650 mg Q6H PRN for pain and fever  -Hydroxyzine 25 mg Q6H PRN for anxiety  -Haldol 5mg prn q6h for agitation  -Haldol 5mg + diphenhydramine 50mg + lorazepam 2mg prn for severe agitation/psychosis  -Maalox 30 ml Q4H PRN for indigestion  -Miralax prn for constipation    Additional Plans:  -Patient will be treated in therapeutic milieu with appropriate individual and group therapies as described.  -Asked sw to update the patient in-person and explaining the process of placement in detail, answering questions and addressing concerns.    Medical diagnoses to be addressed this admission:    # Chronic Hep C  NTD    #Dry mouth  -Biotene started 10/25    #Foot blister/lesion, resolved  -Bacitracin ointment prn    Consults: none    Legal Status: Cardoza  Committed - recommitted 11/1   Legal guardian    Safety Assessment:   Behavioral Orders   Procedures     Cheeking Precautions (behavioral units)     Code 1 - Restrict to Unit     Routine Programming     As clinically indicated     Sexual precautions     Status 15     Every 15 minutes.     SIO: No    Disposition: Patient is stable, medications optimized. Pending placement. Please see CTC notes for more details.     This patient was seen and discussed with my attending  physician.    Sammy Angel MD  PGY-1 Psychiatry Resident  Pager: [phone]      This patient has been seen and evaluated by me, Glenn Isaacs.  I have discussed this patient with the psychiatry resident and I agree with the findings and plan in this note.    I have reviewed today's vital signs, medications, labs and imaging.     Glenn Adhikari MD on 11/18/2021 at 10:34 PM

## 2021-11-19 PROCEDURE — 124N000002 HC R&B MH UMMC

## 2021-11-19 PROCEDURE — 99232 SBSQ HOSP IP/OBS MODERATE 35: CPT | Mod: GC | Performed by: PSYCHIATRY & NEUROLOGY

## 2021-11-19 PROCEDURE — 250N000013 HC RX MED GY IP 250 OP 250 PS 637: Performed by: STUDENT IN AN ORGANIZED HEALTH CARE EDUCATION/TRAINING PROGRAM

## 2021-11-19 RX ADMIN — Medication 1 SPRAY: at 10:51

## 2021-11-19 RX ADMIN — OLANZAPINE 40 MG: 20 TABLET, ORALLY DISINTEGRATING ORAL at 19:07

## 2021-11-19 RX ADMIN — Medication 25 MCG: at 19:08

## 2021-11-19 ASSESSMENT — ACTIVITIES OF DAILY LIVING (ADL)
HYGIENE/GROOMING: INDEPENDENT
DRESS: INDEPENDENT;SCRUBS (BEHAVIORAL HEALTH)
LAUNDRY: WITH SUPERVISION
ORAL_HYGIENE: INDEPENDENT

## 2021-11-19 NOTE — PLAN OF CARE
Assessment/Intervention/Current Symptoms and Care Coordination    Attended team meeting and reviewed chart notes.    Pt is waiting for placement in group home. He appears to be in a better mood today, less irritable. Pt vented frustration yesterday about staying in the hospital any longer.       Discharge Plan or Goal  To group home      Barriers to Discharge   Needs placement      Referral Status  [facility] is looking into placements        Legal Status  Committed with Cardoza order

## 2021-11-19 NOTE — PLAN OF CARE
"Assessment:  Antonio was up ad eduard, has spent most the shift to this point out of his room pacing the dalal. Antonio refused Covid testing \"I already got both injections\"    Antonio denies having suicidal ideation, self harm thoughts, or having thoughts to harm others. Pt was calm, approached writer x 1 for Biotene mouth spray.     Antonio's appetite and fluid intake were WNL.   "

## 2021-11-19 NOTE — PLAN OF CARE
Nursing Plan of care   Problem: Behavioral Health Plan of Care  Goal: Adheres to Safety Considerations for Self and Others  Outcome: Improving  Pt continues declining to attend group activity; was active and visible in the milieu; spent the shift pacing in the hallway and watching TV in the lounge; ate dinner; denied issue with sleep and appetite; denied medication side effect; medication compliant; denied SI/SIB,AVH,depression and anxiety; No PRN was requested or administered;  will continue to monitor and assess

## 2021-11-19 NOTE — PROGRESS NOTES
"    ----------------------------------------------------------------------------------------------------------  [facility], [place]   Psychiatric Progress Note  Hospital Day #80    Identifier: Anderson Perez is a 40 year old male with a past psychiatric history of bipolar disorder with psychosis vs schizoaffective disorder bipolar type, and intellectual disability admitted from the ER on 08/31/2021 due to concern for out of control behaviors, aggression and psychosis. Patient is psychiatrically stable, medications optimized, awaiting placement.     Interim History:   The patient's care was discussed with the treatment team and chart notes were reviewed.    Vitals: VSS  Sleep: 7 hours (11/19/21 0600)  Scheduled medications: Took all scheduled medications as prescribed  PRN medications: No psychiatric prns given    Interim events:   No events    Staff Report:  - vented his frustration with the sw about his long stay in the hospital.  - Deja GOODMAN CM, working on placement with [facility]  - spent most of the day pacing hallway and watching TV  - declined attending group activitis  - declined covid testing  - uses Biotene mouth spray  - good appetite and fluid intake  - no MH sxs     Subjective:     Patient Interview:  Anderson Perez was interviewed while pacing in the dalal. He inquired updates on placement which was told that has not been any since yesterday. He expressed his frustration with his long stay on the unit, \"next month I am gonna be 41 and I don't wanna be here till then\" He was reassured that the  are looking into finding a placement so that he can leave the hospital. He did not endorse any AH/VH/SI/HI. He seems to have been tolerating the meds well. No other concerns or questions.     The risks, benefits, alternatives and side effects of any medication changes have been discussed and are understood by the patient and guardian.    Review of systems:   No sxs except " "mentioned above.    Objective:   /65 (BP Location: Right arm)   Pulse 76   Temp 97.4  F (36.3  C) (Oral)   Resp 12   Ht 1.829 m (6')   Wt 97.1 kg (214 lb 1.6 oz)   SpO2 98%   BMI 29.04 kg/m    Weight is 214 lbs 1.6 oz  Body mass index is 29.04 kg/m .    Mental Status Examination:    Oriented to:  Grossly oriented.  General: Awake and alert  Appearance:  appears older than stated age, Grooming is adequate, Dressed in hospital attire and appropriate weight  Behavior:  Cooperative, calm  Eye Contact: fair  Psychomotor: constantly pacing around the milieu no abnormal motor symptoms appreciated and restless; no catatonia present  Speech:  appropriate volume/tone, spontaneous and with good articulation  Language: Fluent in English with appropriate syntax and vocabulary.  Mood:  \"I want to leave\"  Affect:  Irritable, flat facial expression  Thought Process:  coherent, linear, logical and goal directed  Thought Content: No SI/HI/AH/VH; grandiose delusions (being an , having a high IQ, 16 doctorate degrees)  Associations:  intact  Insight:  Poor due to not knowing the reason being in the hospital  Judgment: Fair d/t cooperation with medication and care        Allergies:     Allergies   Allergen Reactions     Bee Venom Anaphylaxis     Clindamycin Hives     Morphine Hives        Labs:   None new.     Assessment    Primary psychiatric diagnosis:   Psychosis, Unspecified (Schizoaffective disorder vs Bipolar Affective Disorder with Psychosis)    Secondary psychiatric diagnoses of concern this admission:   Intellectual disability (IQ 63)    Diagnostic Impression: Anderson Perez is a 40 year old male with a past psychiatric history of bipolar disorder with psychosis vs schizoaffective disorder bipolar type, and intellectual disability admitted from the  ER on 08/31/2021 due to concern for out of control behaviors, aggression and psychosis in the context of medication refusal despite Cardoza. Patient was " recently released from [facility] to a group home. Presentation to the ED via ambulance after police contacted and provisional discharge rescinded. Group home noted escalating aggressive behavior, delusional thinking, and medication refusal despite Cardoza. PT refusal to discuss problems and largely denied all past history, medications, or any need for care. His reported symptoms of delusions, psychosis, and erratic/aggressive behavior are consistent with prior presentations of psychosis vs schizoaffective disorder bipolar type.    Of note, patient had previous diagnosis of Antisocial Personality Disorder. As psychosis cleared during hospitalization, aggressive and antagonistic behavior resolved completely. It seems questionable that he has ASPD, and rather that previous behaviors were a result of psychosis and/or substance intoxication.    Psychiatric Hospital course: Anderson Perez was admitted to [facility] following revocation of provisional discharge and on a court hold. His PTA olanzapine 20mg PO QDaily was resumed, as pt had been refusing at group home; per conversation with guardian, had planned to start on GRIGGS, but does not think that this was actually started, as patient was declining medications, which prompted admission in setting of decompensation. Pt was converted to ODT formulation given concerns for cheeking medications; while initially resistant, pt was agreeable with taking ODT olanzapine as long as it was given with ginger ale, which was accommodated. Olanzapine dose increased to 40mg on 9/21 given limited efficacy at PTA dose. He was also started on a brief trail of propanolol for akathisia, but this was discontinued due to lack of improvement in pacing and patient attesting that a high level of physical activity was his baseline. Over time, he's become more sociable with peers and staff on the unit, and is spending more time sitting in the milieu as opposed to pacing the hallways. Patient has since  been stable on current medications for some time, and continues to await placement.     Medical course: Anderson Perez was physically examined by the ED prior to being transferred to the unit and was found to be medically stable and appropriate for admission.     Plan   Principal Diagnosis:   # Psychosis, Unspecified (Schizoaffective disorder vs Bipolar Affective Disorder with Psychosis)    Secondary psychiatric diagnoses of concern this admission:   # Intellectual disability    Psychotropic Medications:  Modify:  No changes    Continue Scheduled:  -Olanzapine ODT 40mg qPM    Continue PRN:  -Acetaminophen 650 mg Q6H PRN for pain and fever  -Hydroxyzine 25 mg Q6H PRN for anxiety  -Haldol 5mg prn q6h for agitation  -Haldol 5mg + diphenhydramine 50mg + lorazepam 2mg prn for severe agitation/psychosis  -Maalox 30 ml Q4H PRN for indigestion  -Miralax prn for constipation    Additional Plans:  -Patient will be treated in therapeutic milieu with appropriate individual and group therapies as described.  -Asked sw to update the patient in-person and explaining the process of placement in detail, answering questions and addressing concerns.    Medical diagnoses to be addressed this admission:    # Chronic Hep C  NTD    #Dry mouth  -Biotene started 10/25    #Foot blister/lesion, resolved  -Bacitracin ointment prn    Consults: none    Legal Status: Cardoza  Committed - recommitted 11/1   Legal guardian    Safety Assessment:   Behavioral Orders   Procedures     Cheeking Precautions (behavioral units)     Code 1 - Restrict to Unit     Routine Programming     As clinically indicated     Sexual precautions     Status 15     Every 15 minutes.     SIO: No    Disposition: Patient is stable, medications optimized. Pending placement. Please see CTC notes for more details.     This patient was seen and discussed with my attending physician.    Sammy Angel MD  PGY-1 Psychiatry Resident  Pager: [phone]      This patient has been  seen and evaluated by me, Glenn Isaacs.  I have discussed this patient with the psychiatry resident and I agree with the findings and plan in this note.    I have reviewed today's vital signs, medications, labs and imaging.     Tere Alcaraz MD on 11/18/2021 at 10:34 PM      This patient has been seen and evaluated by me, Glenn Isaacs.  I have discussed this patient with the psychiatry resident and I agree with the findings and plan in this note.    I have reviewed today's vital signs, medications, labs and imaging.     Glenn Adhikari MD on 11/19/2021 at 9:22 PM

## 2021-11-19 NOTE — PLAN OF CARE
Problem: Sleep Disturbance  Goal: Adequate Sleep/Rest  11/19/2021 0546 by Neelima Taylor RN  Outcome: Improving  Received pt sleeping in bed; respiration was regular and unlabored; No PRN was requested or administered; no safety concern to report; pt appeared to have slept for 7 hrs; will continue to monitor and assess.

## 2021-11-20 PROCEDURE — 124N000002 HC R&B MH UMMC

## 2021-11-20 PROCEDURE — 250N000013 HC RX MED GY IP 250 OP 250 PS 637: Performed by: STUDENT IN AN ORGANIZED HEALTH CARE EDUCATION/TRAINING PROGRAM

## 2021-11-20 RX ADMIN — Medication 1 SPRAY: at 14:42

## 2021-11-20 RX ADMIN — OLANZAPINE 40 MG: 20 TABLET, ORALLY DISINTEGRATING ORAL at 19:10

## 2021-11-20 RX ADMIN — Medication 25 MCG: at 19:10

## 2021-11-20 NOTE — PLAN OF CARE
Problem: Behavioral Health Plan of Care  Goal: Plan of Care Review  Outcome: Improving  Flowsheets (Taken 11/20/2021 1423)  Plan of Care Reviewed With: patient  Progress: improving  Patient Agreement with Plan of Care: (Feels should be discharged) agrees with comment (describe)     Problem: Behavior Regulation Impairment (Psychotic Signs/Symptoms)  Goal: Improved Behavioral Control (Psychotic Signs/Symptoms)  Outcome: Improving  Flowsheets (Taken 11/20/2021 1423)  Mutually Determined Action Steps (Improved Behavioral Control): identifies future-oriented goal     Antonio out of room most of day-walking in dalal most of time, but able to spend time sitting in chair watching television-laughing and joking with peer-brief interactions with staff and peers-denies issues, just anxious to discharge to next level of tx

## 2021-11-20 NOTE — PLAN OF CARE
Pt pleasant , social , smilng , enjoys walking the dalal. Med compliant , denies depression or voices , He is hopeful to go to a group home soon

## 2021-11-21 PROCEDURE — 250N000013 HC RX MED GY IP 250 OP 250 PS 637: Performed by: STUDENT IN AN ORGANIZED HEALTH CARE EDUCATION/TRAINING PROGRAM

## 2021-11-21 PROCEDURE — 124N000002 HC R&B MH UMMC

## 2021-11-21 RX ADMIN — OLANZAPINE 40 MG: 20 TABLET, ORALLY DISINTEGRATING ORAL at 19:27

## 2021-11-21 RX ADMIN — Medication 25 MCG: at 19:27

## 2021-11-21 ASSESSMENT — ACTIVITIES OF DAILY LIVING (ADL)
ORAL_HYGIENE: INDEPENDENT
HYGIENE/GROOMING: INDEPENDENT
DRESS: INDEPENDENT
LAUNDRY: WITH SUPERVISION

## 2021-11-21 ASSESSMENT — MIFFLIN-ST. JEOR: SCORE: 1935.49

## 2021-11-21 NOTE — PLAN OF CARE
Problem: Behavior Regulation Impairment (Psychotic Signs/Symptoms)  Goal: Improved Behavioral Control (Psychotic Signs/Symptoms)  Outcome: Improving   Pt doing well , pleasant , med compliant , denies hallucinations and si . Walking halls, smiling , waiting for placement

## 2021-11-21 NOTE — PLAN OF CARE
Problem: Behavioral Health Plan of Care  Goal: Plan of Care Review  Outcome: Improving  Flowsheets (Taken 11/21/2021 1047)  Plan of Care Reviewed With: patient  Progress: improving  Patient Agreement with Plan of Care: (Accepts tx, but believes he should be independently discharged) agrees with comment (describe)     Problem: Mood Impairment (Manic or Hypomanic Signs/Symptoms)  Goal: Improved Mood Symptoms (Manic/Hypomanic Signs/Symptoms)  Outcome: Improving  Flowsheets (Taken 11/21/2021 1047)  Mutually Determined Action Steps (Improved Mood Symptoms):    acknowledges progress    engages in physical activity     Problem: Behavior Regulation Impairment (Psychotic Signs/Symptoms)  Goal: Improved Behavioral Control (Psychotic Signs/Symptoms)  Outcome: Improving  Flowsheets (Taken 11/21/2021 1047)  Mutually Determined Action Steps (Improved Behavioral Control): identifies future-oriented goal     Antonio continues to spend most of day walking in dalal- At times walking and talking with select peers-pleasant in brief social interactions-was not observed talking to self

## 2021-11-22 PROCEDURE — 250N000013 HC RX MED GY IP 250 OP 250 PS 637: Performed by: STUDENT IN AN ORGANIZED HEALTH CARE EDUCATION/TRAINING PROGRAM

## 2021-11-22 PROCEDURE — 99231 SBSQ HOSP IP/OBS SF/LOW 25: CPT | Mod: GC | Performed by: PSYCHIATRY & NEUROLOGY

## 2021-11-22 PROCEDURE — 124N000002 HC R&B MH UMMC

## 2021-11-22 RX ADMIN — Medication 25 MCG: at 20:33

## 2021-11-22 RX ADMIN — OLANZAPINE 40 MG: 20 TABLET, ORALLY DISINTEGRATING ORAL at 20:34

## 2021-11-22 ASSESSMENT — ACTIVITIES OF DAILY LIVING (ADL)
DRESS: INDEPENDENT
LAUNDRY: WITH SUPERVISION
HYGIENE/GROOMING: INDEPENDENT
DRESS: INDEPENDENT
ORAL_HYGIENE: INDEPENDENT
LAUNDRY: WITH SUPERVISION
HYGIENE/GROOMING: INDEPENDENT
ORAL_HYGIENE: INDEPENDENT

## 2021-11-22 NOTE — PLAN OF CARE
Pt appeared to sleep 5.75 hours. Pt more restless overnight but had no requests. No PRNs given or requested. No medical or behavioral events this shift.      Problem: Sleep Disturbance  Goal: Adequate Sleep/Rest  Outcome: Declining

## 2021-11-22 NOTE — PLAN OF CARE
Assessment/Intervention/Current Symptoms and Care Coordination    Pt still waiting for placement in group home. He continues to walk the hallway. Pleasant and cooperative.      Discharge Plan or Goal  Group home      Barriers to Discharge   Needs placement      Referral Status  In process        Legal Status  Committed with Cardoza Order

## 2021-11-22 NOTE — PLAN OF CARE
Problem: Behavioral Health Plan of Care  Goal: Optimized Coping Skills in Response to Life Stressors  Outcome: Improving   Pt coping well with his wait for placement , Very calm , friendly , attends groups , med complaint , very rarely seen talking to himself, Enjoys getting exercise by walking the halls

## 2021-11-22 NOTE — PROGRESS NOTES
"    ----------------------------------------------------------------------------------------------------------  [facility], [place]   Psychiatric Progress Note  Hospital Day #83    Identifier: Anderson Perez is a 40 year old male with a past psychiatric history of bipolar disorder with psychosis vs schizoaffective disorder bipolar type, and intellectual disability admitted from the ER on 08/31/2021 due to concern for out of control behaviors, aggression and psychosis. Patient is psychiatrically stable, medications optimized, awaiting placement.     Interim History:   The patient's care was discussed with the treatment team and chart notes were reviewed.    Vitals: VSS  Sleep: 5.75 hours (11/22/21 0600)  Scheduled medications: Took all scheduled medications as prescribed  PRN medications: No psychiatric prns given    Interim events:   No events    Staff Report:  - Active on unit  - Social and pleasent with staff and peers  - attended groups  - spends most of the day walking the halls as exercise  - Deja GOODMAN CM, working on placement with [facility]  - denied SI/H/I/AH/VH       Subjective:     Patient Interview:  Anderson Perez was interviewed as walking in the main dalal. He inquired updates regarding placement which was told that has not been any over the weekend. He mentioned that \"three social workers are working on my case\". He was told that the sw are working together to get him into [facility]. He denied any AH/VH/SI/HI. He seems to have been tolerating the meds well. No other concerns or questions.     The risks, benefits, alternatives and side effects of any medication changes have been discussed and are understood by the patient and guardian.    Review of systems:   No other sxs except mentioned above.    Objective:   /74 (BP Location: Left arm)   Pulse 93   Temp 97.6  F (36.4  C) (Oral)   Resp 16   Ht 1.829 m (6')   Wt 98.2 kg (216 lb 9.6 oz)   SpO2 99%   BMI 29.38 kg/m  " "  Weight is 216 lbs 9.6 oz  Body mass index is 29.38 kg/m .    Mental Status Examination:    Oriented to:  Grossly oriented.  General: Awake and alert  Appearance:  appears older than stated age, Grooming is adequate, Dressed in hospital attire and Hair is shaved  Behavior:  Cooperative, calm  Eye Contact: fair  Psychomotor: walking around the milieu no abnormal motor symptoms appreciated and restless; no catatonia present  Speech:  appropriate volume/tone, spontaneous and with good articulation  Language: Fluent in English with appropriate syntax and vocabulary.  Mood:  \"I am OK\"  Affect:  Fixed, constricted, flat facial expression  Thought Process:  coherent, linear, logical and goal directed  Thought Content: No SI/HI/AH/VH;   Associations:  intact  Insight:  Poor due to not knowing the reason being in the hospital  Judgment: Fair d/t cooperation with medication and care        Allergies:     Allergies   Allergen Reactions     Bee Venom Anaphylaxis     Clindamycin Hives     Morphine Hives        Labs:   None new.     Assessment    Primary psychiatric diagnosis:   Psychosis, Unspecified (Schizoaffective disorder vs Bipolar Affective Disorder with Psychosis)    Secondary psychiatric diagnoses of concern this admission:   Intellectual disability (IQ 63)    Diagnostic Impression: Anderson Perez is a 40 year old male with a past psychiatric history of bipolar disorder with psychosis vs schizoaffective disorder bipolar type, and intellectual disability admitted from the  ER on 08/31/2021 due to concern for out of control behaviors, aggression and psychosis in the context of medication refusal despite Cardoza. Patient was recently released from [facility] to a group home. Presentation to the ED via ambulance after police contacted and provisional discharge rescinded. Group home noted escalating aggressive behavior, delusional thinking, and medication refusal despite Cardoza. PT refusal to discuss problems and largely denied " all past history, medications, or any need for care. His reported symptoms of delusions, psychosis, and erratic/aggressive behavior are consistent with prior presentations of psychosis vs schizoaffective disorder bipolar type.    Of note, patient had previous diagnosis of Antisocial Personality Disorder. As psychosis cleared during hospitalization, aggressive and antagonistic behavior resolved completely. It seems questionable that he has ASPD, and rather that previous behaviors were a result of psychosis and/or substance intoxication.    Psychiatric Hospital course: Anderson Perez was admitted to [facility] following revocation of provisional discharge and on a court hold. His PTA olanzapine 20mg PO QDaily was resumed, as pt had been refusing at group home; per conversation with guardian, had planned to start on GRIGGS, but does not think that this was actually started, as patient was declining medications, which prompted admission in setting of decompensation. Pt was converted to ODT formulation given concerns for cheeking medications; while initially resistant, pt was agreeable with taking ODT olanzapine as long as it was given with ginger ale, which was accommodated. Olanzapine dose increased to 40mg on 9/21 given limited efficacy at PTA dose. He was also started on a brief trail of propanolol for akathisia, but this was discontinued due to lack of improvement in pacing and patient attesting that a high level of physical activity was his baseline. Over time, he's become more sociable with peers and staff on the unit, and is spending more time sitting in the milieu as opposed to pacing the hallways. Patient has since been stable on current medications for some time, and continues to await placement.     Medical course: Anderson Perez was physically examined by the ED prior to being transferred to the unit and was found to be medically stable and appropriate for admission.     Plan   Principal Diagnosis:   #  Psychosis, Unspecified (Schizoaffective disorder vs Bipolar Affective Disorder with Psychosis)    Secondary psychiatric diagnoses of concern this admission:   # Intellectual disability    Psychotropic Medications:  Modify:  No changes    Continue Scheduled:  -Olanzapine ODT 40mg qPM    Continue PRN:  -Acetaminophen 650 mg Q6H PRN for pain and fever  -Hydroxyzine 25 mg Q6H PRN for anxiety  -Haldol 5mg prn q6h for agitation  -Haldol 5mg + diphenhydramine 50mg + lorazepam 2mg prn for severe agitation/psychosis  -Maalox 30 ml Q4H PRN for indigestion  -Miralax prn for constipation    Additional Plans:  -Patient will be treated in therapeutic milieu with appropriate individual and group therapies as described.  -Asked sw to update the patient in-person and explaining the process of placement in detail, answering questions and addressing concerns.    Medical diagnoses to be addressed this admission:    # Chronic Hep C  NTD    #Dry mouth  -Biotene started 10/25    #Foot blister/lesion, resolved  -Bacitracin ointment prn    Consults: none    Legal Status: Cardoza  Committed - recommitted 11/1   Legal guardian    Safety Assessment:   Behavioral Orders   Procedures     Cheeking Precautions (behavioral units)     Code 1 - Restrict to Unit     Routine Programming     As clinically indicated     Sexual precautions     Status 15     Every 15 minutes.     SIO: No    Disposition: Patient is stable, medications optimized. Pending placement. Please see CTC notes for more details.     This patient was seen and discussed with my attending physician.    Sammy Angel MD  PGY-1 Psychiatry Resident  Pager: [phone]    This patient has been seen and evaluated by me, Glenn Isaacs.  I have discussed this patient with the psychiatry resident and I agree with the findings and plan in this note.    I have reviewed today's vital signs, medications, labs and imaging.     Glenn Adhikari MD on 11/22/2021 at 10:58 PM

## 2021-11-22 NOTE — PLAN OF CARE
Problem: Behavioral Health Plan of Care  Goal: Plan of Care Review  11/22/2021 1751 by Natacha Saini RN  Outcome: Improving  Flowsheets (Taken 11/22/2021 1751)  Plan of Care Reviewed With: patient  Progress: improving  Patient Agreement with Plan of Care: (Agrees with tx-disagrees with continued hospitalization) agrees with comment (describe)    Problem: Mood Impairment (Manic or Hypomanic Signs/Symptoms)  Goal: Improved Mood Symptoms (Manic/Hypomanic Signs/Symptoms)  11/22/2021 1751 by Natacha Saini, RN  Outcome: Improving  Flowsheets (Taken 11/22/2021 1751)  Mutually Determined Action Steps (Improved Mood Symptoms):    acknowledges progress    engages in physical activity      Anderson active on unit, watching football game-social with staff and peers-spoke at length about his views on football trades, team names, who's been cheating and who's been playing fairly-conversation clear and relevant-no overt delusional statements-pleasant in all interactions-continues freq walking in dalal-

## 2021-11-22 NOTE — PLAN OF CARE
Problem: Behavioral Health Plan of Care  Goal: Plan of Care Review  Outcome: Improving  Flowsheets (Taken 11/22/2021 133)  Plan of Care Reviewed With: patient  Progress: improving  Patient Agreement with Plan of Care: (Agrees with tx-disagrees with continued hospitalization) agrees with comment (describe)     Problem: Mood Impairment (Manic or Hypomanic Signs/Symptoms)  Goal: Improved Mood Symptoms (Manic/Hypomanic Signs/Symptoms)  Outcome: Improving  Flowsheets (Taken 11/22/2021 9380)  Mutually Determined Action Steps (Improved Mood Symptoms):   acknowledges progress   engages in physical activity     Problem: Social Isolation  Goal: Increased Social Interaction  Outcome: Improving     Anderson active on unit throughout day-states he had a restless sleep last night and woke up early so is feeling tired-agrees he will not nap to be sure he can sleep well this evening- continues social with staff and peers-pleasant in interactions-

## 2021-11-23 LAB — SARS-COV-2 RNA RESP QL NAA+PROBE: NEGATIVE

## 2021-11-23 PROCEDURE — 99232 SBSQ HOSP IP/OBS MODERATE 35: CPT | Mod: GC | Performed by: PSYCHIATRY & NEUROLOGY

## 2021-11-23 PROCEDURE — 250N000013 HC RX MED GY IP 250 OP 250 PS 637: Performed by: STUDENT IN AN ORGANIZED HEALTH CARE EDUCATION/TRAINING PROGRAM

## 2021-11-23 PROCEDURE — 250N000013 HC RX MED GY IP 250 OP 250 PS 637

## 2021-11-23 PROCEDURE — 124N000002 HC R&B MH UMMC

## 2021-11-23 PROCEDURE — U0003 INFECTIOUS AGENT DETECTION BY NUCLEIC ACID (DNA OR RNA); SEVERE ACUTE RESPIRATORY SYNDROME CORONAVIRUS 2 (SARS-COV-2) (CORONAVIRUS DISEASE [COVID-19]), AMPLIFIED PROBE TECHNIQUE, MAKING USE OF HIGH THROUGHPUT TECHNOLOGIES AS DESCRIBED BY CMS-2020-01-R: HCPCS | Performed by: STUDENT IN AN ORGANIZED HEALTH CARE EDUCATION/TRAINING PROGRAM

## 2021-11-23 RX ADMIN — ACETAMINOPHEN 650 MG: 325 TABLET, FILM COATED ORAL at 15:22

## 2021-11-23 RX ADMIN — OLANZAPINE 40 MG: 20 TABLET, ORALLY DISINTEGRATING ORAL at 20:26

## 2021-11-23 RX ADMIN — Medication 25 MCG: at 20:26

## 2021-11-23 RX ADMIN — Medication 1 SPRAY: at 10:40

## 2021-11-23 ASSESSMENT — ACTIVITIES OF DAILY LIVING (ADL)
DRESS: SCRUBS (BEHAVIORAL HEALTH);INDEPENDENT
ORAL_HYGIENE: INDEPENDENT
HYGIENE/GROOMING: INDEPENDENT

## 2021-11-23 ASSESSMENT — MIFFLIN-ST. JEOR: SCORE: 1932.77

## 2021-11-23 NOTE — PROGRESS NOTES
----------------------------------------------------------------------------------------------------------  [facility], [place]   Psychiatric Progress Note  Hospital Day #84    Identifier: Anderson Peerz is a 40 year old male with a past psychiatric history of bipolar disorder with psychosis vs schizoaffective disorder bipolar type, and intellectual disability admitted from the ER on 08/31/2021 due to concern for out of control behaviors, aggression and psychosis. Patient is psychiatrically stable, medications optimized, awaiting placement.     Interim History:   The patient's care was discussed with the treatment team and chart notes were reviewed.    Vitals: VSS  Sleep: 7 hours (11/23/21 0600)  Scheduled medications: Took all scheduled medications as prescribed  PRN medications: No psychiatric prns given    Interim events:   No events    Staff Report:  - no MH sxs observed overnight  - Social and pleasent with staff and peers  - Good appetite  - seen around the milieu  - denied SI/H/I/AH/VH       Subjective:     Patient Interview:  Anderson Perez was interviewed while he was taking lapses down the dalal. He had got the news about two openings for placement and is scheduled for interviews. He was hopeful that he gets accepted to one of the options. He denied any AH/VH/SI/HI. He did not endorse any side effects from the meds other than dry mouth which is controlled by biotene. No other concerns or questions.     The risks, benefits, alternatives and side effects of any medication changes have been discussed and are understood by the patient and guardian.    Review of systems:   No other sxs except mentioned above.    Objective:   /78 (BP Location: Left arm)   Pulse 72   Temp 97.4  F (36.3  C) (Oral)   Resp 16   Ht 1.829 m (6')   Wt 98 kg (216 lb)   SpO2 98%   BMI 29.29 kg/m    Weight is 216 lbs 0 oz  Body mass index is 29.29 kg/m .    Mental Status  "Examination:    Oriented to:  Grossly oriented.  General: Awake and alert  Appearance:  appears older than stated age, Grooming is adequate, Dressed in hospital attire and Hair is shaved  Behavior:  Cooperative, calm  Eye Contact: minimal  Psychomotor: doing lapses around the milieu no abnormal motor symptoms appreciated and restless; no catatonia present  Speech:  appropriate volume/tone, spontaneous and with good articulation  Language: Fluent in English with appropriate syntax and vocabulary.  Mood:  \"Pretty good\"  Affect:  Fixed, constricted, flat facial expression  Thought Process:  coherent, linear, logical and goal directed  Thought Content: No SI/HI/AH/VH; no obvious  delusions  Associations:  intact  Insight:  Poor due to not acknowledging the reason for his admission  Judgment: Fair d/t cooperation with medication and care        Allergies:     Allergies   Allergen Reactions     Bee Venom Anaphylaxis     Clindamycin Hives     Morphine Hives        Labs:   None new.     Assessment    Primary psychiatric diagnosis:   Psychosis, Unspecified (Schizoaffective disorder vs Bipolar Affective Disorder with Psychosis)    Secondary psychiatric diagnoses of concern this admission:   Intellectual disability (IQ 63)    Diagnostic Impression: Anderson Perez is a 40 year old male with a past psychiatric history of bipolar disorder with psychosis vs schizoaffective disorder bipolar type, and intellectual disability admitted from the  ER on 08/31/2021 due to concern for out of control behaviors, aggression and psychosis in the context of medication refusal despite Cardoza. Patient was recently released from [facility] to a group home. Presentation to the ED via ambulance after police contacted and provisional discharge rescinded. Group home noted escalating aggressive behavior, delusional thinking, and medication refusal despite Cardoza. PT refusal to discuss problems and largely denied all past history, medications, or any " need for care. His reported symptoms of delusions, psychosis, and erratic/aggressive behavior are consistent with prior presentations of psychosis vs schizoaffective disorder bipolar type.    Of note, patient had previous diagnosis of Antisocial Personality Disorder. As psychosis cleared during hospitalization, aggressive and antagonistic behavior resolved completely. It seems questionable that he has ASPD, and rather that previous behaviors were a result of psychosis and/or substance intoxication.    Psychiatric Hospital course: Anderson Perez was admitted to [facility] following revocation of provisional discharge and on a court hold. His PTA olanzapine 20mg PO QDaily was resumed, as pt had been refusing at group home; per conversation with guardian, had planned to start on GRIGGS, but does not think that this was actually started, as patient was declining medications, which prompted admission in setting of decompensation. Pt was converted to ODT formulation given concerns for cheeking medications; while initially resistant, pt was agreeable with taking ODT olanzapine as long as it was given with ginger ale, which was accommodated. Olanzapine dose increased to 40mg on 9/21 given limited efficacy at PTA dose. He was also started on a brief trail of propanolol for akathisia, but this was discontinued due to lack of improvement in pacing and patient attesting that a high level of physical activity was his baseline. Over time, he's become more sociable with peers and staff on the unit, and is spending more time sitting in the milieu as opposed to pacing the hallways. Patient has since been stable on current medications for some time, and continues to await placement.     Medical course: Anderson Perez was physically examined by the ED prior to being transferred to the unit and was found to be medically stable and appropriate for admission.     Plan   Principal Diagnosis:   # Psychosis, Unspecified (Schizoaffective  disorder vs Bipolar Affective Disorder with Psychosis)    Secondary psychiatric diagnoses of concern this admission:   # Intellectual disability    Psychotropic Medications:  Modify:  No changes    Continue Scheduled:  -Olanzapine ODT 40mg qPM    Continue PRN:  -Acetaminophen 650 mg Q6H PRN for pain and fever  -Hydroxyzine 25 mg Q6H PRN for anxiety  -Haldol 5mg prn q6h for agitation  -Haldol 5mg + diphenhydramine 50mg + lorazepam 2mg prn for severe agitation/psychosis  -Maalox 30 ml Q4H PRN for indigestion  -Miralax prn for constipation    Additional Plans:  -Patient will be treated in therapeutic milieu with appropriate individual and group therapies as described.  -Asked sw to update the patient in-person and explaining the process of placement in detail, answering questions and addressing concerns.    Medical diagnoses to be addressed this admission:    # Chronic Hep C  NTD    #Dry mouth  -Biotene started 10/25    #Foot blister/lesion, resolved  -Bacitracin ointment prn    Consults: none    Legal Status: Cardoza  Committed - recommitted 11/1   Legal guardian    Safety Assessment:   Behavioral Orders   Procedures     Cheeking Precautions (behavioral units)     Code 1 - Restrict to Unit     Routine Programming     As clinically indicated     Sexual precautions     Status 15     Every 15 minutes.     SIO: No    Disposition: Patient is stable, medications optimized. Pending placement. Please see CTC notes for more details.     This patient was seen and discussed with my attending physician.    Sammy Angel MD  PGY-1 Psychiatry Resident  Pager: [phone]    This patient has been seen and evaluated by me, Glenn Isaacs.  I have discussed this patient with the psychiatry resident and I agree with the findings and plan in this note.    I have reviewed today's vital signs, medications, labs and imaging.     Glenn Adhikari MD on 11/23/2021 at 10:33 PM

## 2021-11-23 NOTE — PLAN OF CARE
Problem: Mood Impairment (Manic or Hypomanic Signs/Symptoms)  Goal: Improved Mood Symptoms (Manic/Hypomanic Signs/Symptoms)  Outcome: Improving   Patient denies depression and anxiety. Denies pain. Has been pacing the hallway most the morning. Writer walked with patient and patient talked about fish, sharks, fishing, dinosaurs, and football. Has been pleasant and cooperative.

## 2021-11-23 NOTE — PLAN OF CARE
Nursing Plan of care   Problem: Behavioral Health Plan of Care  Goal: Adheres to Safety Considerations for Self and Others  Outcome: Improving     Problem: Behavioral Health Plan of Care  Goal: Develops/Participates in Therapeutic [place] to Support Successful Transition  Outcome: Improving  Pt has been active and visible in the milieu; spent the shift watching TV in the lounge and pacing in the hallway; encouraged to attend group but declined; was cooperative with Nasal COVID swab and sent to the lab; medication complaint; denied medication side effect; calm and pleasant on approach; social and appropriate in the milieu; ate dinner and snack with good appetite; no safety concern to report this shift; will continue to monitor and assess.

## 2021-11-23 NOTE — PLAN OF CARE
Assessment/Intervention/Current Symptoms and Care Coordination    Attended team meeting and reviewed chart notes.    [facility] Deja stated that they have set up two interviews for placement. One today and one next week. Pt was notified. Deja stated that she has not heard from [facility] yet.    Guardian called to say that [place] placement wants to interview pt via teams. Pt was notified and is willing to participate.    Discharge Plan or Goal  Group home      Barriers to Discharge   Needs placement to accept pt      Referral Status  In process        Legal Status  Committed with Cardoza order

## 2021-11-24 PROCEDURE — 124N000002 HC R&B MH UMMC

## 2021-11-24 PROCEDURE — 250N000013 HC RX MED GY IP 250 OP 250 PS 637: Performed by: STUDENT IN AN ORGANIZED HEALTH CARE EDUCATION/TRAINING PROGRAM

## 2021-11-24 PROCEDURE — 99232 SBSQ HOSP IP/OBS MODERATE 35: CPT | Mod: GC | Performed by: PSYCHIATRY & NEUROLOGY

## 2021-11-24 RX ADMIN — Medication 1 SPRAY: at 08:35

## 2021-11-24 RX ADMIN — Medication 25 MCG: at 20:25

## 2021-11-24 RX ADMIN — OLANZAPINE 40 MG: 20 TABLET, ORALLY DISINTEGRATING ORAL at 20:25

## 2021-11-24 ASSESSMENT — ACTIVITIES OF DAILY LIVING (ADL)
LAUNDRY: WITH SUPERVISION
DRESS: INDEPENDENT;SCRUBS (BEHAVIORAL HEALTH)
ORAL_HYGIENE: INDEPENDENT
HYGIENE/GROOMING: INDEPENDENT

## 2021-11-24 NOTE — PLAN OF CARE
Problem: Behavior Regulation Impairment (Psychotic Signs/Symptoms)  Goal: Improved Behavioral Control (Psychotic Signs/Symptoms)  Outcome: Improving     Pt is pleasant and calm on approach. Dismissive with assessment questions. Denies all psych symptoms. Paced the dalal and watched tv during the shift. Social with staff and peers, but not attending groups. Will continue to monitor and assist as needed.

## 2021-11-24 NOTE — PLAN OF CARE
Assessment/Intervention/Current Symptoms and Care Coordination    Attended team meeting and reviewed chart notes.    Waiting to hear from guardian regarding either a phone interview or teams interview with pt.      Discharge Plan or Goal  To group home      Barriers to Discharge   Needs placement      Referral Status  In process        Legal Status  Pt committed with Cardoza order

## 2021-11-24 NOTE — PROGRESS NOTES
----------------------------------------------------------------------------------------------------------  [facility], [place]   Psychiatric Progress Note  Hospital Day #85    Identifier: Anderson Perez is a 40 year old male with a past psychiatric history of bipolar disorder with psychosis vs schizoaffective disorder bipolar type, and intellectual disability admitted from the ER on 08/31/2021 due to concern for out of control behaviors, aggression and psychosis. Patient is psychiatrically stable, medications optimized, awaiting placement.     Interim History:   The patient's care was discussed with the treatment team and chart notes were reviewed.    Vitals: VSS  Sleep: 7 hours (11/24/21 0600)  Scheduled medications: Took all scheduled medications as prescribed  PRN medications: No psychiatric prns given    Interim events:   No events    Staff Report:  - no medical/behavior events overnight  - visible in the milieu, declined participating in groups  - calm and pleasant  - Good appetite  - denied SI/H/I/AH/VH       Subjective:     Patient Interview:  Anderson Perez was interviewed as walking down the dalal. He denied SI/HI/AH/VH. No side effects from meds. Hopeful for the upcoming interview which would possibly happen next week. Did not have any concerns or questions.     The risks, benefits, alternatives and side effects of any medication changes have been discussed and are understood by the patient and guardian.    Review of systems:   No other sxs except mentioned above.    Objective:   /78 (BP Location: Left arm)   Pulse 68   Temp 97.6  F (36.4  C) (Oral)   Resp 16   Ht 1.829 m (6')   Wt 98 kg (216 lb)   SpO2 96%   BMI 29.29 kg/m    Weight is 216 lbs 0 oz  Body mass index is 29.29 kg/m .    Mental Status Examination:    Oriented to:  Grossly oriented.  General: Awake and alert  Appearance:  appears older than stated age, Grooming is adequate, Dressed in  "hospital attire and Hair is shaved  Behavior:  Cooperative, calm  Eye Contact: fair  Psychomotor: doing lapses around the milieu no abnormal motor symptoms appreciated and restless; no catatonia present  Speech:  appropriate volume/tone, spontaneous and with good articulation  Language: Fluent in English with appropriate syntax and vocabulary.  Mood:  \"good\"  Affect:  Fixed, constricted, flat facial expression  Thought Process:  coherent, linear, logical and goal directed  Thought Content: No SI/HI/AH/VH; no obvious  delusions  Associations:  intact  Insight:  Poor due to not acknowledging the reason for his admission  Judgment: Fair d/t cooperation with medication and care        Allergies:     Allergies   Allergen Reactions     Bee Venom Anaphylaxis     Clindamycin Hives     Morphine Hives        Labs:   None new.     Assessment    Primary psychiatric diagnosis:   Psychosis, Unspecified (Schizoaffective disorder vs Bipolar Affective Disorder with Psychosis)    Secondary psychiatric diagnoses of concern this admission:   Intellectual disability (IQ 63)    Diagnostic Impression: Anderson Perez is a 40 year old male with a past psychiatric history of bipolar disorder with psychosis vs schizoaffective disorder bipolar type, and intellectual disability admitted from the  ER on 08/31/2021 due to concern for out of control behaviors, aggression and psychosis in the context of medication refusal despite Cardoza. Patient was recently released from [facility] to a group home. Presentation to the ED via ambulance after police contacted and provisional discharge rescinded. Group home noted escalating aggressive behavior, delusional thinking, and medication refusal despite Cardoza. PT refusal to discuss problems and largely denied all past history, medications, or any need for care. His reported symptoms of delusions, psychosis, and erratic/aggressive behavior are consistent with prior presentations of psychosis vs schizoaffective " disorder bipolar type.    Of note, patient had previous diagnosis of Antisocial Personality Disorder. As psychosis cleared during hospitalization, aggressive and antagonistic behavior resolved completely. It seems questionable that he has ASPD, and rather that previous behaviors were a result of psychosis and/or substance intoxication.    Psychiatric Hospital course: Anderson Perez was admitted to [facility] following revocation of provisional discharge and on a court hold. His PTA olanzapine 20mg PO QDaily was resumed, as pt had been refusing at group home; per conversation with guardian, had planned to start on GRIGGS, but does not think that this was actually started, as patient was declining medications, which prompted admission in setting of decompensation. Pt was converted to ODT formulation given concerns for cheeking medications; while initially resistant, pt was agreeable with taking ODT olanzapine as long as it was given with ginger ale, which was accommodated. Olanzapine dose increased to 40mg on 9/21 given limited efficacy at PTA dose. He was also started on a brief trail of propanolol for akathisia, but this was discontinued due to lack of improvement in pacing and patient attesting that a high level of physical activity was his baseline. Over time, he's become more sociable with peers and staff on the unit, and is spending more time sitting in the milieu as opposed to pacing the hallways. Patient has since been stable on current medications for some time, and continues to await placement.     Medical course: Anderson Perez was physically examined by the ED prior to being transferred to the unit and was found to be medically stable and appropriate for admission.     Plan   Principal Diagnosis:   # Psychosis, Unspecified (Schizoaffective disorder vs Bipolar Affective Disorder with Psychosis)    Secondary psychiatric diagnoses of concern this admission:   # Intellectual disability    Psychotropic  Medications:  Modify:  No changes    Continue Scheduled:  -Olanzapine ODT 40mg qPM    Continue PRN:  -Acetaminophen 650 mg Q6H PRN for pain and fever  -Hydroxyzine 25 mg Q6H PRN for anxiety  -Haldol 5mg prn q6h for agitation  -Haldol 5mg + diphenhydramine 50mg + lorazepam 2mg prn for severe agitation/psychosis  -Maalox 30 ml Q4H PRN for indigestion  -Miralax prn for constipation    Additional Plans:  -Patient will be treated in therapeutic milieu with appropriate individual and group therapies as described.  -Asked sw to update the patient in-person and explaining the process of placement in detail, answering questions and addressing concerns.    Medical diagnoses to be addressed this admission:    # Chronic Hep C  NTD    #Dry mouth  -Biotene started 10/25    #Foot blister/lesion, resolved  -Bacitracin ointment prn    Consults: none    Legal Status: Cardoza  Committed - recommitted 11/1   Legal guardian    Safety Assessment:   Behavioral Orders   Procedures     Cheeking Precautions (behavioral units)     Code 1 - Restrict to Unit     Routine Programming     As clinically indicated     Sexual precautions     Status 15     Every 15 minutes.     SIO: No    Disposition: Patient is stable, medications optimized. Pending placement. Please see CTC notes for more details.     This patient was seen and discussed with my attending physician.    Sammy Angel MD  PGY-1 Psychiatry Resident  Pager: [phone]    This patient has been seen and evaluated by me, Glenn Isaacs.  I have discussed this patient with the psychiatry resident and I agree with the findings and plan in this note.    I have reviewed today's vital signs, medications, labs and imaging.     Glenn Adhikari MD on 11/24/2021 at 9:45 PM

## 2021-11-24 NOTE — PLAN OF CARE
Pt appeared to sleep 7 hours. No PRNs given or requested. No medical or behavioral events this shift.      Problem: Behavioral Disturbance  Goal: Behavioral Disturbance  Description: Signs and symptoms of listed problems will be absent or manageable by discharge or transition of care.  Outcome: No Change

## 2021-11-25 PROCEDURE — 250N000013 HC RX MED GY IP 250 OP 250 PS 637

## 2021-11-25 PROCEDURE — 124N000002 HC R&B MH UMMC

## 2021-11-25 PROCEDURE — 250N000013 HC RX MED GY IP 250 OP 250 PS 637: Performed by: STUDENT IN AN ORGANIZED HEALTH CARE EDUCATION/TRAINING PROGRAM

## 2021-11-25 RX ADMIN — Medication 25 MCG: at 18:51

## 2021-11-25 RX ADMIN — ACETAMINOPHEN 650 MG: 325 TABLET, FILM COATED ORAL at 15:25

## 2021-11-25 RX ADMIN — Medication 1 SPRAY: at 08:57

## 2021-11-25 RX ADMIN — Medication 1 SPRAY: at 14:14

## 2021-11-25 RX ADMIN — OLANZAPINE 40 MG: 20 TABLET, ORALLY DISINTEGRATING ORAL at 18:51

## 2021-11-25 ASSESSMENT — ACTIVITIES OF DAILY LIVING (ADL)
ORAL_HYGIENE: INDEPENDENT
DRESS: INDEPENDENT;SCRUBS (BEHAVIORAL HEALTH)
HYGIENE/GROOMING: INDEPENDENT
LAUNDRY: WITH SUPERVISION

## 2021-11-25 ASSESSMENT — MIFFLIN-ST. JEOR: SCORE: 1936.85

## 2021-11-25 NOTE — PLAN OF CARE
Problem: Behavior Regulation Impairment (Manic or Hypomanic Signs/Symptoms)  Goal: Improved Impulse Control (Manic/Hypomanic Signs/Symptoms)  Outcome: Improving     Pt is pleasant on approach. States he is doing good. Calm and cooperative. Presents with a full range affect. Denies all psych symptoms. Observed whispering under his breath at times--although this is barely noticable. Social with staff. Pt paced the halls during the shift. Will continue to monitor and assist as needed.

## 2021-11-25 NOTE — PLAN OF CARE
Pt appeared to sleep 7 hours. No PRNs given or requested. No medical or behavioral events this shift.      Problem: Sleep Disturbance  Goal: Adequate Sleep/Rest  Outcome: No Change      no

## 2021-11-25 NOTE — PLAN OF CARE
Nursing Plan of care   Problem: Behavioral Health Plan of Care  Goal: Adheres to Safety Considerations for Self and Others  Outcome: Improving    Problem: Behavior Regulation Impairment (Psychotic Signs/Symptoms)  Goal: Improved Behavioral Control (Psychotic Signs/Symptoms)  Outcome: Improving      Problem: Behavioral Health Plan of Care  Goal: Develops/Participates in Therapeutic [place] to Support Successful Transition  Outcome: Improving  Pt has been active and visible in the milieu; spent the shift pacing in the hallway and watching TV in the lounge; calm and pleasant; medication compliant; denied medication side effect; ate dinner; denied issue with appetite and sleep; encouraged to attend group but declined; will continue to monitor and assess.

## 2021-11-26 PROCEDURE — 250N000013 HC RX MED GY IP 250 OP 250 PS 637: Performed by: STUDENT IN AN ORGANIZED HEALTH CARE EDUCATION/TRAINING PROGRAM

## 2021-11-26 PROCEDURE — 124N000002 HC R&B MH UMMC

## 2021-11-26 RX ADMIN — Medication 1 SPRAY: at 15:09

## 2021-11-26 RX ADMIN — OLANZAPINE 40 MG: 20 TABLET, ORALLY DISINTEGRATING ORAL at 20:29

## 2021-11-26 RX ADMIN — Medication 25 MCG: at 20:29

## 2021-11-26 ASSESSMENT — ACTIVITIES OF DAILY LIVING (ADL)
DRESS: SCRUBS (BEHAVIORAL HEALTH);INDEPENDENT
LAUNDRY: WITH SUPERVISION
HYGIENE/GROOMING: INDEPENDENT
ORAL_HYGIENE: INDEPENDENT

## 2021-11-26 NOTE — PLAN OF CARE
"  Problem: Adult Inpatient Plan of Care  Goal: Optimal Comfort and Wellbeing  Outcome: No Change     Problem: Sleep Disturbance  Goal: Adequate Sleep/Rest  Outcome: No Change   Observed to have slept well for approx 7 hrs on all Q 15\" rounds overnight w/ regular non-labored respirations and no reported or observed distress.  Safe, therapeutic environment maintained.   "

## 2021-11-26 NOTE — PLAN OF CARE
"Nursing plan of care   Problem: Behavior Regulation Impairment (Psychotic Signs/Symptoms)  Goal: Improved Behavioral Control (Psychotic Signs/Symptoms)  Outcome: Improving  Flowsheets (Taken 11/25/2021 1833)  Mutually Determined Action Steps (Improved Behavioral Control): verbalizes personal treatment goal  Pt has been active and visible in the milieu; spent the shift pacing in the hallway and watched movie/TV with peers; social and respectful to peers and staff; stated he is doing \"fine\" and no issue/ concern to report; attended no group; took no shower; will continue to monitor and assess.      "

## 2021-11-26 NOTE — PLAN OF CARE
Assessment/Intervention/Current Symptoms and Care Coordination      Writer checked in with pt. He does not have any issues or concerns at this time.        Discharge Plan or Goal  Discharge to group home      Barriers to Discharge   Needs group home to accept him      Referral Status  In process        Legal Status  Committed with Cardoza order

## 2021-11-26 NOTE — PLAN OF CARE
Pt spent much of the day pacing the hallway. He minimally interacted with staff and peers. Pt looked downward much of the time. Pt's affect was flat and mood was calm. Pt was pleasant and cooperative. Pt ate all meals. Pt appears to be RIS.    Pt denied SI/SIB/HI/AVH. Pt denied depression and anxiety. Pt denied pain. Denied medication side effects. Will continue to monitor and support.

## 2021-11-27 PROCEDURE — 99231 SBSQ HOSP IP/OBS SF/LOW 25: CPT | Performed by: PSYCHIATRY & NEUROLOGY

## 2021-11-27 PROCEDURE — 250N000013 HC RX MED GY IP 250 OP 250 PS 637: Performed by: STUDENT IN AN ORGANIZED HEALTH CARE EDUCATION/TRAINING PROGRAM

## 2021-11-27 PROCEDURE — 124N000002 HC R&B MH UMMC

## 2021-11-27 RX ADMIN — OLANZAPINE 40 MG: 20 TABLET, ORALLY DISINTEGRATING ORAL at 18:52

## 2021-11-27 RX ADMIN — Medication 1 SPRAY: at 09:46

## 2021-11-27 RX ADMIN — Medication 1 SPRAY: at 19:10

## 2021-11-27 RX ADMIN — Medication 1 SPRAY: at 13:28

## 2021-11-27 RX ADMIN — Medication 25 MCG: at 18:52

## 2021-11-27 ASSESSMENT — ACTIVITIES OF DAILY LIVING (ADL)
LAUNDRY: WITH SUPERVISION
ORAL_HYGIENE: INDEPENDENT;PROMPTS
DRESS: SCRUBS (BEHAVIORAL HEALTH);INDEPENDENT
HYGIENE/GROOMING: INDEPENDENT;PROMPTS

## 2021-11-27 NOTE — PLAN OF CARE
Problem: Behavioral Disturbance  Goal: Behavioral Disturbance  Description: Signs and symptoms of listed problems will be absent or manageable by discharge or transition of care.  Outcome: Improving  Flowsheets (Taken 11/27/2021 1238)  Behavioral Disturbance Assessed:    suicidality    self injury    affect    mood    anxiety    insight    thought process    sleep    speech    orientation    memory deficits  Behavioral Disturbance Present:    thought process    insight    Pt visible in the milieu for the majority of the day. He is calm. pleasant and was observed engaging with others. He appears to have conversations that are  thoughtful and rooted in logic. Pt spends much of the day walking the hallway but was observed sitting in the lounge a few times. Pt requested Biotene spray for dry mouth. No additional prn medications requested or indicated. Pt denies SI/SIB, as well as AH/VH. However, he does appear at times to be responding to internal stimuli. No additional concerns at this time. Will continue to assess and offer support.

## 2021-11-27 NOTE — PROGRESS NOTES
"Identifier: Anderson Perez is a 40 year old male with a past psychiatric history of bipolar disorder with psychosis vs schizoaffective disorder bipolar type, and intellectual disability admitted from the ER on 08/31/2021 due to concern for out of control behaviors, aggression and psychosis. Patient is psychiatrically stable, medications optimized, awaiting placement.      S:  Observed pacing hallway. Calm, cooperative with interview.  \"I know to take my meds every day, I just want to get my license and a job.\"  Frustrated at being here, simone with birthday upcoming.  I reviewed plan with him and expressed empathy.    O:  Oriented to:  Grossly oriented.  General: Awake and alert  Appearance:  appears older than stated age, Grooming is adequate, Dressed in hospital attire and Hair is shaved  Behavior:  Cooperative, calm  Eye Contact: minimal  Psychomotor: doing laps around the milieu  Speech:  appropriate volume/tone, spontaneous and with some dysarthria/slurring  Language: Fluent in English with appropriate syntax and vocabulary.  Mood:  \"OK\"  Affect:  Fixed, constricted, flat facial expression  Thought Process:  coherent, linear, logical and goal directed  Thought Content: No SI/HI/AH/VH; no obvious  delusions    A/P:  Stable awaiting placement; no changes to existing meds.    Eleazar Jernigan MD    "

## 2021-11-27 NOTE — PLAN OF CARE
Nursing Plan of care   Problem: Behavioral Health Plan of Care  Goal: Adheres to Safety Considerations for Self and Others  Outcome: Improving     Problem: Behavioral Health Plan of Care  Goal: Develops/Participates in Therapeutic [place] to Support Successful Transition  Outcome: Improving  Pt has been active and visible in the milieu; spent the shift socializing with peers and staff; watching movie in the lounge and pacing in  the hallway; present with calm mood and full range affect; medication complaint; denied medication side effect; denied issue with appetite and sleep; No PRN requested or administered; attended No group and take no shower; will continue to monitor and assess.

## 2021-11-27 NOTE — PLAN OF CARE
Problem: Sleep Disturbance  Goal: Adequate Sleep/Rest  Outcome: No Change     Problem: Behavioral Disturbance  Goal: Behavioral Disturbance  Description: Signs and symptoms of listed problems will be absent or manageable by discharge or transition of care.  Outcome: Improving    Pt appeared a sleep most of the night during the 15 minutes checks, a total of 6.75 hours . No sexual/cheeking behaviors observed, continues on precautions.

## 2021-11-28 PROCEDURE — 124N000002 HC R&B MH UMMC

## 2021-11-28 PROCEDURE — 250N000013 HC RX MED GY IP 250 OP 250 PS 637: Performed by: STUDENT IN AN ORGANIZED HEALTH CARE EDUCATION/TRAINING PROGRAM

## 2021-11-28 RX ADMIN — OLANZAPINE 40 MG: 20 TABLET, ORALLY DISINTEGRATING ORAL at 19:14

## 2021-11-28 RX ADMIN — Medication 25 MCG: at 19:15

## 2021-11-28 ASSESSMENT — ACTIVITIES OF DAILY LIVING (ADL)
DRESS: SCRUBS (BEHAVIORAL HEALTH);INDEPENDENT;PROMPTS
ORAL_HYGIENE: INDEPENDENT;PROMPTS
LAUNDRY: WITH SUPERVISION
HYGIENE/GROOMING: INDEPENDENT;PROMPTS

## 2021-11-28 ASSESSMENT — MIFFLIN-ST. JEOR: SCORE: 1938.21

## 2021-11-28 NOTE — PLAN OF CARE
Problem: Adult Inpatient Plan of Care  Goal: Optimal Comfort and Wellbeing  Outcome: Improving     Pt visible in the milieu for the majority of the day. He appears to initiate more spontaneous conversations with staff and fellow patients. Pt is calm and pleasant on the unit. Was observed at several points sitting and watching a movie in the lounge. Ate both breakfast and lunch in the lounge. No indication or request for prn medications. Pt denies SI/SIB, as well as AH/VH. Pt appears to be whispering to himself at times but this is almost unnoticeable. Pt encouraged to tend to ADLs, including shower. He was agreeable to this. No safety concerns at this time. Will continue to assess and offer support.

## 2021-11-28 NOTE — PLAN OF CARE
Nursing Plan of care   Problem: Behavioral Health Plan of Care  Goal: Develops/Participates in Therapeutic [place] to Support Successful Transition  Outcome: Improving     Problem: Behavior Regulation Impairment (Psychotic Signs/Symptoms)  Goal: Improved Behavioral Control (Psychotic Signs/Symptoms)  Outcome: Improving  Flowsheets (Taken 11/27/2021 2043)  Mutually Determined Action Steps (Improved Behavioral Control):    verbalizes personal treatment goal    other (see comments)  Pt has been active and visible in the milieu; social and pleasant on approach; has been pacing in the hallway and watched TV in the lounge; reported dry mouth as medication side effect and received artificial saliva ( Biotene ) PRN; denied issue with sleep and appetite; encouraged to attend group but declined; medication compliant; No safety concern to report this shift; will continue to monitor and assess.

## 2021-11-28 NOTE — PLAN OF CARE
Problem: Sleep Disturbance  Goal: Adequate Sleep/Rest  Outcome: No Change    Patient was observed sleeping during all 15 minutes checks. Breathing was non labored. Continues on sexual precautions, no safety events. No prn meds given. Slept for about 7 hours tonight.

## 2021-11-29 PROCEDURE — 124N000002 HC R&B MH UMMC

## 2021-11-29 PROCEDURE — 99231 SBSQ HOSP IP/OBS SF/LOW 25: CPT | Mod: GC | Performed by: PSYCHIATRY & NEUROLOGY

## 2021-11-29 PROCEDURE — 250N000013 HC RX MED GY IP 250 OP 250 PS 637: Performed by: STUDENT IN AN ORGANIZED HEALTH CARE EDUCATION/TRAINING PROGRAM

## 2021-11-29 RX ADMIN — Medication 1 SPRAY: at 15:57

## 2021-11-29 RX ADMIN — OLANZAPINE 40 MG: 20 TABLET, ORALLY DISINTEGRATING ORAL at 20:31

## 2021-11-29 RX ADMIN — Medication 1 SPRAY: at 11:15

## 2021-11-29 RX ADMIN — NICOTINE POLACRILEX 2 MG: 2 GUM, CHEWING ORAL at 16:34

## 2021-11-29 RX ADMIN — Medication 25 MCG: at 20:31

## 2021-11-29 RX ADMIN — NICOTINE POLACRILEX 2 MG: 2 GUM, CHEWING ORAL at 12:19

## 2021-11-29 ASSESSMENT — ACTIVITIES OF DAILY LIVING (ADL)
HYGIENE/GROOMING: INDEPENDENT;PROMPTS
DRESS: SCRUBS (BEHAVIORAL HEALTH);INDEPENDENT
ORAL_HYGIENE: INDEPENDENT;PROMPTS
HYGIENE/GROOMING: INDEPENDENT
DRESS: INDEPENDENT
LAUNDRY: WITH SUPERVISION
ORAL_HYGIENE: INDEPENDENT
LAUNDRY: WITH SUPERVISION

## 2021-11-29 NOTE — PLAN OF CARE
Problem: Adult Inpatient Plan of Care  Goal: Optimal Comfort and Wellbeing  Outcome: Improving     Pt visible in the milieu for the majority of the day. He is calm and pleasant on the unit and engages appropriately with others. Pt denies SI/SIB, as well as AH/VH. This writer did not observe pt responding to internal stimuli. Per request, given Nicorette gum prn. No request or indication for additional prn medications. Will continue to assess and offer support.

## 2021-11-29 NOTE — PLAN OF CARE
Nursing plan of care   Problem: Behavioral Health Plan of Care  Goal: Adheres to Safety Considerations for Self and Others  Outcome: Improving     Problem: Behavioral Health Plan of Care  Goal: Develops/Participates in Therapeutic [place] to Support Successful Transition  Outcome: Improving   Pt has been active and visible in the milieu; spent the shift pacing in the hallway and watched football in the lounge; social with peers and staff; denied SI/SIB,AVH, depression and anxiety; ate dinner and snack; good appetite; denied issue with sleep; denied medication side effect; medication compliant; groomed and tidy; No PRN was requested or administered; will continue to monitor and assess.

## 2021-11-29 NOTE — PLAN OF CARE
Problem: Sleep Disturbance  Goal: Adequate Sleep/Rest  Outcome: No Change   Patient was observed sleeping when shift started. Continues on sexual precautions, no safety events. No prn meds given this night. Patient slept for about 7 hours.

## 2021-11-29 NOTE — PLAN OF CARE
BEHAVIORAL TEAM DISCUSSION    Participants: Dr. Isaacs, resident Mila Kate, med student Angelina, ROCIO Ramires, CTC Siobhan Irving  Progress: pt appears to be at baseline  Anticipated length of stay: until group home is in place  Continued Stay Criteria/Rationale: pt is committed with [facility]  Medical/Physical: Per psych team: Anderson CHI Orhermilo was physically examined by the ED prior to being transferred to the unit and was found to be medically stable and appropriate for admission.     Precautions:   Behavioral Orders   Procedures     Cheeking Precautions (behavioral units)     Code 1 - Restrict to Unit     Routine Programming     As clinically indicated     Sexual precautions     Status 15     Every 15 minutes.     Plan: Provide a psychological assessment and manage medications per psychiatry, staff to provide a safe and therapeutic milieu, CTC to coordinate transfer to group home.  Rationale for change in precautions or plan: no change

## 2021-11-29 NOTE — PROGRESS NOTES
----------------------------------------------------------------------------------------------------------  [facility], [place]   Psychiatric Progress Note  Hospital Day #90    Identifier: Anderson Perez is a 40 year old male with a past psychiatric history of bipolar disorder with psychosis vs schizoaffective disorder bipolar type, and intellectual disability admitted from the ER on 08/31/2021 due to concern for out of control behaviors, aggression and psychosis. Patient is psychiatrically stable, medications optimized, awaiting placement.     Interim History:   The patient's care was discussed with the treatment team and chart notes were reviewed.    Vitals: VSS  Sleep: 7 hours (11/29/21 0600)  Scheduled medications: Taking all scheduled medications as prescribed  PRN medications: No psychiatric prns received    Interim events:   No events    Staff Report:  - no medical/behavior events overnight  - visible in the milieu, declined participating in groups  - calm and pleasant  - Good appetite  - denied SI/H/I/AH/VH  - needs suggestions to complete ADLs       Subjective:     Patient Interview:  Anderson Perez was interviewed while walking down the dalal. He denied SI/HI/AH/VH. No medication side effects. He requests nicotine gum. No other questions or concerns.     The risks, benefits, alternatives and side effects of any medication changes have been discussed and are understood by the patient and guardian.    Review of systems:   ROS negative except as stated above.    Objective:   /68 (BP Location: Left arm)   Pulse 83   Temp 97.4  F (36.3  C) (Tympanic)   Resp 16   Ht 1.829 m (6')   Wt 98.5 kg (217 lb 3.2 oz)   SpO2 98%   BMI 29.46 kg/m    Weight is 217 lbs 3.2 oz  Body mass index is 29.46 kg/m .    Mental Status Examination:    Oriented to:  Grossly oriented.  General: Awake and alert  Appearance:  appears older than stated age, Grooming is adequate, Dressed in  "hospital attire and Hair is shaved . Numerous tattoos.  Behavior:  Cooperative, calm  Eye Contact: fair  Psychomotor: pacing back and forth on unit. No abnormal motor symptoms appreciated. Somewhat restless. No catatonia present.  Speech:  appropriate volume/tone, spontaneous and with good articulation  Language: Fluent in English with appropriate syntax and vocabulary.  Mood:  \"good\"  Affect:  Fixed, blunted, restricted range, flat facial expression  Thought Process:  coherent, linear, logical and goal directed  Thought Content: No SI/HI/AH/VH; no obvious  delusions  Associations:  No loosening of associations appreciated  Insight:  Poor due to not acknowledging the reason for his admission  Judgment: Fair d/t cooperation with medication and care        Allergies:     Allergies   Allergen Reactions    Bee Venom Anaphylaxis    Clindamycin Hives    Morphine Hives        Labs:   None new.     Assessment    Primary psychiatric diagnosis:   Psychosis, Unspecified (Schizoaffective disorder vs Bipolar Affective Disorder with Psychosis)    Secondary psychiatric diagnoses of concern this admission:   Intellectual disability (IQ 63)    Diagnostic Impression: Anderson Perez is a 40 year old male with a past psychiatric history of bipolar disorder with psychosis vs schizoaffective disorder bipolar type, and intellectual disability admitted from the  ER on 08/31/2021 due to concern for out of control behaviors, aggression and psychosis in the context of medication refusal despite Cardoza. Patient was recently released from [facility] to a group home. Presentation to the ED via ambulance after police contacted and provisional discharge rescinded. Group home noted escalating aggressive behavior, delusional thinking, and medication refusal despite Cardoza. PT refusal to discuss problems and largely denied all past history, medications, or any need for care. His reported symptoms of delusions, psychosis, and erratic/aggressive behavior " are consistent with prior presentations of psychosis vs schizoaffective disorder bipolar type.    Of note, patient had previous diagnosis of Antisocial Personality Disorder. As psychosis cleared during hospitalization, aggressive and antagonistic behavior resolved completely. It seems questionable that he has ASPD, and rather that previous behaviors were a result of psychosis and/or substance intoxication.    Psychiatric Hospital course: Anderson Perez was admitted to [facility] following revocation of provisional discharge and on a court hold. His PTA olanzapine 20mg PO QDaily was resumed, as pt had been refusing at group home; per conversation with guardian, had planned to start on GRIGGS, but does not think that this was actually started, as patient was declining medications, which prompted admission in setting of decompensation. Pt was converted to ODT formulation given concerns for cheeking medications; while initially resistant, pt was agreeable with taking ODT olanzapine as long as it was given with ginger ale, which was accommodated. Olanzapine dose increased to 40mg on 9/21 given limited efficacy at PTA dose. He was also started on a brief trail of propanolol for akathisia, but this was discontinued due to lack of improvement in pacing and patient attesting that a high level of physical activity was his baseline. Over time, he's become more sociable with peers and staff on the unit, and is spending more time sitting in the milieu as opposed to pacing the hallways. Patient has since been stable on current medications for some time, and continues to await placement.     Medical course: Anderson Perez was physically examined by the ED prior to being transferred to the unit and was found to be medically stable and appropriate for admission.     Plan   Principal Diagnosis:   # Psychosis, Unspecified (Schizoaffective disorder vs Bipolar Affective Disorder with Psychosis)    Secondary psychiatric diagnoses of  concern this admission:   # Intellectual disability    Psychotropic Medications:  Modify:  No changes    Continue Scheduled:   -Olanzapine ODT 40mg qPM    Continue PRN:  -Acetaminophen 650 mg Q6H PRN for pain and fever  -Hydroxyzine 25 mg Q6H PRN for anxiety  -Haldol 5mg prn q6h for agitation  -Haldol 5mg + diphenhydramine 50mg + lorazepam 2mg prn for severe agitation/psychosis  -Maalox 30 ml Q4H PRN for indigestion  -Miralax prn for constipation  -Nicotine gum 2 mg every hour as needed    Additional Plans:  -Patient will be treated in therapeutic milieu with appropriate individual and group therapies as described.  -Asked sw to update the patient in-person and explaining the process of placement in detail, answering questions and addressing concerns.    Medical diagnoses to be addressed this admission:    # Chronic Hep C  NTD    #Dry mouth  -Biotene started 10/25    #Foot blister/lesion, resolved  -Bacitracin ointment prn    Consults: none    Legal Status: Cardoza  Committed - recommitted 11/1   Legal guardian    Safety Assessment:   Behavioral Orders   Procedures    Cheeking Precautions (behavioral units)    Code 1 - Restrict to Unit    Routine Programming     As clinically indicated    Sexual precautions    Status 15     Every 15 minutes.     SIO: No    Disposition: Patient is stable, medications optimized. Pending placement. Please see CTC notes for more details.     This patient was seen and discussed with my attending physician.    Mila Rockwell MD  PGY-2 Psychiatry    This patient has been seen and evaluated by me, Glenn Isaacs.  I have discussed this patient with the psychiatry resident and I agree with the findings and plan in this note.    I have reviewed today's vital signs, medications, labs and imaging.     Glenn Adhikari MD on 11/29/2021 at 10:22 PM

## 2021-11-29 NOTE — PLAN OF CARE
Assessment/Intervention/Current Symptoms and Care Coordination    Attended team meeting and reviewed chart notes.    Pt's REX GONZALES visited a possible group home last week. We are waiting to hear back from group home about a video or phone interview with pt.     REX GONZALES will also visit another group home this week for possible placement.        Discharge Plan or Goal  To group home        Barriers to Discharge     Needs to be accepted to group home    Referral Status  In process        Legal Status  Committed with Cardoza order    Pt will need a PD when he discharges.

## 2021-11-30 PROCEDURE — 124N000002 HC R&B MH UMMC

## 2021-11-30 PROCEDURE — 250N000013 HC RX MED GY IP 250 OP 250 PS 637: Performed by: STUDENT IN AN ORGANIZED HEALTH CARE EDUCATION/TRAINING PROGRAM

## 2021-11-30 PROCEDURE — 99231 SBSQ HOSP IP/OBS SF/LOW 25: CPT | Mod: GC | Performed by: PSYCHIATRY & NEUROLOGY

## 2021-11-30 RX ADMIN — Medication 25 MCG: at 20:05

## 2021-11-30 RX ADMIN — Medication 1 SPRAY: at 16:18

## 2021-11-30 RX ADMIN — OLANZAPINE 40 MG: 20 TABLET, ORALLY DISINTEGRATING ORAL at 20:05

## 2021-11-30 RX ADMIN — NICOTINE POLACRILEX 2 MG: 2 GUM, CHEWING ORAL at 17:51

## 2021-11-30 ASSESSMENT — ACTIVITIES OF DAILY LIVING (ADL)
LAUNDRY: WITH SUPERVISION
HYGIENE/GROOMING: INDEPENDENT
ORAL_HYGIENE: INDEPENDENT
HYGIENE/GROOMING: INDEPENDENT
DRESS: INDEPENDENT
ORAL_HYGIENE: INDEPENDENT
LAUNDRY: UNABLE TO COMPLETE
DRESS: STREET CLOTHES

## 2021-11-30 NOTE — PLAN OF CARE
"  Problem: Behavioral Health Plan of Care  Goal: Plan of Care Review  Outcome: Improving  Flowsheets (Taken 11/30/2021 1419)  Plan of Care Reviewed With: patient  Progress: improving  Patient Agreement with Plan of Care: agrees     Problem: Mood Impairment (Manic or Hypomanic Signs/Symptoms)  Goal: Improved Mood Symptoms (Manic/Hypomanic Signs/Symptoms)  Outcome: Improving  Flowsheets (Taken 11/30/2021 1419)  Mutually Determined Action Steps (Improved Mood Symptoms):    acknowledges progress    engages in physical activity     Problem: Behavior Regulation Impairment (Psychotic Signs/Symptoms)  Goal: Improved Behavioral Control (Psychotic Signs/Symptoms)  Outcome: Improving  Flowsheets (Taken 11/30/2021 1419)  Mutually Determined Action Steps (Improved Behavioral Control): verbalizes gratifying activity    Antonio spent most of day walking in dalal-social with staff and peers-states he talked with three year old son this AM for first time since admission-happy post call stating son has a \"touch of autism\" and has been attending school program which specializes in care of autistic children-Antonio was excited son is saying more words-Antonio states he usually sees son everyday, but would feel too emotionally upset if he talked with him knowing he was unable to see him for long period of time-states he misses him very much-emphasized he needs to go to grp home [place] so he can see son-was not observed talking to self today-conversation appropriate-seeks interaction with others    "

## 2021-11-30 NOTE — PLAN OF CARE
Pt appeared to sleep 6.25 hours. No PRNs given or requested. No medical or behavioral events this shift.      Problem: Behavioral Disturbance  Goal: Behavioral Disturbance  Description: Signs and symptoms of listed problems will be absent or manageable by discharge or transition of care.  Outcome: No Change

## 2021-11-30 NOTE — PLAN OF CARE
Problem: Behavioral Disturbance  Goal: Behavioral Disturbance  Description: Signs and symptoms of listed problems will be absent or manageable by discharge or transition of care.  Outcome: Improving  Flowsheets (Taken 11/29/2021 2157)  Behavioral Disturbance Assessed:   anxiety   thought process   speech   mood   suicidality   sleep   insight   self injury   affect   psychomotor activity  Behavioral Disturbance Present:   insight   thought process   psychomotor activity    Anderson was visible in the milieu and was observed pacing the dalal most of the evening. He had a full range of affect, was pleasant and cooperative, and denied all mental health symptoms. He was medication compliant, ate meals and snacks, and had no behavioral or safety concerns.

## 2021-11-30 NOTE — PROGRESS NOTES
----------------------------------------------------------------------------------------------------------  [facility], [place]   Psychiatric Progress Note  Hospital Day #91    Identifier: Anderson Perez is a 40 year old male with a past psychiatric history of bipolar disorder with psychosis vs schizoaffective disorder bipolar type, and intellectual disability admitted from the ER on 08/31/2021 due to concern for out of control behaviors, aggression and psychosis. Patient is psychiatrically stable, medications optimized, awaiting placement.     Interim History:   The patient's care was discussed with the treatment team and chart notes were reviewed.    Vitals: VSS  Sleep: 6.25 hours (11/30/21 0600)  Scheduled medications: Taking all scheduled medications as prescribed  PRN medications: No psychiatric prns received    Interim events:   No events    Staff Report:  - visible in the milieu  - engaged in conversation with staff and peers  - calm and pleasant  - Good appetite and sleep  - denied SI/H/I/AH/VH  - no medical/behavior events overnight     Subjective:     Patient Interview:  Anderson Perez was interviewed as pacing down the dalal. He denied SI/HI/AH/VH. No medication side effects. His questions about GH placement were answered.     The risks, benefits, alternatives and side effects of any medication changes have been discussed and are understood by the patient and guardian.    Review of systems:   ROS negative except as stated above.    Objective:   /77 (BP Location: Left arm)   Pulse 84   Temp 97.1  F (36.2  C) (Tympanic)   Resp 14   Ht 1.829 m (6')   Wt 98.5 kg (217 lb 3.2 oz)   SpO2 99%   BMI 29.46 kg/m    Weight is 217 lbs 3.2 oz  Body mass index is 29.46 kg/m .    Mental Status Examination:    Oriented to:  Grossly oriented.  General: Awake and alert  Appearance:  appears older than stated age, Grooming is adequate, Dressed in hospital attire and Hair is  "shaved . Numerous tattoos.  Behavior:  Cooperative, calm  Eye Contact: fair  Psychomotor: Doing lapses on unit. No abnormal motor symptoms appreciated. Somewhat restless. No catatonia present.  Speech:  appropriate volume/tone, spontaneous and with good articulation  Language: Fluent in English with appropriate syntax and vocabulary.  Mood:  \"good\"  Affect:  Fixed, blunted, restricted range, flat facial expression  Thought Process:  coherent, linear, logical and goal directed  Thought Content: No SI/HI/AH/VH; no obvious  delusions  Associations:  No loosening of associations appreciated  Insight:  Poor due to not acknowledging the reason for his admission  Judgment: Fair d/t cooperation with medication and care        Allergies:     Allergies   Allergen Reactions     Bee Venom Anaphylaxis     Clindamycin Hives     Morphine Hives        Labs:   None new.     Assessment    Primary psychiatric diagnosis:   Psychosis, Unspecified (Schizoaffective disorder vs Bipolar Affective Disorder with Psychosis)    Secondary psychiatric diagnoses of concern this admission:   Intellectual disability (IQ 63)    Diagnostic Impression: Anderson Perez is a 40 year old male with a past psychiatric history of bipolar disorder with psychosis vs schizoaffective disorder bipolar type, and intellectual disability admitted from the  ER on 08/31/2021 due to concern for out of control behaviors, aggression and psychosis in the context of medication refusal despite Cardoza. Patient was recently released from [facility] to a group home. Presentation to the ED via ambulance after police contacted and provisional discharge rescinded. Group home noted escalating aggressive behavior, delusional thinking, and medication refusal despite Cardoza. PT refusal to discuss problems and largely denied all past history, medications, or any need for care. His reported symptoms of delusions, psychosis, and erratic/aggressive behavior are consistent with prior " presentations of psychosis vs schizoaffective disorder bipolar type.    Of note, patient had previous diagnosis of Antisocial Personality Disorder. As psychosis cleared during hospitalization, aggressive and antagonistic behavior resolved completely. It seems questionable that he has ASPD, and rather that previous behaviors were a result of psychosis and/or substance intoxication.    Psychiatric Hospital course: Anderson Perez was admitted to [facility] following revocation of provisional discharge and on a court hold. His PTA olanzapine 20mg PO QDaily was resumed, as pt had been refusing at group home; per conversation with guardian, had planned to start on GRIGGS, but does not think that this was actually started, as patient was declining medications, which prompted admission in setting of decompensation. Pt was converted to ODT formulation given concerns for cheeking medications; while initially resistant, pt was agreeable with taking ODT olanzapine as long as it was given with ginger ale, which was accommodated. Olanzapine dose increased to 40mg on 9/21 given limited efficacy at PTA dose. He was also started on a brief trail of propanolol for akathisia, but this was discontinued due to lack of improvement in pacing and patient attesting that a high level of physical activity was his baseline. Over time, he's become more sociable with peers and staff on the unit, and is spending more time sitting in the milieu as opposed to pacing the hallways. Patient has since been stable on current medications for some time, and continues to await placement.     Medical course: Anderson Perez was physically examined by the ED prior to being transferred to the unit and was found to be medically stable and appropriate for admission.     Plan   Principal Diagnosis:   # Psychosis, Unspecified (Schizoaffective disorder vs Bipolar Affective Disorder with Psychosis)    Secondary psychiatric diagnoses of concern this admission:   #  Intellectual disability    Psychotropic Medications:  Modify:  No changes    Continue Scheduled:   -Olanzapine ODT 40mg qPM    Continue PRN:  -Acetaminophen 650 mg Q6H PRN for pain and fever  -Hydroxyzine 25 mg Q6H PRN for anxiety  -Haldol 5mg prn q6h for agitation  -Haldol 5mg + diphenhydramine 50mg + lorazepam 2mg prn for severe agitation/psychosis  -Maalox 30 ml Q4H PRN for indigestion  -Miralax prn for constipation  -Nicotine gum 2 mg every hour as needed    Additional Plans:  -Patient will be treated in therapeutic milieu with appropriate individual and group therapies as described.  -Asked sw to update the patient in-person and explaining the process of placement in detail, answering questions and addressing concerns.    Medical diagnoses to be addressed this admission:    # Chronic Hep C  NTD    #Dry mouth  -Biotene started 10/25    #Foot blister/lesion, resolved  -Bacitracin ointment prn    Consults: none    Legal Status: Cardoza  Committed - recommitted 11/1   Legal guardian    Safety Assessment:   Behavioral Orders   Procedures     Cheeking Precautions (behavioral units)     Code 1 - Restrict to Unit     Routine Programming     As clinically indicated     Sexual precautions     Status 15     Every 15 minutes.     SIO: No    Disposition: Patient is stable, medications optimized. Pending placement. Please see CTC notes for more details.     This patient was seen and discussed with my attending physician.    Sammy Angel MD  PGY-1 Psychiatry      This patient has been seen and evaluated by me, Glenn Isaacs.  I have discussed this patient with the psychiatry resident and I agree with the findings and plan in this note.    I have reviewed today's vital signs, medications, labs and imaging.     Glenn Adhikari MD on 11/30/2021 at 10:29 PM

## 2021-11-30 NOTE — PLAN OF CARE
Assessment/Intervention/Current Symptoms and Care Coordination    Attended team meeting and reviewed chart notes.    Writer received VM from REX Short. Deja was expecting [facility] to contact writer to set up and interview with pt. They have not reached out to writer. Deja also said one [facility] facility declined to take pt due to vulnerable clients at the home. Deja will interview another facility this week and keep trying [facility].        Discharge Plan or Goal  To group home      Barriers to Discharge   Needs to be accepted to a facility      Referral Status  In process        Legal Status  Committed with Cardoza order

## 2021-12-01 LAB — SARS-COV-2 RNA RESP QL NAA+PROBE: NEGATIVE

## 2021-12-01 PROCEDURE — 250N000013 HC RX MED GY IP 250 OP 250 PS 637: Performed by: STUDENT IN AN ORGANIZED HEALTH CARE EDUCATION/TRAINING PROGRAM

## 2021-12-01 PROCEDURE — 124N000002 HC R&B MH UMMC

## 2021-12-01 PROCEDURE — 99232 SBSQ HOSP IP/OBS MODERATE 35: CPT | Mod: GC | Performed by: PSYCHIATRY & NEUROLOGY

## 2021-12-01 PROCEDURE — 87635 SARS-COV-2 COVID-19 AMP PRB: CPT | Performed by: PSYCHIATRY & NEUROLOGY

## 2021-12-01 RX ADMIN — NICOTINE POLACRILEX 2 MG: 2 GUM, CHEWING ORAL at 17:41

## 2021-12-01 RX ADMIN — OLANZAPINE 40 MG: 20 TABLET, ORALLY DISINTEGRATING ORAL at 18:21

## 2021-12-01 RX ADMIN — Medication 25 MCG: at 18:21

## 2021-12-01 ASSESSMENT — ACTIVITIES OF DAILY LIVING (ADL)
ORAL_HYGIENE: INDEPENDENT
DRESS: INDEPENDENT
HYGIENE/GROOMING: INDEPENDENT
LAUNDRY: WITH SUPERVISION

## 2021-12-01 NOTE — PLAN OF CARE
"  Problem: Behavioral Health Plan of Care  Goal: Plan of Care Review  Outcome: Improving  Flowsheets (Taken 12/1/2021 1325)  Plan of Care Reviewed With: patient  Progress: improving  Patient Agreement with Plan of Care: (Agrees with tx, but not continued admit) agrees with comment (describe)     Problem: Behavior Regulation Impairment (Psychotic Signs/Symptoms)  Goal: Improved Behavioral Control (Psychotic Signs/Symptoms)  Outcome: Improving  Flowsheets (Taken 12/1/2021 1325)  Mutually Determined Action Steps (Improved Behavioral Control): identifies future-oriented goal    Anderson became very angry late AM-frustrated plans for discharge are not yet in place-feels he is doing well and should be discharged-Antonio later apologized for angry outburst and appropriately expressed frustration with greater than three months hospital stay-Also c/o feeling extremely hungry commenting, \"I can't possibly eat another orange or banana!\"-MD requested nutrition consult re special circumstance of extended stay-Chen Guallpa, nutrition manager, checking with director re options-Antonio spent rest of day walking in dalal-brief interactions with others-declined  Covid test complaining they are being ordered too frequently    "

## 2021-12-01 NOTE — PROGRESS NOTES
CLINICAL NUTRITION SERVICES - BRIEF NOTE     Nutrition Prescription    Recommendations already ordered by Registered Dietitian (RD):  Continue current supplements and double portions     Future/Additional Recommendations:  None additionally, RD to sign off     REASON FOR ASSESSMENT  Anderson Perez is a 40 year old male assessed by the dietitian for Provider Order - Extended hospital stay warrants increase in calorie intake. Inquiry for adding nutrition-rich snacks to diet regimen.  PMH significant for bipolar disorder with psychosis vs schizoaffective disorder bipolar type, and intellectual disability admitted from the  ER on 08/31/2021 due to concern for out of control behaviors, aggression and psychosis in the context of medication refusal despite Cardoza  Findings  Per RN pt is tired of the snack options on the unit (fruit). RD offered supplements as these are the only available between meal snacks available. Ensure was added with each meal today per provider, pt would prefer different options between meals. Pt is currently receiving double portions and ordering (on average) 4414kcal and 155g protein per day. Ensure TID will provide an additional 1050kcal and 60g protein daily. Discussed ordering extra menu items to keep in unit fridge as snacks.   Addendum: Spoke to [facility] and CNM, snacks can be allowed as needed for/when nutritionally necessary (malnutrition). Pt is currently eating well so snacks are not indicated at this time. Discussed other supplement options (ie magic cup gelatein) however pt prefers ensure    35# wt gain since admit  11/28/21 1220 98.5 kg (217 lb 3.2 oz)   11/25/21 0901 98.4 kg (216 lb 14.4 oz)   11/23/21 0816 98 kg (216 lb)   11/21/21 0738 98.2 kg (216 lb 9.6 oz)   11/18/21 0746 97.1 kg (214 lb 1.6 oz)   11/11/21 0802 95.2 kg (209 lb 14.4 oz)   11/09/21 0921 95.1 kg (209 lb 9.6 oz)   11/07/21 0751 94.5 kg (208 lb 6.4 oz)   11/04/21 0751 94.6 kg (208 lb 9.6 oz)   11/02/21 0736 93.6 kg (206  lb 6.4 oz)   10/24/21 0739 92.3 kg (203 lb 6.4 oz)   10/21/21 0812 91.9 kg (202 lb 8 oz)   10/17/21 1249 91.7 kg (202 lb 1.6 oz)   10/14/21 0746 91 kg (200 lb 11.2 oz)   10/10/21 1209 90.5 kg (199 lb 8 oz)   08/31/21 1457 82.7 kg (182 lb 4.8 oz)       INTERVENTIONS  Implementation  Continue current supplements/double portions     Follow up/Monitoring  No nutrition follow-up warranted at this time. RD to sign off. Please consult if further needs arise    Fina Loaiza MS, RD, LDN  Unit Pager [phone]

## 2021-12-01 NOTE — PLAN OF CARE
Nursing plan of care   Problem: Behavioral Health Plan of Care  Goal: Develops/Participates in Therapeutic [place] to Support Successful Transition  Outcome: Improving     Problem: Behavior Regulation Impairment (Psychotic Signs/Symptoms)  Goal: Improved Behavioral Control (Psychotic Signs/Symptoms)  Outcome: Improving   Pt has been active and visible in the milieu; spent the pacing in the hallway, watching TV in the lounge and shift socializing with select peers and staff; no physical symptom noted; VS WDL; encouraged to attend group but declined; was cooperative with COVID swab and specimen sent to the lab; result is pending; received a call from dietary r/t pt's recent request for more food; dietary stated that they can't honer him more food but they can substitute between meal ensure for gelatine or protein ice cream, but pt declined; pt denied SI/SIB,AVH,depression and anxiety; medication compliant; denied medication side effect; good appetite; denied issue with sleep; will continue to monitor and assess.

## 2021-12-01 NOTE — PLAN OF CARE
Problem: Behavioral Disturbance  Goal: Behavioral Disturbance  Outcome: Improving     Problem: Behavioral Health Plan of Care  Goal: Optimized Coping Skills in Response to Life Stressors  Outcome: Improving     Patient is pleasant, calm, and cooperative.  Bright and smiling upon approach.  Up in milieu and engages with select peers and staff.  Observed pacing hallways intermittently throughout the shift.  Dismissive with mental health symptoms, denying anxiety, depression, SI, HI, SIB, and hallucinations.  Denies pain.  Patient is medication complaint and remains safe on unit.  Will continue to monitor.  Pura Clark RN

## 2021-12-01 NOTE — PLAN OF CARE
Assessment/Intervention/Current Symptoms and Care Coordination    Attended team meeting and reviewed chart notes.    Pt's CADI worker is making referrals and is visiting a group home in [place] today.        Discharge Plan or Goal  To group home      Barriers to Discharge   Needs to be accepted to group home      Referral Status  In process        Legal Status  Committed with Cardoza order    Pt will need a PD upon discharge.

## 2021-12-01 NOTE — PROGRESS NOTES
"    ----------------------------------------------------------------------------------------------------------  [facility], [place]   Psychiatric Progress Note  Hospital Day #92    Identifier: Anderson Perez is a 40 year old male with a past psychiatric history of bipolar disorder with psychosis vs schizoaffective disorder bipolar type, and intellectual disability admitted from the ER on 08/31/2021 due to concern for out of control behaviors, aggression and psychosis. Patient is psychiatrically stable, medications optimized, awaiting placement.     Interim History:   The patient's care was discussed with the treatment team and chart notes were reviewed.    Vitals: VSS  Sleep: 7 hours (12/01/21 0600)  Scheduled medications: Taking all scheduled medications as prescribed  PRN medications: No psychiatric prns received    Interim events:   No events    Staff Report:    - Pleasant, calm and cooperative  - Engaging with staff and peers  - Pacing hallways the majority of time awake  - Talked with his 3-yr old son over the phone  - Denied SI/H/I/AH/VH  - No medical/behavior events overnight  - C/o feeling hungry to staff     Subjective:     Patient Interview:  Anderson Perez was interviewed as pacing down the hallway. He immediately asked if there has been any update. He got irritable once found out there has been no updates since yesterday. He raised his voice and complained about the slow process and how he thinks he does not need social workers. \"I can find myself a place to stay if you let me out of here\", \"I am a grown adult and have 16 doctorate degrees\". He also mentioned he is from [facility] and is not supposed to have his case followed up at [facility]. The writer reassured him that things are moving toward the right direction although slowly. He refused to talk to the writer and aborted the interview prematurely.    Review of systems:   ROS negative except as stated " "above.    Objective:   /73 (BP Location: Left arm)   Pulse 75   Temp 97.2  F (36.2  C) (Tympanic)   Resp 15   Ht 1.829 m (6')   Wt 98.5 kg (217 lb 3.2 oz)   SpO2 95%   BMI 29.46 kg/m    Weight is 217 lbs 3.2 oz  Body mass index is 29.46 kg/m .    Mental Status Examination:    Oriented to:  Grossly oriented.  General: Awake and alert  Appearance:  appears older than stated age, Grooming is adequate, Dressed in hospital attire and Hair is shaved . Numerous tattoos.  Behavior: irritable  Eye Contact: fair  Psychomotor: Pacing the hallways. No abnormal motor symptoms appreciated. Somewhat restless. No catatonia present.  Speech:  appropriate volume/tone, spontaneous and with good articulation  Language: Fluent in English with appropriate syntax and vocabulary.  Mood:  \"I want to leave\"  Affect:  Fixed, blunted, restricted range, flat facial expression  Thought Process:  coherent, linear, logical and goal directed  Thought Content: No SI/HI/AH/VH; states grandiose delusions (having 16 doctorate degrees)  Associations:  No loosening of associations appreciated  Insight:  Poor due to not acknowledging the reason for his admission  Judgment: Fair d/t cooperation with medication and care        Allergies:     Allergies   Allergen Reactions    Bee Venom Anaphylaxis    Clindamycin Hives    Morphine Hives        Labs:   None new.     Assessment    Primary psychiatric diagnosis:   Psychosis, Unspecified (Schizoaffective disorder vs Bipolar Affective Disorder with Psychosis)    Secondary psychiatric diagnoses of concern this admission:   Intellectual disability (IQ 63)    Diagnostic Impression: Anderson CHI Chris is a 40 year old male with a past psychiatric history of bipolar disorder with psychosis vs schizoaffective disorder bipolar type, and intellectual disability admitted from the  ER on 08/31/2021 due to concern for out of control behaviors, aggression and psychosis in the context of medication refusal despite " Cardoza. Patient was recently released from [facility] to a group home. Presentation to the ED via ambulance after police contacted and provisional discharge rescinded. Group home noted escalating aggressive behavior, delusional thinking, and medication refusal despite Cardoza. PT refusal to discuss problems and largely denied all past history, medications, or any need for care. His reported symptoms of delusions, psychosis, and erratic/aggressive behavior are consistent with prior presentations of psychosis vs schizoaffective disorder bipolar type.    Of note, patient had previous diagnosis of Antisocial Personality Disorder. As psychosis cleared during hospitalization, aggressive and antagonistic behavior resolved completely. It seems questionable that he has ASPD, and rather that previous behaviors were a result of psychosis and/or substance intoxication.    Psychiatric Hospital course: Anderson Perez was admitted to [facility] following revocation of provisional discharge and on a court hold. His PTA olanzapine 20mg PO QDaily was resumed, as pt had been refusing at group home; per conversation with guardian, had planned to start on GRIGGS, but does not think that this was actually started, as patient was declining medications, which prompted admission in setting of decompensation. Pt was converted to ODT formulation given concerns for cheeking medications; while initially resistant, pt was agreeable with taking ODT olanzapine as long as it was given with ginger ale, which was accommodated. Olanzapine dose increased to 40mg on 9/21 given limited efficacy at PTA dose. He was also started on a brief trail of propanolol for akathisia, but this was discontinued due to lack of improvement in pacing and patient attesting that a high level of physical activity was his baseline. Over time, he's become more sociable with peers and staff on the unit, and is spending more time sitting in the milieu as opposed to pacing the  [facility]. Patient has since been stable on current medications for some time, and continues to await placement.     Medical course: Anderson Perez was physically examined by the ED prior to being transferred to the unit and was found to be medically stable and appropriate for admission.     Plan   Principal Diagnosis:   # Psychosis, Unspecified (Schizoaffective disorder vs Bipolar Affective Disorder with Psychosis)    Secondary psychiatric diagnoses of concern this admission:   # Intellectual disability    Psychotropic Medications:  Modify:  No changes    Continue Scheduled:   -Olanzapine ODT 40mg qPM    Continue PRN:  -Acetaminophen 650 mg Q6H PRN for pain and fever  -Hydroxyzine 25 mg Q6H PRN for anxiety  -Haldol 5mg prn q6h for agitation  -Haldol 5mg + diphenhydramine 50mg + lorazepam 2mg prn for severe agitation/psychosis  -Maalox 30 ml Q4H PRN for indigestion  -Miralax prn for constipation  -Nicotine gum 2 mg every hour as needed    Additional Plans:  -Patient will be treated in therapeutic milieu with appropriate individual and group therapies as described.  -Patient would be able to go to the [place] as the staff on station 22 find appropriate, accompanied by a  and a station 22 staff member.    Medical diagnoses to be addressed this admission:    # Chronic Hep C  NTD    #Dry mouth  -Biotene started 10/25    #Foot blister/lesion, resolved  -Bacitracin ointment prn    Consults:   -Nutrition Consult: Extended hospital stay, considering adding snacks to diet    Legal Status: Cardoza  Committed - recommitted 11/1   Legal guardian    Safety Assessment:   Behavioral Orders   Procedures    Cheeking Precautions (behavioral units)    Code 1 - Restrict to Unit    Code 3     Patient may go to [place] accompanied by one security staff member and one station 22 staff member.    Routine Programming     As clinically indicated    Sexual precautions    Status 15     Every 15 minutes.     SIO:  No    Disposition: Patient is stable, medications optimized. Pending placement. Please see CTC notes for more details.     This patient was seen and discussed with my attending physician.    Sammy Angel MD  PGY-1 Psychiatry    This patient has been seen and evaluated by me, Glenn Isaacs.  I have discussed this patient with the psychiatry resident and I agree with the findings and plan in this note.    I have reviewed today's vital signs, medications, labs and imaging.     Glenn Adhikari MD on 12/1/2021 at 10:42 PM

## 2021-12-02 PROCEDURE — 250N000013 HC RX MED GY IP 250 OP 250 PS 637: Performed by: STUDENT IN AN ORGANIZED HEALTH CARE EDUCATION/TRAINING PROGRAM

## 2021-12-02 PROCEDURE — 124N000002 HC R&B MH UMMC

## 2021-12-02 PROCEDURE — 99233 SBSQ HOSP IP/OBS HIGH 50: CPT | Mod: GC | Performed by: PSYCHIATRY & NEUROLOGY

## 2021-12-02 RX ADMIN — Medication 25 MCG: at 19:01

## 2021-12-02 RX ADMIN — OLANZAPINE 40 MG: 20 TABLET, ORALLY DISINTEGRATING ORAL at 19:01

## 2021-12-02 RX ADMIN — NICOTINE POLACRILEX 2 MG: 2 GUM, CHEWING ORAL at 20:06

## 2021-12-02 RX ADMIN — NICOTINE POLACRILEX 2 MG: 2 GUM, CHEWING ORAL at 15:35

## 2021-12-02 RX ADMIN — Medication 1 SPRAY: at 09:18

## 2021-12-02 RX ADMIN — Medication 1 SPRAY: at 19:51

## 2021-12-02 NOTE — PLAN OF CARE
Assessment/Intervention/Current Symptoms and Care Coordination    Attended team meeting and reviewed chart note.    Pt still waiting to be accepted into a group home.    Pt was allowed to go to the '[facility]' area to get some fresh air.        Discharge Plan or Goal  To group home      Barriers to Discharge   Pt hasn't been accepted into a group home      Referral Status  RE[facility] Short with [facility] is working on placement        Legal Status  Committed with Cardoza order

## 2021-12-02 NOTE — PROGRESS NOTES
"    ----------------------------------------------------------------------------------------------------------  [facility], [place]   Psychiatric Progress Note  Hospital Day #93    Identifier: Anderson Perez is a 40 year old male with a past psychiatric history of bipolar disorder with psychosis vs schizoaffective disorder bipolar type, and intellectual disability admitted from the ER on 08/31/2021 due to concern for out of control behaviors, aggression and psychosis. Patient is psychiatrically stable, medications optimized, awaiting placement.     Interim History:   The patient's care was discussed with the treatment team and chart notes were reviewed.    Vitals: VSS  Sleep: 7 hours (12/02/21 0600)  Scheduled medications: Taking all scheduled medications as prescribed  PRN medications: No psychiatric prns received    Interim events:   No events    Staff Report:  - no complaints with sleep  - c/o variety of meals and limited snack options  - Pleasant, calm and cooperative  - Engaging with staff and peers  - Denied SI/H/I/AH/VH  - No medical/behavior events overnight     Subjective:     Patient Interview:  Anderson Perez was interviewed as pacing down the hallway. He apologized for his behavior yesterday \"It was not me\". He declined any side effects from the medications. No SI/HI/AH/VH. He asked whether he could get some fresh air at some point today.    Pt was escorted to the [facility].  He was pleasant and appropriate during this time.  Conversation was appropriate apart from stating he doesn't go to groups because he already has 16 PhDs.      Review of systems:   ROS negative except as stated above.    Objective:   /72 (BP Location: Left arm)   Pulse 88   Temp 97.4  F (36.3  C) (Oral)   Resp 16   Ht 1.829 m (6')   Wt 98.5 kg (217 lb 3.2 oz)   SpO2 97%   BMI 29.46 kg/m    Weight is 217 lbs 3.2 oz  Body mass index is 29.46 kg/m .    Mental Status " "Examination:    Oriented to:  Grossly oriented.  General: Awake and alert  Appearance:  appears older than stated age, Grooming is adequate, Dressed in hospital attire and Hair is shaved . Tattoos on neck.  Behavior: calm, apologetic, cooperative  Eye Contact: fair  Psychomotor: Pacing the hallways. No abnormal motor symptoms appreciated. Somewhat restless. No catatonia present.  Speech:  appropriate volume/tone, spontaneous and with good articulation  Language: Fluent in English with appropriate syntax and vocabulary.  Mood:  \"I am doing well\"  Affect:  Fixed, blunted, restricted range, flat facial expression  Thought Process:  coherent, linear, logical and goal directed  Thought Content: No SI/HI/AH/VH; no delusions noted  Associations:  No loosening of associations appreciated  Insight:  Poor due to not acknowledging the reason for his admission  Judgment: Fair d/t cooperation with medication and care        Allergies:     Allergies   Allergen Reactions     Bee Venom Anaphylaxis     Clindamycin Hives     Morphine Hives        Labs:   None new.     Assessment    Primary psychiatric diagnosis:   Psychosis, Unspecified (Schizoaffective disorder vs Bipolar Affective Disorder with Psychosis)    Secondary psychiatric diagnoses of concern this admission:   Intellectual disability (IQ 63)    Diagnostic Impression: Anderson Perez is a 40 year old male with a past psychiatric history of bipolar disorder with psychosis vs schizoaffective disorder bipolar type, and intellectual disability admitted from the  ER on 08/31/2021 due to concern for out of control behaviors, aggression and psychosis in the context of medication refusal despite Cardoza. Patient was recently released from [facility] to a group home. Presentation to the ED via ambulance after police contacted and provisional discharge rescinded. Group home noted escalating aggressive behavior, delusional thinking, and medication refusal despite Cardoza. PT refusal to " discuss problems and largely denied all past history, medications, or any need for care. His reported symptoms of delusions, psychosis, and erratic/aggressive behavior are consistent with prior presentations of psychosis vs schizoaffective disorder bipolar type.    Of note, patient had previous diagnosis of Antisocial Personality Disorder. As psychosis cleared during hospitalization, aggressive and antagonistic behavior resolved completely. It seems questionable that he has ASPD, and rather that previous behaviors were a result of psychosis and/or substance intoxication.    Psychiatric Hospital course: Anderson Perez was admitted to [facility] following revocation of provisional discharge and on a court hold. His PTA olanzapine 20mg PO QDaily was resumed, as pt had been refusing at group home; per conversation with guardian, had planned to start on GRIGGS, but does not think that this was actually started, as patient was declining medications, which prompted admission in setting of decompensation. Pt was converted to ODT formulation given concerns for cheeking medications; while initially resistant, pt was agreeable with taking ODT olanzapine as long as it was given with ginger ale, which was accommodated. Olanzapine dose increased to 40mg on 9/21 given limited efficacy at PTA dose. He was also started on a brief trail of propanolol for akathisia, but this was discontinued due to lack of improvement in pacing and patient attesting that a high level of physical activity was his baseline. Over time, he's become more sociable with peers and staff on the unit, and is spending more time sitting in the milieu as opposed to pacing the hallways. Patient has since been stable on current medications for some time, and continues to await placement.     Medical course: Anderson Perez was physically examined by the ED prior to being transferred to the unit and was found to be medically stable and appropriate for admission.      Plan   Principal Diagnosis:   # Psychosis, Unspecified (Schizoaffective disorder vs Bipolar Affective Disorder with Psychosis)    Secondary psychiatric diagnoses of concern this admission:   # Intellectual disability    Psychotropic Medications:  Modify:  No changes    Continue Scheduled:   -Olanzapine ODT 40mg qPM    Continue PRN:  -Acetaminophen 650 mg Q6H PRN for pain and fever  -Hydroxyzine 25 mg Q6H PRN for anxiety  -Haldol 5mg prn q6h for agitation  -Haldol 5mg + diphenhydramine 50mg + lorazepam 2mg prn for severe agitation/psychosis  -Maalox 30 ml Q4H PRN for indigestion  -Miralax prn for constipation  -Nicotine gum 2 mg every hour as needed    Additional Plans:  -Patient will be treated in therapeutic milieu with appropriate individual and group therapies as described.  -Patient would be able to go to the [facility] as the staff on station 22 find appropriate, accompanied by a  and a station 22 staff member.    Medical diagnoses to be addressed this admission:    # Chronic Hep C  NTD    #Dry mouth  -Biotene started 10/25    #Foot blister/lesion, resolved  -Bacitracin ointment prn    Consults:   -Nutrition Consult: Extended hospital stay, considering adding snacks to diet    Legal Status: Cardoza  Committed - recommitted 11/1   Legal guardian    Safety Assessment:   Behavioral Orders   Procedures     Cheeking Precautions (behavioral units)     Code 1 - Restrict to Unit     Routine Programming     As clinically indicated     Sexual precautions     Status 15     Every 15 minutes.     SIO: No    Disposition: Patient is stable, medications optimized. Pending placement. Please see CTC notes for more details.     This patient was seen and discussed with my attending physician.    Sammy Angel MD  PGY-1 Psychiatry    This patient has been seen and evaluated by me, Glenn Isaacs.  I have discussed this patient with the psychiatry resident and I agree with the findings and plan in this note.     I have reviewed today's vital signs, medications, labs and imaging.     Glenn Adhikari MD on 12/2/2021 at 9:57 PM

## 2021-12-02 NOTE — PLAN OF CARE
Problem: Behavioral Health Plan of Care  Goal: Plan of Care Review  Outcome: Improving  Flowsheets (Taken 12/2/2021 1234)  Plan of Care Reviewed With: patient  Progress: improving  Patient Agreement with Plan of Care: (agrees with tx, believes should now be discharged) agrees with comment (describe)     Problem: Mood Impairment (Manic or Hypomanic Signs/Symptoms)  Goal: Improved Mood Symptoms (Manic/Hypomanic Signs/Symptoms)  Outcome: Improving  Flowsheets (Taken 12/2/2021 1234)  Mutually Determined Action Steps (Improved Mood Symptoms):    acknowledges progress    engages in physical activity     Problem: Behavior Regulation Impairment (Psychotic Signs/Symptoms)  Goal: Improved Behavioral Control (Psychotic Signs/Symptoms)  Outcome: Improving  Flowsheets (Taken 12/2/2021 1234)  Mutually Determined Action Steps (Improved Behavioral Control): identifies future-oriented goal     Antonio participated in code 3 walk to [place] with Dr Isaacs and Venkat HUNT, psych assoc-accompanied to [place] by security-pleasant in interactions today-no angry outbursts-periodically moving lips as though responding in conversation, but not with sound-continues to decline grps-spends time walking in [place] and talking with staff and select peers-pleasant in interactions

## 2021-12-02 NOTE — PLAN OF CARE
Problem: Behavioral Health Plan of Care  Goal: Adheres to Safety Considerations for Self and Others  Intervention: Develop and Maintain Individualized Safety Plan  Recent Flowsheet Documentation  Taken 12/2/2021 0151 by Alyssa Mensah RN  Safety Measures: safety rounds completed     Problem: Sleep Disturbance  Goal: Adequate Sleep/Rest  Outcome: No Change     Patient slept well this shift. No behavioral concerns. No complaints of pain nor discomfort. He slept 7 hours. No PRN medications given.

## 2021-12-03 PROCEDURE — 250N000013 HC RX MED GY IP 250 OP 250 PS 637: Performed by: STUDENT IN AN ORGANIZED HEALTH CARE EDUCATION/TRAINING PROGRAM

## 2021-12-03 PROCEDURE — 124N000002 HC R&B MH UMMC

## 2021-12-03 PROCEDURE — 99232 SBSQ HOSP IP/OBS MODERATE 35: CPT | Mod: GC | Performed by: PSYCHIATRY & NEUROLOGY

## 2021-12-03 PROCEDURE — 250N000013 HC RX MED GY IP 250 OP 250 PS 637

## 2021-12-03 RX ADMIN — OLANZAPINE 40 MG: 20 TABLET, ORALLY DISINTEGRATING ORAL at 20:00

## 2021-12-03 RX ADMIN — NICOTINE POLACRILEX 2 MG: 2 GUM, CHEWING ORAL at 08:46

## 2021-12-03 RX ADMIN — Medication 1 SPRAY: at 14:52

## 2021-12-03 RX ADMIN — HYDROXYZINE HYDROCHLORIDE 25 MG: 25 TABLET, FILM COATED ORAL at 15:56

## 2021-12-03 RX ADMIN — NICOTINE POLACRILEX 2 MG: 2 GUM, CHEWING ORAL at 16:28

## 2021-12-03 RX ADMIN — NICOTINE POLACRILEX 2 MG: 2 GUM, CHEWING ORAL at 20:03

## 2021-12-03 RX ADMIN — NICOTINE POLACRILEX 2 MG: 2 GUM, CHEWING ORAL at 15:08

## 2021-12-03 RX ADMIN — Medication 25 MCG: at 20:00

## 2021-12-03 ASSESSMENT — ACTIVITIES OF DAILY LIVING (ADL)
ORAL_HYGIENE: INDEPENDENT
ORAL_HYGIENE: INDEPENDENT
DRESS: INDEPENDENT
LAUNDRY: WITH SUPERVISION
HYGIENE/GROOMING: INDEPENDENT
HYGIENE/GROOMING: INDEPENDENT
DRESS: INDEPENDENT

## 2021-12-03 NOTE — PROGRESS NOTES
----------------------------------------------------------------------------------------------------------  [facility], [place]   Psychiatric Progress Note  Hospital Day #94    Identifier: Anderson Perez is a 40 year old male with a past psychiatric history of bipolar disorder with psychosis vs schizoaffective disorder bipolar type, and intellectual disability admitted from the ER on 08/31/2021 due to concern for out of control behaviors, aggression and psychosis. Patient is psychiatrically stable, medications optimized, awaiting placement.     Interim History:   The patient's care was discussed with the treatment team and chart notes were reviewed.    Vitals: VSS  Sleep: 7 hours (12/02/21 0600)  Scheduled medications: Taking all scheduled medications as prescribed  PRN medications: No psychiatric prns received    Interim events:   No events    Staff Report:  - Enjoyed going outside yesterday and getting fresh air  - Pleasant, calm and cooperative  - Engaging with selected staff and peers  - Denied SI/H/I/AH/VH  - No medical/behavior events overnight     Subjective:     Patient Interview:  Anderson Perez was interviewed while he was lounging in the [facility]. He inquired update on how his group home placement. He mentioned that his [birth year] is on [birth year] and that he likes to get out of hospital by that time. Antonio denies any MH sxs or side effects from meds.    Pt was escorted to the [facility].  He was pleasant and appropriate during this time.  Conversation was appropriate apart from stating he doesn't go to groups because he already has 16 PhDs.      Review of systems:   ROS negative except as stated above.    Objective:   /81 (BP Location: Right arm, Patient Position: Sitting)   Pulse 89   Temp 97.4  F (36.3  C) (Oral)   Resp 20   Ht 1.829 m (6')   Wt 98.5 kg (217 lb 3.2 oz)   SpO2 98%   BMI 29.46 kg/m    Weight is 217 lbs 3.2 oz  Body mass index is 29.46  "kg/m .    Mental Status Examination:    Oriented to:  Grossly oriented.  General: Awake and alert  Appearance:  appears older than stated age, Grooming is adequate, Dressed in hospital attire and Hair is shaved . Tattoos on neck.  Behavior: calm, grateful, cooperative  Eye Contact: fair  Psychomotor: No abnormal motor symptoms appreciated. Somewhat restless. No catatonia present.  Speech:  appropriate volume/tone, spontaneous and with good articulation  Language: Fluent in English with appropriate syntax and vocabulary.  Mood:  \"Good\"  Affect:  Appropriate, blunted, restricted range, flat facial expression  Thought Process:  coherent, linear, logical and goal directed  Thought Content: No SI/HI/AH/VH; no delusions noted  Associations:  No loosening of associations appreciated  Insight:  Poor due to not acknowledging the reason for his admission  Judgment: Fair d/t cooperation with medication and care        Allergies:     Allergies   Allergen Reactions    Bee Venom Anaphylaxis    Clindamycin Hives    Morphine Hives        Labs:   None new.     Assessment    Primary psychiatric diagnosis:   Psychosis, Unspecified (Schizoaffective disorder vs Bipolar Affective Disorder with Psychosis)    Secondary psychiatric diagnoses of concern this admission:   Intellectual disability (IQ 63)    Diagnostic Impression: Anderson Perez is a 40 year old male with a past psychiatric history of bipolar disorder with psychosis vs schizoaffective disorder bipolar type, and intellectual disability admitted from the  ER on 08/31/2021 due to concern for out of control behaviors, aggression and psychosis in the context of medication refusal despite Cardoza. Patient was recently released from [facility] to a group home. Presentation to the ED via ambulance after police contacted and provisional discharge rescinded. Group home noted escalating aggressive behavior, delusional thinking, and medication refusal despite Cardoza. PT refusal to discuss " problems and largely denied all past history, medications, or any need for care. His reported symptoms of delusions, psychosis, and erratic/aggressive behavior are consistent with prior presentations of psychosis vs schizoaffective disorder bipolar type.    Of note, patient had previous diagnosis of Antisocial Personality Disorder. As psychosis cleared during hospitalization, aggressive and antagonistic behavior resolved completely. It seems questionable that he has ASPD, and rather that previous behaviors were a result of psychosis and/or substance intoxication.    Psychiatric Hospital course: Anderson Perez was admitted to [facility] following revocation of provisional discharge and on a court hold. His PTA olanzapine 20mg PO QDaily was resumed, as pt had been refusing at group home; per conversation with guardian, had planned to start on GRIGGS, but does not think that this was actually started, as patient was declining medications, which prompted admission in setting of decompensation. Pt was converted to ODT formulation given concerns for cheeking medications; while initially resistant, pt was agreeable with taking ODT olanzapine as long as it was given with ginger ale, which was accommodated. Olanzapine dose increased to 40mg on 9/21 given limited efficacy at PTA dose. He was also started on a brief trail of propanolol for akathisia, but this was discontinued due to lack of improvement in pacing and patient attesting that a high level of physical activity was his baseline. Over time, he's become more sociable with peers and staff on the unit, and is spending more time sitting in the milieu as opposed to pacing the hallways. Patient has since been stable on current medications for some time, and continues to await placement.     Medical course: Anderson Perez was physically examined by the ED prior to being transferred to the unit and was found to be medically stable and appropriate for admission.     Plan    Principal Diagnosis:   # Psychosis, Unspecified (Schizoaffective disorder vs Bipolar Affective Disorder with Psychosis)    Secondary psychiatric diagnoses of concern this admission:   # Intellectual disability    Psychotropic Medications:  Modify:  No changes    Continue Scheduled:   -Olanzapine ODT 40mg qPM    Continue PRN:  -Acetaminophen 650 mg Q6H PRN for pain and fever  -Hydroxyzine 25 mg Q6H PRN for anxiety  -Haldol 5mg prn q6h for agitation  -Haldol 5mg + diphenhydramine 50mg + lorazepam 2mg prn for severe agitation/psychosis  -Maalox 30 ml Q4H PRN for indigestion  -Miralax prn for constipation  -Nicotine gum 2 mg every hour as needed    Additional Plans:  -Patient will be treated in therapeutic milieu with appropriate individual and group therapies as described.  -Patient would be able to go to the [facility] as the staff on station 22 find appropriate, accompanied by a  and a station 22 staff member.    Medical diagnoses to be addressed this admission:    # Chronic Hep C  NTD    #Dry mouth  -Biotene started 10/25    #Foot blister/lesion, resolved  -Bacitracin ointment prn    Consults:   -Nutrition Consult: Extended hospital stay, considering adding snacks to diet    Legal Status: Cardoza  Committed - recommitted 11/1   Legal guardian    Safety Assessment:   Behavioral Orders   Procedures    Cheeking Precautions (behavioral units)    Code 1 - Restrict to Unit    Code 3     Patient is allowed to go to the [facility] accompanied by two staff members as frequently as every other day per staff availability    Routine Programming     As clinically indicated    Sexual precautions    Status 15     Every 15 minutes.     SIO: No    Disposition: Patient is stable, medications optimized. Pending placement. Please see CTC notes for more details.     This patient was seen and discussed with my attending physician.    Sammy Angel MD  PGY-1 Psychiatry      This patient has been seen and evaluated by  me, Glenn Isaacs.  I have discussed this patient with the psychiatry resident and I agree with the findings and plan in this note.    I have reviewed today's vital signs, medications, labs and imaging.     Glenn Adhikari MD on 12/5/2021 at 3:44 PM

## 2021-12-03 NOTE — PLAN OF CARE
Nursing plan of care   Problem: Behavioral Health Plan of Care  Goal: Adheres to Safety Considerations for Self and Others  Outcome: Improving     Problem: Behavioral Health Plan of Care  Goal: Develops/Participates in Therapeutic [place] to Support Successful Transition  Outcome: Improving  Pt has been active and visible in the milieu; watched TV in the lounge between pacing in the hallway; stated that he enjoyed time spent in the [place] at previous shift; calm and pleasant; denied medication side effect ; medication compliant; attended no group; took no shower; denied SI/SIB,AVH,depression and anxiety; will continue to monitor and assess.

## 2021-12-03 NOTE — PLAN OF CARE
Assessment/Intervention/Current Symptoms and Care Coordination      Attended team meeting and reviewed chart notes.    Pt is still waiting for placement. Writer will call [facility] on Monday for an update.      Discharge Plan or Goal  To group home      Barriers to Discharge   Pt has not been accepted into a group home.      Referral Status  [facility] is working on placement        Legal Status  Committed with Cardoza order

## 2021-12-03 NOTE — PLAN OF CARE
Problem: Behavior Regulation Impairment (Manic or Hypomanic Signs/Symptoms)  Goal: Improved Impulse Control (Manic/Hypomanic Signs/Symptoms)  Outcome: Improving   Patient has been pacing most the shift. Spoke about how much he enjoyed going outside yesterday and breathing in the fresh air. Denies depression and anxiety. Does not attend groups. Is social with select peers and staff. Spoke about how he is going to quit smoking cigars. States has a lot more energy not smoking. Wants to get a job when discharged. Spoke about wanting to get out of here soon. Has been pleasant.

## 2021-12-04 PROCEDURE — 124N000002 HC R&B MH UMMC

## 2021-12-04 PROCEDURE — 250N000013 HC RX MED GY IP 250 OP 250 PS 637: Performed by: STUDENT IN AN ORGANIZED HEALTH CARE EDUCATION/TRAINING PROGRAM

## 2021-12-04 RX ADMIN — Medication 25 MCG: at 19:33

## 2021-12-04 RX ADMIN — NICOTINE POLACRILEX 2 MG: 2 GUM, CHEWING ORAL at 17:39

## 2021-12-04 RX ADMIN — NICOTINE POLACRILEX 2 MG: 2 GUM, CHEWING ORAL at 15:04

## 2021-12-04 RX ADMIN — NICOTINE POLACRILEX 2 MG: 2 GUM, CHEWING ORAL at 19:06

## 2021-12-04 RX ADMIN — OLANZAPINE 40 MG: 20 TABLET, ORALLY DISINTEGRATING ORAL at 19:33

## 2021-12-04 RX ADMIN — Medication 1 SPRAY: at 12:00

## 2021-12-04 RX ADMIN — Medication 1 SPRAY: at 19:06

## 2021-12-04 RX ADMIN — NICOTINE POLACRILEX 2 MG: 2 GUM, CHEWING ORAL at 13:03

## 2021-12-04 RX ADMIN — NICOTINE POLACRILEX 2 MG: 2 GUM, CHEWING ORAL at 11:26

## 2021-12-04 ASSESSMENT — ACTIVITIES OF DAILY LIVING (ADL)
DRESS: INDEPENDENT
HYGIENE/GROOMING: INDEPENDENT
DRESS: INDEPENDENT
ORAL_HYGIENE: INDEPENDENT
LAUNDRY: WITH SUPERVISION
HYGIENE/GROOMING: INDEPENDENT
LAUNDRY: WITH SUPERVISION
ORAL_HYGIENE: INDEPENDENT

## 2021-12-04 NOTE — PLAN OF CARE
Problem: Adult Inpatient Plan of Care  Goal: Plan of Care Review  Outcome: Improving  Flowsheets (Taken 12/3/2021 1700)  Plan of Care Reviewed With: patient     Problem: Adult Inpatient Plan of Care  Goal: Patient-Specific Goal (Individualized)  Outcome: Improving   Patient has been calm and cooperative, mood has been good, full range affect. Patient continues with pacing up/down the dalal. Patient has been overall pleasant, and appropriate. Patient socialized selectively with peers, he is alert and oriented x 4, independent of cares and Adls. Patient denies pain, and depression, denies SI/SIB/ HI/AVH. Patient endorsed anxiety, 6/10 and hydroxyzine 25 mg given and was effective. Patient is medication compliant, denies side effects, good appetite. No other concerns, will continue to monitor.

## 2021-12-04 NOTE — PLAN OF CARE
Problem: Behavioral Health Plan of Care  Goal: Plan of Care Review  Outcome: Improving  Flowsheets (Taken 12/4/2021 1344)  Plan of Care Reviewed With: patient  Progress: improving  Patient Agreement with Plan of Care: (Agrees with tx, but feels should be discharged) agrees with comment (describe)     Problem: Mood Impairment (Manic or Hypomanic Signs/Symptoms)  Goal: Improved Mood Symptoms (Manic/Hypomanic Signs/Symptoms)  Outcome: Improving  Flowsheets (Taken 12/4/2021 1344)  Mutually Determined Action Steps (Improved Mood Symptoms):   acknowledges progress   engages in physical activity     Problem: Social Isolation  Goal: Increased Social Interaction  Outcome: Improving     Anderson slept most of AM-states he decided to let himself sleep in on days he feels desire to do so-stated he felt more rested and refreshed-Anderson post becoming aware peer was feeling paranoid and was afraid of him, very kindly and appropriately reassured peer that he recognizes his appearance may be intimidating, but that he has no ill will and would not harm him-peer accepted reassurance, smiling and appeared relieved-Antonio using increased amt of nicorette gum-states he thinks he paces because he wants to smoke and so decided to use nicorette gum to decrease restlessness-states he feels it is helping him (declined marco substitute earlier In admission)-mentioned he may use gum to avoid returning to smoking post discharge-continues pleasant in interactions-waiting placement

## 2021-12-05 PROCEDURE — 250N000013 HC RX MED GY IP 250 OP 250 PS 637: Performed by: STUDENT IN AN ORGANIZED HEALTH CARE EDUCATION/TRAINING PROGRAM

## 2021-12-05 PROCEDURE — 124N000002 HC R&B MH UMMC

## 2021-12-05 RX ADMIN — OLANZAPINE 40 MG: 20 TABLET, ORALLY DISINTEGRATING ORAL at 19:55

## 2021-12-05 RX ADMIN — Medication 25 MCG: at 19:55

## 2021-12-05 RX ADMIN — NICOTINE POLACRILEX 2 MG: 2 GUM, CHEWING ORAL at 10:51

## 2021-12-05 RX ADMIN — NICOTINE POLACRILEX 2 MG: 2 GUM, CHEWING ORAL at 08:53

## 2021-12-05 RX ADMIN — NICOTINE POLACRILEX 2 MG: 2 GUM, CHEWING ORAL at 19:57

## 2021-12-05 RX ADMIN — Medication 1 SPRAY: at 14:09

## 2021-12-05 RX ADMIN — NICOTINE POLACRILEX 2 MG: 2 GUM, CHEWING ORAL at 15:27

## 2021-12-05 RX ADMIN — NICOTINE POLACRILEX 2 MG: 2 GUM, CHEWING ORAL at 13:31

## 2021-12-05 RX ADMIN — NICOTINE POLACRILEX 2 MG: 2 GUM, CHEWING ORAL at 21:15

## 2021-12-05 RX ADMIN — NICOTINE POLACRILEX 2 MG: 2 GUM, CHEWING ORAL at 18:40

## 2021-12-05 ASSESSMENT — ACTIVITIES OF DAILY LIVING (ADL)
LAUNDRY: WITH SUPERVISION
HYGIENE/GROOMING: INDEPENDENT
HYGIENE/GROOMING: INDEPENDENT
LAUNDRY: WITH SUPERVISION
ORAL_HYGIENE: INDEPENDENT
ORAL_HYGIENE: INDEPENDENT
DRESS: INDEPENDENT
DRESS: INDEPENDENT

## 2021-12-05 ASSESSMENT — MIFFLIN-ST. JEOR: SCORE: 1962.25

## 2021-12-05 NOTE — PLAN OF CARE
"Pt observed out in the milieu, pacing the hallway. He was pleasant upon approach. Presents with blunted affect, in a calm mood. During check in, pt made delusional statements. Verbalized \"You know I have 16 PhDs, I started when I was 17 then I ended my last one when I was 31. I told my doctor too\". Other than that, conversations with pt has been appropriate. Pt claimed he is looking forward to discharge, he believes he needs to be but agrees to the plan of care. Pt was observed social to a peer that was paranoid about him during the morning shift.   Pt denied SI/SIB/HI. Denies experiencing any type of hallucinations. Denies having anxiety and depression.     Consumed 100% of share of dinner and took approximately 1000 ml of fluids.   Due medications given. Denies experiencing side effects. PRN Biotene for dry mouth given per request.   Needs attended to. On sexual and cheeking precautions. Staff will continue to monitor. No further concerns noted as of this time.    "

## 2021-12-05 NOTE — PLAN OF CARE
Problem: Behavioral Health Plan of Care  Goal: Plan of Care Review  Outcome: Improving  Flowsheets (Taken 12/5/2021 1230)  Plan of Care Reviewed With: patient  Progress: improving  Patient Agreement with Plan of Care: (Agrees with tx, but wants to discharge) agrees with comment (describe)     Problem: Mood Impairment (Manic or Hypomanic Signs/Symptoms)  Goal: Improved Mood Symptoms (Manic/Hypomanic Signs/Symptoms)  Outcome: Improving  Flowsheets (Taken 12/5/2021 1230)  Mutually Determined Action Steps (Improved Mood Symptoms):    acknowledges progress    engages in physical activity     Problem: Social Isolation  Goal: Increased Social Interaction  Outcome: Improving     Antonio active on unit throughout day-walking in dalal or watching television, simone football with peers-patiently waiting for placement-social with staff and peers-occ grandiose statements, but overall sticks to superficial social topics, e.g. informing staff of icy roads in AM-pleasant in interactions

## 2021-12-06 PROCEDURE — 250N000013 HC RX MED GY IP 250 OP 250 PS 637: Performed by: STUDENT IN AN ORGANIZED HEALTH CARE EDUCATION/TRAINING PROGRAM

## 2021-12-06 PROCEDURE — 99232 SBSQ HOSP IP/OBS MODERATE 35: CPT | Mod: GC | Performed by: PSYCHIATRY & NEUROLOGY

## 2021-12-06 PROCEDURE — 124N000002 HC R&B MH UMMC

## 2021-12-06 RX ADMIN — Medication 25 MCG: at 18:14

## 2021-12-06 RX ADMIN — NICOTINE POLACRILEX 2 MG: 2 GUM, CHEWING ORAL at 09:12

## 2021-12-06 RX ADMIN — NICOTINE POLACRILEX 2 MG: 2 GUM, CHEWING ORAL at 18:14

## 2021-12-06 RX ADMIN — NICOTINE POLACRILEX 2 MG: 2 GUM, CHEWING ORAL at 14:47

## 2021-12-06 RX ADMIN — OLANZAPINE 40 MG: 20 TABLET, ORALLY DISINTEGRATING ORAL at 18:14

## 2021-12-06 RX ADMIN — NICOTINE POLACRILEX 2 MG: 2 GUM, CHEWING ORAL at 13:28

## 2021-12-06 RX ADMIN — NICOTINE POLACRILEX 2 MG: 2 GUM, CHEWING ORAL at 20:34

## 2021-12-06 RX ADMIN — NICOTINE POLACRILEX 2 MG: 2 GUM, CHEWING ORAL at 19:29

## 2021-12-06 RX ADMIN — NICOTINE POLACRILEX 2 MG: 2 GUM, CHEWING ORAL at 17:12

## 2021-12-06 RX ADMIN — NICOTINE POLACRILEX 2 MG: 2 GUM, CHEWING ORAL at 11:21

## 2021-12-06 NOTE — PROGRESS NOTES
----------------------------------------------------------------------------------------------------------  [facility], [place]   Psychiatric Progress Note  Hospital Day #97    Identifier: Anderson Perez is a 40 year old male with a past psychiatric history of bipolar disorder with psychosis vs schizoaffective disorder bipolar type, and intellectual disability admitted from the ER on 08/31/2021 due to concern for out of control behaviors, aggression and psychosis. Patient is psychiatrically stable, medications optimized, awaiting placement.     Interim History:   The patient's care was discussed with the treatment team and chart notes were reviewed.    Vitals: VSS  Sleep: 6.75 hours (12/06/21 0600)  Scheduled medications: Taking all scheduled medications as prescribed  PRN medications: No psychiatric prns received    Interim events:   No events    Staff Report:  - Pleasant, calm  - Social with peers and staff  - Denied SI/H/I/AH/VH  - No medical/behavior events overnight     Subjective:     Patient Interview:  Anderson Perez was interviewed while he was pacing down the hallway. He was updated by SW about the possibility of staff from a  to stop by during the week for a meeting. Antonio denies any MH sxs or side effects from meds.    The risks, benefits, alternatives and side effects of any medication changes have been discussed and are understood by the patient and guardian.  Review of systems:   ROS negative except as stated above.    Objective:   /71 (BP Location: Left arm)   Pulse 98   Temp 97.5  F (36.4  C)   Resp 12   Ht 1.829 m (6')   Wt 100.9 kg (222 lb 8 oz)   SpO2 97%   BMI 30.18 kg/m    Weight is 222 lbs 8 oz  Body mass index is 30.18 kg/m .    Mental Status Examination:    Oriented to:  Grossly oriented.  General: Awake and alert  Appearance:  appears older than stated age, Grooming is adequate, Dressed in hospital attire and Hair is shaved . Tattoos on  "neck.  Behavior: calm, grateful, cooperative  Eye Contact: fair  Psychomotor: No abnormal motor symptoms appreciated. Somewhat restless. No catatonia present.  Speech:  appropriate volume/tone, spontaneous and with good articulation  Language: Fluent in English with appropriate syntax and vocabulary.  Mood:  \"I am fine\"  Affect:  Appropriate, blunted, restricted range, flat facial expression  Thought Process:  coherent, linear, logical and goal directed  Thought Content: No SI/HI/AH/VH; no delusions noted  Associations:  No loosening of associations appreciated  Insight:  Poor due to not acknowledging the reason for his admission  Judgment: Fair d/t cooperation with medication and care        Allergies:     Allergies   Allergen Reactions     Bee Venom Anaphylaxis     Clindamycin Hives     Morphine Hives        Labs:   None new.     Assessment    Primary psychiatric diagnosis:   Psychosis, Unspecified (Schizoaffective disorder vs Bipolar Affective Disorder with Psychosis)    Secondary psychiatric diagnoses of concern this admission:   Intellectual disability (IQ 63)    Diagnostic Impression: Anderson Perez is a 40 year old male with a past psychiatric history of bipolar disorder with psychosis vs schizoaffective disorder bipolar type, and intellectual disability admitted from the  ER on 08/31/2021 due to concern for out of control behaviors, aggression and psychosis in the context of medication refusal despite Cardoza. Patient was recently released from [facility] to a group home. Presentation to the ED via ambulance after police contacted and provisional discharge rescinded. Group home noted escalating aggressive behavior, delusional thinking, and medication refusal despite Cradoza. PT refusal to discuss problems and largely denied all past history, medications, or any need for care. His reported symptoms of delusions, psychosis, and erratic/aggressive behavior are consistent with prior presentations of psychosis vs " schizoaffective disorder bipolar type.    Of note, patient had previous diagnosis of Antisocial Personality Disorder. As psychosis cleared during hospitalization, aggressive and antagonistic behavior resolved completely. It seems questionable that he has ASPD, and rather that previous behaviors were a result of psychosis and/or substance intoxication.    Psychiatric Hospital course: Anderson Perez was admitted to [facility] following revocation of provisional discharge and on a court hold. His PTA olanzapine 20mg PO QDaily was resumed, as pt had been refusing at group home; per conversation with guardian, had planned to start on GRIGGS, but does not think that this was actually started, as patient was declining medications, which prompted admission in setting of decompensation. Pt was converted to ODT formulation given concerns for cheeking medications; while initially resistant, pt was agreeable with taking ODT olanzapine as long as it was given with ginger ale, which was accommodated. Olanzapine dose increased to 40mg on 9/21 given limited efficacy at PTA dose. He was also started on a brief trail of propanolol for akathisia, but this was discontinued due to lack of improvement in pacing and patient attesting that a high level of physical activity was his baseline. Over time, he's become more sociable with peers and staff on the unit, and is spending more time sitting in the milieu as opposed to pacing the hallways. Patient has since been stable on current medications for some time, and continues to await placement.     Medical course: Anderson Perez was physically examined by the ED prior to being transferred to the unit and was found to be medically stable and appropriate for admission.     Plan   Principal Diagnosis:   # Psychosis, Unspecified (Schizoaffective disorder vs Bipolar Affective Disorder with Psychosis)    Secondary psychiatric diagnoses of concern this admission:   # Intellectual  disability    Psychotropic Medications:  Modify:  No changes    Continue Scheduled:   -Olanzapine ODT 40mg qPM    Continue PRN:  -Acetaminophen 650 mg Q6H PRN for pain and fever  -Hydroxyzine 25 mg Q6H PRN for anxiety  -Haldol 5mg prn q6h for agitation  -Haldol 5mg + diphenhydramine 50mg + lorazepam 2mg prn for severe agitation/psychosis  -Maalox 30 ml Q4H PRN for indigestion  -Miralax prn for constipation  -Nicotine gum 2 mg every hour as needed    Additional Plans:  -Patient will be treated in therapeutic milieu with appropriate individual and group therapies as described.  -Patient would be able to go to the [facility] as the staff on station 22 find appropriate, accompanied by a  and a station 22 staff member.    Medical diagnoses to be addressed this admission:    # Chronic Hep C  NTD    #Dry mouth  -Biotene started 10/25    #Foot blister/lesion, resolved  -Bacitracin ointment prn    Consults:   -Nutrition Consult: Extended hospital stay, considering adding snacks to diet    Legal Status: Cardoza  Committed - recommitted 11/1   Legal guardian    Safety Assessment:   Behavioral Orders   Procedures     Cheeking Precautions (behavioral units)     Code 1 - Restrict to Unit     Code 3     Patient is allowed to go to the [facility] accompanied by two staff members as frequently as every other day per staff availability     Routine Programming     As clinically indicated     Sexual precautions     Status 15     Every 15 minutes.     SIO: No    Disposition: Patient is stable, medications optimized. Pending placement. Please see CTC notes for more details.     This patient was seen and discussed with my attending physician.    Sammy Angel MD  PGY-1 Psychiatry      This patient has been seen and evaluated by me, Glenn Isaacs.  I have discussed this patient with the psychiatry resident and I agree with the findings and plan in this note.    I have reviewed today's vital signs, medications, labs  and imaging.     Glenn Adhikari MD on 12/6/2021 at 5:56 PM

## 2021-12-06 NOTE — PLAN OF CARE
[facility] cooperative with going to [facility] (per MD order).   Pt. Anxious to discharge, but patient.    Pt interacting appropriately with staff and peers...appears more animated.   [facility] Progress Note for 2017 12:00 PM    Patient Name:ANTON HOWARD   Account #:[id number]  MRN:[id number]  Gender:Male  YOB: 2016 10:29 AM    DEMOGRAPHICS  Date:  2017 12:00 PM  Age:  3 days  Post Conceptional Age:  40 weeks 3 days  Weight:  2.925kg as of 2017  Date/Time of Admission:  2017 04:43 PM  Birth Date/Time:  2016 10:29 AM  Gestational Age at Birth:  40 weeks  Primary Care Physician:  Sarah Penaloza MD    CURRENT MEDICATIONS    1. hepatitis B virus vacc.rec(PF) 5 mcg IM  (5 mcg/0.5 mL syringe(IM))  (Single   Dose)    Duration: Day 1    PHYSICAL EXAMINATION    Respiratory Statusroom air    Growth Parameter(s)Weight: 2.925 kg    General:Bed/Temperature Support  stable in open crib;  Respiratory Support  room   air;  Head:fontanelle -soft;  normocephalic -;  sutures  normal, mobile;  Ears:ears  normal;  Nose:nares  patent;  Throat:mouth  normal;  oral cavity  normal;  tongue  normal;  Neck:general appearance  normal;  range of motion  normal;  Respiratory:mildly increasedrespiratory effort  greater than 80 breaths/min;    breath sounds  bilateral, clear;  mild intermittentretractions  yes;  Cardiac:precordium  normal;  rhythm  sinus rhythm;  murmur  no;  perfusion    normal;  pulses  normal;  Abdomen:abdomen  soft, nontender, flat, bowel sounds present, organomegaly   absent;  Genitourinary:genitalia  normal, term, male;  testes  bilateral, descended;  Anus and Rectum:anus  patent;  Spine:spine appearance  normal;  Extremity:deformity  no;  range of motion  normal;  Skin:skin appearance  term;  Neuro:mental status  alert;  muscle tone  normal;    LABS  2017 8:15:00 AM   HCT CAP 51; Sodium ; Potassium CAP 4.3; Glucose CAP 60; Calcium -    Ionized CAP 1.26; Specimen Source CAP CAPILLARY; SPO2 CAP 65; pH CAP 7.349; pCO2   CAP 47.6; pO2 CAP 36; HCO3 CAP 26.2; BE CAP 1; SPO2 CAP 93; Ventilator Support   CAP Inf Vent; FiO2 CAP 21; Mode CAP  CPAP; PEEP CAP 5; Specimen Source CAP LF;   Andrew's Test CAP N/A  2017 3:15:00 PM   Gentamicin -  Trough HEPARIN 0.9  2017 8:24:00 PM   Glucose UNK 69; Specimen Source UNK unknown  2017 8:30:00 AM   Sodium HEPARIN 142; Potassium HEPARIN 5.5; Chloride HEPARIN 112; Carbon Dioxide   -  CO2 HEPARIN 24; Glucose HEPARIN 72; BUN HEPARIN 2; Creatinine HEPARIN 0.5;   Calcium HEPARIN 9.9; Bili - Total HEPARIN 10.5; Bili - Direct HEPARIN 0.4    NUTRITION    Prior Day's Intake  Actual Parenteral:  Crystalloid - PIV:   Dex 10 g/dl/day    Total Actual Parenteral:72 mls25 ml/kg/day8 carmella/kg/day    Actual Enteral:  Enfamil Premium Infant (20 carmella): 25 ml every 3 hr bolus feeds per OG. Duration   of bolus feed 30 min.  Gavage Feeding Duration 30 min    Total Actual Enteral:195 mls67 ml/kg/day45 carmella/kg/day    Projected Enteral:  Enfamil Premium Infant (20 carmella): 35 ml every 3 hr bolus feeds per OG. Duration   of bolus feed 30 min.  Gavage Feeding Duration 30 min    Total Projected Enteral:280 mls96 ml/kg/day65 carmella/kg/day    OUTPUT  Urine (ml):  206   Urine (ml/kg/hr):  2.885  Stool (#):  4  Blood (ml):  1   Blood (ml/kg/day):  0.336  Emesis (#):  1    DIAGNOSES  1. Post-term  (P08.21)  Onset:2017  Comments:  Lopez exam puts infants gestation at 40 weeks. Infant SGA for weight and   length. OFC AGA.     2. Transient tachypnea of  (P22.1)  Onset:2017  Comments:  Consult requested regarding infants respiratory status at 3 of age. Infant with   persistent grunting and nasal flaring while on room air. Oxygen saturations 98%   on room air. Normal respiratory rate noted during exam. AROM with clear fluid   prior to delivery. Nurse reports possible partial abruption at time of delivery.   Infant NP suctioned and stomach suctioned for large amounts of bloody   secretions. CXR consistent with mildly retained lung fluid. Infant admitted to   the NICU for persistent grunting and tachypnea at 6 hours of age.   Grunting   persisted and infant placed on Nasal CPAP, improvement noted, no oxygen   requirement. CBG with hypercarbia, ABG normal. Room air . Infant with   occasional  intermittent tachypnea in the past 24 hours ending .   Plans:  follow with pulse oximetry   room air    3.  affected by maternal noxious substance, unspecified (P04.9)  Onset:2017  Comments:  Mother positive for THC during current pregnancy. Mother's urine drug screen on   admit negative. Reports last use 2 week ago. Mother smoked cigarettes daily   during pregnancy. Meconium sent .   Plans:   follow meconium drug screen     4.  jaundice, unspecified (P59.9)  Onset:2017  Comments:  [place] screening indicated. Mother A positive. 36 hour serum bilirubin 6.9,   below light level. Serum bilirubin 10.5 remains below light level .   Plans:  follow clinically     5. Slow feeding of  (P92.2)  Onset:2017  Comments:  Infant began nipple feeds . Nippled 4 of 4 feedings with good effort ending   .   Plans:  nipple as tolerated, no maximum volume     6. Other specified disturbances of temperature regulation of  (P81.8)  Onset:2017  Comments:  Admitted to radiant heat warmer. Placed into open crib  at 1100.   Plans:   follow temperature in an open crib     7. Nutritional Support ()  Onset:2017  Comments:  Feeding choice: formula. Initial glucose 54. NPO at admission. Tolerating   enteral feeds well.   Plans:  Begin Poly-Vi-sol with Iron when enteral feeds > 120 mg/kg/day    enteral feeds with advancement as tolerated     8. Encounter for examination of ears and hearing without abnormal findings   (Z01.10)  Onset:2017  Comments:  [place] hearing screening indicated.  Plans:   obtain a hearing screen before discharge     9. Encounter for immunization (Z23)  Onset:2017  Medications:  1.hepatitis B virus vacc.rec(PF) 5 mcg IM  (5 mcg/0.5 mL syringe(IM))  (Single   Dose)   Weight: 2.925 kg started on 2017 ended on 2017 (completed )  Comments:  Recommended immunizations prior to discharge as indicated.  Plans:   complete immunizations on schedule -ordered     10. Encounter for screening for cardiovascular disorders (Z13.6)  Onset:2017  Comments:  Screening for congenital heart disease by pulse oximetry indicated per American   Academy of Pediatric guidelines. Admit Preductal SaO2 97%. Postductal SaO2 97%.   Plans:  pulse oximetry screening if discharged prior to one week of age    11. Encounter for screening for other metabolic disorders - [place] Metabolic   Screening (Z13.228)  Onset:2017  Comments:  [place] metabolic screening indicated. Sent .  Plans:   follow  screen     12. Single liveborn infant, delivered vaginally (Z38.00)  Onset:2017Resolved: 2017    13. Encounter for screening for infectious and parasitic diseases (all infants   unless suspected to be related to maternal disease) ALL AGES (Z11.9)  Onset:2017  Comments:  At risk secondary to respiratory distress, history of late prenatal care. Mother   GBS positive, treated. Mother positive for Chlamydia. Screening CBC with mildly   decreased WBCs pediatrician requested antibiotics. Repeat CBC normal. Blood   culture negative. Gentamicin trough normal . Antibiotics discontinued .   Plans:   follow blood culture     CARE PLAN  1. Parental Interaction  Onset: 2017  Comments  ([phone]) No answer when calling with update. Unable to leave message.   Plans   continue family updates     2. Discharge Plans  Onset: 2017  Comments  The infant will be ready for discharge when adequate nutrition and   thermoregulation has been established.    Rounds made/plan of care discussed with Inga Sifuentes MD  .    Preparer:FABY: Gisela Ortiz, JOSELUISP, APRN 2017 12:00 PM      Attending: FABY: Inga Sifuentes MD 2017 11:38 PM

## 2021-12-06 NOTE — PLAN OF CARE
BEHAVIORAL TEAM DISCUSSION    Participants: Dr. Isaacs, resident Sammy Angel, medical student Teja, RN Natacha Saini, CTC Siobhan Irving  Progress: pt appears to be a baseline  Anticipated length of stay: until group home can be arranged  Continued Stay Criteria/Rationale: pt is committed with [facility]-waiting to be accepted to group home  Medical/Physical: per psych team upon admission: Anderson was found to be medically stable in the ED.  Precautions:   Behavioral Orders   Procedures     Cheeking Precautions (behavioral units)     Code 1 - Restrict to Unit     Code 3     Patient is allowed to go to the [facility] accompanied by two staff members as frequently as every other day per staff availability     Routine Programming     As clinically indicated     Sexual precautions     Status 15     Every 15 minutes.     Plan: provide a psychological assessment and manage medications per psychiatry, staff to provide a safe and therapeutic milieu, CTC to coordinate transfer to group home.  Rationale for change in precautions or plan: no change

## 2021-12-06 NOTE — PLAN OF CARE
Problem: Behavioral Health Plan of Care  Goal: Plan of Care Review  12/6/2021 1146 by Natacha Saini RN  Outcome: Improving  Flowsheets (Taken 12/6/2021 1146)  Plan of Care Reviewed With: patient  Progress: improving  Patient Agreement with Plan of Care: (believes he should be discharged) agrees with comment (describe)    Problem: Mood Impairment (Manic or Hypomanic Signs/Symptoms)  Goal: Improved Mood Symptoms (Manic/Hypomanic Signs/Symptoms)  12/6/2021 1146 by Natacha Saini, RN  Outcome: Improving  Flowsheets (Taken 12/6/2021 1146)  Mutually Determined Action Steps (Improved Mood Symptoms): engages in physical activity    Problem: Behavior Regulation Impairment (Psychotic Signs/Symptoms)  Goal: Improved Behavioral Control (Psychotic Signs/Symptoms)  Outcome: Improving  Flowsheets (Taken 12/6/2021 1146)  Mutually Determined Action Steps (Improved Behavioral Control): verbalizes gratifying activity     Antonio spending day pacing in [place]-stops and talks to staff or peers briefly, but quickly resumes walking-1450 Antonio more socially engaged as day progressed-frustrated with continued stay, but maintained calm and voiced frustration appropriately-good sense of humor, often laughing and joking with staff and peers-mood is good-talking bout [place] of turning 41 in two days

## 2021-12-06 NOTE — PLAN OF CARE
Problem: Behavioral Health Plan of Care  Goal: Plan of Care Review  12/5/2021 2243 by Natacha Saini RN  Outcome: Improving  Flowsheets  Taken 12/5/2021 2243  Patient Agreement with Plan of Care: agrees with comment (describe)  Taken 12/5/2021 1800  Plan of Care Reviewed With: patient  Patient Agreement with Plan of Care: (Believes he should be discharged) agrees with comment (describe)    Problem: Mood Impairment (Manic or Hypomanic Signs/Symptoms)  Goal: Improved Mood Symptoms (Manic/Hypomanic Signs/Symptoms)  12/5/2021 2243 by Natacha Saini, RN  Outcome: Improving  Flowsheets (Taken 12/5/2021 2243)  Mutually Determined Action Steps (Improved Mood Symptoms):   acknowledges progress   engages in physical activity  Antonio active on unit throughout evening-initiates social interactions with others-smiling and joking with peers and staff-pleasant in interactions

## 2021-12-06 NOTE — PLAN OF CARE
Assessment/Intervention/Current Symptoms and Care Coordination    Attended team meeting and reviewed chart notes.    Writer spoke with REX Short regarding placement. Deja stated that someone from [facility] is interested in interviewing pt. Deja will pass along [facility]'s contact information.    Deja has been trying to find placement with [facility] however she is running into problems. Some [facility] facilities say they have too many vulnerable clients to admit Pt. She is meeting resistance from other [facility] that say he is too high functioning. Writer recommend she contact the supervisor as [facility] is suppose to find placement for the difficult clients.         Discharge Plan or Goal  To group home    Barriers to Discharge     Needs facility to accept pt    Referral Status  REX Short is actively making referrals to other facilities that might take pt.        Legal Status  Committed with Cardoza Order

## 2021-12-07 PROCEDURE — 250N000013 HC RX MED GY IP 250 OP 250 PS 637: Performed by: STUDENT IN AN ORGANIZED HEALTH CARE EDUCATION/TRAINING PROGRAM

## 2021-12-07 PROCEDURE — 124N000002 HC R&B MH UMMC

## 2021-12-07 PROCEDURE — 99231 SBSQ HOSP IP/OBS SF/LOW 25: CPT | Mod: GC | Performed by: PSYCHIATRY & NEUROLOGY

## 2021-12-07 RX ADMIN — NICOTINE POLACRILEX 2 MG: 2 GUM, CHEWING ORAL at 18:51

## 2021-12-07 RX ADMIN — NICOTINE POLACRILEX 2 MG: 2 GUM, CHEWING ORAL at 11:03

## 2021-12-07 RX ADMIN — Medication 25 MCG: at 18:50

## 2021-12-07 RX ADMIN — NICOTINE POLACRILEX 2 MG: 2 GUM, CHEWING ORAL at 09:17

## 2021-12-07 RX ADMIN — NICOTINE POLACRILEX 2 MG: 2 GUM, CHEWING ORAL at 13:44

## 2021-12-07 RX ADMIN — OLANZAPINE 40 MG: 20 TABLET, ORALLY DISINTEGRATING ORAL at 18:51

## 2021-12-07 RX ADMIN — NICOTINE POLACRILEX 2 MG: 2 GUM, CHEWING ORAL at 16:32

## 2021-12-07 ASSESSMENT — MIFFLIN-ST. JEOR: SCORE: 1959.08

## 2021-12-07 NOTE — PLAN OF CARE
Problem: Behavioral Disturbance  Goal: Behavioral Disturbance  Description: Signs and symptoms of listed problems will be absent or manageable by discharge or transition of care.  Outcome: Improved  Patient has been pacing most the shift. Eats meal in dining room. Denies depression and anxiety. Enjoyed going out to the [employer] yesterday. Has been pleasant. Spoke about birthday tomorrow.

## 2021-12-07 NOTE — PLAN OF CARE
"Nursing plan of care   Problem: Behavioral Health Plan of Care  Goal: Adheres to Safety Considerations for Self and Others  Outcome: Improving    Problem: Behavioral Health Plan of Care  Goal: Develops/Participates in Therapeutic [place] to Support Successful Transition  Outcome: Improving  Pt has been pacing in the hallway and intermittently sit to watch TV/Movie in the lounge; calm and pleasant; denied having mental health symptom of SI/SIB,AVH,HI, depression and anxiety; stated he is \"doing great and no concern\"; medication compliant; denied medication side effect; ate dinner; good appetite; will continue to monitor and assess.   "

## 2021-12-07 NOTE — PROGRESS NOTES
----------------------------------------------------------------------------------------------------------  [facility], [place]   Psychiatric Progress Note  Hospital Day #98    Identifier: Anderson Perez is a 40 year old male with a past psychiatric history of bipolar disorder with psychosis vs schizoaffective disorder bipolar type, and intellectual disability admitted from the ER on 08/31/2021 due to concern for out of control behaviors, aggression and psychosis. Patient is psychiatrically stable, medications optimized, awaiting placement.     Interim History:   The patient's care was discussed with the treatment team and chart notes were reviewed.    Vitals: VSS  Sleep: 6.75 hours (12/07/21 0554)  Scheduled medications: Taking all scheduled medications as prescribed  PRN medications: No psychiatric prns received    Interim events:   No events    Staff Report:  - No issues with sleep and appetite  - Pacing most of the shift  - Went to [facility]  - Pleasant, calm  - Social with peers and staff  - Denied SI/H/I/AH/VH  - No medical/behavior events overnight     Subjective:     Patient Interview:  Anderson Perez was interviewed while he was pacing down the hallway. He brightened up when asked about his visit to the [facility] yesterday. No SI/HI/AH/VH. Antonio denies any MH sxs or side effects from meds.    The risks, benefits, alternatives and side effects of any medication changes have been discussed and are understood by the patient and guardian.  Review of systems:   ROS negative except as stated above.    Objective:   /73 (BP Location: Left arm)   Pulse 84   Temp 98.3  F (36.8  C) (Tympanic)   Resp 16   Ht 1.829 m (6')   Wt 100.6 kg (221 lb 12.8 oz)   SpO2 96%   BMI 30.08 kg/m    Weight is 221 lbs 12.8 oz  Body mass index is 30.08 kg/m .    Mental Status Examination:    Oriented to:  Grossly oriented.  General: Awake and alert  Appearance:  appears older  "than stated age, Grooming is adequate, Dressed in hospital attire and Hair is shaved . Tattoos on neck.  Behavior: calm, pleasent, cooperative  Eye Contact: fair  Psychomotor: No abnormal motor symptoms appreciated. Somewhat restless. No catatonia present.  Speech:  appropriate volume/tone, spontaneous and with good articulation  Language: Fluent in English with appropriate syntax and vocabulary.  Mood:  \"I am fine\"  Affect:  Appropriate, blunted, restricted range, flat facial expression  Thought Process:  coherent, linear, logical and goal directed  Thought Content: No SI/HI/AH/VH; no delusions noted  Associations:  No loosening of associations appreciated  Insight:  Poor due to not acknowledging the reason for his admission  Judgment: Fair d/t cooperation with medication and care        Allergies:     Allergies   Allergen Reactions     Bee Venom Anaphylaxis     Clindamycin Hives     Morphine Hives        Labs:   None new.     Assessment    Primary psychiatric diagnosis:   Psychosis, Unspecified (Schizoaffective disorder vs Bipolar Affective Disorder with Psychosis)    Secondary psychiatric diagnoses of concern this admission:   Intellectual disability (IQ 63)    Diagnostic Impression: Anderson Perez is a 40 year old male with a past psychiatric history of bipolar disorder with psychosis vs schizoaffective disorder bipolar type, and intellectual disability admitted from the  ER on 08/31/2021 due to concern for out of control behaviors, aggression and psychosis in the context of medication refusal despite Cardoza. Patient was [facility] from [facility] to a group home. Presentation to the ED via ambulance after police contacted and provisional discharge rescinded. Group home noted escalating aggressive behavior, delusional thinking, and medication refusal despite Cardoza. PT refusal to discuss problems and largely denied all past history, medications, or any need for care. His reported symptoms of delusions, " psychosis, and erratic/aggressive behavior are consistent with prior presentations of psychosis vs schizoaffective disorder bipolar type.    Of note, patient had previous diagnosis of Antisocial Personality Disorder. As psychosis cleared during hospitalization, aggressive and antagonistic behavior resolved completely. It seems questionable that he has ASPD, and rather that previous behaviors were a result of psychosis and/or substance intoxication.    Psychiatric Hospital course: Anderson Perez was admitted to [facility] following revocation of provisional discharge and on a court hold. His PTA olanzapine 20mg PO QDaily was resumed, as pt had been refusing at group home; per conversation with guardian, had planned to start on GRIGGS, but does not think that this was actually started, as patient was declining medications, which prompted admission in setting of decompensation. Pt was converted to ODT formulation given concerns for cheeking medications; while initially resistant, pt was agreeable with taking ODT olanzapine as long as it was given with ginger ale, which was accommodated. Olanzapine dose increased to 40mg on 9/21 given limited efficacy at PTA dose. He was also started on a brief trail of propanolol for akathisia, but this was discontinued due to lack of improvement in pacing and patient attesting that a high level of physical activity was his baseline. Over time, he's become more sociable with peers and staff on the unit, and is spending more time sitting in the milieu as opposed to pacing the hallways. Patient has since been stable on current medications for some time, and continues to await placement.     Medical course: Anderson Perez was physically examined by the ED prior to being transferred to the unit and was found to be medically stable and appropriate for admission.     Plan   Principal Diagnosis:   # Psychosis, Unspecified (Schizoaffective disorder vs Bipolar Affective Disorder with  Psychosis)    Secondary psychiatric diagnoses of concern this admission:   # Intellectual disability    Psychotropic Medications:  Modify:  No changes    Continue Scheduled:   -Olanzapine ODT 40mg qPM    Continue PRN:  -Acetaminophen 650 mg Q6H PRN for pain and fever  -Hydroxyzine 25 mg Q6H PRN for anxiety  -Haldol 5mg prn q6h for agitation  -Haldol 5mg + diphenhydramine 50mg + lorazepam 2mg prn for severe agitation/psychosis  -Maalox 30 ml Q4H PRN for indigestion  -Miralax prn for constipation  -Nicotine gum 2 mg every hour as needed    Additional Plans:  -Patient will be treated in therapeutic milieu with appropriate individual and group therapies as described.  -Patient would be able to go to the [facility] as the staff on [facility] find appropriate, accompanied by a  and a [facility] staff member.    Medical diagnoses to be addressed this admission:    # Chronic Hep C  NTD    #Dry mouth  -Biotene started 10/25    #Foot blister/lesion, resolved  -Bacitracin ointment prn    Consults:   -Nutrition Consult: Extended hospital stay, considering adding snacks to diet    Legal Status: Cardoza  Committed - recommitted 11/1   Legal guardian    Safety Assessment:   Behavioral Orders   Procedures     Cheeking Precautions (behavioral units)     Code 1 - Restrict to Unit     Code 3     Patient is allowed to go to the [facility] accompanied by two staff members as frequently as every other day per staff availability     Routine Programming     As clinically indicated     Sexual precautions     Status 15     Every 15 minutes.     SIO: No    Disposition: Patient is stable, medications optimized. Pending placement. Please see CTC notes for more details.     This patient was seen and discussed with my attending physician.    Sammy Angel MD  PGY-1 Psychiatry    This patient has been seen and evaluated by me, Glenn Isaacs.  I have discussed this patient with the psychiatry resident and I agree with the  findings and plan in this note.    I have reviewed today's vital signs, medications, labs and imaging.     Glenn Adhikari MD on 12/7/2021 at 9:08 PM

## 2021-12-07 NOTE — PLAN OF CARE
Assessment/Intervention/Current Symptoms and Care Coordination    Attended team meeting and reviewed chart notes.    Deja GOODMAN CM from [facility] is making additional referrals to group homes as we wait for two facilities to interview pt.      Discharge Plan or Goal  To group home    Barriers to Discharge   Needs to be accepted to facility      Referral Status  In process        Legal Status  Pt is committed with Cardoza order

## 2021-12-07 NOTE — PLAN OF CARE
Pt appeared to sleep 6.75 hours. No PRNs given or requested. No medical or behavioral events this shift.      Problem: Sleep Disturbance  Goal: Adequate Sleep/Rest  12/7/2021 0639 by Leah Valente, RN  Outcome: No Change  12/7/2021 0623 by Leah Valente, RN  Outcome: No Change

## 2021-12-08 LAB — SARS-COV-2 RNA RESP QL NAA+PROBE: NEGATIVE

## 2021-12-08 PROCEDURE — 250N000013 HC RX MED GY IP 250 OP 250 PS 637: Performed by: STUDENT IN AN ORGANIZED HEALTH CARE EDUCATION/TRAINING PROGRAM

## 2021-12-08 PROCEDURE — U0003 INFECTIOUS AGENT DETECTION BY NUCLEIC ACID (DNA OR RNA); SEVERE ACUTE RESPIRATORY SYNDROME CORONAVIRUS 2 (SARS-COV-2) (CORONAVIRUS DISEASE [COVID-19]), AMPLIFIED PROBE TECHNIQUE, MAKING USE OF HIGH THROUGHPUT TECHNOLOGIES AS DESCRIBED BY CMS-2020-01-R: HCPCS

## 2021-12-08 PROCEDURE — 124N000002 HC R&B MH UMMC

## 2021-12-08 PROCEDURE — 99232 SBSQ HOSP IP/OBS MODERATE 35: CPT | Mod: GC | Performed by: PSYCHIATRY & NEUROLOGY

## 2021-12-08 RX ADMIN — NICOTINE POLACRILEX 2 MG: 2 GUM, CHEWING ORAL at 18:14

## 2021-12-08 RX ADMIN — NICOTINE POLACRILEX 2 MG: 2 GUM, CHEWING ORAL at 08:32

## 2021-12-08 RX ADMIN — Medication 25 MCG: at 18:14

## 2021-12-08 RX ADMIN — NICOTINE POLACRILEX 2 MG: 2 GUM, CHEWING ORAL at 10:26

## 2021-12-08 RX ADMIN — OLANZAPINE 40 MG: 20 TABLET, ORALLY DISINTEGRATING ORAL at 18:13

## 2021-12-08 RX ADMIN — NICOTINE POLACRILEX 2 MG: 2 GUM, CHEWING ORAL at 14:40

## 2021-12-08 RX ADMIN — NICOTINE POLACRILEX 2 MG: 2 GUM, CHEWING ORAL at 12:00

## 2021-12-08 RX ADMIN — NICOTINE POLACRILEX 2 MG: 2 GUM, CHEWING ORAL at 21:19

## 2021-12-08 NOTE — PLAN OF CARE
Problem: Behavioral Health Plan of Care  Goal: Plan of Care Review  Outcome: Improving  Flowsheets (Taken 12/8/2021 1240)  Plan of Care Reviewed With: patient  Progress: improving  Patient Agreement with Plan of Care: (Agrees with tx, but believes he should be discharged) agrees with comment (describe)     Problem: Mood Impairment (Manic or Hypomanic Signs/Symptoms)  Goal: Improved Mood Symptoms (Manic/Hypomanic Signs/Symptoms)  Outcome: Improving  Flowsheets (Taken 12/8/2021 1240)  Mutually Determined Action Steps (Improved Mood Symptoms):    engages in physical activity    acknowledges progress     Problem: Social Isolation  Goal: Increased Social Interaction  Outcome: Improving     Antonio active on unit throughout day-walk to [place] [address] -escorted by two staff and security-enjoyed being outside-continues pleasant in interactions-appears to enjoy brief social interactions with staff and peers-no overt delusional statements this shift-agrees to routine Covid 19 swab

## 2021-12-08 NOTE — PROGRESS NOTES
"    ----------------------------------------------------------------------------------------------------------  [facility], [place]   Psychiatric Progress Note  Hospital Day #99    Identifier: Andesron Perez is a 40 year old male with a past psychiatric history of bipolar disorder with psychosis vs schizoaffective disorder bipolar type, and intellectual disability admitted from the ER on 08/31/2021 due to concern for out of control behaviors, aggression and psychosis. Patient is psychiatrically stable, medications optimized, awaiting placement.     Interim History:   The patient's care was discussed with the treatment team and chart notes were reviewed.    Vitals: VSS  Sleep: 7 hours (12/08/21 0556)  Scheduled medications: Taking all scheduled medications as prescribed  PRN medications: No psychiatric prns received    Interim events:   No events    Staff Report:  - Medication compliant   - Spoke with ex-gf and son over the phone  - No issues with sleep and appetite  - Pacing most of the shift  - Pleasant, calm, appropriate  - Denied SI/H/I/AH/VH  - No medical/behavior events overnight     Subjective:     Patient Interview:  Anderson Perez was interviewed while he was pacing down the hallway. He described his mood as \"Good\". He asked if there has been any update on the upcoming interviews from the . No SI/HI/AH/VH. Antonio denies any MH sxs or side effects from meds.    The risks, benefits, alternatives and side effects of any medication changes have been discussed and are understood by the patient and guardian.  Review of systems:   ROS negative except as stated above.    Objective:   /81 (BP Location: Left arm)   Pulse 84   Temp 97.4  F (36.3  C) (Tympanic)   Resp 14   Ht 1.829 m (6')   Wt 100.6 kg (221 lb 12.8 oz)   SpO2 96%   BMI 30.08 kg/m    Weight is 221 lbs 12.8 oz  Body mass index is 30.08 kg/m .    Mental Status Examination:    Oriented to:  Grossly " "oriented.  General: Awake and alert  Appearance:  appears older than stated age, Grooming is adequate, Dressed in hospital attire and Hair is shaved . Tattoos on neck.  Behavior: calm, pleasent  Eye Contact: fair  Psychomotor: Pacing the hallway, No abnormal motor symptoms appreciated. Somewhat restless. No catatonia present.  Speech:  appropriate volume/tone, spontaneous and with good articulation  Language: Fluent in English with appropriate syntax and vocabulary.  Mood:  \"I am doing good\"  Affect:  Appropriate, blunted, restricted range, flat facial expression  Thought Process:  coherent, linear, logical and goal directed  Thought Content: No SI/HI/AH/VH; no delusions noted  Associations:  No loosening of associations appreciated  Insight:  Poor due to not acknowledging the reason for his admission  Judgment: Fair d/t cooperation with medication and care        Allergies:     Allergies   Allergen Reactions     Bee Venom Anaphylaxis     Clindamycin Hives     Morphine Hives        Labs:   None new.     Assessment    Primary psychiatric diagnosis:   Psychosis, Unspecified (Schizoaffective disorder vs Bipolar Affective Disorder with Psychosis)    Secondary psychiatric diagnoses of concern this admission:   Intellectual disability (IQ 63)    Diagnostic Impression: Anderson Perez is a 40 year old male with a past psychiatric history of bipolar disorder with psychosis vs schizoaffective disorder bipolar type, and intellectual disability admitted from the  ER on 08/31/2021 due to concern for out of control behaviors, aggression and psychosis in the context of medication refusal despite Cardoza. Patient was recently released from [facility] to a group home. Presentation to the ED via ambulance after police contacted and provisional discharge rescinded. Group home noted escalating aggressive behavior, delusional thinking, and medication refusal despite Cardoza. PT refusal to discuss problems and largely denied all past " history, medications, or any need for care. His reported symptoms of delusions, psychosis, and erratic/aggressive behavior are consistent with prior presentations of psychosis vs schizoaffective disorder bipolar type.    Of note, patient had previous diagnosis of Antisocial Personality Disorder. As psychosis cleared during hospitalization, aggressive and antagonistic behavior resolved completely. It seems questionable that he has ASPD, and rather that previous behaviors were a result of psychosis and/or substance intoxication.    Psychiatric Hospital course: Anderson Perez was admitted to [facility] following revocation of provisional discharge and on a court hold. His PTA olanzapine 20mg PO QDaily was resumed, as pt had been refusing at group home; per conversation with guardian, had planned to start on GRIGGS, but does not think that this was actually started, as patient was declining medications, which prompted admission in setting of decompensation. Pt was converted to ODT formulation given concerns for cheeking medications; while initially resistant, pt was agreeable with taking ODT olanzapine as long as it was given with ginger ale, which was accommodated. Olanzapine dose increased to 40mg on 9/21 given limited efficacy at PTA dose. He was also started on a brief trail of propanolol for akathisia, but this was discontinued due to lack of improvement in pacing and patient attesting that a high level of physical activity was his baseline. Over time, he's become more sociable with peers and staff on the unit, and is spending more time sitting in the milieu as opposed to pacing the hallways. Patient has since been stable on current medications for some time, and continues to await placement.     Medical course: Anderson Perez was physically examined by the ED prior to being transferred to the unit and was found to be medically stable and appropriate for admission.     Plan   Principal Diagnosis:   # Psychosis,  Unspecified (Schizoaffective disorder vs Bipolar Affective Disorder with Psychosis)    Secondary psychiatric diagnoses of concern this admission:   # Intellectual disability    Psychotropic Medications:  Modify:  No changes    Continue Scheduled:   -Olanzapine ODT 40mg qPM    Continue PRN:  -Acetaminophen 650 mg Q6H PRN for pain and fever  -Hydroxyzine 25 mg Q6H PRN for anxiety  -Haldol 5mg prn q6h for agitation  -Haldol 5mg + diphenhydramine 50mg + lorazepam 2mg prn for severe agitation/psychosis  -Maalox 30 ml Q4H PRN for indigestion  -Miralax prn for constipation  -Nicotine gum 2 mg every hour as needed    Additional Plans:  -Patient will be treated in therapeutic milieu with appropriate individual and group therapies as described.  -Patient would be able to go to the [facility] as the staff on station 22 find appropriate, accompanied by a  and a station 22 staff member.  -Weekly COVID test ordered on 12/8/2021    Medical diagnoses to be addressed this admission:    # Chronic Hep C  NTD    #Dry mouth  -Biotene started 10/25    #Foot blister/lesion, resolved  -Bacitracin ointment prn    Consults:   -Nutrition Consult: Extended hospital stay, considering adding snacks to diet    Legal Status: Cardoza  Committed - recommitted 11/1   Legal guardian    Safety Assessment:   Behavioral Orders   Procedures     Cheeking Precautions (behavioral units)     Code 1 - Restrict to Unit     Code 3     Patient is allowed to go to the [facility] accompanied by two staff members as frequently as every other day per staff availability     Routine Programming     As clinically indicated     Sexual precautions     Status 15     Every 15 minutes.     SIO: No    Disposition: Patient is stable, medications optimized. Pending placement. Please see CTC notes for more details.     This patient was seen and discussed with my attending physician.    Sammy Angel MD  PGY-1 Psychiatry    This patient has been seen and  evaluated by me, Glenn Isaacs.  I have discussed this patient with the psychiatry resident and I agree with the findings and plan in this note.    I have reviewed today's vital signs, medications, labs and imaging.     Glenn Adhikari MD on 12/8/2021 at 10:12 PM

## 2021-12-08 NOTE — PROGRESS NOTES
Patient's ex-girlfriend Mila MARTINEZ called in twice before dinner as she wanted to Skype Antonio for his birthday with their son. Writer spoke with Mila one of the times and told her this would need to be approved with a doctor as well as with the Patient. Writer consulted with RN and RN to follow up with Patient. Per RN the patient will contact Mila themselves by tomorrow.

## 2021-12-08 NOTE — PLAN OF CARE
Assessment/Intervention/Current Symptoms and Care Coordination    Attended team meeting and reviewed chart notes.    Pt was able to go to the [facility] with staff and security. He enjoyed the time outside.    Still waiting for group homes to contact writer to set up interviews. According to [facility] there are two group homes interested in accepting pt.      Discharge Plan or Goal  To group home    Barriers to Discharge   Needs to be accepted to group home      Referral Status  In process    Waiting for interviews        Legal Status  Committed with Cardoza order

## 2021-12-09 PROCEDURE — 124N000002 HC R&B MH UMMC

## 2021-12-09 PROCEDURE — 250N000013 HC RX MED GY IP 250 OP 250 PS 637: Performed by: STUDENT IN AN ORGANIZED HEALTH CARE EDUCATION/TRAINING PROGRAM

## 2021-12-09 PROCEDURE — 99232 SBSQ HOSP IP/OBS MODERATE 35: CPT | Mod: GC | Performed by: PSYCHIATRY & NEUROLOGY

## 2021-12-09 RX ADMIN — NICOTINE POLACRILEX 2 MG: 2 GUM, CHEWING ORAL at 18:35

## 2021-12-09 RX ADMIN — NICOTINE POLACRILEX 2 MG: 2 GUM, CHEWING ORAL at 20:00

## 2021-12-09 RX ADMIN — NICOTINE POLACRILEX 2 MG: 2 GUM, CHEWING ORAL at 10:59

## 2021-12-09 RX ADMIN — Medication 1 SPRAY: at 10:59

## 2021-12-09 RX ADMIN — Medication 25 MCG: at 18:35

## 2021-12-09 RX ADMIN — NICOTINE POLACRILEX 2 MG: 2 GUM, CHEWING ORAL at 12:03

## 2021-12-09 RX ADMIN — NICOTINE POLACRILEX 2 MG: 2 GUM, CHEWING ORAL at 16:21

## 2021-12-09 RX ADMIN — NICOTINE POLACRILEX 2 MG: 2 GUM, CHEWING ORAL at 09:02

## 2021-12-09 RX ADMIN — NICOTINE POLACRILEX 2 MG: 2 GUM, CHEWING ORAL at 13:27

## 2021-12-09 RX ADMIN — OLANZAPINE 40 MG: 20 TABLET, ORALLY DISINTEGRATING ORAL at 18:35

## 2021-12-09 ASSESSMENT — MIFFLIN-ST. JEOR: SCORE: 1956.35

## 2021-12-09 NOTE — PLAN OF CARE
Nursing Plan of care   Problem: Behavioral Health Plan of Care  Goal: Adheres to Safety Considerations for Self and Others  Outcome: Improving    Problem: Behavioral Health Plan of Care  Goal: Develops/Participates in Therapeutic [place] to Support Successful Transition  Outcome: Improving    Pt was active and visible in the milieu;  paced in the hallway, watched movie in the lounge and received phone call from girl friend, Mila and pt spoke to his son and his son recited A-Z, which made the pt excited; Pt reported that he had a great day of Birthday celebration. Ate diner and snack; reported no issue with appetite and asleep; medication compliant; denied medication side effect; will continue to monitor and assess.

## 2021-12-09 NOTE — PROGRESS NOTES
"    ----------------------------------------------------------------------------------------------------------  [facility], [place]   Psychiatric Progress Note  Hospital Day #100    Identifier: Anderson Perez is a 40 year old male with a past psychiatric history of bipolar disorder with psychosis vs schizoaffective disorder bipolar type, and intellectual disability admitted from the ER on 08/31/2021 due to concern for out of control behaviors, aggression and psychosis. Patient is psychiatrically stable, medications optimized, awaiting placement.     Interim History:   The patient's care was discussed with the treatment team and chart notes were reviewed.    Vitals: VSS  Sleep: 7 hours (12/09/21 0600)  Scheduled medications: Taking all scheduled medications as prescribed  PRN medications: No psychiatric prns received    Interim events:   No events    Staff Report:  - Medication compliant   - Enjoyed walk to [place] escorted by two staff and security.  - Watched a movie in the common area last night.  - Covid test returned negative.   - Spoke with ex-gf and son over the phone  - No issues with sleep and appetite  - Pacing most of the shift  - Pleasant, calm, appropriate  - Denied SI/H/I/AH/VH  - No medical/behavior events overnight     Subjective:     Patient Interview:   Anderson Perez was interviewed as walking down the hallway. He described his mood as \"Great and hopeful\". He asked if there has been any update on the upcoming interviews from the . Writer explained that sw has been in touch with  and we will communicate any updates regarding placement. No SI/HI/AH/VH. Antonio denies any MH sxs or side effects from meds.    The risks, benefits, alternatives and side effects of any medication changes have been discussed and are understood by the patient and guardian.  Review of systems:   ROS negative except as stated above.    Objective:   /74   Pulse 85   Temp 97.9  F " "(36.6  C) (Oral)   Resp 14   Ht 1.829 m (6')   Wt 100.6 kg (221 lb 12.8 oz)   SpO2 98%   BMI 30.08 kg/m    Weight is 221 lbs 12.8 oz  Body mass index is 30.08 kg/m .    Mental Status Examination:    Oriented to:  Grossly oriented.  General: Awake and alert  Appearance:  appears older than stated age, Grooming is adequate, Dressed in hospital attire and Hair is shaved . Tattoos on neck.  Behavior: calm, pleasant, grateful  Eye Contact: fair  Psychomotor: Pacing the hallway, No abnormal motor symptoms appreciated. Somewhat restless. No catatonia present.  Speech:  appropriate volume/tone, spontaneous and with good articulation  Language: Fluent in English with appropriate syntax and vocabulary.  Mood:  \"I am great\"  Affect:  Appropriate, blunted, restricted range, flat facial expression  Thought Process:  coherent, linear, logical and goal directed  Thought Content: No SI/HI/AH/VH; no delusions noted  Associations:  No loosening of associations appreciated  Insight:  Poor due to not acknowledging the reason for his admission  Judgment: Fair d/t cooperation with medication and care        Allergies:     Allergies   Allergen Reactions     Bee Venom Anaphylaxis     Clindamycin Hives     Morphine Hives        Labs:   None new.     Assessment    Primary psychiatric diagnosis:   Psychosis, Unspecified (Schizoaffective disorder vs Bipolar Affective Disorder with Psychosis)    Secondary psychiatric diagnoses of concern this admission:   Intellectual disability (IQ 63)    Diagnostic Impression: Anderson Perez is a 40 year old male with a past psychiatric history of bipolar disorder with psychosis vs schizoaffective disorder bipolar type, and intellectual disability admitted from the  ER on 08/31/2021 due to concern for out of control behaviors, aggression and psychosis in the context of medication refusal despite Cardoza. Patient was recently released from [facility] to a group home. Presentation to the ED via ambulance " after police contacted and provisional discharge rescinded. Group home noted escalating aggressive behavior, delusional thinking, and medication refusal despite Cardoza. PT refusal to discuss problems and largely denied all past history, medications, or any need for care. His reported symptoms of delusions, psychosis, and erratic/aggressive behavior are consistent with prior presentations of psychosis vs schizoaffective disorder bipolar type.    Of note, patient had previous diagnosis of Antisocial Personality Disorder. As psychosis cleared during hospitalization, aggressive and antagonistic behavior resolved completely. It seems questionable that he has ASPD, and rather that previous behaviors were a result of psychosis and/or substance intoxication.    Psychiatric Hospital course: Anderson Perez was admitted to [facility] following revocation of provisional discharge and on a court hold. His PTA olanzapine 20mg PO QDaily was resumed, as pt had been refusing at group home; per conversation with guardian, had planned to start on GRIGGS, but does not think that this was actually started, as patient was declining medications, which prompted admission in setting of decompensation. Pt was converted to ODT formulation given concerns for cheeking medications; while initially resistant, pt was agreeable with taking ODT olanzapine as long as it was given with ginger ale, which was accommodated. Olanzapine dose increased to 40mg on 9/21 given limited efficacy at PTA dose. He was also started on a brief trail of propanolol for akathisia, but this was discontinued due to lack of improvement in pacing and patient attesting that a high level of physical activity was his baseline. Over time, he's become more sociable with peers and staff on the unit, and is spending more time sitting in the milieu as opposed to pacing the hallways. Patient has since been stable on current medications for some time, and continues to await  placement.     Medical course: Anderson Perez was physically examined by the ED prior to being transferred to the unit and was found to be medically stable and appropriate for admission.     Plan   Principal Diagnosis:   # Psychosis, Unspecified (Schizoaffective disorder vs Bipolar Affective Disorder with Psychosis)    Secondary psychiatric diagnoses of concern this admission:   # Intellectual disability    Psychotropic Medications:  Modify:  No changes    Continue Scheduled:   -Olanzapine ODT 40mg qPM    Continue PRN:  -Acetaminophen 650 mg Q6H PRN for pain and fever  -Hydroxyzine 25 mg Q6H PRN for anxiety  -Haldol 5mg prn q6h for agitation  -Haldol 5mg + diphenhydramine 50mg + lorazepam 2mg prn for severe agitation/psychosis  -Maalox 30 ml Q4H PRN for indigestion  -Miralax prn for constipation  -Nicotine gum 2 mg every hour as needed    Additional Plans:  -Patient will be treated in therapeutic milieu with appropriate individual and group therapies as described.  -Patient would be able to go to the [employer] [facility] as the staff on station 22 find appropriate, accompanied by a  and a station 22 staff member.  -Weekly COVID test ordered on 12/8/2021    Medical diagnoses to be addressed this admission:    # Chronic Hep C  NTD    #Dry mouth  -Biotene started 10/25    #Foot blister/lesion, resolved  -Bacitracin ointment prn    Consults:   -Nutrition Consult: Extended hospital stay, considering adding snacks to diet    Legal Status: Cardoza  Committed - recommitted 11/1   Legal guardian    Safety Assessment:   Behavioral Orders   Procedures     Code 1 - Restrict to Unit     Code 3     Patient is allowed to go to the [employer] accompanied by two staff members as frequently as every other day per staff availability     Routine Programming     As clinically indicated     Status 15     Every 15 minutes.     SIO: No    Disposition: Patient is stable, medications optimized. Pending placement. Please see CTC  notes for more details.     This patient was seen and discussed with my attending physician.    Sammy Angel MD  PGY-1 Psychiatry    This patient has been seen and evaluated by me, Glenn Isaacs.  I have discussed this patient with the psychiatry resident and I agree with the findings and plan in this note.    I have reviewed today's vital signs, medications, labs and imaging.     Glenn Adhikari MD on 12/9/2021 at 9:54 PM

## 2021-12-09 NOTE — PLAN OF CARE
Plan of care   Problem: Sleep Disturbance  Goal: Adequate Sleep/Rest  Outcome: Improving   Pt appeared to have slept for 7 hrs; respiration was quit and regular; no safety concern to report; will continue to monitor and assess.

## 2021-12-09 NOTE — PLAN OF CARE
Assessment/Intervention/Current Symptoms and Care Coordination    Attended team meeting and reviewed chart notes.    Pt continues to pace the halls. He is pleasant and cooperative with meds and unit rules.    Writer called guardian Daylin with an update.          Discharge Plan or Goal  To group home      Barriers to Discharge   Needs group home to accept him      Referral Status  In process        Legal Status  Committed with Cardoza order

## 2021-12-09 NOTE — PLAN OF CARE
Problem: Behavioral Health Plan of Care  Goal: Plan of Care Review  Outcome: Improving  Flowsheets (Taken 12/9/2021 1119)  Plan of Care Reviewed With: patient  Progress: improving  Patient Agreement with Plan of Care: agrees     Problem: Mood Impairment (Manic or Hypomanic Signs/Symptoms)  Goal: Improved Mood Symptoms (Manic/Hypomanic Signs/Symptoms)  Outcome: Improving  Flowsheets (Taken 12/9/2021 1119)  Mutually Determined Action Steps (Improved Mood Symptoms):    acknowledges progress    engages in physical activity     Problem: Behavior Regulation Impairment (Psychotic Signs/Symptoms)  Goal: Improved Behavioral Control (Psychotic Signs/Symptoms)  Outcome: Improving  Flowsheets (Taken 12/9/2021 1119)  Mutually Determined Action Steps (Improved Behavioral Control): identifies future-oriented goal     Anderson active on unit-social with staff and select peers-continues to decline grps, but spontaneous, appropriate and pleasant in interactions with others-most conversation about general interest topics-appropriately supportive of others-[id number] Biotene spray for dry mouth-1420 Antonio continued active on unit throughout day-joking with others-walking and talking with peer in the dalal

## 2021-12-10 PROCEDURE — 99232 SBSQ HOSP IP/OBS MODERATE 35: CPT | Mod: GC | Performed by: PSYCHIATRY & NEUROLOGY

## 2021-12-10 PROCEDURE — 124N000002 HC R&B MH UMMC

## 2021-12-10 PROCEDURE — 250N000013 HC RX MED GY IP 250 OP 250 PS 637: Performed by: STUDENT IN AN ORGANIZED HEALTH CARE EDUCATION/TRAINING PROGRAM

## 2021-12-10 PROCEDURE — 250N000013 HC RX MED GY IP 250 OP 250 PS 637

## 2021-12-10 RX ADMIN — HYDROXYZINE HYDROCHLORIDE 25 MG: 25 TABLET, FILM COATED ORAL at 15:53

## 2021-12-10 RX ADMIN — NICOTINE POLACRILEX 2 MG: 2 GUM, CHEWING ORAL at 13:28

## 2021-12-10 RX ADMIN — NICOTINE POLACRILEX 2 MG: 2 GUM, CHEWING ORAL at 17:47

## 2021-12-10 RX ADMIN — Medication 25 MCG: at 18:38

## 2021-12-10 RX ADMIN — NICOTINE POLACRILEX 2 MG: 2 GUM, CHEWING ORAL at 10:12

## 2021-12-10 RX ADMIN — NICOTINE POLACRILEX 2 MG: 2 GUM, CHEWING ORAL at 16:38

## 2021-12-10 RX ADMIN — NICOTINE POLACRILEX 2 MG: 2 GUM, CHEWING ORAL at 14:17

## 2021-12-10 RX ADMIN — NICOTINE POLACRILEX 2 MG: 2 GUM, CHEWING ORAL at 19:53

## 2021-12-10 RX ADMIN — NICOTINE POLACRILEX 2 MG: 2 GUM, CHEWING ORAL at 12:07

## 2021-12-10 RX ADMIN — NICOTINE POLACRILEX 2 MG: 2 GUM, CHEWING ORAL at 15:19

## 2021-12-10 RX ADMIN — OLANZAPINE 40 MG: 20 TABLET, ORALLY DISINTEGRATING ORAL at 18:37

## 2021-12-10 ASSESSMENT — ACTIVITIES OF DAILY LIVING (ADL)
ORAL_HYGIENE: INDEPENDENT
HYGIENE/GROOMING: INDEPENDENT
LAUNDRY: WITH SUPERVISION
HYGIENE/GROOMING: INDEPENDENT
LAUNDRY: WITH SUPERVISION
DRESS: INDEPENDENT
ORAL_HYGIENE: INDEPENDENT
DRESS: INDEPENDENT

## 2021-12-10 NOTE — PLAN OF CARE
"Patient remain alert and oriented x 4, able to communicate needs and denies pain/discomfort at present time. Refuse shower when it was offer by writer, continued to pace on the unit which he said help him to calm down and relax. Patient stated\" I'm antisocial so attending group or socializing with other people is very hard for me, I prefer being alone and I'm ready to leave this place\". Writer encourage patient to tried and participate in groups. Rated anxiety 7/10 and depression 2/10 prn hydroxyzine 25 mg given with good relief later, also had prn nicotine gums. Patient has no plan for today but promise to have a quiet night, medication compliant. Denies SI/SIB/HI and hallucination.  "

## 2021-12-10 NOTE — PLAN OF CARE
"Nursing Plan of care   Problem: Behavioral Health Plan of Care  Goal: Adheres to Safety Considerations for Self and Others  Outcome: Improving    Problem: Behavioral Health Plan of Care  Goal: Develops/Participates in Therapeutic [place] to Support Successful Transition  Outcome: Improving  Pt has been active and visible in the milieu; paced in the hallway most of the shift; encouraged to attend group but didn't follow through; pleasant and social with peers; talked about his 3 boys, being 40 yrs old and the feeling comes with it. Denied issue with sleep and appetite; medication compliant; denied medication side effect; denied SI/SIB,AVH, depression and appetite \" everything is good\" ; took no shower; will continue to monitor and assess.     "

## 2021-12-10 NOTE — PROGRESS NOTES
----------------------------------------------------------------------------------------------------------  [facility], [place]   Psychiatric Progress Note  Hospital Day #101    Identifier: Anderson Perez is a 40 year old male with a past psychiatric history of bipolar disorder with psychosis vs schizoaffective disorder bipolar type, and intellectual disability admitted from the ER on 08/31/2021 due to concern for out of control behaviors, aggression and psychosis. Patient is psychiatrically stable, medications optimized, awaiting placement.     Interim History:   The patient's care was discussed with the treatment team and chart notes were reviewed.    Vitals: VSS  Sleep: 7 hours (12/10/21 0500)  Scheduled medications: Taking all scheduled medications as prescribed  PRN medications: No psychiatric prns received    Interim events:   No events    Staff Report:  - Medication compliant   - No issues with sleep and appetite  - Pacing most of the shift  - Pleasant, calm, appropriate  - Denied SI/H/I/AH/VH  - Biotene spray for dry mouth  - No medical/behavior events overnight     Subjective:     Patient Interview:  Anderson Perez was interviewed in the common area as he was having a morning snack. He denies any MH sxs and denied any medication side effects besides dry mouth. He has been using the Biotene spray which has been effective. No SI/HI/AH/VH. He was updated on his placement status and his application for MS. His questions were answered.    The risks, benefits, alternatives and side effects of any medication changes have been discussed and are understood by the patient and guardian.  Review of systems:   ROS negative except as stated above.    Objective:   /77 (BP Location: Right arm, Patient Position: Sitting)   Pulse 85   Temp 97  F (36.1  C) (Tympanic)   Resp 12   Ht 1.829 m (6')   Wt 100.8 kg (222 lb 4.8 oz)   SpO2 97%   BMI 30.15 kg/m    Weight is 222 lbs  "4.8 oz  Body mass index is 30.15 kg/m .    Mental Status Examination:    Oriented to:  Grossly oriented.  General: Awake and alert  Appearance:  appears older than stated age, Grooming is adequate, Dressed in hospital attire and Hair is shaved . Tattoos on neck.  Behavior: calm, pleasant, grateful  Eye Contact: fair  Psychomotor: No abnormal motor symptoms appreciated. Somewhat restless. No catatonia present.  Speech:  appropriate volume/tone, spontaneous and with good articulation  Language: Fluent in English with appropriate syntax and vocabulary.  Mood:  \"Good\"  Affect:  Appropriate, blunted, restricted range, flat facial expression  Thought Process:  coherent, linear, logical and goal directed  Thought Content: No SI/HI/AH/VH; no delusions noted  Associations:  No loosening of associations appreciated  Insight:  Poor due to not acknowledging the reason for his admission  Judgment: Fair d/t cooperation with medication and care        Allergies:     Allergies   Allergen Reactions     Bee Venom Anaphylaxis     Clindamycin Hives     Morphine Hives        Labs:   None new.     Assessment    Primary psychiatric diagnosis:   Psychosis, Unspecified (Schizoaffective disorder vs Bipolar Affective Disorder with Psychosis)    Secondary psychiatric diagnoses of concern this admission:   Intellectual disability (IQ 63)    Diagnostic Impression: Anderson Perez is a 40 year old male with a past psychiatric history of bipolar disorder with psychosis vs schizoaffective disorder bipolar type, and intellectual disability admitted from the  ER on 08/31/2021 due to concern for out of control behaviors, aggression and psychosis in the context of medication refusal despite Cardoza. Patient was recently released from [facility] to a group home. Presentation to the ED via ambulance after police contacted and provisional discharge rescinded. Group home noted escalating aggressive behavior, delusional thinking, and medication refusal despite " Sony. PT refusal to discuss problems and largely denied all past history, medications, or any need for care. His reported symptoms of delusions, psychosis, and erratic/aggressive behavior are consistent with prior presentations of psychosis vs schizoaffective disorder bipolar type.    Of note, patient had previous diagnosis of Antisocial Personality Disorder. As psychosis cleared during hospitalization, aggressive and antagonistic behavior resolved completely. It seems questionable that he has ASPD, and rather that previous behaviors were a result of psychosis and/or substance intoxication.    Psychiatric Hospital course: Anderson Perez was admitted to [facility] following revocation of provisional discharge and on a court hold. His PTA olanzapine 20mg PO QDaily was resumed, as pt had been refusing at group home; per conversation with guardian, had planned to start on GRIGGS, but does not think that this was actually started, as patient was declining medications, which prompted admission in setting of decompensation. Pt was converted to ODT formulation given concerns for cheeking medications; while initially resistant, pt was agreeable with taking ODT olanzapine as long as it was given with ginger ale, which was accommodated. Olanzapine dose increased to 40mg on 9/21 given limited efficacy at PTA dose. He was also started on a brief trail of propanolol for akathisia, but this was discontinued due to lack of improvement in pacing and patient attesting that a high level of physical activity was his baseline. Over time, he's become more sociable with peers and staff on the unit, and is spending more time sitting in the milieu as opposed to pacing the hallways. Patient has since been stable on current medications for some time, and continues to await placement.     Medical course: Anderson Perez was physically examined by the ED prior to being transferred to the unit and was found to be medically stable and  appropriate for admission.     Plan   Principal Diagnosis:   # Psychosis, Unspecified (Schizoaffective disorder vs Bipolar Affective Disorder with Psychosis)    Secondary psychiatric diagnoses of concern this admission:   # Intellectual disability    Psychotropic Medications:  Modify:  No changes    Continue Scheduled:   -Olanzapine ODT 40mg qPM    Continue PRN:  -Acetaminophen 650 mg Q6H PRN for pain and fever  -Hydroxyzine 25 mg Q6H PRN for anxiety  -Haldol 5mg prn q6h for agitation  -Haldol 5mg + diphenhydramine 50mg + lorazepam 2mg prn for severe agitation/psychosis  -Maalox 30 ml Q4H PRN for indigestion  -Miralax prn for constipation  -Nicotine gum 2 mg every hour as needed    Additional Plans:  -Patient will be treated in therapeutic milieu with appropriate individual and group therapies as described.  -Patient would be able to go to the [facility] as the staff on station 22 find appropriate, accompanied by a  and a station 22 staff member.  -Weekly COVID test ordered on 12/8/2021    Medical diagnoses to be addressed this admission:    # Chronic Hep C  NTD    #Dry mouth  -Biotene started 10/25    #Foot blister/lesion, resolved  -Bacitracin ointment prn    Consults:   -Nutrition Consult: Extended hospital stay, considering adding snacks to diet    Legal Status: Cardoza  Committed - recommitted 11/1   Legal guardian    Safety Assessment:   Behavioral Orders   Procedures     Code 1 - Restrict to Unit     Code 3     Patient is allowed to go to the [facility] accompanied by two staff members as frequently as every other day per staff availability     Routine Programming     As clinically indicated     Status 15     Every 15 minutes.     SIO: No    Disposition: Patient is stable, medications optimized. Pending placement. Please see [facility] notes for more details.     This patient was seen and discussed with my attending physician.    Sammy Angel MD  PGY-1 Psychiatry    This patient has been seen and  evaluated by me, Glenn Isaacs.  I have discussed this patient with the psychiatry resident and I agree with the findings and plan in this note.    I have reviewed today's vital signs, medications, labs and imaging.     Glenn Adhikari MD on 12/12/2021 at 2:06 PM

## 2021-12-10 NOTE — PLAN OF CARE
Plan of care   Problem: Sleep Disturbance  Goal: Adequate Sleep/Rest  Outcome: Improving   No safety concern overnight; respiration was regular; No PRN or snack requested. Pt appeared to have slept for 7 hrs; will continue to monitor and assess.

## 2021-12-10 NOTE — PLAN OF CARE
Problem: Behavioral Health Plan of Care  Goal: Plan of Care Review  Outcome: Improving  Flowsheets (Taken 12/10/2021 1039)  Plan of Care Reviewed With: patient  Progress: improving     Problem: Mood Impairment (Manic or Hypomanic Signs/Symptoms)  Goal: Improved Mood Symptoms (Manic/Hypomanic Signs/Symptoms)  Outcome: Improving  Flowsheets (Taken 12/10/2021 1039)  Mutually Determined Action Steps (Improved Mood Symptoms):    acknowledges progress    engages in physical activity     Problem: Behavior Regulation Impairment (Psychotic Signs/Symptoms)  Goal: Improved Behavioral Control (Psychotic Signs/Symptoms)  Outcome: Improving  Flowsheets (Taken 12/10/2021 1039)  Mutually Determined Action Steps (Improved Behavioral Control): identifies future-oriented goal    Antonio slept until 0945-states he stayed up late to watch football game-enthusiastically described high lights of game-continues social with staff and select peers-pleasant in interactions-declines grp

## 2021-12-10 NOTE — PLAN OF CARE
Assessment/Intervention/Current Symptoms and Care Coordination    Attended team meeting and reviewed chart notes.    Writer requested the number to [facility] from the [facility] to discuss why pt has been denied admission.    Pt's guardian will visit pt on Tuesday December 14th at 11:00 to bring coat, boots and other items to pt.        Discharge Plan or Goal  To group home      Barriers to Discharge   Pt needs acceptance      Referral Status    In process      Legal Status  Committed with Cardoza order

## 2021-12-11 PROCEDURE — 124N000002 HC R&B MH UMMC

## 2021-12-11 PROCEDURE — 250N000013 HC RX MED GY IP 250 OP 250 PS 637: Performed by: STUDENT IN AN ORGANIZED HEALTH CARE EDUCATION/TRAINING PROGRAM

## 2021-12-11 RX ADMIN — NICOTINE POLACRILEX 2 MG: 2 GUM, CHEWING ORAL at 18:34

## 2021-12-11 RX ADMIN — NICOTINE POLACRILEX 2 MG: 2 GUM, CHEWING ORAL at 11:32

## 2021-12-11 RX ADMIN — NICOTINE POLACRILEX 2 MG: 2 GUM, CHEWING ORAL at 15:58

## 2021-12-11 RX ADMIN — OLANZAPINE 40 MG: 20 TABLET, ORALLY DISINTEGRATING ORAL at 18:34

## 2021-12-11 RX ADMIN — Medication 25 MCG: at 18:34

## 2021-12-11 ASSESSMENT — ACTIVITIES OF DAILY LIVING (ADL)
HYGIENE/GROOMING: INDEPENDENT;PROMPTS
LAUNDRY: WITH SUPERVISION
ORAL_HYGIENE: INDEPENDENT
DRESS: INDEPENDENT

## 2021-12-11 NOTE — PLAN OF CARE
Nursing plan of care   Problem: Behavioral Health Plan of Care  Goal: Adheres to Safety Considerations for Self and Others  Outcome: Improving    Problem: Behavioral Health Plan of Care  Goal: Develops/Participates in Therapeutic [place] to Support Successful Transition  Outcome: Improving  Pt has been active and visible in the milieu; spent the shift pacing in the hallway, watching movie in the lounge, and socializing with staff and peers; calm and pleasant; denied medication side effect; medication complaint; took no shower and attended no group;  denied issue with sleep and appetite; pt requested and received PRN nicotine gum X2; will continue to monitor and assess.

## 2021-12-11 NOTE — PLAN OF CARE
Problem: Sleep Disturbance  Goal: Adequate Sleep/Rest  Outcome: No Change  Patient was sleeping when shift started with easy and non labored respirations. Did not required prn medications. No safety events. Observed to have slept for about 7 hours.

## 2021-12-11 NOTE — PLAN OF CARE
Problem: Behavioral Disturbance  Goal: Behavioral Disturbance  Description: Signs and symptoms of listed problems will be absent or manageable by discharge or transition of care.  Outcome: Improving  Flowsheets (Taken 12/11/2021 1331)  Behavioral Disturbance Assessed: all  Behavioral Disturbance Present:   thought process   insight     Pt visible and engaged on the unit. He participated in art group, as well as spent time outside in the courtyard with two staff members. Pt is calm and pleasant on the unit. No indication or request for prn medications. Denies SI/SIB, as well as AH/VH. Pt does not appear to be responding to internal stimuli. Eating meals without issue. No additional concerns at this time. Will continue to assess and offer support.

## 2021-12-12 PROCEDURE — 124N000002 HC R&B MH UMMC

## 2021-12-12 PROCEDURE — 250N000013 HC RX MED GY IP 250 OP 250 PS 637: Performed by: STUDENT IN AN ORGANIZED HEALTH CARE EDUCATION/TRAINING PROGRAM

## 2021-12-12 RX ADMIN — NICOTINE POLACRILEX 2 MG: 2 GUM, CHEWING ORAL at 11:26

## 2021-12-12 RX ADMIN — NICOTINE POLACRILEX 2 MG: 2 GUM, CHEWING ORAL at 08:41

## 2021-12-12 RX ADMIN — NICOTINE POLACRILEX 2 MG: 2 GUM, CHEWING ORAL at 18:22

## 2021-12-12 RX ADMIN — NICOTINE POLACRILEX 2 MG: 2 GUM, CHEWING ORAL at 14:18

## 2021-12-12 RX ADMIN — Medication 25 MCG: at 18:58

## 2021-12-12 RX ADMIN — NICOTINE POLACRILEX 2 MG: 2 GUM, CHEWING ORAL at 13:02

## 2021-12-12 RX ADMIN — NICOTINE POLACRILEX 2 MG: 2 GUM, CHEWING ORAL at 09:40

## 2021-12-12 RX ADMIN — NICOTINE POLACRILEX 2 MG: 2 GUM, CHEWING ORAL at 16:58

## 2021-12-12 RX ADMIN — NICOTINE POLACRILEX 2 MG: 2 GUM, CHEWING ORAL at 15:39

## 2021-12-12 RX ADMIN — NICOTINE POLACRILEX 2 MG: 2 GUM, CHEWING ORAL at 20:18

## 2021-12-12 RX ADMIN — OLANZAPINE 40 MG: 20 TABLET, ORALLY DISINTEGRATING ORAL at 18:57

## 2021-12-12 ASSESSMENT — ACTIVITIES OF DAILY LIVING (ADL)
DRESS: INDEPENDENT
ORAL_HYGIENE: INDEPENDENT;PROMPTS
LAUNDRY: WITH SUPERVISION
HYGIENE/GROOMING: INDEPENDENT;PROMPTS

## 2021-12-12 ASSESSMENT — MIFFLIN-ST. JEOR: SCORE: 1966.78

## 2021-12-12 NOTE — PLAN OF CARE
Problem: Adult Inpatient Plan of Care  Goal: Optimal Comfort and Wellbeing  Outcome: Improving     Pt visible in the milieu for the majority of the day. He is pleasant and social with others, as well as participated in groups throughout the day. He denies the presence of AH/VH, as well as SI/SIB. Aside from nicorette gum, no indication or request for prn medications. No additional concerns at this time. Will continue to assess and offer support.

## 2021-12-12 NOTE — PLAN OF CARE
Plan of care   Problem: Sleep Disturbance  Goal: Adequate Sleep/Rest  Outcome: Improving  Received pt sleeping in bed; No safety concern was noted or reported pt appeared to have slept for 7 hrs; will continue to monitor and assess.

## 2021-12-13 PROCEDURE — 99231 SBSQ HOSP IP/OBS SF/LOW 25: CPT | Mod: GC | Performed by: PSYCHIATRY & NEUROLOGY

## 2021-12-13 PROCEDURE — 250N000013 HC RX MED GY IP 250 OP 250 PS 637: Performed by: STUDENT IN AN ORGANIZED HEALTH CARE EDUCATION/TRAINING PROGRAM

## 2021-12-13 PROCEDURE — 124N000002 HC R&B MH UMMC

## 2021-12-13 RX ADMIN — NICOTINE POLACRILEX 2 MG: 2 GUM, CHEWING ORAL at 15:23

## 2021-12-13 RX ADMIN — NICOTINE POLACRILEX 2 MG: 2 GUM, CHEWING ORAL at 11:02

## 2021-12-13 RX ADMIN — NICOTINE POLACRILEX 2 MG: 2 GUM, CHEWING ORAL at 13:02

## 2021-12-13 RX ADMIN — Medication 1 SPRAY: at 12:10

## 2021-12-13 RX ADMIN — NICOTINE POLACRILEX 2 MG: 2 GUM, CHEWING ORAL at 16:27

## 2021-12-13 RX ADMIN — NICOTINE POLACRILEX 2 MG: 2 GUM, CHEWING ORAL at 08:54

## 2021-12-13 RX ADMIN — NICOTINE POLACRILEX 2 MG: 2 GUM, CHEWING ORAL at 18:22

## 2021-12-13 RX ADMIN — NICOTINE POLACRILEX 2 MG: 2 GUM, CHEWING ORAL at 12:10

## 2021-12-13 RX ADMIN — NICOTINE POLACRILEX 2 MG: 2 GUM, CHEWING ORAL at 10:02

## 2021-12-13 RX ADMIN — NICOTINE POLACRILEX 2 MG: 2 GUM, CHEWING ORAL at 19:02

## 2021-12-13 RX ADMIN — NICOTINE POLACRILEX 2 MG: 2 GUM, CHEWING ORAL at 14:05

## 2021-12-13 RX ADMIN — Medication 25 MCG: at 18:22

## 2021-12-13 RX ADMIN — OLANZAPINE 40 MG: 20 TABLET, ORALLY DISINTEGRATING ORAL at 18:23

## 2021-12-13 ASSESSMENT — ACTIVITIES OF DAILY LIVING (ADL)
DRESS: SCRUBS (BEHAVIORAL HEALTH)
DRESS: INDEPENDENT
HYGIENE/GROOMING: PROMPTS
LAUNDRY: WITH SUPERVISION
HYGIENE/GROOMING: INDEPENDENT;PROMPTS
ORAL_HYGIENE: INDEPENDENT;PROMPTS
ORAL_HYGIENE: PROMPTS

## 2021-12-13 NOTE — PLAN OF CARE
Plan of care   Problem: Sleep Disturbance  Goal: Adequate Sleep/Rest  Outcome: Improving  Received pt sleeping in bed with quite regular respiration; NO PRN or snack requested; appeared to have slept for 7 hrs; will continue to monitor and assess.

## 2021-12-13 NOTE — PLAN OF CARE
Nursing Plan of care   Problem: Behavioral Health Plan of Care  Goal: Adheres to Safety Considerations for Self and Others  Outcome: Improving    Problem: Behavioral Health Plan of Care  Goal: Optimized Coping Skills in Response to Life Stressors  Outcome: Improving  Pt was active and visible in the milieu; declined group; took no shower; denied issue with appetite and asleep; medication compliant; denied medication side effect; ate dinner and snack; social and pleasant with staff and peers; VS stable ; requested and received PRN nicotine gum(see eMAR); will continue to monitor and assess.

## 2021-12-13 NOTE — PLAN OF CARE
Problem: Behavioral Health Plan of Care  Goal: Plan of Care Review  Outcome: Improving  Flowsheets (Taken 12/13/2021 1240)  Plan of Care Reviewed With: patient  Progress: improving  Patient Agreement with Plan of Care: agrees     Problem: Mood Impairment (Manic or Hypomanic Signs/Symptoms)  Goal: Improved Mood Symptoms (Manic/Hypomanic Signs/Symptoms)  Outcome: Improving  Flowsheets (Taken 12/13/2021 1240)  Mutually Determined Action Steps (Improved Mood Symptoms):    acknowledges progress    engages in physical activity     Problem: Behavior Regulation Impairment (Psychotic Signs/Symptoms)  Goal: Improved Behavioral Control (Psychotic Signs/Symptoms)  Outcome: Improving  Flowsheets (Taken 12/13/2021 1240)  Mutually Determined Action Steps (Improved Behavioral Control): identifies future-oriented goal      Antonio active on unit-social with staff and peers-pleasant conversation-laughing and joking appropriately-waiting placement   EVANGELINA

## 2021-12-13 NOTE — PLAN OF CARE
Assessment/Intervention/Current Symptoms and Care Coordination    Attended team meeting and reviewed chart notes.    Pt's guardian plans on visiting tomorrow around 11:00 to bring pt a coat and boots and other items.        Discharge Plan or Goal    To group home    Barriers to Discharge   Needs to be accepted to group home      Referral Status  No one is accepting pt    KAELYNI CHRISTIAN Short is trying to work with MSOCs for placement.        Legal Status  Committed with harper order

## 2021-12-13 NOTE — PLAN OF CARE
BEHAVIORAL TEAM DISCUSSION    Participants: Dr. Isaacs, resident Sammy Angel, medical student Angelina, ROCIO Saini, CTC Siobhan Irving  Progress: pt appears to be at baseline  Anticipated length of stay: until group home is in place  Continued Stay Criteria/Rationale: pt is committed with Cardoza order  Medical/Physical: Anderson Perez was physically examined by the ED prior to being transferred to the unit and was found to be medically stable and appropriate for admission.     Precautions:   Behavioral Orders   Procedures     Code 1 - Restrict to Unit     Code 3     Patient is allowed to go to the [place] accompanied by two staff members as frequently as every other day per staff availability     Routine Programming     As clinically indicated     Status 15     Every 15 minutes.     Plan: provide a psychological assessment and manage medications per psychiatry, staff to provide a safe and therapeutic milieu, CTC to coordinate transfer to group home when one is available.   Rationale for change in precautions or plan: no change

## 2021-12-13 NOTE — PROGRESS NOTES
----------------------------------------------------------------------------------------------------------  [facility], [place]   Psychiatric Progress Note  Hospital Day #104    Identifier: Anderson Perez is a 40 year old male with a past psychiatric history of bipolar disorder with psychosis vs schizoaffective disorder bipolar type, and intellectual disability admitted from the ER on 08/31/2021 due to concern for out of control behaviors, aggression and psychosis. Patient is psychiatrically stable, medications optimized, awaiting placement.     Interim History:   The patient's care was discussed with the treatment team and chart notes were reviewed.    Vitals: VSS  Sleep: 7 hours (12/13/21 0600)  Scheduled medications: Taking all scheduled medications as prescribed  PRN medications: No psychiatric prns received    Interim events:   No events    Staff Report:  - Medication compliant  - Declined COVID swab  - Very social and pleasant with staff  - No issues with sleep and appetite  - Denied SI/H/I/AH/VH  - No medical/behavior events overnight     Subjective:     Patient Interview:  Anderson Perez was interviewed in the hallway. He denies MH sxs and medication side effects. He was grateful that he had to go outside in the [place] over the weekend. No SI/HI/AH/VH.    The risks, benefits, alternatives and side effects of any medication changes have been discussed and are understood by the patient and guardian.  Review of systems:   ROS negative except as stated above.    Objective:   /68 (BP Location: Right arm)   Pulse 105   Temp 97.7  F (36.5  C) (Tympanic)   Resp 16   Ht 1.829 m (6')   Wt 101.9 kg (224 lb 9.6 oz)   SpO2 97%   BMI 30.46 kg/m    Weight is 224 lbs 9.6 oz  Body mass index is 30.46 kg/m .    Mental Status Examination:    Oriented to:  Grossly oriented.  General: Awake and alert  Appearance:  appears older than stated age, Grooming is adequate,  "Dressed in hospital attire and Hair is shaved . Various tattoos on neck.  Behavior: calm, pleasant, grateful  Eye Contact: fair  Psychomotor: No abnormal motor symptoms appreciated. Somewhat restless. No catatonia present.  Speech:  appropriate volume/tone, spontaneous and with good articulation  Language: Fluent in English with appropriate syntax and vocabulary.  Mood:  \"doing well\"  Affect:  Appropriate, blunted, restricted range, flat facial expression  Thought Process:  coherent, linear, logical and goal directed  Thought Content: No SI/HI/AH/VH; no delusions noted  Associations:  No loosening of associations appreciated  Insight:  Poor due to not acknowledging the reason for his admission  Judgment: Fair d/t cooperation with medication and care        Allergies:     Allergies   Allergen Reactions     Bee Venom Anaphylaxis     Clindamycin Hives     Morphine Hives        Labs:   None new.     Assessment    Primary psychiatric diagnosis:   Psychosis, Unspecified (Schizoaffective disorder vs Bipolar Affective Disorder with Psychosis)    Secondary psychiatric diagnoses of concern this admission:   Intellectual disability (IQ 63)    Diagnostic Impression: Anderson Perez is a 40 year old male with a past psychiatric history of bipolar disorder with psychosis vs schizoaffective disorder bipolar type, and intellectual disability admitted from the  ER on 08/31/2021 due to concern for out of control behaviors, aggression and psychosis in the context of medication refusal despite Cardoza. Patient was recently released from [facility] to a group home. Presentation to the ED via ambulance after police contacted and provisional discharge rescinded. Group home noted escalating aggressive behavior, delusional thinking, and medication refusal despite Cardoza. PT refusal to discuss problems and largely denied all past history, medications, or any need for care. His reported symptoms of delusions, psychosis, and erratic/aggressive " behavior are consistent with prior presentations of psychosis vs schizoaffective disorder bipolar type.    Of note, patient had previous diagnosis of Antisocial Personality Disorder. As psychosis cleared during hospitalization, aggressive and antagonistic behavior resolved completely. It seems questionable that he has ASPD, and rather that previous behaviors were a result of psychosis and/or substance intoxication.    Psychiatric Hospital course: Anderson Perez was admitted to [facility] following revocation of provisional discharge and on a court hold. His PTA olanzapine 20mg PO QDaily was resumed, as pt had been refusing at group home; per conversation with guardian, had planned to start on GRIGGS, but does not think that this was actually started, as patient was declining medications, which prompted admission in setting of decompensation. Pt was converted to ODT formulation given concerns for cheeking medications; while initially resistant, pt was agreeable with taking ODT olanzapine as long as it was given with ginger ale, which was accommodated. Olanzapine dose increased to 40mg on 9/21 given limited efficacy at PTA dose. He was also started on a brief trail of propanolol for akathisia, but this was discontinued due to lack of improvement in pacing and patient attesting that a high level of physical activity was his baseline. Over time, he's become more sociable with peers and staff on the unit, and is spending more time sitting in the milieu as opposed to pacing the hallways. Patient has since been stable on current medications for some time, and continues to await placement.     Medical course: Anderson Perez was physically examined by the ED prior to being transferred to the unit and was found to be medically stable and appropriate for admission.     Plan   Principal Diagnosis:   # Psychosis, Unspecified (Schizoaffective disorder vs Bipolar Affective Disorder with Psychosis)    Secondary psychiatric  diagnoses of concern this admission:   # Intellectual disability    Psychotropic Medications:  Modify:  No changes    Continue Scheduled:   -Olanzapine ODT 40mg qPM    Continue PRN:  -Acetaminophen 650 mg Q6H PRN for pain and fever  -Hydroxyzine 25 mg Q6H PRN for anxiety  -Haldol 5mg prn q6h for agitation  -Haldol 5mg + diphenhydramine 50mg + lorazepam 2mg prn for severe agitation/psychosis  -Maalox 30 ml Q4H PRN for indigestion  -Miralax prn for constipation  -Nicotine gum 2 mg every hour as needed    Additional Plans:  -Patient will be treated in therapeutic milieu with appropriate individual and group therapies as described.  -Patient would be able to go to the [facility] as the staff on station 22 find appropriate, accompanied by a  and a station 22 staff member.  -Weekly COVID test ordered on 12/8/2021    Medical diagnoses to be addressed this admission:    # Chronic Hep C  NTD    #Dry mouth  -Biotene started 10/25    #Foot blister/lesion, resolved  -Bacitracin ointment prn    Consults:   -Nutrition Consult: Extended hospital stay, considering adding snacks to diet    Legal Status: Cardoza  Committed - recommitted 11/1   Legal guardian    Safety Assessment:   Behavioral Orders   Procedures     Code 1 - Restrict to Unit     Code 3     Patient is allowed to go to the [facility] accompanied by two staff members as frequently as every other day per staff availability     Routine Programming     As clinically indicated     Status 15     Every 15 minutes.     SIO: No    Disposition: Patient is stable, medications optimized. Pending placement. Please see CTC notes for more details.     This patient was seen and discussed with my attending physician.    Sammy Angel MD  PGY-1 Psychiatry    This patient has been seen and evaluated by me, Glenn Isaacs.  I have discussed this patient with the psychiatry resident and I agree with the findings and plan in this note.    I have reviewed today's vital  signs, medications, labs and imaging.     Glenn Adhikari MD on 12/14/2021 at 12:53 AM

## 2021-12-14 PROCEDURE — 99232 SBSQ HOSP IP/OBS MODERATE 35: CPT | Mod: GC | Performed by: PSYCHIATRY & NEUROLOGY

## 2021-12-14 PROCEDURE — 250N000013 HC RX MED GY IP 250 OP 250 PS 637: Performed by: STUDENT IN AN ORGANIZED HEALTH CARE EDUCATION/TRAINING PROGRAM

## 2021-12-14 PROCEDURE — 124N000002 HC R&B MH UMMC

## 2021-12-14 RX ADMIN — OLANZAPINE 40 MG: 20 TABLET, ORALLY DISINTEGRATING ORAL at 20:22

## 2021-12-14 RX ADMIN — NICOTINE POLACRILEX 2 MG: 2 GUM, CHEWING ORAL at 08:57

## 2021-12-14 RX ADMIN — NICOTINE POLACRILEX 2 MG: 2 GUM, CHEWING ORAL at 09:54

## 2021-12-14 RX ADMIN — NICOTINE POLACRILEX 2 MG: 2 GUM, CHEWING ORAL at 20:25

## 2021-12-14 RX ADMIN — NICOTINE POLACRILEX 2 MG: 2 GUM, CHEWING ORAL at 21:14

## 2021-12-14 RX ADMIN — NICOTINE POLACRILEX 2 MG: 2 GUM, CHEWING ORAL at 13:58

## 2021-12-14 RX ADMIN — Medication 25 MCG: at 20:22

## 2021-12-14 RX ADMIN — NICOTINE POLACRILEX 2 MG: 2 GUM, CHEWING ORAL at 15:33

## 2021-12-14 RX ADMIN — NICOTINE POLACRILEX 2 MG: 2 GUM, CHEWING ORAL at 18:19

## 2021-12-14 RX ADMIN — NICOTINE POLACRILEX 2 MG: 2 GUM, CHEWING ORAL at 12:54

## 2021-12-14 RX ADMIN — NICOTINE POLACRILEX 2 MG: 2 GUM, CHEWING ORAL at 16:30

## 2021-12-14 RX ADMIN — NICOTINE POLACRILEX 2 MG: 2 GUM, CHEWING ORAL at 11:56

## 2021-12-14 RX ADMIN — Medication 1 SPRAY: at 09:54

## 2021-12-14 ASSESSMENT — ACTIVITIES OF DAILY LIVING (ADL)
LAUNDRY: WITH SUPERVISION
DRESS: SCRUBS (BEHAVIORAL HEALTH)
HYGIENE/GROOMING: INDEPENDENT;PROMPTS
ORAL_HYGIENE: INDEPENDENT;PROMPTS

## 2021-12-14 ASSESSMENT — MIFFLIN-ST. JEOR: SCORE: 1966.78

## 2021-12-14 NOTE — PLAN OF CARE
Assessment/Intervention/Current Symptoms and Care Coordination    Attended team meeting and reviewed chart notes.    Pt's guardian Daylin visited pt and brought some clothing. She will contact the group home [facility] to see if they are still interested in pt and want to set up a phone/video with pt.    Pt will have an interview with Lucas from [facility] tomorrow at 11:00 via teams.      Discharge Plan or Goal  Discharge to group home    Barriers to Discharge   Needs placement      Referral Status  In process        Legal Status  Committed with Cardoza order

## 2021-12-14 NOTE — PLAN OF CARE
"  Problem: Behavioral Health Plan of Care  Goal: Plan of Care Review  Outcome: Improving  Flowsheets (Taken 12/14/2021 1439)  Plan of Care Reviewed With: patient  Progress: improving  Patient Agreement with Plan of Care: agrees     Problem: Mood Impairment (Manic or Hypomanic Signs/Symptoms)  Goal: Improved Mood Symptoms (Manic/Hypomanic Signs/Symptoms)  Outcome: Improving  Flowsheets (Taken 12/14/2021 1439)  Mutually Determined Action Steps (Improved Mood Symptoms):   acknowledges progress   engages in physical activity     Antonio active on unit throughout day-social with staff and peers-enjoyed meeting with guardian-states they discussed how he is feeling and potential plans for placement-continues to spend free time walking in halls-expressed concern about not having insurance card to prove he has insurance-states in April he was living in his own apartment and working, but did not qualify for state insurance, \"I made too much money because of all my degrees.\" and was unable to fill his prescriptions which cost $4000-states he became ill and ended up in the hospital, \"If I leave here. I'm just going to end up coming back.\"-reviewed fact he has [employer] ins-concerned he will lose if obtains employment-   "

## 2021-12-14 NOTE — PROGRESS NOTES
----------------------------------------------------------------------------------------------------------  [facility], [place]   Psychiatric Progress Note  Hospital Day #105    Identifier: Anderson Perez is a 40 year old male with a past psychiatric history of bipolar disorder with psychosis vs schizoaffective disorder bipolar type, and intellectual disability admitted from the ER on 08/31/2021 due to concern for out of control behaviors, aggression and psychosis. Patient is psychiatrically stable, medications optimized, awaiting placement.     Interim History:   The patient's care was discussed with the treatment team and chart notes were reviewed.    Vitals: VSS  Sleep: 7 hours (12/14/21 0600)  Scheduled medications: Taking all scheduled medications as prescribed  PRN medications: No psychiatric prns received    Interim events:   No events    Staff Report:  - Medication compliant  - Social and pleasant with staff  - No issues with sleep and appetite  - Denied SI/H/I/AH/VH  - No medical/behavior events overnight     Subjective:     Patient Interview:  Anderson Perez was interviewed in the hallway. He denies MH sxs and medication side effects. No SI/HI/AH/VH. Patient inquired about updates regarding placement. Pt's guardian Daylin was planning to visit later in the day.    The risks, benefits, alternatives and side effects of any medication changes have been discussed and are understood by the patient and guardian.  Review of systems:   ROS negative except as stated above.    Objective:   /82 (BP Location: Left arm)   Pulse 98   Temp 97.7  F (36.5  C) (Tympanic)   Resp 12   Ht 1.829 m (6')   Wt 101.9 kg (224 lb 9.6 oz)   SpO2 97%   BMI 30.46 kg/m    Weight is 224 lbs 9.6 oz  Body mass index is 30.46 kg/m .    Mental Status Examination:    Oriented to:  Grossly oriented.  General: Awake and alert  Appearance:  appears older than stated age, Grooming is adequate,  "Dressed in hospital attire and Hair is shaved . Various tattoos on neck.  Behavior: calm, pleasant  Eye Contact: fair  Psychomotor: No abnormal motor symptoms appreciated. Somewhat restless. No catatonia present.  Speech:  appropriate volume/tone, spontaneous and with good articulation  Language: Fluent in English with appropriate syntax and vocabulary.  Mood:  \"I am fine\"  Affect:  Appropriate, blunted, restricted range, flat facial expression  Thought Process:  coherent, linear, logical and goal directed  Thought Content: No SI/HI/AH/VH; no delusions noted  Associations:  No loosening of associations appreciated  Insight:  Poor due to not acknowledging the reason for his admission  Judgment: Fair d/t cooperation with medication and care        Allergies:     Allergies   Allergen Reactions     Bee Venom Anaphylaxis     Clindamycin Hives     Morphine Hives        Labs:   None new.     Assessment    Primary psychiatric diagnosis:   Psychosis, Unspecified (Schizoaffective disorder vs Bipolar Affective Disorder with Psychosis)    Secondary psychiatric diagnoses of concern this admission:   Intellectual disability (IQ 63)    Diagnostic Impression: Anderson Perez is a 40 year old male with a past psychiatric history of bipolar disorder with psychosis vs schizoaffective disorder bipolar type, and intellectual disability admitted from the  ER on 08/31/2021 due to concern for out of control behaviors, aggression and psychosis in the context of medication refusal despite Cardoza. Patient was recently released from [facility] to a group home. Presentation to the ED via ambulance after police contacted and provisional discharge rescinded. Group home noted escalating aggressive behavior, delusional thinking, and medication refusal despite Cardoza. PT refusal to discuss problems and largely denied all past history, medications, or any need for care. His reported symptoms of delusions, psychosis, and erratic/aggressive behavior are " consistent with prior presentations of psychosis vs schizoaffective disorder bipolar type.    Of note, patient had previous diagnosis of Antisocial Personality Disorder. As psychosis cleared during hospitalization, aggressive and antagonistic behavior resolved completely. It seems questionable that he has ASPD, and rather that previous behaviors were a result of psychosis and/or substance intoxication.    Psychiatric Hospital course: Anderson Perez was admitted to [facility] following revocation of provisional discharge and on a court hold. His PTA olanzapine 20mg PO QDaily was resumed, as pt had been refusing at group home; per conversation with guardian, had planned to start on GRIGGS, but does not think that this was actually started, as patient was declining medications, which prompted admission in setting of decompensation. Pt was converted to ODT formulation given concerns for cheeking medications; while initially resistant, pt was agreeable with taking ODT olanzapine as long as it was given with ginger ale, which was accommodated. Olanzapine dose increased to 40mg on 9/21 given limited efficacy at PTA dose. He was also started on a brief trail of propanolol for akathisia, but this was discontinued due to lack of improvement in pacing and patient attesting that a high level of physical activity was his baseline. Over time, he's become more sociable with peers and staff on the unit, and is spending more time sitting in the milieu as opposed to pacing the hallways. Patient has since been stable on current medications for some time, and continues to await placement.     Medical course: Anderson Perez was physically examined by the ED prior to being transferred to the unit and was found to be medically stable and appropriate for admission.     Plan   Principal Diagnosis:   # Psychosis, Unspecified (Schizoaffective disorder vs Bipolar Affective Disorder with Psychosis)    Secondary psychiatric diagnoses of  concern this admission:   # Intellectual disability    Psychotropic Medications:  Modify:  No changes    Continue Scheduled:   -Olanzapine ODT 40mg qPM    Continue PRN:  -Acetaminophen 650 mg Q6H PRN for pain and fever  -Hydroxyzine 25 mg Q6H PRN for anxiety  -Haldol 5mg prn q6h for agitation  -Haldol 5mg + diphenhydramine 50mg + lorazepam 2mg prn for severe agitation/psychosis  -Maalox 30 ml Q4H PRN for indigestion  -Miralax prn for constipation  -Nicotine gum 2 mg every hour as needed    Additional Plans:  -Patient will be treated in therapeutic milieu with appropriate individual and group therapies as described.  -Patient would be able to go to the [facility] as the staff on station [facility] find appropriate, accompanied by a  and a [facility] staff member.  -Weekly COVID test ordered on 12/8/2021    Medical diagnoses to be addressed this admission:    # Chronic Hep C  NTD    #Dry mouth  -Biotene started 10/25    #Foot blister/lesion, resolved  -Bacitracin ointment prn    Consults:   -Nutrition Consult: Extended hospital stay, considering adding snacks to diet    Legal Status: Cardoza  Committed - recommitted 11/1   Legal guardian    Safety Assessment:   Behavioral Orders   Procedures     Code 1 - Restrict to Unit     Code 3     Patient is allowed to go to the [facility] accompanied by two staff members as frequently as every other day per staff availability     Routine Programming     As clinically indicated     Status 15     Every 15 minutes.     SIO: No    Disposition: Patient is stable, medications optimized. Pending placement. Please see CTC notes for more details.     This patient was seen and discussed with my attending physician.    Sammy Angel MD  PGY-1 Psychiatry    This patient has been seen and evaluated by me, Glenn Isaacs.  I have discussed this patient with the psychiatry resident and I agree with the findings and plan in this note.    I have reviewed today's vital signs,  medications, labs and imaging.     Glenn Adhikari MD on 12/14/2021 at 11:48 PM

## 2021-12-14 NOTE — PLAN OF CARE
Problem: Behavioral Health Plan of Care  Goal: Plan of Care Review  Outcome: No Change  Goal: Adheres to Safety Considerations for Self and Others  Intervention: Develop and Maintain Individualized Safety Plan  Recent Flowsheet Documentation  Taken 12/14/2021 0041 by Alyssa Mensah, RN  Safety Measures: safety rounds completed  Goal: Absence of New-Onset Illness or Injury  Intervention: Identify and Manage Fall Risk  Recent Flowsheet Documentation  Taken 12/14/2021 0041 by Alyssa Mensah RN  Safety Measures: safety rounds completed     Problem: Sleep Disturbance  Goal: Adequate Sleep/Rest  Outcome: No Change     Patient was observed sleeping at start of shift and through the rest of the night. No signs of pain nor discomfort. Appeared to have slept 7 hours. No PRN medications given this shift.

## 2021-12-14 NOTE — PLAN OF CARE
Shift update: Antonio has been calm/cooperative and pleasant this evening. He was appropriately talkative. Med compliant. Pacing the halls as usual. Eating well.     Mood/Affect: full range, appropriate eye contact    SI: no   SIB: no     Hallucinations/Psychosis: denies    Activity/Participation: pacing   PRN Meds: Nicotine gum  Appetite: good

## 2021-12-15 PROCEDURE — 250N000013 HC RX MED GY IP 250 OP 250 PS 637: Performed by: STUDENT IN AN ORGANIZED HEALTH CARE EDUCATION/TRAINING PROGRAM

## 2021-12-15 PROCEDURE — 99232 SBSQ HOSP IP/OBS MODERATE 35: CPT | Mod: GC | Performed by: PSYCHIATRY & NEUROLOGY

## 2021-12-15 PROCEDURE — 124N000002 HC R&B MH UMMC

## 2021-12-15 PROCEDURE — 250N000013 HC RX MED GY IP 250 OP 250 PS 637

## 2021-12-15 RX ADMIN — Medication 25 MCG: at 19:04

## 2021-12-15 RX ADMIN — ACETAMINOPHEN 650 MG: 325 TABLET, FILM COATED ORAL at 13:03

## 2021-12-15 RX ADMIN — OLANZAPINE 40 MG: 20 TABLET, ORALLY DISINTEGRATING ORAL at 19:04

## 2021-12-15 RX ADMIN — NICOTINE POLACRILEX 2 MG: 2 GUM, CHEWING ORAL at 14:43

## 2021-12-15 RX ADMIN — NICOTINE POLACRILEX 2 MG: 2 GUM, CHEWING ORAL at 19:33

## 2021-12-15 RX ADMIN — NICOTINE POLACRILEX 2 MG: 2 GUM, CHEWING ORAL at 16:06

## 2021-12-15 RX ADMIN — NICOTINE POLACRILEX 2 MG: 2 GUM, CHEWING ORAL at 17:42

## 2021-12-15 RX ADMIN — NICOTINE POLACRILEX 2 MG: 2 GUM, CHEWING ORAL at 14:02

## 2021-12-15 RX ADMIN — NICOTINE POLACRILEX 2 MG: 2 GUM, CHEWING ORAL at 10:58

## 2021-12-15 RX ADMIN — NICOTINE POLACRILEX 2 MG: 2 GUM, CHEWING ORAL at 13:07

## 2021-12-15 RX ADMIN — NICOTINE POLACRILEX 2 MG: 2 GUM, CHEWING ORAL at 08:43

## 2021-12-15 RX ADMIN — NICOTINE POLACRILEX 2 MG: 2 GUM, CHEWING ORAL at 21:04

## 2021-12-15 RX ADMIN — Medication 1 SPRAY: at 08:43

## 2021-12-15 NOTE — PLAN OF CARE
Problem: Sleep Disturbance  Goal: Adequate Sleep/Rest  Outcome: Improving     Patient appeared to have slept fine. No complaint by patient and no psychiatric issues noted. Safety checks done. Will continue to monitor.

## 2021-12-15 NOTE — PLAN OF CARE
Problem: Behavioral Health Plan of Care  Goal: Plan of Care Review  Outcome: Improving  Flowsheets (Taken 12/15/2021 1253)  Plan of Care Reviewed With: patient  Progress: improving  Patient Agreement with Plan of Care: agrees     Problem: Mood Impairment (Manic or Hypomanic Signs/Symptoms)  Goal: Improved Mood Symptoms (Manic/Hypomanic Signs/Symptoms)  Outcome: Improving  Flowsheets (Taken 12/15/2021 1253)  Mutually Determined Action Steps (Improved Mood Symptoms):    acknowledges progress    engages in physical activity    identifies personal treatment goal    verbalizes increased insight     Anderson continues appropriately social with staff and peers-states grp home interview went well-feeling hopeful he will be discharged soon-pleasant in interactions-polite and considerate in interactions-received Tylenol 650 mg PO at 1303 for c/o sore feet-states does not interfere with activity, but annoying-1340 Anderson states Tylenol effective in relieving discomfort of sore feet

## 2021-12-15 NOTE — PLAN OF CARE
Nursing Plan of care   Problem: Behavioral Health Plan of Care  Goal: Adheres to Safety Considerations for Self and Others  Outcome: Improving     Problem: Behavioral Health Plan of Care  Goal: Develops/Participates in Therapeutic [place] to Support Successful Transition  Outcome: Improving   Pt was active and visible in the milieu; spent the shift pacing in the hallway, watching movie in the lounge and socializing with peers and staff; his plan for the shift is to call his 4 yrs old son and wish him happy birthday after dinner and he did it;; presented with full range affect and bright mood; denied issue with sleep and appetite; denied medication side effect; medication complaint; denied SI/SIB,AVH,depression and anxiety; reported no feet pain this shift; will continue to monitor and assess.

## 2021-12-15 NOTE — PROGRESS NOTES
----------------------------------------------------------------------------------------------------------  [facility], [place]   Psychiatric Progress Note  Hospital Day #106    Identifier: Anderson Perez is a 41 year old male with a past psychiatric history of bipolar disorder with psychosis vs schizoaffective disorder bipolar type, and intellectual disability admitted from the ER on 08/31/2021 due to concern for out of control behaviors, aggression and psychosis. Patient is psychiatrically stable, medications optimized, awaiting placement.     Interim History:   The patient's care was discussed with the treatment team and chart notes were reviewed.    Vitals: VSS  Sleep: 6.5 hours (12/15/21 0600)  Scheduled medications: Taking all scheduled medications as prescribed  PRN medications: No psychiatric prns received    Interim events:   No events    Staff Report:  - Medication compliant  - Social and pleasant with staff  - Antonio active on unit throughout day-social with staff and peers  - Had meeting with guardian that he enjoyed -states they discussed how he is feeling and potential plans for placement  - Continues to spend free time walking in halls  - Patient appeared to have slept fine.     Subjective:     Patient Interview:  Anderson Perez was interviewed in the hallway. Reported he had an interview shortly with [facility] in [place], [place]. He felt good about this interview. No significant mental health symptoms nor medication side effects reported. No SI/HI/AH/VH.    The risks, benefits, alternatives and side effects of any medication changes have been discussed and are understood by the patient and guardian.  Review of systems:   ROS negative except as stated above.    Objective:   /80 (BP Location: Left arm)   Pulse 95   Temp 98  F (36.7  C) (Tympanic)   Resp 12   Ht 1.829 m (6')   Wt 101.9 kg (224 lb 9.6 oz)   SpO2 98%   BMI 30.46 kg/m    Weight is 224  "lbs 9.6 oz  Body mass index is 30.46 kg/m .    Mental Status Examination:    Oriented to:  Grossly oriented.  General: Awake and alert, walking in hallway  Appearance:  appears older than stated age, Grooming is adequate, Dressed in hospital attire and Hair is shaved . Various tattoos on neck.  Behavior: calm, pleasant, offered hand shake  Eye Contact: good  Psychomotor: No abnormal motor symptoms appreciated. Seems restless with constant pacing but denies restlessness. No catatonia present.  Speech:  appropriate volume/tone, spontaneous and with good articulation  Language: Fluent in English with appropriate syntax and vocabulary.  Mood:  \"great\"  Affect:  Appropriate, blunted, restricted range, small smile when meeting new team members  Thought Process:  coherent, linear, logical and goal directed  Thought Content: No SI/HI/AH/VH; no delusions noted  Associations:  No loosening of associations appreciated  Insight:  Limited due to not acknowledging the reason for his admission  Judgment: Fair d/t cooperation with medication and care        Allergies:     Allergies   Allergen Reactions    Bee Venom Anaphylaxis    Clindamycin Hives    Morphine Hives        Labs:   None new.     Assessment    Primary psychiatric diagnosis:   Psychosis, Unspecified (Schizoaffective disorder vs Bipolar Affective Disorder with Psychosis)    Secondary psychiatric diagnoses of concern this admission:   Intellectual disability (IQ 63)    Diagnostic Impression: Anderson Perez is a 40 year old male with a past psychiatric history of bipolar disorder with psychosis vs schizoaffective disorder bipolar type, and intellectual disability admitted from the  ER on 08/31/2021 due to concern for out of control behaviors, aggression and psychosis in the context of medication refusal despite Cardoza. Patient was recently released from [facility] to a group home. Presentation to the ED via ambulance after police contacted and provisional discharge " rescinded. Group home noted escalating aggressive behavior, delusional thinking, and medication refusal despite Cardoza. PT refusal to discuss problems and largely denied all past history, medications, or any need for care. His reported symptoms of delusions, psychosis, and erratic/aggressive behavior are consistent with prior presentations of psychosis vs schizoaffective disorder bipolar type.    Of note, patient had previous diagnosis of Antisocial Personality Disorder. As psychosis cleared during hospitalization, aggressive and antagonistic behavior resolved completely. It seems questionable that he has ASPD, and rather that previous behaviors were a result of psychosis and/or substance intoxication.    Psychiatric Hospital course: Anderson Perez was admitted to [facility] following revocation of provisional discharge and on a court hold. His PTA olanzapine 20mg PO QDaily was resumed, as pt had been refusing at group home; per conversation with guardian, had planned to start on GRIGGS, but does not think that this was actually started, as patient was declining medications, which prompted admission in setting of decompensation. Pt was converted to ODT formulation given concerns for cheeking medications; while initially resistant, pt was agreeable with taking ODT olanzapine as long as it was given with ginger ale, which was accommodated. Olanzapine dose increased to 40mg on 9/21 given limited efficacy at PTA dose. He was also started on a brief trail of propanolol for akathisia, but this was discontinued due to lack of improvement in pacing and patient attesting that a high level of physical activity was his baseline. Over time, he's become more sociable with peers and staff on the unit, and is spending more time sitting in the milieu as opposed to pacing the hallways. Patient has since been stable on current medications for some time and only awaits placement.     Medical course: Anderson Perez was physically  examined by the ED prior to being transferred to the unit and was found to be medically stable and appropriate for admission. He had foot blisters from walking too much, which have now resolved.     Plan   Principal Diagnosis:   # Psychosis, Unspecified (Schizoaffective disorder vs Bipolar Affective Disorder with psychotic features), severe, recurrent episode    Secondary psychiatric diagnoses of concern this admission:   # Intellectual disability    Psychotropic Medications:  Modify:  No changes    Continue Scheduled:   -Olanzapine ODT 40mg qPM    Continue PRN:  -Acetaminophen 650 mg Q6H PRN for pain and fever  -Hydroxyzine 25 mg Q6H PRN for anxiety  -Haldol 5mg prn q6h for agitation  -Haldol 5mg + diphenhydramine 50mg + lorazepam 2mg prn for severe agitation/psychosis  -Maalox 30 ml Q4H PRN for indigestion  -Miralax prn for constipation  -Nicotine gum 2 mg every hour as needed    Additional Plans:  -Patient will be treated in therapeutic milieu with appropriate individual and group therapies as described.  -Patient would be able to go to the [facility] as the staff on station 22 find appropriate, accompanied by a  and a station 22 staff member.  -Weekly COVID test ordered on 12/8/2021    Medical diagnoses to be addressed this admission:    # Chronic Hep C  NTD    #Dry mouth  -Biotene started 10/25    #Foot blister/lesion, resolved  -Bacitracin ointment prn    Consults:   -Nutrition Consult: Extended hospital stay, considering adding snacks to diet    Legal Status: Cardoza  Committed - recommitted 11/1   Legal guardian    Safety Assessment:   Behavioral Orders   Procedures    Code 1 - Restrict to Unit    Code 3     Patient is allowed to go to the [facility] accompanied by two staff members as frequently as every other day per staff availability    Routine Programming     As clinically indicated    Status 15     Every 15 minutes.     SIO: No, no recent need for SIO, seclusion, or  restraints.    Disposition: Patient is stable, medications optimized. Pending placement at [facility] or other facility.    This patient was seen and discussed with my attending physician, Dr. Shital Gómez MD  PGY-1 Psychiatry Resident Physician    This patient has been seen and evaluated by me, Glenn Isaacs.  I have discussed this patient with the psychiatry resident and I agree with the findings and plan in this note.    I have reviewed today's vital signs, medications, labs and imaging.     Glenn Adhikari MD on 12/15/2021 at 9:30 PM

## 2021-12-15 NOTE — PLAN OF CARE
Assessment/Intervention/Current Symptoms and Care Coordination    Attended team meeting and reviewed chart notes.    Pt had an interview today via Teams with [facility] in [place]. Pt stated he thought the interview went well and that they will contact writer after they make their decision.      Discharge Plan or Goal  To group home    Barriers to Discharge   Needs bed at group [facility]      Referral Status  In process        Legal Status  Committed with Cardoza order

## 2021-12-15 NOTE — PLAN OF CARE
Problem: Behavioral Health Plan of Care  Goal: Optimized Coping Skills in Response to Life Stressors  Outcome: Improving    Pt doing well , no complaints . Walking the halls most of shift. In good spirits , med compliant

## 2021-12-16 PROCEDURE — G0177 OPPS/PHP; TRAIN & EDUC SERV: HCPCS

## 2021-12-16 PROCEDURE — 250N000013 HC RX MED GY IP 250 OP 250 PS 637: Performed by: STUDENT IN AN ORGANIZED HEALTH CARE EDUCATION/TRAINING PROGRAM

## 2021-12-16 PROCEDURE — 99232 SBSQ HOSP IP/OBS MODERATE 35: CPT | Mod: GC | Performed by: PSYCHIATRY & NEUROLOGY

## 2021-12-16 PROCEDURE — 124N000002 HC R&B MH UMMC

## 2021-12-16 RX ADMIN — NICOTINE POLACRILEX 2 MG: 2 GUM, CHEWING ORAL at 20:48

## 2021-12-16 RX ADMIN — Medication 1 SPRAY: at 10:50

## 2021-12-16 RX ADMIN — Medication 25 MCG: at 19:08

## 2021-12-16 RX ADMIN — NICOTINE POLACRILEX 2 MG: 2 GUM, CHEWING ORAL at 11:35

## 2021-12-16 RX ADMIN — NICOTINE POLACRILEX 2 MG: 2 GUM, CHEWING ORAL at 12:59

## 2021-12-16 RX ADMIN — OLANZAPINE 40 MG: 20 TABLET, ORALLY DISINTEGRATING ORAL at 19:07

## 2021-12-16 RX ADMIN — NICOTINE POLACRILEX 2 MG: 2 GUM, CHEWING ORAL at 10:51

## 2021-12-16 RX ADMIN — NICOTINE POLACRILEX 2 MG: 2 GUM, CHEWING ORAL at 09:27

## 2021-12-16 RX ADMIN — NICOTINE POLACRILEX 2 MG: 2 GUM, CHEWING ORAL at 19:22

## 2021-12-16 RX ADMIN — NICOTINE POLACRILEX 2 MG: 2 GUM, CHEWING ORAL at 18:31

## 2021-12-16 NOTE — PLAN OF CARE
Problem: Behavioral Health Plan of Care  Goal: Plan of Care Review  Outcome: No Change  Goal: Adheres to Safety Considerations for Self and Others  Intervention: Develop and Maintain Individualized Safety Plan  Recent Flowsheet Documentation  Taken 12/16/2021 0021 by Alyssa Mensah, RN  Safety Measures: safety rounds completed  Goal: Absence of New-Onset Illness or Injury  Intervention: Identify and Manage Fall Risk  Recent Flowsheet Documentation  Taken 12/16/2021 0021 by Alyssa Mensah, RN  Safety Measures: safety rounds completed     Problem: Sleep Disturbance  Goal: Adequate Sleep/Rest  Outcome: No Change     Patient was observed sleeping at start of shift and through the rest of the night. No signs of pain nor discomfort. Pt appeared to sleep7 hours . No PRN medications given.

## 2021-12-16 NOTE — PLAN OF CARE
Problem: Behavioral Health Plan of Care  Goal: Plan of Care Review  Outcome: Improving  Flowsheets (Taken 12/16/2021 1244)  Plan of Care Reviewed With: patient  Progress: improving  Patient Agreement with Plan of Care: agrees     Problem: Mood Impairment (Manic or Hypomanic Signs/Symptoms)  Goal: Improved Mood Symptoms (Manic/Hypomanic Signs/Symptoms)  Outcome: Improving  Flowsheets (Taken 12/16/2021 1244)  Mutually Determined Action Steps (Improved Mood Symptoms):   acknowledges progress   engages in physical activity     Problem: Social Isolation  Goal: Increased Social Interaction  Outcome: Improving     Anderson active on unit-social with staff and peers-patiently waiting for response from grp home interview yesterday-appropriate interactions with others

## 2021-12-16 NOTE — PROGRESS NOTES
----------------------------------------------------------------------------------------------------------  [facility], [place]   Psychiatric Progress Note  Hospital Day #107    Identifier: Anderson Perez is a 41 year old male with a past psychiatric history of bipolar disorder with psychosis vs schizoaffective disorder bipolar type, and intellectual disability admitted from the ER on 08/31/2021 due to concern for out of control behaviors, aggression and psychosis. Patient is psychiatrically stable, medications optimized, awaiting placement.     Interim History:   The patient's care was discussed with the treatment team and chart notes were reviewed.    Vitals: VSS  Sleep: 7 hours (12/16/21 0600)  Scheduled medications: Taking all scheduled medications as prescribed  PRN medications: No psychiatric prns received    Interim events:   No events    Staff Report:  - Medication compliant  - Social and pleasant with staff  - Called 4-year-old son to wish him happy birthday  - Full affect range     Subjective:     Patient Interview:  Anderson Perez was interviewed in the hallway. Had a good conversation with his son last night for his birthday. Antonio can't believe how much his son has grown. Reported the interview with Radha yesterday went well. Antonio is concerned about weight gain on the olanzapine. No significant mental health symptoms nor medication side effects reported. No SI/HI/AH/VH.     The risks, benefits, alternatives and side effects of any medication changes have been discussed and are understood by the patient and guardian.    Review of systems:   ROS negative except as stated above.    Objective:   /79 (BP Location: Right arm)   Pulse 97   Temp 97.6  F (36.4  C)   Resp 17   Ht 1.829 m (6')   Wt 101.9 kg (224 lb 9.6 oz)   SpO2 96%   BMI 30.46 kg/m    Weight is 224 lbs 9.6 oz  Body mass index is 30.46 kg/m .     Weight on admission was 182 lbs 4.8 oz  Body mass  "index is 24.72 kg/m .    Mental Status Examination:    Oriented to:  Grossly oriented.  General: Awake and alert, walking in hallway  Appearance:  appears older than stated age, Grooming is adequate, Dressed in hospital attire and Hair is shaved . Various tattoos on neck.  Behavior: calm, pleasant, offered hand shake  Eye Contact: good  Psychomotor: No abnormal motor symptoms appreciated. Seems restless with constant pacing but denies restlessness. No catatonia present.  Speech:  appropriate volume/tone, spontaneous and with good articulation  Language: Fluent in English with appropriate syntax and vocabulary.  Mood:  \"doing good\"  Affect:  Appropriate, full range of affect when talking about son  Thought Process:  coherent, linear, logical and goal directed  Thought Content: No SI/HI/AH/VH; no delusions noted  Associations:  No loosening of associations appreciated  Insight:  Limited due to not acknowledging the reason for his admission  Judgment: Fair d/t cooperation with medication and care        Allergies:     Allergies   Allergen Reactions     Bee Venom Anaphylaxis     Clindamycin Hives     Morphine Hives        Labs:   None new.     Assessment    Primary psychiatric diagnosis:   Psychosis, Unspecified (Schizoaffective disorder vs Bipolar Affective Disorder with Psychosis)    Secondary psychiatric diagnoses of concern this admission:   Intellectual disability (IQ 63)    Diagnostic Impression: Anderson Perez is a 40 year old male with a past psychiatric history of bipolar disorder with psychosis vs schizoaffective disorder bipolar type, and intellectual disability admitted from the  ER on 08/31/2021 due to concern for out of control behaviors, aggression and psychosis in the context of medication refusal despite Cardoza. Patient was recently released from [facility] to a group home. Presentation to the ED via ambulance after police contacted and provisional discharge rescinded. Group home noted escalating " aggressive behavior, delusional thinking, and medication refusal despite Cardoza. PT refusal to discuss problems and largely denied all past history, medications, or any need for care. His reported symptoms of delusions, psychosis, and erratic/aggressive behavior are consistent with prior presentations of psychosis vs schizoaffective disorder bipolar type.    Of note, patient had previous diagnosis of Antisocial Personality Disorder. As psychosis cleared during hospitalization, aggressive and antagonistic behavior resolved completely. It seems questionable that he has ASPD, and rather that previous behaviors were a result of psychosis and/or substance intoxication.    Psychiatric Hospital course: Anderson Perez was admitted to [facility] following revocation of provisional discharge and on a court hold. His PTA olanzapine 20mg PO QDaily was resumed, as pt had been refusing at group home; per conversation with guardian, had planned to start on RGIGGS, but does not think that this was actually started, as patient was declining medications, which prompted admission in setting of decompensation. Pt was converted to ODT formulation given concerns for cheeking medications; while initially resistant, pt was agreeable with taking ODT olanzapine as long as it was given with ginger ale, which was accommodated. Olanzapine dose increased to 40mg on 9/21 given limited efficacy at PTA dose. He was also started on a brief trail of propanolol for akathisia, but this was discontinued due to lack of improvement in pacing and patient attesting that a high level of physical activity was his baseline. Over time, he's become more sociable with peers and staff on the unit, and is spending more time sitting in the milieu as opposed to pacing the hallways. Patient has since been stable on current medications for some time and only awaits placement.     Medical course: Anderson Perez was physically examined by the ED prior to being  transferred to the unit and was found to be medically stable and appropriate for admission. He had foot blisters from walking too much, which have now resolved. Noted weight gain from from 182 lbs at admission to 224 lbs on 12/16. Discussed adjunct medications to decrease further weight gain. Likely metformin over topiramate due to adverse effect of fuzziness possible with topiramate.     Plan   Principal Diagnosis:   # Psychosis, Unspecified (Schizoaffective disorder vs Bipolar Affective Disorder with psychotic features), severe, recurrent episode    Secondary psychiatric diagnoses of concern this admission:   # Intellectual disability    Psychotropic Medications:  Modify:  No changes    Continue Scheduled:   -Olanzapine ODT 40mg qPM    Continue PRN:  -Acetaminophen 650 mg Q6H PRN for pain and fever  -Hydroxyzine 25 mg Q6H PRN for anxiety  -Haldol 5mg prn q6h for agitation  -Haldol 5mg + diphenhydramine 50mg + lorazepam 2mg prn for severe agitation/psychosis  -Maalox 30 ml Q4H PRN for indigestion  -Miralax prn for constipation  -Nicotine gum 2 mg every hour as needed    Additional Plans:  -Patient will be treated in therapeutic milieu with appropriate individual and group therapies as described.  -Patient would be able to go to the [facility] as the staff on station 22 find appropriate, accompanied by a  and a [facility] 22 staff member.  -Weekly COVID test ordered on 12/8/2021    Medical diagnoses to be addressed this admission:    # Chronic Hep C  NTD    #Dry mouth  -Biotene started 10/25    #Foot blister/lesion, resolved  -Bacitracin ointment prn    #Weight gain  - Consider starting metformin over topiramate  - Get LFTs, Cholesterol    Consults:   -Nutrition Consult: Extended hospital stay, considering adding snacks to diet    Legal Status: Cardoza  Committed - recommitted 11/1   Legal guardian    Safety Assessment:   Behavioral Orders   Procedures     Code 1 - Restrict to Unit     Code 3     Patient  is allowed to go to the [facility] accompanied by two staff members as frequently as every other day per staff availability     Routine Programming     As clinically indicated     Status 15     Every 15 minutes.     SIO: No, no recent need for SIO, seclusion, or restraints.    Disposition: Patient is stable, medications optimized. Pending placement at [facility] or other facility.    This patient was seen and discussed with my attending physician, Dr. Shital Gómez MD  PGY-1 Psychiatry Resident Physician      This patient has been seen and evaluated by me, Glenn Isaacs.  I have discussed this patient with the psychiatry resident and I agree with the findings and plan in this note.    I have reviewed today's vital signs, medications, labs and imaging.     Glenn Adhikari MD on 12/16/2021 at 10:15 PM

## 2021-12-16 NOTE — PLAN OF CARE
INITIAL OT NOTE  Problem: General Rehab Plan of Care  Goal: Occupational Therapy Goals  Description: The patient and/or their representative will achieve their patient-specific goals related to the plan of care.  The patient-specific goals include:    Pt will demonstrate consistent engagement in OT group activities that support recovery as evidenced by participating in >1 scheduled OT group/day for 5 days.     Pt attended 2 out of 3 OT groups offered, which is a significant improvement in group attendance and participation, as this was the first time pt attended OT groups since admission. Pt actively participated in occupational therapy clinic with 3-5 patients total x90 minutes. Pt was able to ask for assistance as needed, and independently initiate a structured creative expression task. Pt demonstrated good focus, planning, and attention to detail. Social with peers and writer throughout. He shared that he enjoyed being able to color and listen to music during this group, and plans to continue attending this group in the future. Calm, pleasant, cooperative, and engaged throughout groups today. Affect appeared to brighten in interactions.

## 2021-12-16 NOTE — PROGRESS NOTES
Assessment/Intervention/Current Symptoms and Care Coordination        Pt had an interview today via Teams with [facility] in [place] on Wednesday. P/c and left message for Lucas Urbano at [facility] ([phone] to determine whether pt had conchis ccepted to program     Discharge Plan or Goal  To group home     Barriers to Discharge   Needs bed at group home        Referral Status  In process     Legal Status  Committed with Cardoza order

## 2021-12-17 LAB
ALBUMIN SERPL-MCNC: 3.7 G/DL (ref 3.4–5)
ALP SERPL-CCNC: 102 U/L (ref 40–150)
ALT SERPL W P-5'-P-CCNC: 54 U/L (ref 0–70)
ANION GAP SERPL CALCULATED.3IONS-SCNC: 4 MMOL/L (ref 3–14)
AST SERPL W P-5'-P-CCNC: 32 U/L (ref 0–45)
BILIRUB SERPL-MCNC: 0.6 MG/DL (ref 0.2–1.3)
BUN SERPL-MCNC: 22 MG/DL (ref 7–30)
CALCIUM SERPL-MCNC: 9.1 MG/DL (ref 8.5–10.1)
CHLORIDE BLD-SCNC: 107 MMOL/L (ref 94–109)
CHOLEST SERPL-MCNC: 166 MG/DL
CO2 SERPL-SCNC: 28 MMOL/L (ref 20–32)
CREAT SERPL-MCNC: 0.88 MG/DL (ref 0.66–1.25)
GFR SERPL CREATININE-BSD FRML MDRD: >90 ML/MIN/1.73M2
GLUCOSE BLD-MCNC: 115 MG/DL (ref 70–99)
HDLC SERPL-MCNC: 47 MG/DL
NONHDLC SERPL-MCNC: 119 MG/DL
POTASSIUM BLD-SCNC: 3.9 MMOL/L (ref 3.4–5.3)
PROT SERPL-MCNC: 7.4 G/DL (ref 6.8–8.8)
SODIUM SERPL-SCNC: 139 MMOL/L (ref 133–144)

## 2021-12-17 PROCEDURE — G0177 OPPS/PHP; TRAIN & EDUC SERV: HCPCS

## 2021-12-17 PROCEDURE — 124N000002 HC R&B MH UMMC

## 2021-12-17 PROCEDURE — 250N000013 HC RX MED GY IP 250 OP 250 PS 637: Performed by: STUDENT IN AN ORGANIZED HEALTH CARE EDUCATION/TRAINING PROGRAM

## 2021-12-17 PROCEDURE — 99231 SBSQ HOSP IP/OBS SF/LOW 25: CPT | Mod: GC | Performed by: PSYCHIATRY & NEUROLOGY

## 2021-12-17 PROCEDURE — 82040 ASSAY OF SERUM ALBUMIN: CPT | Performed by: PSYCHIATRY & NEUROLOGY

## 2021-12-17 PROCEDURE — 250N000013 HC RX MED GY IP 250 OP 250 PS 637: Performed by: PSYCHIATRY & NEUROLOGY

## 2021-12-17 PROCEDURE — 36415 COLL VENOUS BLD VENIPUNCTURE: CPT | Performed by: PSYCHIATRY & NEUROLOGY

## 2021-12-17 PROCEDURE — 80053 COMPREHEN METABOLIC PANEL: CPT | Performed by: PSYCHIATRY & NEUROLOGY

## 2021-12-17 PROCEDURE — 82465 ASSAY BLD/SERUM CHOLESTEROL: CPT | Performed by: PSYCHIATRY & NEUROLOGY

## 2021-12-17 RX ORDER — METFORMIN HCL 500 MG
500 TABLET, EXTENDED RELEASE 24 HR ORAL
Status: DISCONTINUED | OUTPATIENT
Start: 2021-12-17 | End: 2021-12-22

## 2021-12-17 RX ADMIN — OLANZAPINE 40 MG: 20 TABLET, ORALLY DISINTEGRATING ORAL at 20:00

## 2021-12-17 RX ADMIN — NICOTINE POLACRILEX 2 MG: 2 GUM, CHEWING ORAL at 10:47

## 2021-12-17 RX ADMIN — NICOTINE POLACRILEX 2 MG: 2 GUM, CHEWING ORAL at 19:37

## 2021-12-17 RX ADMIN — NICOTINE POLACRILEX 2 MG: 2 GUM, CHEWING ORAL at 14:59

## 2021-12-17 RX ADMIN — NICOTINE POLACRILEX 2 MG: 2 GUM, CHEWING ORAL at 18:09

## 2021-12-17 RX ADMIN — NICOTINE POLACRILEX 2 MG: 2 GUM, CHEWING ORAL at 12:32

## 2021-12-17 RX ADMIN — METFORMIN HYDROCHLORIDE 500 MG: 500 TABLET, EXTENDED RELEASE ORAL at 18:16

## 2021-12-17 RX ADMIN — NICOTINE POLACRILEX 2 MG: 2 GUM, CHEWING ORAL at 16:20

## 2021-12-17 RX ADMIN — NICOTINE POLACRILEX 2 MG: 2 GUM, CHEWING ORAL at 08:40

## 2021-12-17 RX ADMIN — NICOTINE POLACRILEX 2 MG: 2 GUM, CHEWING ORAL at 13:47

## 2021-12-17 RX ADMIN — NICOTINE POLACRILEX 2 MG: 2 GUM, CHEWING ORAL at 20:15

## 2021-12-17 RX ADMIN — Medication 25 MCG: at 20:01

## 2021-12-17 RX ADMIN — NICOTINE POLACRILEX 2 MG: 2 GUM, CHEWING ORAL at 21:14

## 2021-12-17 RX ADMIN — NICOTINE POLACRILEX 2 MG: 2 GUM, CHEWING ORAL at 11:45

## 2021-12-17 ASSESSMENT — ACTIVITIES OF DAILY LIVING (ADL)
DRESS: INDEPENDENT
HYGIENE/GROOMING: INDEPENDENT
ORAL_HYGIENE: INDEPENDENT

## 2021-12-17 NOTE — PROGRESS NOTES
----------------------------------------------------------------------------------------------------------  [facility], [place]   Psychiatric Progress Note  Hospital Day #108    Identifier: Anderson Perez is a 41 year old male with a past psychiatric history of bipolar disorder with psychosis vs schizoaffective disorder bipolar type, and intellectual disability admitted from the ER on 08/31/2021 due to concern for out of control behaviors, aggression and psychosis. Patient is psychiatrically stable, medications optimized, awaiting placement.     Interim History:   The patient's care was discussed with the treatment team and chart notes were reviewed.    Vitals: VSS  Sleep: 7 hours (12/17/21 0600)  Scheduled medications: Taking all scheduled medications as prescribed  PRN medications: No psychiatric prns received    Interim events:   No events    Staff Report:  - Pt was able to ask for assistance as needed, and independently initiate a structured creative expression task in OT group  - Got accepted to [facility], very excited  - Working on art project in OT, continues to do laps down the dalal      Subjective:     Patient Interview:  Anderson Perez was interviewed in the hallway. He is excited about getting accepted to [facility] in [place]. He doesn't know when the bed is available. Still concerned about the amount of weight gained while in the hospital but understands the benefits of the medication for his mental health are more important than the weight gain. No SI/HI/AH/VH.     The risks, benefits, alternatives and side effects of any medication changes have been discussed and are understood by the patient and guardian.    Review of systems:   ROS negative except as stated above.    Objective:   /74 (BP Location: Left arm)   Pulse 84   Temp 97.8  F (36.6  C) (Oral)   Resp 16   Ht 1.829 m (6')   Wt 101.9 kg (224 lb 9.6 oz)   SpO2 99%   BMI 30.46 kg/m   "  Weight is 224 lbs 9.6 oz  Body mass index is 30.46 kg/m .     Weight on admission was 182 lbs 4.8 oz  Body mass index is 24.72 kg/m .    Mental Status Examination:    Oriented to:  Grossly oriented.  General: Awake and alert, walking in hallway  Appearance:  appears older than stated age, Grooming is adequate, Dressed in hospital attire and Hair is shaved . Various tattoos on neck.  Behavior: calm, pleasant, offered hand shake  Eye Contact: good  Psychomotor: No abnormal motor symptoms appreciated. Seems restless with constant pacing but denies restlessness. No catatonia present.  Speech:  appropriate volume/tone, spontaneous and with good articulation  Language: Fluent in English with appropriate syntax and vocabulary.  Mood:  \"excited\"  Affect:  Appropriate, full range of affect when talking about son  Thought Process:  coherent, linear, logical and goal directed  Thought Content: No SI/HI/AH/VH; no delusions noted  Associations:  No loosening of associations appreciated  Insight:  Fair due to acknowledging the reason for his admission, understanding why he needs the medications  Judgment: Fair d/t cooperation with medication and care        Allergies:     Allergies   Allergen Reactions     Bee Venom Anaphylaxis     Clindamycin Hives     Morphine Hives        Labs:   None new.     Assessment    Primary psychiatric diagnosis:   Psychosis, Unspecified (Schizoaffective disorder vs Bipolar Affective Disorder with Psychosis)    Secondary psychiatric diagnoses of concern this admission:   Intellectual disability (IQ 63)    Diagnostic Impression: Anderson Perez is a 40 year old male with a past psychiatric history of bipolar disorder with psychosis vs schizoaffective disorder bipolar type, and intellectual disability admitted from the  ER on 08/31/2021 due to concern for out of control behaviors, aggression and psychosis in the context of medication refusal despite Cardoza. Patient was recently released from [facility] to " a group home. Presentation to the ED via ambulance after police contacted and provisional discharge rescinded. Group home noted escalating aggressive behavior, delusional thinking, and medication refusal despite Cardoza. PT refusal to discuss problems and largely denied all past history, medications, or any need for care. His reported symptoms of delusions, psychosis, and erratic/aggressive behavior are consistent with prior presentations of psychosis vs schizoaffective disorder bipolar type.    Of note, patient had previous diagnosis of Antisocial Personality Disorder. As psychosis cleared during hospitalization, aggressive and antagonistic behavior resolved completely. It seems questionable that he has ASPD, and rather that previous behaviors were a result of psychosis and/or substance intoxication.    Psychiatric Hospital course: Anderson Perez was admitted to [facility] following revocation of provisional discharge and on a court hold. His PTA olanzapine 20mg PO QDaily was resumed, as pt had been refusing at group home; per conversation with guardian, had planned to start on GRIGGS, but does not think that this was actually started, as patient was declining medications, which prompted admission in setting of decompensation. Pt was converted to ODT formulation given concerns for cheeking medications; while initially resistant, pt was agreeable with taking ODT olanzapine as long as it was given with ginger ale, which was accommodated. Olanzapine dose increased to 40mg on 9/21 given limited efficacy at PTA dose. He was also started on a brief trail of propanolol for akathisia, but this was discontinued due to lack of improvement in pacing and patient attesting that a high level of physical activity was his baseline. Over time, he's become more sociable with peers and staff on the unit, and is spending more time sitting in the milieu as opposed to pacing the hallways. Patient has since been stable on current  medications for some time. On 12/17 he was accepted to [facility].    Medical course: Anderson Perez was physically examined by the ED prior to being transferred to the unit and was found to be medically stable and appropriate for admission. He had foot blisters from walking too much, which have now resolved. Noted weight gain from from 182 lbs at admission to 224 lbs on 12/16. Discussed adjunct medications to decrease further weight gain. Likely metformin over topiramate due to adverse effect of fuzziness possible with topiramate.     Plan   Principal Diagnosis:   # Psychosis, Unspecified (Schizoaffective disorder vs Bipolar Affective Disorder with psychotic features), severe, recurrent episode    Secondary psychiatric diagnoses of concern this admission:   # Intellectual disability    Psychotropic Medications:  Modify:  No changes    Continue Scheduled:   -Olanzapine ODT 40mg qPM    Continue PRN:  -Acetaminophen 650 mg Q6H PRN for pain and fever  -Hydroxyzine 25 mg Q6H PRN for anxiety  -Haldol 5mg prn q6h for agitation  -Haldol 5mg + diphenhydramine 50mg + lorazepam 2mg prn for severe agitation/psychosis  -Maalox 30 ml Q4H PRN for indigestion  -Miralax prn for constipation  -Nicotine gum 2 mg every hour as needed    Additional Plans:  -Patient will be treated in therapeutic milieu with appropriate individual and group therapies as described.  -Patient would be able to go to the [facility] as the staff on station 22 find appropriate, accompanied by a  and a [facility] 22 staff member.  -Weekly COVID test ordered on 12/8/2021    Medical diagnoses to be addressed this admission:    # Chronic Hep C  NTD    #Dry mouth  -Biotene started 10/25    #Foot blister/lesion, resolved  -Bacitracin ointment prn    #Weight gain  - Metformin  mg with supper started on 12/17. Can increase up to 2 g daily if tolerated in 500 mg increments every week  - CMP, Cholesterol within normal  limits    Consults:   -Nutrition Consult: Extended hospital stay, considering adding snacks to diet    Legal Status: Cardoza  Committed - recommitted 11/1   Legal guardian    Safety Assessment:   Behavioral Orders   Procedures     Code 1 - Restrict to Unit     Code 3     Patient is allowed to go to the [facility] accompanied by two staff members as frequently as every other day per staff availability     Routine Programming     As clinically indicated     Status 15     Every 15 minutes.     SIO: No, no recent need for SIO, seclusion, or restraints.    Disposition: Patient is stable, medications optimized. Pending placement at [facility] or other facility.    This patient was seen and discussed with my attending physician, Dr. Shital Gómez MD  PGY-1 Psychiatry Resident Physician    This patient has been seen and evaluated by me, Glenn Isaacs.  I have discussed this patient with the psychiatry resident and I agree with the findings and plan in this note.    I have reviewed today's vital signs, medications, labs and imaging.     Glenn Adhikari MD on 12/19/2021 at 11:17 PM

## 2021-12-17 NOTE — PLAN OF CARE
Assessment/Intervention/Current Symptoms and Care Coordination      Attended team meeting and reviewed chart notes.    Pt has been accepted to [facility] in [place]. Writer emailed Lucas for admit date. Lucas will get back to writer.         Discharge Plan or Goal  To [facility]      Barriers to Discharge   Waiting for admit date      Referral Status  completed        Legal Status  Committed with Cardoza order

## 2021-12-17 NOTE — PLAN OF CARE
Problem: General Rehab Plan of Care  Goal: Occupational Therapy Goals  Description: The patient and/or their representative will achieve their patient-specific goals related to the plan of care.  The patient-specific goals include:    Pt will demonstrate consistent engagement in OT group activities that support recovery as evidenced by participating in >1 scheduled OT group/day for 5 days.     Pt attended 2 out of 3 OT groups offered. Pt actively participated in occupational therapy clinic with 1-3 patients total x60 minutes. Pt was able to ask for assistance as needed, and independently initiate a structured creative expression task. Pt demonstrated good focus, planning, and attention to detail. Social with peers and writer, and appropriately encouraged a peer to stay in group. Pt actively participated in a structured occupational therapy group of 1-2 patients total x50 minutes with a focus on social and leisure engagement via a group game. Pt was able to follow multi-step directions and was attentive to task parameters throughout. Pt demonstrated strategic planning. Focused and engaged throughout full duration of group. Pleasant with a bright affect in all interactions. Future-oriented in discussing his discharge plan.

## 2021-12-17 NOTE — PLAN OF CARE
"  Problem: Behavior Regulation Impairment (Manic or Hypomanic Signs/Symptoms)  Goal: Improved Impulse Control (Manic/Hypomanic Signs/Symptoms)  Outcome: Improving   Patient is excited about discharge to [facility]. Continues to pace in hallway most the shift. States will attend OT today to finish project from yesterday, \"I want to put in on the [employer] tree.\" Is social with select peers. Has been pleasant. Denies depression and anxiety.  "

## 2021-12-17 NOTE — PLAN OF CARE
Nursing plan of care   Problem: Behavioral Health Plan of Care  Goal: Adheres to Safety Considerations for Self and Others  Outcome: Improving    Problem: Behavioral Health Plan of Care  Goal: Develops/Participates in Therapeutic [place] to Support Successful Transition  Outcome: Improving  Pt was active and visible in the milieu; spent the shift pacing in the milieu and watching movies in the lounge; social with peers and staff; denied issue with sleep and appetite; denied SI/SIB,AVH,depression and anxiety; requested and received PRN nicotine gum (see eMAR); reported excitement for his acceptance to [facility]; denied medication side effect; medication compliant; will continue to monitor and assess.

## 2021-12-18 PROCEDURE — 250N000013 HC RX MED GY IP 250 OP 250 PS 637: Performed by: STUDENT IN AN ORGANIZED HEALTH CARE EDUCATION/TRAINING PROGRAM

## 2021-12-18 PROCEDURE — 124N000002 HC R&B MH UMMC

## 2021-12-18 PROCEDURE — 250N000013 HC RX MED GY IP 250 OP 250 PS 637: Performed by: PSYCHIATRY & NEUROLOGY

## 2021-12-18 RX ADMIN — NICOTINE POLACRILEX 2 MG: 2 GUM, CHEWING ORAL at 10:39

## 2021-12-18 RX ADMIN — NICOTINE POLACRILEX 2 MG: 2 GUM, CHEWING ORAL at 15:02

## 2021-12-18 RX ADMIN — NICOTINE POLACRILEX 2 MG: 2 GUM, CHEWING ORAL at 17:59

## 2021-12-18 RX ADMIN — Medication 25 MCG: at 19:50

## 2021-12-18 RX ADMIN — NICOTINE POLACRILEX 2 MG: 2 GUM, CHEWING ORAL at 08:12

## 2021-12-18 RX ADMIN — METFORMIN HYDROCHLORIDE 500 MG: 500 TABLET, EXTENDED RELEASE ORAL at 17:39

## 2021-12-18 RX ADMIN — NICOTINE POLACRILEX 2 MG: 2 GUM, CHEWING ORAL at 20:22

## 2021-12-18 RX ADMIN — NICOTINE POLACRILEX 2 MG: 2 GUM, CHEWING ORAL at 21:17

## 2021-12-18 RX ADMIN — NICOTINE POLACRILEX 2 MG: 2 GUM, CHEWING ORAL at 12:01

## 2021-12-18 RX ADMIN — NICOTINE POLACRILEX 2 MG: 2 GUM, CHEWING ORAL at 13:32

## 2021-12-18 RX ADMIN — NICOTINE POLACRILEX 2 MG: 2 GUM, CHEWING ORAL at 09:32

## 2021-12-18 RX ADMIN — NICOTINE POLACRILEX 2 MG: 2 GUM, CHEWING ORAL at 16:57

## 2021-12-18 RX ADMIN — OLANZAPINE 40 MG: 20 TABLET, ORALLY DISINTEGRATING ORAL at 19:50

## 2021-12-18 ASSESSMENT — ACTIVITIES OF DAILY LIVING (ADL)
DRESS: INDEPENDENT
HYGIENE/GROOMING: INDEPENDENT
ORAL_HYGIENE: INDEPENDENT

## 2021-12-18 NOTE — PLAN OF CARE
Patient bright and cooperated....  going to [facility], thanking writer for arranging it.   Pt reported interview with [facility] went well, and he is glad that he will be close to son in [place].

## 2021-12-18 NOTE — PLAN OF CARE
Patient out in milieu, social with peers and staff.  Pt. Pacing dalal for exercise, smiling often.   Patient reports ready for discharge.

## 2021-12-18 NOTE — PLAN OF CARE
Problem: Behavioral Disturbance  Goal: Behavioral Disturbance  Description: Signs and symptoms of listed problems will be absent or manageable by discharge or transition of care.  Outcome: Improving   Patient has been pacing the dalal this morning. Denies all mental health symptoms. Waiting to hear back from the group home in regard to day of admission. Has been pleasant and cooperative. Started attending groups two day ago. Showed writer several different ornaments he colored for the [employer] tree.

## 2021-12-19 PROCEDURE — 250N000013 HC RX MED GY IP 250 OP 250 PS 637: Performed by: PSYCHIATRY & NEUROLOGY

## 2021-12-19 PROCEDURE — 124N000002 HC R&B MH UMMC

## 2021-12-19 PROCEDURE — 250N000013 HC RX MED GY IP 250 OP 250 PS 637: Performed by: STUDENT IN AN ORGANIZED HEALTH CARE EDUCATION/TRAINING PROGRAM

## 2021-12-19 RX ADMIN — NICOTINE POLACRILEX 2 MG: 2 GUM, CHEWING ORAL at 12:49

## 2021-12-19 RX ADMIN — NICOTINE POLACRILEX 2 MG: 2 GUM, CHEWING ORAL at 13:51

## 2021-12-19 RX ADMIN — METFORMIN HYDROCHLORIDE 500 MG: 500 TABLET, EXTENDED RELEASE ORAL at 17:35

## 2021-12-19 RX ADMIN — NICOTINE POLACRILEX 2 MG: 2 GUM, CHEWING ORAL at 18:25

## 2021-12-19 RX ADMIN — OLANZAPINE 40 MG: 20 TABLET, ORALLY DISINTEGRATING ORAL at 20:09

## 2021-12-19 RX ADMIN — NICOTINE POLACRILEX 2 MG: 2 GUM, CHEWING ORAL at 10:12

## 2021-12-19 RX ADMIN — NICOTINE POLACRILEX 2 MG: 2 GUM, CHEWING ORAL at 20:09

## 2021-12-19 RX ADMIN — NICOTINE POLACRILEX 2 MG: 2 GUM, CHEWING ORAL at 08:42

## 2021-12-19 RX ADMIN — NICOTINE POLACRILEX 2 MG: 2 GUM, CHEWING ORAL at 17:18

## 2021-12-19 RX ADMIN — NICOTINE POLACRILEX 2 MG: 2 GUM, CHEWING ORAL at 14:51

## 2021-12-19 RX ADMIN — Medication 25 MCG: at 20:09

## 2021-12-19 RX ADMIN — NICOTINE POLACRILEX 2 MG: 2 GUM, CHEWING ORAL at 11:50

## 2021-12-19 RX ADMIN — NICOTINE POLACRILEX 2 MG: 2 GUM, CHEWING ORAL at 16:19

## 2021-12-19 RX ADMIN — METFORMIN HYDROCHLORIDE 500 MG: 500 TABLET, EXTENDED RELEASE ORAL at 17:33

## 2021-12-19 ASSESSMENT — MIFFLIN-ST. JEOR: SCORE: 1977.21

## 2021-12-19 NOTE — PROGRESS NOTES
"Brief Note - Cross Cover    S: alerted that patient was requesting to go to [place], reportedly has done this at other times during his stay. Has been doing \"really good\". Would require a code 3 to leave unit with supervision.     O: /78 (BP Location: Right arm)   Pulse 98   Temp 97.4  F (36.3  C) (Tympanic)   Resp 12   Ht 1.829 m (6')   Wt 102.9 kg (226 lb 14.4 oz)   SpO2 97%   BMI 30.77 kg/m      Code 3 order in-place stating \"patient is allowed to go to the [place] accompanied by two staff members as frequently as every other day per staff availability\"     A/P:  Patient requesting off-unit time, currently committed    - code 3 order already in-place from primary team  - after sunset now, recommend patient not leave unit at this hour but can discuss with primary team tomorrow if the frequency of off-unit privileges should be changed.     Bobbi Sanford MD  PGY2 Psychiatry     "

## 2021-12-19 NOTE — PLAN OF CARE
"  Problem: Behavioral Disturbance  Goal: Behavioral Disturbance  Description: Signs and symptoms of listed problems will be absent or manageable by discharge or transition of care.  Outcome: Improving   Patient has been pacing in the hallway most the morning. Became angry on phone while speaking to mother of his son. Patient was over heard saying he wasn't going to take his medications after he gets out of the hospital. Writer spoke to patient after phone call and patient said \"the mother of my kid wants to know the name of the place I'm going to in [place].\" Writer told patient he was going to [facility]. Patient called mother of son back. Patient said the conversation went worse the second time. Did not want to discuss, \"I will process it and let it go.\"  "

## 2021-12-19 NOTE — PLAN OF CARE
Pt appeared to sleep 7 hours. No PRNs given or requested. No medical or behavioral events this shift.    Problem: Sleep Disturbance  Goal: Adequate Sleep/Rest  Outcome: No Change

## 2021-12-20 LAB — SARS-COV-2 RNA RESP QL NAA+PROBE: NEGATIVE

## 2021-12-20 PROCEDURE — 99232 SBSQ HOSP IP/OBS MODERATE 35: CPT | Mod: GC | Performed by: PSYCHIATRY & NEUROLOGY

## 2021-12-20 PROCEDURE — 250N000013 HC RX MED GY IP 250 OP 250 PS 637: Performed by: STUDENT IN AN ORGANIZED HEALTH CARE EDUCATION/TRAINING PROGRAM

## 2021-12-20 PROCEDURE — 124N000002 HC R&B MH UMMC

## 2021-12-20 PROCEDURE — 250N000013 HC RX MED GY IP 250 OP 250 PS 637: Performed by: PSYCHIATRY & NEUROLOGY

## 2021-12-20 PROCEDURE — 250N000013 HC RX MED GY IP 250 OP 250 PS 637

## 2021-12-20 PROCEDURE — G0177 OPPS/PHP; TRAIN & EDUC SERV: HCPCS

## 2021-12-20 PROCEDURE — U0003 INFECTIOUS AGENT DETECTION BY NUCLEIC ACID (DNA OR RNA); SEVERE ACUTE RESPIRATORY SYNDROME CORONAVIRUS 2 (SARS-COV-2) (CORONAVIRUS DISEASE [COVID-19]), AMPLIFIED PROBE TECHNIQUE, MAKING USE OF HIGH THROUGHPUT TECHNOLOGIES AS DESCRIBED BY CMS-2020-01-R: HCPCS | Performed by: PSYCHIATRY & NEUROLOGY

## 2021-12-20 RX ORDER — HYDROXYZINE HYDROCHLORIDE 25 MG/1
25-50 TABLET, FILM COATED ORAL EVERY 4 HOURS PRN
Status: DISCONTINUED | OUTPATIENT
Start: 2021-12-20 | End: 2021-12-28 | Stop reason: HOSPADM

## 2021-12-20 RX ADMIN — NICOTINE POLACRILEX 2 MG: 2 GUM, CHEWING ORAL at 17:32

## 2021-12-20 RX ADMIN — NICOTINE POLACRILEX 2 MG: 2 GUM, CHEWING ORAL at 14:22

## 2021-12-20 RX ADMIN — OLANZAPINE 40 MG: 20 TABLET, ORALLY DISINTEGRATING ORAL at 20:14

## 2021-12-20 RX ADMIN — NICOTINE POLACRILEX 2 MG: 2 GUM, CHEWING ORAL at 20:05

## 2021-12-20 RX ADMIN — NICOTINE POLACRILEX 2 MG: 2 GUM, CHEWING ORAL at 11:25

## 2021-12-20 RX ADMIN — NICOTINE POLACRILEX 2 MG: 2 GUM, CHEWING ORAL at 15:23

## 2021-12-20 RX ADMIN — Medication 1 SPRAY: at 10:59

## 2021-12-20 RX ADMIN — HYDROXYZINE HYDROCHLORIDE 25 MG: 25 TABLET, FILM COATED ORAL at 11:20

## 2021-12-20 RX ADMIN — NICOTINE POLACRILEX 2 MG: 2 GUM, CHEWING ORAL at 08:49

## 2021-12-20 RX ADMIN — METFORMIN HYDROCHLORIDE 500 MG: 500 TABLET, EXTENDED RELEASE ORAL at 17:53

## 2021-12-20 RX ADMIN — ACETAMINOPHEN 650 MG: 325 TABLET, FILM COATED ORAL at 21:49

## 2021-12-20 RX ADMIN — Medication 25 MCG: at 20:14

## 2021-12-20 RX ADMIN — NICOTINE POLACRILEX 2 MG: 2 GUM, CHEWING ORAL at 18:29

## 2021-12-20 RX ADMIN — NICOTINE POLACRILEX 2 MG: 2 GUM, CHEWING ORAL at 12:36

## 2021-12-20 ASSESSMENT — ACTIVITIES OF DAILY LIVING (ADL)
DRESS: INDEPENDENT
ORAL_HYGIENE: INDEPENDENT
HYGIENE/GROOMING: INDEPENDENT

## 2021-12-20 NOTE — PLAN OF CARE
Problem: General Rehab Plan of Care  Goal: Occupational Therapy Goals  Description: The patient and/or their representative will achieve their patient-specific goals related to the plan of care.  The patient-specific goals include:    Pt will demonstrate consistent engagement in OT group activities that support recovery as evidenced by participating in >1 scheduled OT group/day for 5 days.     Pt attended 2 out of 3 OT groups offered. Pt actively participated in occupational therapy clinic with 4 patients total x60 minutes. Pt was able to ask for assistance as needed, and independently initiate a structured creative expression task. Pt demonstrated good focus, planning, and attention to detail. Social with peers and staff. Pt actively participated in a structured occupational therapy group of 4 patients total x60 minutes with a focus on social and leisure engagement via a group game. Pt was able to follow multi-step directions and was attentive to task parameters throughout. Pt demonstrated strategic planning. Focused and engaged throughout full duration of group. Pleasant with a bright affect in interactions.

## 2021-12-20 NOTE — PLAN OF CARE
Patient out in milieu majority of shift,  walking dalal for exercise interspersed with watching TV.   Pt bright and social... interacting appropriately with staff and peers.

## 2021-12-20 NOTE — PLAN OF CARE
Patient cooperative and appreciative with going out to [employer] early in shift.  Pt hopeful that he will be discharged before [facility], but realizes  that it might be after.   Pt bright and social with staff and peers... watching football on TV.

## 2021-12-20 NOTE — PROGRESS NOTES
"    ----------------------------------------------------------------------------------------------------------  [facility], [place]   Psychiatric Progress Note  Hospital Day #111    Identifier: Anderson Perez is a 41 year old male with a past psychiatric history of bipolar disorder with psychosis vs schizoaffective disorder bipolar type, and intellectual disability admitted from the ER on 08/31/2021 due to concern for out of control behaviors, aggression and psychosis. Patient is psychiatrically stable, medications optimized, awaiting placement.     Interim History:   The patient's care was discussed with the treatment team and chart notes were reviewed.    Vitals: VSS  Sleep: 6.5 hours (12/20/21 0700)  Scheduled medications: Taking all scheduled medications as prescribed  PRN medications: No psychiatric prns received    Interim events:   No events    Weekend Staff Report:  - \"Became angry on phone while speaking to mother of his son. Writer spoke to patient after phone call and patient said \"the mother of my kid wants to know the name of the place I'm going to in [place].\"\"  - \"Patient was over heard saying to son's mom that he wasn't going to take his medications after he gets out of the hospital.\"  - Pt bright and social... interacting appropriately with staff and peers     Subjective:     Patient Interview:  Anderson Perez was interviewed in the hallway.  Antonio reported the weekend went pretty well.  He had multiple phone calls with his \"baby mamomkar\" whose name is Mila.  She and his son live in [place], [place] which will be close to his new group home in [place].  Antonio has not had any gastrointestinal distress after starting the metformin on Friday night.  He understands this medication is to help him not gain further weight on the olanzapine.  Anderson endorsed significant anxiety recently.  He has previously had anxiety as well and has been given Xanax for it in the past. " " Antonio said that he has struggled with claustrophobia particularly after being trapped in an elevator for 3 hours when younger.  His current anxiety is related to being on the locked unit and not having control over where he can go and what he can do.  He is interested in a medication to help with his anxiety.  Encouraged use of his PRN hydroxyzine. No other concerns such as SI/HI/AH/VH.    The risks, benefits, alternatives and side effects of any medication changes have been discussed and are understood by the patient and guardian.    Review of systems:   ROS negative except as stated above.    Objective:   /78 (BP Location: Right arm)   Pulse 98   Temp 97.4  F (36.3  C) (Tympanic)   Resp 12   Ht 1.829 m (6')   Wt 102.9 kg (226 lb 14.4 oz)   SpO2 97%   BMI 30.77 kg/m    Weight is 226 lbs 14.4 oz  Body mass index is 30.77 kg/m .     Weight on admission was 182 lbs 4.8 oz  Body mass index is 24.72 kg/m .    Mental Status Examination:    Oriented to:  Grossly oriented.  General: Awake and alert, walking in hallway, wearing red sweatshirt  Appearance:  appears older than stated age, Grooming is adequate, Dressed in hospital attire and Hair is shaved . Various tattoos on neck.  Behavior: calm, pleasant  Eye Contact: good, focuses down hallway while walking and talking  Psychomotor: No abnormal motor symptoms appreciated. Seems restless with constant pacing but denies restlessness. No catatonia present.  Speech:  appropriate volume/tone, spontaneous and with good articulation  Language: Fluent in English with appropriate syntax and vocabulary.  Mood:  \"anxious\"  Affect:  Appropriate, full range of affect when talking about son, tense while discussing anxiety symptoms  Thought Process:  coherent, linear, logical and goal directed  Thought Content: No SI/HI/AH/VH; no delusions noted  Associations:  No loosening of associations appreciated  Insight:  Fair due to acknowledging the reason for his admission, " understanding why he needs the medications  Judgment: Fair d/t cooperation with medication and care        Allergies:     Allergies   Allergen Reactions     Bee Venom Anaphylaxis     Clindamycin Hives     Morphine Hives        Labs:   None new.     Assessment    Primary psychiatric diagnosis:   Psychosis, Unspecified (Schizoaffective disorder vs Bipolar Affective Disorder with Psychosis)    Secondary psychiatric diagnoses of concern this admission:   Intellectual disability (IQ 63)    Diagnostic Impression: Anderson Perez is a 40 year old male with a past psychiatric history of bipolar disorder with psychosis vs schizoaffective disorder bipolar type, and intellectual disability admitted from the  ER on 08/31/2021 due to concern for out of control behaviors, aggression and psychosis in the context of medication refusal despite Cardoza. Patient was recently released from [facility] to a group home. Presentation to the ED via ambulance after police contacted and provisional discharge rescinded. Group home noted escalating aggressive behavior, delusional thinking, and medication refusal despite Cardoza. PT refusal to discuss problems and largely denied all past history, medications, or any need for care. His reported symptoms of delusions, psychosis, and erratic/aggressive behavior are consistent with prior presentations of psychosis vs schizoaffective disorder bipolar type.    Of note, patient had previous diagnosis of Antisocial Personality Disorder. As psychosis cleared during hospitalization, aggressive and antagonistic behavior resolved completely. It seems questionable that he has ASPD, and rather that previous behaviors were a result of psychosis and/or substance intoxication.    Psychiatric Hospital course: Anderson Perez was admitted to [facility] following revocation of provisional discharge and on a court hold. His PTA olanzapine 20mg PO QDaily was resumed, as pt had been refusing at group home; per  conversation with guardian, had planned to start on GRIGGS, but does not think that this was actually started, as patient was declining medications, which prompted admission in setting of decompensation. Pt was converted to ODT formulation given concerns for cheeking medications; while initially resistant, pt was agreeable with taking ODT olanzapine as long as it was given with ginger ale, which was accommodated. Olanzapine dose increased to 40mg on 9/21 given limited efficacy at PTA dose. He was also started on a brief trail of propanolol for akathisia, but this was discontinued due to lack of improvement in pacing and patient attesting that a high level of physical activity was his baseline. Over time, he's become more sociable with peers and staff on the unit, and is spending more time sitting in the milieu as opposed to pacing the hallways. Patient has since been stable on current medications for some time. On 12/17 he was accepted to [facility]. On 12/20 Antonio reported a history of anxiety and current symptoms while being on the inpatient unit.    Medical course: Anderson Perez was physically examined by the ED prior to being transferred to the unit and was found to be medically stable and appropriate for admission. He had foot blisters from walking too much, which have now resolved. Noted weight gain from from 182 lbs at admission to 224 lbs on 12/16. Discussed adjunct medications to decrease further weight gain. Likely metformin over topiramate due to adverse effect of fuzziness possible with topiramate.     Plan   Principal Diagnosis:   # Psychosis, Unspecified (Schizoaffective disorder vs Bipolar Affective Disorder with psychotic features), severe, recurrent episode    Secondary psychiatric diagnoses of concern this admission:   # Intellectual disability    Psychotropic Medications:  Modify:  No changes    Continue Scheduled:   -Olanzapine ODT 40mg qPM    Continue PRN:  -Acetaminophen 650 mg Q6H  PRN for pain and fever  -Hydroxyzine 25-50 mg Q6H PRN for anxiety  -Haldol 5mg prn q6h for agitation  -Haldol 5mg + diphenhydramine 50mg + lorazepam 2mg prn for severe agitation/psychosis  -Maalox 30 ml Q4H PRN for indigestion  -Miralax prn for constipation  -Nicotine gum 2 mg every hour as needed    Additional Plans:  -Patient will be treated in therapeutic milieu with appropriate individual and group therapies as described.  -Patient would be able to go to the [facility] as the staff on [facility] find appropriate, accompanied by a  and a [facility] staff member, preferably during sunlight hours.  -Weekly COVID test    Medical diagnoses to be addressed this admission:    # Chronic Hep C  NTD    #Dry mouth  -Biotene started 10/25    #Foot blister/lesion, resolved  -Bacitracin ointment prn    #Weight gain  - Metformin  mg with supper started on 12/17. Can increase up to 2 g daily if tolerated in 500 mg increments every week  - CMP, Cholesterol within normal limits    Consults:   -Nutrition Consult: Extended hospital stay, added snacks and double portions, took off ensure    Legal Status: Cardoza  Committed - recommitted 11/1   Legal guardian    Safety Assessment:   Behavioral Orders   Procedures     Code 1 - Restrict to Unit     Code 3     Patient is allowed to go to the [place] accompanied by two staff members as frequently as every other day per staff availability     Routine Programming     As clinically indicated     Status 15     Every 15 minutes.     SIO: No, no recent need for SIO, seclusion, or restraints.    Disposition: Patient is stable, medications optimized. Pending placement at [facility] or other facility.    This patient was seen and discussed with my attending physician, Dr. Shital Gómez MD  PGY-1 Psychiatry Resident Physician    This patient has been seen and evaluated by me, Glenn Isaacs.  I have discussed this patient with the psychiatry  resident and I agree with the findings and plan in this note.    I have reviewed today's vital signs, medications, labs and imaging.     Glenn Adhikari MD on 12/20/2021 at 4:45 PM

## 2021-12-20 NOTE — PLAN OF CARE
BEHAVIORAL TEAM DISCUSSION    Participants: Dr. Isaacs, resident Sheldon Gómez, RN Talia Lopez, CTC Siobhan Irving  Progress: pt appears to be at baseline  Anticipated length of stay: until pt can admit to group home-pt has been accepted to [facility] in [place]  Continued Stay Criteria/Rationale: pt is committed with Cardoza order  Medical/Physical: none  Precautions:   Behavioral Orders   Procedures     Code 1 - Restrict to Unit     Code 3     Patient is allowed to go to the [place] accompanied by two staff members as frequently as every other day per staff availability     Routine Programming     As clinically indicated     Status 15     Every 15 minutes.     Plan: provide a psychological assessment and manage medications per psychiatry, staff to provide a safe and therapeutic milieu, CTC to continue to contact Lucas Miranda to assist in transfer.  Rationale for change in precautions or plan: no change

## 2021-12-20 NOTE — PLAN OF CARE
Problem: Behavioral Disturbance  Goal: Behavioral Disturbance  Description: Signs and symptoms of listed problems will be absent or manageable by discharge or transition of care.  Outcome: Improving   Has been pacing in the dalal most the shift.  Patiently waiting to hear about discharge plans. Denies all mental health symptoms. Has been pleasant, laughing at times, social with male staff. Attending OT groups.

## 2021-12-20 NOTE — PLAN OF CARE
Assessment/Intervention/Current Symptoms and Care Coordination    Attended team meeting and reviewed chart notes.    Writer emailed Lucas from [facility] for admission date. Pt will need transportation and follow up psychiatry in [facility].      Discharge Plan or Goal  To group home    Barriers to Discharge   Needs admit date    Referral Status  completed        Legal Status  Committed with Cardoza order

## 2021-12-21 PROBLEM — Z72.0 TOBACCO USE: Status: ACTIVE | Noted: 2021-12-21

## 2021-12-21 PROBLEM — Z91.148 NONCOMPLIANCE WITH MEDICATION REGIMEN: Status: RESOLVED | Noted: 2021-08-31 | Resolved: 2021-12-21

## 2021-12-21 PROBLEM — F29 PSYCHOSIS (H): Status: RESOLVED | Noted: 2021-04-10 | Resolved: 2021-12-21

## 2021-12-21 PROCEDURE — G0177 OPPS/PHP; TRAIN & EDUC SERV: HCPCS

## 2021-12-21 PROCEDURE — 124N000002 HC R&B MH UMMC

## 2021-12-21 PROCEDURE — 250N000013 HC RX MED GY IP 250 OP 250 PS 637: Performed by: PSYCHIATRY & NEUROLOGY

## 2021-12-21 PROCEDURE — 250N000013 HC RX MED GY IP 250 OP 250 PS 637: Performed by: STUDENT IN AN ORGANIZED HEALTH CARE EDUCATION/TRAINING PROGRAM

## 2021-12-21 PROCEDURE — 99232 SBSQ HOSP IP/OBS MODERATE 35: CPT | Mod: GC | Performed by: PSYCHIATRY & NEUROLOGY

## 2021-12-21 PROCEDURE — 250N000013 HC RX MED GY IP 250 OP 250 PS 637

## 2021-12-21 RX ORDER — HYDROXYZINE HYDROCHLORIDE 25 MG/1
25-50 TABLET, FILM COATED ORAL EVERY 4 HOURS PRN
Qty: 15 TABLET | Refills: 0 | Status: SHIPPED | OUTPATIENT
Start: 2021-12-21

## 2021-12-21 RX ORDER — OLANZAPINE 20 MG/1
40 TABLET, ORALLY DISINTEGRATING ORAL AT BEDTIME
Qty: 30 TABLET | Refills: 0 | Status: SHIPPED | OUTPATIENT
Start: 2021-12-21

## 2021-12-21 RX ORDER — METFORMIN HCL 500 MG
500 TABLET, EXTENDED RELEASE 24 HR ORAL
Qty: 30 TABLET | Refills: 0 | Status: SHIPPED | OUTPATIENT
Start: 2021-12-21 | End: 2021-12-27

## 2021-12-21 RX ORDER — VITAMIN B COMPLEX
25 TABLET ORAL AT BEDTIME
Qty: 30 TABLET | Refills: 0 | Status: SHIPPED | OUTPATIENT
Start: 2021-12-21

## 2021-12-21 RX ADMIN — OLANZAPINE 40 MG: 20 TABLET, ORALLY DISINTEGRATING ORAL at 18:25

## 2021-12-21 RX ADMIN — NICOTINE POLACRILEX 2 MG: 2 GUM, CHEWING ORAL at 18:25

## 2021-12-21 RX ADMIN — NICOTINE POLACRILEX 2 MG: 2 GUM, CHEWING ORAL at 17:13

## 2021-12-21 RX ADMIN — Medication 25 MCG: at 18:25

## 2021-12-21 RX ADMIN — NICOTINE POLACRILEX 2 MG: 2 GUM, CHEWING ORAL at 12:18

## 2021-12-21 RX ADMIN — NICOTINE POLACRILEX 2 MG: 2 GUM, CHEWING ORAL at 09:54

## 2021-12-21 RX ADMIN — NICOTINE POLACRILEX 2 MG: 2 GUM, CHEWING ORAL at 19:40

## 2021-12-21 RX ADMIN — NICOTINE POLACRILEX 2 MG: 2 GUM, CHEWING ORAL at 16:22

## 2021-12-21 RX ADMIN — NICOTINE POLACRILEX 2 MG: 2 GUM, CHEWING ORAL at 14:52

## 2021-12-21 RX ADMIN — METFORMIN HYDROCHLORIDE 500 MG: 500 TABLET, EXTENDED RELEASE ORAL at 18:15

## 2021-12-21 RX ADMIN — ACETAMINOPHEN 650 MG: 325 TABLET, FILM COATED ORAL at 19:15

## 2021-12-21 RX ADMIN — NICOTINE POLACRILEX 2 MG: 2 GUM, CHEWING ORAL at 11:22

## 2021-12-21 RX ADMIN — NICOTINE POLACRILEX 2 MG: 2 GUM, CHEWING ORAL at 13:52

## 2021-12-21 ASSESSMENT — ACTIVITIES OF DAILY LIVING (ADL)
HYGIENE/GROOMING: INDEPENDENT
DRESS: INDEPENDENT
ORAL_HYGIENE: INDEPENDENT

## 2021-12-21 NOTE — PROGRESS NOTES
This is regarding the Metformin given on 12/19/21. It appears in the chart as pt received 2 doses of Metformin. Writer talked to RN who gave the meds. Fercho CHAN said it was not given twice he must have accidentally scanned it twice.

## 2021-12-21 NOTE — PLAN OF CARE
Assessment/Intervention/Current Symptoms and Care Coordination    Attended team meeting and reviewed chart notes.    Pt will transfer to [facility] on Tuesday December 28th per Lucas. His CADI CHRISTIAN Short and CHRISTIAN Barragan will coordinate transportation.     Writer emailed questions to all three above regarding medications, refill providers and logistical comments.        Discharge Plan or Goal  To [facility] on December 28th        Barriers to Discharge   weather      Referral Status  completed      Legal Status  Committed with Cardoza order

## 2021-12-21 NOTE — PLAN OF CARE
"Patient alert and oriented to person, place, and time, adequately groomed. Pt is pleasant, good eye contact, cooperative, no scheduled medication this shift, PRN nicotine requested every hour. No precautions, no behaviors observed. Pt contracts for safety. Pt engaged in group activities, observed walking/pacing hallway, on the phone and calm, appropriate with peers and staff. Pt communicates and verbalizes needs to staff. No behaviors noted. Will continue to monitor behavior and encourage engagement.     NURSING ASSESSMENT  Pain: denies  Anxiety: denies  Depression: denies  SI: denies  SIB: denies  HI: denies  AVH: denies, did not appear to be responding to internal stimuli, did not demonstrate delusional thinking  Mood/Affect: blunted   Sleep: pt states \"good\"  Medication: compliant, no side effects reported or observed  PRN: nicotine gum every hour  Appetite: breakfast 90%     lunch  ADLs: independent   Visits: none  Vitals: WNL   Medical: denies   Problem: Adult Inpatient Plan of Care  Goal: Plan of Care Review  Outcome: Improving  Flowsheets (Taken 12/21/2021 1200)  Plan of Care Reviewed With: patient   Problem: Adult Inpatient Plan of Care  Goal: Patient-Specific Goal (Individualized)  Outcome: Improving   Problem: Adult Inpatient Plan of Care  Goal: Optimal Comfort and Wellbeing  Outcome: Improving  Intervention: Provide Person-Centered Care  Recent Flowsheet Documentation  Taken 12/21/2021 1200 by Cindy Pena RN  Trust Relationship/Rapport:   care explained   choices provided   emotional support provided   empathic listening provided   questions answered   questions encouraged   reassurance provided   thoughts/feelings acknowledged   Problem: Behavioral Health Plan of Care  Goal: Adheres to Safety Considerations for Self and Others  Outcome: Improving  Intervention: Develop and Maintain Individualized Safety Plan  Recent Flowsheet Documentation  Taken 12/21/2021 1200 by Cindy Pena, " RN  Safety Measures:   environmental rounds completed   safety rounds completed   suicide assessment completed   suicide check-in completed   Problem: Mood Impairment (Manic or Hypomanic Signs/Symptoms)  Goal: Improved Mood Symptoms (Manic/Hypomanic Signs/Symptoms)  Outcome: Improving  Intervention: Optimize Emotion and Mood  Recent Flowsheet Documentation  Taken 12/21/2021 1200 by Cindy Pena RN  Diversional Activity:   art work   structured exercise   Problem: Sleep Disturbance  Goal: Adequate Sleep/Rest  Outcome: Improving

## 2021-12-21 NOTE — PROGRESS NOTES
"    ----------------------------------------------------------------------------------------------------------  [facility], [place]   Psychiatric Progress Note  Hospital Day #112    Identifier: Anderson Perez is a 41 year old male with a past psychiatric history of bipolar disorder with psychosis vs schizoaffective disorder bipolar type, and intellectual disability admitted from the ER on 08/31/2021 due to concern for out of control behaviors, aggression and psychosis. Patient is psychiatrically stable, medications optimized, awaiting placement.     Interim History:   The patient's care was discussed with the treatment team and chart notes were reviewed.    Vitals: VSS  Sleep: 7 hours (12/21/21 0600)  Scheduled medications: Taking all scheduled medications as prescribed  PRN medications: No psychiatric prns received    Interim events:   No events    Overnight Staff Report:  - Appreciative with going out to [employer] early in shift. watching football on TV.  - Pt hopeful that he will be discharged before Eliseo, but realizes that it might be after.  - Pt attended 2 out of 3 OT groups offered.     Subjective:     Patient Interview:  Anderson Perez was interviewed in the hallway. Antonio reported continued anxiety, specifically around uncertainty for his discharge and being locked on this unit. Discussed TIPP skills with Antonio, which he seemed open to trying. Rated anxiety as 8/10. Antonio said he was hoping to get out by Eliseo, although it's not that big of deal because he doesn't celebrate much. Antonio disclosed he was a  prior to his mental health crisis so he could pay off his student loans. Antonio said he went to [employer] and the [facility] for archeology because he really liked dinosaurs. 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     Later, told Antonio he could be " "discharged next Tuesday December 28th to [facility] in [place]. He appreciated the news.    The risks, benefits, alternatives and side effects of any medication changes have been discussed and are understood by the patient and guardian.    Review of systems:   ROS negative except as stated above.    Objective:   /82   Pulse 76   Temp 97.4  F (36.3  C) (Oral)   Resp 12   Ht 1.829 m (6')   Wt 102.9 kg (226 lb 14.4 oz)   SpO2 100%   BMI 30.77 kg/m    Weight is 226 lbs 14.4 oz  Body mass index is 30.77 kg/m .     Weight on admission was 182 lbs 4.8 oz  Body mass index is 24.72 kg/m .    Mental Status Examination:    Oriented to:  Grossly oriented.  General: Awake and alert, walking in hallway, wearing red sweatshirt  Appearance:  appears older than stated age, Grooming is adequate, Dressed in hospital attire and Hair is shaved . Various tattoos on neck.  Behavior: calm, pleasant  Eye Contact: good, focuses down hallway while walking and talking  Psychomotor: No abnormal motor symptoms appreciated. Seems restless with constant pacing but denies restlessness. No catatonia present.  Speech:  appropriate volume/tone, spontaneous and with good articulation  Language: Fluent in English with appropriate syntax and vocabulary.  Mood:  \"good\"  Affect:  Appropriate, full range of affect when talking about son, tense while discussing anxiety symptoms  Thought Process:  coherent, linear, logical and goal directed  Thought Content: No SI/HI/AH/VH; no delusions noted  Associations:  No loosening of associations appreciated  Insight:  Fair due to acknowledging the reason for his admission, understanding why he needs the medications  Judgment: Fair d/t cooperation with medication and care        Allergies:     Allergies   Allergen Reactions     Bee Venom Anaphylaxis     Clindamycin Hives     Morphine Hives        Labs:   None new.     Assessment    Primary psychiatric diagnosis:   Psychosis, Unspecified " (Schizoaffective disorder vs Bipolar Affective Disorder with Psychosis)    Secondary psychiatric diagnoses of concern this admission:   Intellectual disability (IQ 63)    Diagnostic Impression: Anderson Perez is a 40 year old male with a past psychiatric history of bipolar disorder with psychosis vs schizoaffective disorder bipolar type, and intellectual disability admitted from the  ER on 08/31/2021 due to concern for out of control behaviors, aggression and psychosis in the context of medication refusal despite Cardoza. Patient was recently released from [facility] to a group home. Presentation to the ED via ambulance after police contacted and provisional discharge rescinded. Group home noted escalating aggressive behavior, delusional thinking, and medication refusal despite Cardoza. PT refusal to discuss problems and largely denied all past history, medications, or any need for care. His reported symptoms of delusions, psychosis, and erratic/aggressive behavior are consistent with prior presentations of psychosis vs schizoaffective disorder bipolar type.    Of note, patient had previous diagnosis of Antisocial Personality Disorder. As psychosis cleared during hospitalization, aggressive and antagonistic behavior resolved completely. It seems questionable that he has ASPD, and rather that previous behaviors were a result of psychosis and/or substance intoxication.    Psychiatric Hospital course: Anderson Perez was admitted to [facility] following revocation of provisional discharge and on a court hold. His PTA olanzapine 20mg PO QDaily was resumed, as pt had been refusing at group home; per conversation with guardian, had planned to start on GRIGGS, but does not think that this was actually started, as patient was declining medications, which prompted admission in setting of decompensation. Pt was converted to ODT formulation given concerns for cheeking medications; while initially resistant, pt was agreeable with  taking ODT olanzapine as long as it was given with ginger ale, which was accommodated. Olanzapine dose increased to 40mg on 9/21 given limited efficacy at PTA dose. He was also started on a brief trail of propanolol for akathisia, but this was discontinued due to lack of improvement in pacing and patient attesting that a high level of physical activity was his baseline. Over time, he's become more sociable with peers and staff on the unit, and is spending more time sitting in the milieu as opposed to pacing the hallways. Patient has since been stable on current medications for some time. On 12/17 he was accepted to [facility] On 12/20 Antonio reported a history of anxiety and current symptoms while being on the inpatient unit    Medical course: Anderson CHI Orhermilo was physically examined by the ED prior to being transferred to the unit and was found to be medically stable and appropriate for admission. He had foot blisters from walking too much, which have now resolved. Noted weight gain from from 182 lbs at admission to 224 lbs on 12/16. Discussed adjunct medications to decrease further weight gain. Started metformin  mg on 12/17.     Plan   Principal Diagnosis:   # Psychosis, Unspecified (Schizoaffective disorder vs Bipolar Affective Disorder with psychotic features), severe, recurrent episode    Secondary psychiatric diagnoses of concern this admission:   # Intellectual disability    Psychotropic Medications:  Modify:  No changes    Continue Scheduled:   -Olanzapine ODT 40mg qPM    Continue PRN:  -Acetaminophen 650 mg Q6H PRN for pain and fever  -Hydroxyzine 25-50 mg Q6H PRN for anxiety  -Haldol 5mg prn q6h for agitation  -Haldol 5mg + diphenhydramine 50mg + lorazepam 2mg prn for severe agitation/psychosis  -Maalox 30 ml Q4H PRN for indigestion  -Miralax prn for constipation  -Nicotine gum 2 mg every hour as needed    Additional Plans:  -Patient will be treated in therapeutic milieu with appropriate  individual and group therapies as described.  -Patient would be able to go to the [facility] as the staff on station 22 find appropriate, accompanied by a  and a [facility] 22 staff member, preferably during sunlight hours.  -Weekly COVID test    Medical diagnoses to be addressed this admission:    # Chronic Hep C  NTD    #Dry mouth  -Biotene started 10/25    #Foot blister/lesion, resolved  -Bacitracin ointment prn    #Weight gain  - Metformin  mg with supper started on 12/17. Can increase up to 2 g daily if tolerated in 500 mg increments every week  - CMP, Cholesterol within normal limits    Consults:   -Nutrition Consult: Extended hospital stay, added snacks and double portions, took off ensure    Legal Status: Cardoza  Committed - recommitted 11/1   Legal guardian    Safety Assessment:   Behavioral Orders   Procedures     Code 1 - Restrict to Unit     Code 3     Patient is allowed to go to the [facility] accompanied by two staff members as frequently as every other day per staff availability     Routine Programming     As clinically indicated     Status 15     Every 15 minutes.     SIO: No, no recent need for SIO, seclusion, or restraints.    Disposition: Patient is stable, medications optimized. Accepted at [facility] in [place], [place]. Will be transported there on 12/28. Medications to be sent to [facility] in addition to 30 day supply from here.    This patient was seen and discussed with my attending physician, Dr. Shital Gómez MD  PGY-1 Psychiatry Resident Physician    This patient has been seen and evaluated by me, Glenn Isaacs.  I have discussed this patient with the psychiatry resident and I agree with the findings and plan in this note.    I have reviewed today's vital signs, medications, labs and imaging.     Glenn Adhikari MD on 12/21/2021 at 11:16 PM

## 2021-12-21 NOTE — PLAN OF CARE
Problem: General Rehab Plan of Care  Goal: Occupational Therapy Goals  Description: The patient and/or their representative will achieve their patient-specific goals related to the plan of care.  The patient-specific goals include:    Pt will demonstrate consistent engagement in OT group activities that support recovery as evidenced by participating in >1 scheduled OT group/day for 5 days.     Pt attended 2 out of 2 OT groups offered. Pt actively participated in occupational therapy clinic with 1-3 patients total x60 minutes. Pt was able to ask for assistance as needed, and independently initiate a structured creative expression task. Pt demonstrated good focus, planning, and attention to detail. Social with peers and writer. He mentioned that he isn't able to read well. He received news regarding his discharge date during this group, and was future-oriented in discussing upcoming discharge. Pt actively participated in a structured occupational therapy group of 3-5 patients total x45 minutes with a focus on social and leisure engagement via a group game. Pt was able to follow multi-step directions and was attentive to task parameters throughout. Pt demonstrated strategic planning and good task organization. Focused and engaged throughout full duration of group. He was patient with a peer who played the game at a slower pace. He continues to be calm, pleasant, cooperative, and engaged throughout all groups.

## 2021-12-22 PROCEDURE — 250N000013 HC RX MED GY IP 250 OP 250 PS 637: Performed by: STUDENT IN AN ORGANIZED HEALTH CARE EDUCATION/TRAINING PROGRAM

## 2021-12-22 PROCEDURE — G0177 OPPS/PHP; TRAIN & EDUC SERV: HCPCS

## 2021-12-22 PROCEDURE — 124N000002 HC R&B MH UMMC

## 2021-12-22 PROCEDURE — 99232 SBSQ HOSP IP/OBS MODERATE 35: CPT | Mod: GC | Performed by: PSYCHIATRY & NEUROLOGY

## 2021-12-22 PROCEDURE — 250N000013 HC RX MED GY IP 250 OP 250 PS 637: Performed by: PSYCHIATRY & NEUROLOGY

## 2021-12-22 RX ORDER — METFORMIN HCL 500 MG
TABLET, EXTENDED RELEASE 24 HR ORAL
Qty: 155 TABLET | Refills: 0 | Status: SHIPPED | OUTPATIENT
Start: 2021-12-22 | End: 2022-02-04

## 2021-12-22 RX ORDER — METFORMIN HCL 500 MG
1000 TABLET, EXTENDED RELEASE 24 HR ORAL
Status: DISCONTINUED | OUTPATIENT
Start: 2021-12-22 | End: 2021-12-28 | Stop reason: HOSPADM

## 2021-12-22 RX ADMIN — NICOTINE POLACRILEX 2 MG: 2 GUM, CHEWING ORAL at 15:07

## 2021-12-22 RX ADMIN — OLANZAPINE 40 MG: 20 TABLET, ORALLY DISINTEGRATING ORAL at 18:40

## 2021-12-22 RX ADMIN — NICOTINE POLACRILEX 2 MG: 2 GUM, CHEWING ORAL at 13:25

## 2021-12-22 RX ADMIN — NICOTINE POLACRILEX 2 MG: 2 GUM, CHEWING ORAL at 08:39

## 2021-12-22 RX ADMIN — NICOTINE POLACRILEX 2 MG: 2 GUM, CHEWING ORAL at 17:09

## 2021-12-22 RX ADMIN — Medication 25 MCG: at 18:40

## 2021-12-22 RX ADMIN — NICOTINE POLACRILEX 2 MG: 2 GUM, CHEWING ORAL at 18:40

## 2021-12-22 RX ADMIN — METFORMIN HYDROCHLORIDE 1000 MG: 500 TABLET, EXTENDED RELEASE ORAL at 18:40

## 2021-12-22 RX ADMIN — NICOTINE POLACRILEX 2 MG: 2 GUM, CHEWING ORAL at 10:04

## 2021-12-22 RX ADMIN — NICOTINE POLACRILEX 2 MG: 2 GUM, CHEWING ORAL at 11:57

## 2021-12-22 RX ADMIN — NICOTINE POLACRILEX 2 MG: 2 GUM, CHEWING ORAL at 19:46

## 2021-12-22 ASSESSMENT — ACTIVITIES OF DAILY LIVING (ADL)
ORAL_HYGIENE: INDEPENDENT
LAUNDRY: UNABLE TO COMPLETE
HYGIENE/GROOMING: INDEPENDENT
DRESS: SCRUBS (BEHAVIORAL HEALTH)

## 2021-12-22 NOTE — PLAN OF CARE
Problem: General Rehab Plan of Care  Goal: Occupational Therapy Goals  Description: The patient and/or their representative will achieve their patient-specific goals related to the plan of care.  The patient-specific goals include:    Pt will demonstrate consistent engagement in OT group activities that support recovery as evidenced by participating in >1 scheduled OT group/day for 5 days.     Pt attended 2 out of 2 OT groups offered. Pt actively participated in occupational therapy clinic with 1-5 patients total x90 minutes. Pt was able to ask for assistance as needed, and independently initiate a structured creative expression task. Pt demonstrated good focus, planning, and attention to detail. Receptive to learning a new, detailed, goal-directed task, and appropriately chose to take a break from this task when the level of detail became challenging for him. Social with peers and writer throughout, specifically discussing his favorite rappers and musicians. Calm, pleasant, cooperative, and engaged. Affect appeared to brighten in interactions.

## 2021-12-22 NOTE — PLAN OF CARE
Nursing plan of care   Problem: Behavioral Health Plan of Care  Goal: Adheres to Safety Considerations for Self and Others  Outcome: Improving  Goal: Develops/Participates in Therapeutic [place] to Support Successful Transition  Outcome: Improving     Problem: Behavioral Health Plan of Care  Goal: Develops/Participates in Therapeutic [place] to Support Successful Transition  Outcome: Improving  Pt has been active and visible in the milieu; presented with full range affect and pleasant mood; denied SI/SIB,AVH,depression and anxiety; denied issue with sleep and appetite; calm and cooperative ;good eye contact; medication compliant; denied medication side effect; attended no group; took no shower; denied physical issues; will continue to monitor and assess.

## 2021-12-22 NOTE — PLAN OF CARE
Patient pleasant and cooperative, visible in milieu.  Bright affect, denies SI/SIB, depression/anxiety.  Attended group meeting and participated in activities.  Also, paces hallway most of the time.  Good appetite, medication compliant.  Able to make needs know; presently pacing hallway at this time.  No aggressive behavior noted, will continue to monitor closely.

## 2021-12-22 NOTE — PLAN OF CARE
Pt appeared to sleep 7 hours. No PRNs given or requested. No medical or behavioral events this shift.      Problem: Sleep Disturbance  Goal: Adequate Sleep/Rest  Outcome: No Change      0

## 2021-12-22 NOTE — PLAN OF CARE
Assessment/Intervention/Current Symptoms and Care Coordination    Attended team meeting and reviewed chart notes.    Pt will discharge to [facility] on Tuesday December 28th. CADI CHRISTIAN Short and CHRISTIAN Barragan both from [facility] will be here to transport pt.    Discharge Plan or Goal  To [facility]    Barriers to Discharge   Poor weather    Referral Status  completed      Legal Status  Committed with Cardoza-will need PD

## 2021-12-22 NOTE — PROGRESS NOTES
"    ----------------------------------------------------------------------------------------------------------  [facility], [place]   Psychiatric Progress Note  Hospital Day #113    Identifier: Anderson Perez is a 41 year old male with a past psychiatric history of bipolar disorder with psychosis vs schizoaffective disorder bipolar type, and intellectual disability admitted from the ER on 08/31/2021 due to concern for out of control behaviors, aggression and psychosis. Patient is psychiatrically stable, medications optimized, awaiting placement.     Interim History:   The patient's care was discussed with the treatment team and chart notes were reviewed.    Vitals: VSS  Sleep: 7 hours (12/22/21 0600)  Scheduled medications: Taking all scheduled medications as prescribed  PRN medications: No psychiatric prns received    Interim events:   No events    Overnight Staff Report:  - Presented with full range of affect and pleasant mood  - Pt attended 2 out of 2 OT groups offered.  - He received news regarding his discharge date during OT group, and was future-oriented in discussing upcoming discharge.     Subjective:     Patient Interview:  Anderson Perez was interviewed sitting in lounge chairs by the window. He reported feeling \"relieved\" today because he knows when he will be leaving the hospital. He ranked his anxiety at 4/10, which is down from 8/10 yesterday. Antonio said he tried some of the breathing exercises and felt like they helped. He is ok with increasing the metformin dose and understands he doesn't need to stay in the hospital for that. No other concerns. At the end of the discussion, Antonio said \"Have a good rest of the day\".     The risks, benefits, alternatives and side effects of any medication changes have been discussed and are understood by the patient and guardian.    Review of systems:   ROS negative except as stated above.    Objective:   /86 (BP Location: Left " "arm, Patient Position: Sitting)   Pulse 101   Temp 97.5  F (36.4  C)   Resp 12   Ht 1.829 m (6')   Wt 102.9 kg (226 lb 14.4 oz)   SpO2 99%   BMI 30.77 kg/m    Weight is 226 lbs 14.4 oz  Body mass index is 30.77 kg/m .     Weight on admission was 182 lbs 4.8 oz  Body mass index is 24.72 kg/m .    Mental Status Examination:    Oriented to:  Grossly oriented.  General: Awake and alert, walking in hallway, wearing red sweatshirt, sitting and rocking in chair during interview  Appearance:  appears older than stated age, Grooming is adequate, Dressed in hospital attire and Hair is shaved . Various tattoos on neck.  Behavior: calm, pleasant  Eye Contact: good  Psychomotor: No abnormal motor symptoms appreciated. Seems restless with constant pacing but denies restlessness. No catatonia present.  Speech:  appropriate volume/tone, spontaneous and with good articulation  Language: Fluent in English with appropriate syntax and vocabulary.  Mood:  \"relieved\"  Affect:  Appropriate, full range of affect when talking about son, tense while discussing anxiety symptoms  Thought Process:  coherent, linear, logical and goal directed  Thought Content: No SI/HI/AH/VH; no delusions noted  Associations:  No loosening of associations appreciated  Insight:  Good due to acknowledging the reason for his admission, understanding why he needs the medications  Judgment: Fair d/t cooperation with medication and care, needs reinforcement that he should continue to take the medications after his Cardoza        Allergies:     Allergies   Allergen Reactions     Bee Venom Anaphylaxis     Clindamycin Hives     Morphine Hives        Labs:   None new.     Assessment    Primary psychiatric diagnosis:   Psychosis, Unspecified (Schizoaffective disorder vs Bipolar Affective Disorder with Psychosis)    Secondary psychiatric diagnoses of concern this admission:   Intellectual disability (IQ 63)    Diagnostic Impression: Anderson CHI Orlagabriela is a 40 year old " male with a past psychiatric history of bipolar disorder with psychosis vs schizoaffective disorder bipolar type, and intellectual disability admitted from the  ER on 08/31/2021 due to concern for out of control behaviors, aggression and psychosis in the context of medication refusal despite Cardoza. Patient was recently released from [facility] to a group home. Presentation to the ED via ambulance after police contacted and provisional discharge rescinded. Group home noted escalating aggressive behavior, delusional thinking, and medication refusal despite Cardoza. PT refusal to discuss problems and largely denied all past history, medications, or any need for care. His reported symptoms of delusions, psychosis, and erratic/aggressive behavior are consistent with prior presentations of psychosis vs schizoaffective disorder bipolar type.    Of note, patient had previous diagnosis of Antisocial Personality Disorder. As psychosis cleared during hospitalization, aggressive and antagonistic behavior resolved completely. It seems questionable that he has ASPD, and rather that previous behaviors were a result of psychosis and/or substance intoxication.    Psychiatric Hospital course: Anderson Perez was admitted to [facility] following revocation of provisional discharge and on a court hold. His PTA olanzapine 20mg PO QDaily was resumed, as pt had been refusing at group home; per conversation with guardian, had planned to start on GRIGGS, but does not think that this was actually started, as patient was declining medications, which prompted admission in setting of decompensation. Pt was converted to ODT formulation given concerns for cheeking medications; while initially resistant, pt was agreeable with taking ODT olanzapine as long as it was given with ginger ale, which was accommodated. Olanzapine dose increased to 40mg on 9/21 given limited efficacy at PTA dose. He was also started on a brief trail of propanolol for akathisia,  but this was discontinued due to lack of improvement in pacing and patient attesting that a high level of physical activity was his baseline. Over time, he's become more sociable with peers and staff on the unit, and is spending more time sitting in the milieu as opposed to pacing the hallways. Patient has since been stable on current medications for some time. On 12/17 he was accepted to [facility]. On 12/20 Antonio reported a history of anxiety and current symptoms while being on the inpatient unit. His anxiety symptoms improved on 12/22 after hearing he would discharge on 12/28.     Medical course: Anderson Perez was physically examined by the ED prior to being transferred to the unit and was found to be medically stable and appropriate for admission. He had foot blisters from walking too much, which have now resolved. Noted weight gain from from 182 lbs at admission to 224 lbs on 12/16. Discussed adjunct medications to decrease further weight gain. Started metformin  mg on 12/17. Increased to 1000 mg on 12/22.     Plan   Principal Diagnosis:   # Psychosis, Unspecified (Schizoaffective disorder vs Bipolar Affective Disorder with psychotic features), severe, recurrent episode    Secondary psychiatric diagnoses of concern this admission:   # Intellectual disability    Psychotropic Medications:  Modify:  No changes    Continue Scheduled:   -Olanzapine ODT 40mg qPM    Continue PRN:  -Acetaminophen 650 mg Q6H PRN for pain and fever  -Hydroxyzine 25-50 mg Q6H PRN for anxiety  -Haldol 5mg prn q6h for agitation  -Haldol 5mg + diphenhydramine 50mg + lorazepam 2mg prn for severe agitation/psychosis  -Maalox 30 ml Q4H PRN for indigestion  -Miralax prn for constipation  -Nicotine gum 2 mg every hour as needed    Additional Plans:  -Patient will be treated in therapeutic milieu with appropriate individual and group therapies as described.  -Patient would be able to go to the [facility] as the staff on  station 22 find appropriate, accompanied by a  and a station 22 staff member, preferably during sunlight hours.  -Weekly COVID test    Medical diagnoses to be addressed this admission:    # Chronic Hep C  NTD    #Dry mouth  -Biotene started 10/25    #Foot blister/lesion, resolved  -Bacitracin ointment prn    #Weight gain  - Metformin XR 1000 mg with supper started on 12/22. Can increase up to 2 g daily if tolerated in 500 mg increments every week  - CMP, Cholesterol within normal limits    Consults:   -Nutrition Consult: Extended hospital stay, added snacks and double portions, took off ensure    Legal Status: Cardoza  Committed - recommitted 11/1   Legal guardian    Safety Assessment:   Behavioral Orders   Procedures     Code 1 - Restrict to Unit     Code 3     Patient is allowed to go to the [facility] accompanied by two staff members as frequently as every other day per staff availability     Routine Programming     As clinically indicated     Status 15     Every 15 minutes.     SIO: No, no recent need for SIO, seclusion, or restraints.    Disposition: Patient is stable, medications optimized. Accepted at [facility] in [place], [place]. Will be transported there on 12/28. Medications to be sent to [facility] in addition to 30 day supply from here.    This patient was seen and discussed with my attending physician, Dr. Shital Gómez MD  PGY-1 Psychiatry Resident Physician    This patient has been seen and evaluated by me, Glenn Isaacs.  I have discussed this patient with the psychiatry resident and I agree with the findings and plan in this note.    I have reviewed today's vital signs, medications, labs and imaging.     Glenn Adhikari MD on 12/22/2021 at 10:32 PM

## 2021-12-23 PROCEDURE — 250N000013 HC RX MED GY IP 250 OP 250 PS 637: Performed by: STUDENT IN AN ORGANIZED HEALTH CARE EDUCATION/TRAINING PROGRAM

## 2021-12-23 PROCEDURE — 250N000013 HC RX MED GY IP 250 OP 250 PS 637: Performed by: PSYCHIATRY & NEUROLOGY

## 2021-12-23 PROCEDURE — G0177 OPPS/PHP; TRAIN & EDUC SERV: HCPCS

## 2021-12-23 PROCEDURE — 124N000002 HC R&B MH UMMC

## 2021-12-23 RX ADMIN — NICOTINE POLACRILEX 2 MG: 2 GUM, CHEWING ORAL at 20:23

## 2021-12-23 RX ADMIN — NICOTINE POLACRILEX 2 MG: 2 GUM, CHEWING ORAL at 12:03

## 2021-12-23 RX ADMIN — NICOTINE POLACRILEX 2 MG: 2 GUM, CHEWING ORAL at 14:37

## 2021-12-23 RX ADMIN — OLANZAPINE 40 MG: 20 TABLET, ORALLY DISINTEGRATING ORAL at 19:03

## 2021-12-23 RX ADMIN — NICOTINE POLACRILEX 2 MG: 2 GUM, CHEWING ORAL at 16:34

## 2021-12-23 RX ADMIN — NICOTINE POLACRILEX 2 MG: 2 GUM, CHEWING ORAL at 19:03

## 2021-12-23 RX ADMIN — NICOTINE POLACRILEX 2 MG: 2 GUM, CHEWING ORAL at 18:08

## 2021-12-23 RX ADMIN — METFORMIN HYDROCHLORIDE 1000 MG: 500 TABLET, EXTENDED RELEASE ORAL at 19:02

## 2021-12-23 RX ADMIN — NICOTINE POLACRILEX 2 MG: 2 GUM, CHEWING ORAL at 09:09

## 2021-12-23 RX ADMIN — NICOTINE POLACRILEX 2 MG: 2 GUM, CHEWING ORAL at 15:28

## 2021-12-23 RX ADMIN — NICOTINE POLACRILEX 2 MG: 2 GUM, CHEWING ORAL at 09:54

## 2021-12-23 RX ADMIN — Medication 25 MCG: at 19:03

## 2021-12-23 ASSESSMENT — ACTIVITIES OF DAILY LIVING (ADL)
DRESS: INDEPENDENT
HYGIENE/GROOMING: INDEPENDENT
ORAL_HYGIENE: INDEPENDENT

## 2021-12-23 ASSESSMENT — MIFFLIN-ST. JEOR: SCORE: 1979.48

## 2021-12-23 NOTE — PLAN OF CARE
Problem: Sleep Disturbance  Goal: Adequate Sleep/Rest  Outcome: No Change     Patient slept all night. No behavioral concerns. No complaints of pain. Pt slept 7 hours. No PRN medications given.

## 2021-12-23 NOTE — DISCHARGE INSTRUCTIONS
Behavioral Discharge Planning and Instructions    Summary: You were admitted on 8/30/2021  due to Schizoaffective disorder.  You were treated by Dr. Bobo/Dr. Isaacs and discharged on 12/28/2021 from station [facility] to: [facility] [address] [place] [phone]    You will be provisionally discharged from [facility].    Main Diagnosis:   Schizoaffective Disorder    Health Care Follow-up:   [facility]  [phone]  Fax: [phone]  [address]  [address]  [facility] [address]  Medication management: Tonie BUSBY CNP January 13th @ 8:45 - This is a video appointment    They will email Lucas at [facility] three days prior to appointment, then at 5:00pm the evening before appointment.    There is a follow up appointment on February 8th @ 11:40    The guardian needs to be present for all appointments, complete papers and provide the court order for Guardianship to [facility] at least 3 days prior to appointment.          Guardian: [employer] Daylin Umanzor [phone]     [facility] [phone]          Attend all scheduled appointments with your outpatient providers. Call at least 24 hours in advance if you need to reschedule an appointment to ensure continued access to your outpatient providers.     Major Treatments, Procedures and Findings:  You were provided with: a psychiatric assessment, assessed for medical stability, medication evaluation and/or management and milieu management    Symptoms to Report: feeling more aggressive, increased confusion, losing more sleep, mood getting worse or thoughts of suicide    Early warning signs can include: increased depression or anxiety sleep disturbances increased thoughts or behaviors of suicide or self-harm  increased unusual thinking, such as paranoia or hearing voices    Safety and Wellness:  Take all medicines as directed.  Make no changes unless your doctor suggests them.      Follow treatment  "recommendations.  Refrain from alcohol and non-prescribed drugs.  If there is a concern for safety, call 911.    Resources:   Crisis Intervention: 376.826.4456 or 165-615-0279 (TTY: 687.498.1555).  Call anytime for help.  National [place] on Mental Illness (www.[place].karoline.org): 103.729.9786 or 705-737-3365.  Text 4 Life: txt \"LIFE\" to 120[address] for immediate support and crisis intervention  Crisis text line: Text \"[place]\" to 947931. Free, confidential, 24/7.    General Medication Instructions:   See your medication sheet(s) for instructions.   Take all medicines as directed.  Make no changes unless your doctor suggests them.   Go to all your doctor visits.  Be sure to have all your required lab tests. This way, your medicines can be refilled on time.  Do not use any drugs not prescribed by your doctor.  Avoid alcohol.    Advance Directives:   Scanned document on file with [facility]?    Is document scanned?    Honoring Choices Your Rights Handout:    Was more information offered?      The Treatment team has appreciated the opportunity to work with you. If you have any questions or concerns about your recent admission, you can contact the unit which can receive your call 24 hours a day, 7 days a week. They will be able to get in touch with a Provider if needed. The unit number is [phone].  "

## 2021-12-23 NOTE — PLAN OF CARE
Assessment/Intervention/Current Symptoms and Care Coordination    No team meeting today.    Pt is waiting patiently for the transfer to his group home.      Discharge Plan or Goal  To group home    Barriers to Discharge   none      Referral Status  completed        Legal Status  Committed with Cardoza order

## 2021-12-23 NOTE — PLAN OF CARE
Nursing Plan of care   Problem: Behavioral Health Plan of Care  Goal: Adheres to Safety Considerations for Self and Others  Outcome: Improving  Pt had a good shift; calm and pleasant; denied SI/SIB,AVH,depression and anxiety; medication compliant; denied medication side effect; requested and received PRN nicotine gum(see eMAR); paced in the hallway most of the shift and watched Movie in the lounge; attended no group and took no shower; will continue to monitor and assess.

## 2021-12-23 NOTE — PLAN OF CARE
Nursing Plan of care   Problem: Behavioral Health Plan of Care  Goal: Develops/Participates in Therapeutic [place] to Support Successful Transition  Outcome: Improving  Pt has been pacing in the hallway; calm and pleasant; denied SI/SIB,AVH,depression and anxiety; medication compliant; denied medication side effect; attended no group this shift; good eye contact; exited and waiting on his discharge on Tuesday; denied issue with sleep and appetite; will continue to monitor and assess.

## 2021-12-23 NOTE — PLAN OF CARE
Problem: Behavior Regulation Impairment (Manic or Hypomanic Signs/Symptoms)  Goal: Improved Impulse Control (Manic/Hypomanic Signs/Symptoms)  Outcome: Improving   Pt has been out walking the halls and attending group today. Pt is polite and cooperative. Pt is appropriate with staff and peers. Pt is looking forward to discharge on Tuesday.

## 2021-12-23 NOTE — PLAN OF CARE
"Problem: General Rehab Plan of Care  Goal: Occupational Therapy Goals  Description: The patient and/or their representative will achieve their patient-specific goals related to the plan of care.  The patient-specific goals include:    Pt will demonstrate consistent engagement in OT group activities that support recovery as evidenced by participating in >1 scheduled OT group/day for 5 days.     Pt attended 3 out of 3 OT groups offered. Pt actively participated in a structured occupational therapy group of 2-3 patients total x45 minutes with a focus on coping through movement via [place] as a strategy to facilitate therapeutic exercise, calming, and stress management. Pt actively followed 100% of the movements, and remained attentive and engaged throughout group. Pt verbalized feeling \"good, ready to go walk the halls again\" at the end of group. He shared that he has done P[place] Chi before, and enjoys how it makes him feel more \"relaxed.\" Pt actively participated in occupational therapy clinic with 2-3 patients total x60 minutes. Pt was able to ask for assistance as needed, and independently initiate a structured creative expression task. Pt demonstrated good focus, planning, and attention to detail. Social with peers and writer, and shared that he plans to give his finished projects to his son. Pt actively participated in a structured occupational therapy group with 2-3 patients total x45 minutes with a focus on a visual scanning leisure task to facilitate focus and organization. Pt was able to follow 2 step directions for the novel task. Pt demonstrated good attention to task and efficient visual scanning. Calm, pleasant, cooperative, and engaged throughout all groups. Bright affect in interactions.     "

## 2021-12-24 PROCEDURE — 250N000013 HC RX MED GY IP 250 OP 250 PS 637: Performed by: STUDENT IN AN ORGANIZED HEALTH CARE EDUCATION/TRAINING PROGRAM

## 2021-12-24 PROCEDURE — G0177 OPPS/PHP; TRAIN & EDUC SERV: HCPCS

## 2021-12-24 PROCEDURE — 124N000002 HC R&B MH UMMC

## 2021-12-24 PROCEDURE — 250N000013 HC RX MED GY IP 250 OP 250 PS 637: Performed by: PSYCHIATRY & NEUROLOGY

## 2021-12-24 RX ADMIN — NICOTINE POLACRILEX 2 MG: 2 GUM, CHEWING ORAL at 13:07

## 2021-12-24 RX ADMIN — NICOTINE POLACRILEX 2 MG: 2 GUM, CHEWING ORAL at 17:20

## 2021-12-24 RX ADMIN — NICOTINE POLACRILEX 2 MG: 2 GUM, CHEWING ORAL at 11:55

## 2021-12-24 RX ADMIN — NICOTINE POLACRILEX 2 MG: 2 GUM, CHEWING ORAL at 09:49

## 2021-12-24 RX ADMIN — NICOTINE POLACRILEX 2 MG: 2 GUM, CHEWING ORAL at 14:26

## 2021-12-24 RX ADMIN — METFORMIN HYDROCHLORIDE 1000 MG: 500 TABLET, EXTENDED RELEASE ORAL at 17:59

## 2021-12-24 RX ADMIN — Medication 25 MCG: at 19:31

## 2021-12-24 RX ADMIN — NICOTINE POLACRILEX 2 MG: 2 GUM, CHEWING ORAL at 08:34

## 2021-12-24 RX ADMIN — NICOTINE POLACRILEX 2 MG: 2 GUM, CHEWING ORAL at 19:35

## 2021-12-24 RX ADMIN — OLANZAPINE 40 MG: 20 TABLET, ORALLY DISINTEGRATING ORAL at 19:31

## 2021-12-24 ASSESSMENT — ACTIVITIES OF DAILY LIVING (ADL)
LAUNDRY: WITH SUPERVISION
HYGIENE/GROOMING: INDEPENDENT
ORAL_HYGIENE: INDEPENDENT
DRESS: SCRUBS (BEHAVIORAL HEALTH);INDEPENDENT

## 2021-12-24 NOTE — PLAN OF CARE
Assessment/Intervention/Current Symptoms and Care Coordination    No team meeting today    Pt is waiting patiently to discharge to group home on Tuesday.        Discharge Plan or Goal  To group home- [facility] in [place]      Barriers to Discharge   Inclement weather       Referral Status  completed      Legal Status  Committed with Cardoza order- needs PD

## 2021-12-24 NOTE — PLAN OF CARE
"Problem: General Rehab Plan of Care  Goal: Occupational Therapy Goals  Description: The patient and/or their representative will achieve their patient-specific goals related to the plan of care.  The patient-specific goals include:    Pt will demonstrate consistent engagement in OT group activities that support recovery as evidenced by participating in >1 scheduled OT group/day for 5 days.     Pt attended 3 out of 3 OT groups offered. Pt actively participated in occupational therapy clinic with 4 patients total x60 minutes. Pt was able to ask for assistance as needed, and independently initiate a structured creative expression task. Pt demonstrated good focus, planning, and attention to detail. Social with peers and writer. Pt actively participated in a mental health management group of 3 patients total x45 minutes with a focus on coping through movement to facilitate relaxation and stress management via chair yoga. Pt followed and engaged in 100% of the yoga poses, and verbalized feeling \"good\" at the end of group. Pt actively participated in a structured occupational therapy group of 2 patients total x60 minutes with a focus on social and leisure engagement via a group game. Pt was able to follow multi-step directions and was attentive to task parameters throughout. Pt demonstrated strategic planning and good task organization. Focused and engaged throughout full duration of group. Politely offered encouragement to peers during groups. He continues to be calm, pleasant, cooperative, and engaged throughout all groups. Bright affect.          "

## 2021-12-24 NOTE — PLAN OF CARE
Plan of care   Problem: Sleep Disturbance  Goal: Adequate Sleep/Rest  Outcome: Improving  Pt appeared to have slept for 7 hrs ; no safety concern to report; will continue to monitor and assess.

## 2021-12-24 NOTE — PLAN OF CARE
Problem: Adult Inpatient Plan of Care  Goal: Readiness for Transition of Care  Intervention: Mutually Develop Transition Plan  Recent Flowsheet Documentation  Taken 12/24/2021 1139 by Sierra Ramires RN  Concerns to be Addressed: mental health     Problem: Adult Inpatient Plan of Care  Goal: Optimal Comfort and Wellbeing  Outcome: Improving     Pt visible in the milieu for the majority of the day. He has been calm, pleasant and intermittently social. Denies SI/SIB, as well as AH/VH. Aside from nicorette gum, no indication or request for prn medications. Eating meals without issue. Attended some groups. Pt is looking forward to discharge. No additional concerns at this time. Will continue to assess and offer support.

## 2021-12-25 PROCEDURE — 250N000013 HC RX MED GY IP 250 OP 250 PS 637: Performed by: PSYCHIATRY & NEUROLOGY

## 2021-12-25 PROCEDURE — 250N000013 HC RX MED GY IP 250 OP 250 PS 637: Performed by: STUDENT IN AN ORGANIZED HEALTH CARE EDUCATION/TRAINING PROGRAM

## 2021-12-25 PROCEDURE — 250N000013 HC RX MED GY IP 250 OP 250 PS 637

## 2021-12-25 PROCEDURE — 124N000002 HC R&B MH UMMC

## 2021-12-25 RX ADMIN — NICOTINE POLACRILEX 2 MG: 2 GUM, CHEWING ORAL at 16:58

## 2021-12-25 RX ADMIN — ACETAMINOPHEN 650 MG: 325 TABLET, FILM COATED ORAL at 12:19

## 2021-12-25 RX ADMIN — NICOTINE POLACRILEX 2 MG: 2 GUM, CHEWING ORAL at 15:31

## 2021-12-25 RX ADMIN — NICOTINE POLACRILEX 2 MG: 2 GUM, CHEWING ORAL at 09:53

## 2021-12-25 RX ADMIN — Medication 25 MCG: at 20:12

## 2021-12-25 RX ADMIN — OLANZAPINE 40 MG: 20 TABLET, ORALLY DISINTEGRATING ORAL at 20:13

## 2021-12-25 RX ADMIN — NICOTINE POLACRILEX 2 MG: 2 GUM, CHEWING ORAL at 11:59

## 2021-12-25 RX ADMIN — METFORMIN HYDROCHLORIDE 1000 MG: 500 TABLET, EXTENDED RELEASE ORAL at 17:56

## 2021-12-25 RX ADMIN — NICOTINE POLACRILEX 2 MG: 2 GUM, CHEWING ORAL at 18:13

## 2021-12-25 RX ADMIN — NICOTINE POLACRILEX 2 MG: 2 GUM, CHEWING ORAL at 08:23

## 2021-12-25 RX ADMIN — NICOTINE POLACRILEX 2 MG: 2 GUM, CHEWING ORAL at 20:15

## 2021-12-25 RX ADMIN — NICOTINE POLACRILEX 2 MG: 2 GUM, CHEWING ORAL at 14:14

## 2021-12-25 ASSESSMENT — ACTIVITIES OF DAILY LIVING (ADL)
ORAL_HYGIENE: INDEPENDENT
ORAL_HYGIENE: INDEPENDENT
DRESS: SCRUBS (BEHAVIORAL HEALTH)
LAUNDRY: WITH SUPERVISION
LAUNDRY: WITH SUPERVISION
DRESS: SCRUBS (BEHAVIORAL HEALTH)
HYGIENE/GROOMING: INDEPENDENT
HYGIENE/GROOMING: INDEPENDENT;PROMPTS

## 2021-12-25 NOTE — PLAN OF CARE
Nursing Plan of care   Problem: Behavioral Health Plan of Care  Goal: Adheres to Safety Considerations for Self and Others  Outcome: Improving  Intervention: Develop and Maintain Individualized Safety Plan  Recent Flowsheet Documentation  Taken 12/24/2021 0600 by Neelima Taylor RN  Safety Measures:   safety plan reviewed   safety rounds completed  Pt has been active and visible in the milieu; spent most of the shift pacing in the hallway and socializing with peers and staff; denied MH symptoms of SI/SIB,AVH,depression and anxiety; calm and pleasant on approach; denied medication side effect; denied issue with sleep and appetite; stated that he is excited about his upcoming discharge and hoping things will get better for him. Will continue to monitor and assess.

## 2021-12-25 NOTE — PLAN OF CARE
Problem: Adult Inpatient Plan of Care  Goal: Optimal Comfort and Wellbeing  Outcome: Improving       Pt visible in the milieu for the majority of the day. He has been calm, pleasant and social with others. Per request for a headache rated at a 4/10, pt given Tylenol prn. Intermittent nicorette prn requested and given. Pt denies SI/SIB, as well as AH/VH. He is able to appropriately converse with others and does so spontaneously. No additional concerns at this time. Will continue to assess and offer support.

## 2021-12-25 NOTE — PLAN OF CARE
Plan of care   Problem: Sleep Disturbance  Goal: Adequate Sleep/Rest  Outcome: Improving  Pt appeared to have slept for 7 hrs; No PRN was requested or administered; no safety concern to report. Will continue to monitor and assess.

## 2021-12-26 PROCEDURE — 99233 SBSQ HOSP IP/OBS HIGH 50: CPT | Mod: GC | Performed by: PSYCHIATRY & NEUROLOGY

## 2021-12-26 PROCEDURE — 124N000002 HC R&B MH UMMC

## 2021-12-26 PROCEDURE — 250N000013 HC RX MED GY IP 250 OP 250 PS 637: Performed by: STUDENT IN AN ORGANIZED HEALTH CARE EDUCATION/TRAINING PROGRAM

## 2021-12-26 PROCEDURE — 250N000013 HC RX MED GY IP 250 OP 250 PS 637: Performed by: PSYCHIATRY & NEUROLOGY

## 2021-12-26 RX ADMIN — NICOTINE POLACRILEX 2 MG: 2 GUM, CHEWING ORAL at 13:49

## 2021-12-26 RX ADMIN — METFORMIN HYDROCHLORIDE 1000 MG: 500 TABLET, EXTENDED RELEASE ORAL at 17:39

## 2021-12-26 RX ADMIN — Medication 25 MCG: at 20:37

## 2021-12-26 RX ADMIN — OLANZAPINE 40 MG: 20 TABLET, ORALLY DISINTEGRATING ORAL at 20:37

## 2021-12-26 RX ADMIN — NICOTINE POLACRILEX 2 MG: 2 GUM, CHEWING ORAL at 16:03

## 2021-12-26 RX ADMIN — NICOTINE POLACRILEX 2 MG: 2 GUM, CHEWING ORAL at 20:38

## 2021-12-26 RX ADMIN — NICOTINE POLACRILEX 2 MG: 2 GUM, CHEWING ORAL at 08:44

## 2021-12-26 RX ADMIN — NICOTINE POLACRILEX 2 MG: 2 GUM, CHEWING ORAL at 17:39

## 2021-12-26 RX ADMIN — NICOTINE POLACRILEX 2 MG: 2 GUM, CHEWING ORAL at 11:15

## 2021-12-26 RX ADMIN — NICOTINE POLACRILEX 2 MG: 2 GUM, CHEWING ORAL at 10:11

## 2021-12-26 RX ADMIN — NICOTINE POLACRILEX 2 MG: 2 GUM, CHEWING ORAL at 18:59

## 2021-12-26 RX ADMIN — NICOTINE POLACRILEX 2 MG: 2 GUM, CHEWING ORAL at 12:14

## 2021-12-26 ASSESSMENT — ACTIVITIES OF DAILY LIVING (ADL)
LAUNDRY: WITH SUPERVISION
ORAL_HYGIENE: INDEPENDENT
LAUNDRY: WITH SUPERVISION
DRESS: SCRUBS (BEHAVIORAL HEALTH);INDEPENDENT
HYGIENE/GROOMING: INDEPENDENT
ORAL_HYGIENE: INDEPENDENT
DRESS: SCRUBS (BEHAVIORAL HEALTH)
HYGIENE/GROOMING: INDEPENDENT

## 2021-12-26 NOTE — PLAN OF CARE
Problem: Sleep Disturbance  Goal: Adequate Sleep/Rest  Outcome: No Change  Patient slept comfortably with easy and non labored respirations during 15 minutes rounding. No prn given, no behavorial issues. Slept for about 7 hours this night.

## 2021-12-26 NOTE — PLAN OF CARE
Problem: Behavioral Disturbance  Goal: Behavioral Disturbance  Description: Signs and symptoms of listed problems will be absent or manageable by discharge or transition of care.  Outcome: Improving  Flowsheets (Taken 12/26/2021 1030)  Behavioral Disturbance Present: insight     Pt visible in the milieu for the majority of the day. He is calm, pleasant and social with others. Pt spent much of his time walking the hallway chatting with fellow patients. Conversations appeared appropriate and thoughtful. Pt denies SI/SIB, as well as AH/VH. Pt does not appear responding to internal stimuli. No additional concerns at this time. Will continue to assess and offer support.

## 2021-12-26 NOTE — PROGRESS NOTES
"    ----------------------------------------------------------------------------------------------------------  [facility] [place]   Psychiatric Progress Note  Hospital Day #117    Identifier: Anderson Perez is a 41 year old male with a past psychiatric history of bipolar disorder with psychosis vs schizoaffective disorder bipolar type, and intellectual disability admitted from the ER on 08/31/2021 due to concern for out of control behaviors, aggression and psychosis. Patient is psychiatrically stable, medications optimized, awaiting placement.     Interim History:   The patient's care was discussed with the treatment team and chart notes were reviewed.    Vitals: VSS  Sleep: 7 hours (12/26/21 0600)  Scheduled medications: Taking all scheduled medications as prescribed  PRN medications: No psychiatric prns received    Interim events:   No events    Overnight Staff Report:  Nursing reports patient doing well, eager to discharge Tuesday, no new concerns.      Subjective:     Patient Interview:  Anderson Perez was interviewed while walking in the unit hallway. He reported feeling \"good\" today because he knows when he will be leaving the hospital. Says he has not gotten to a mile yet today though \"I've done over 100 since I've been here.\" We discuss his recent medication change \"It's fine. My stomach feels fine.\" Denies any other GI symptoms. On brief ROS does endorse occasional headaches that are not new, usually relieved with prn tylenol. Encouraged patient to request this if needed, also encouraged him to continue hydrating.     The risks, benefits, alternatives and side effects of any medication changes have been discussed and are understood by the patient and guardian.    Review of systems:   ROS negative except as stated above.    Objective:   /84 (BP Location: Left arm)   Pulse 88   Temp 97.7  F (36.5  C) (Oral)   Resp 16   Ht 1.829 m (6')   Wt 103.1 kg (227 lb 6.4 " "oz)   SpO2 98%   BMI 30.84 kg/m    Weight is 227 lbs 6.4 oz  Body mass index is 30.84 kg/m .     Weight on admission was 182 lbs 4.8 oz  Body mass index is 24.72 kg/m .    Mental Status Examination:    Oriented to:  Grossly oriented.  General: Awake and alert, walking in hallway, wearing red sweatshirt, walking during interview  Appearance:  appears older than stated age, Grooming is adequate, Dressed in hospital attire and Hair is shaved . Various tattoos on neck.  Behavior: calm, pleasant  Eye Contact: good  Psychomotor: No abnormal motor symptoms appreciated. Normal gait.  Speech:  appropriate volume/tone, spontaneous and with good articulation  Language: Fluent in English with appropriate syntax and vocabulary.  Mood:  \"fine\"  Affect:  Appropriate, full range of affect when talking about son, tense while discussing anxiety symptoms  Thought Process:  coherent, linear, logical and goal directed  Thought Content: No SI/HI/AH/VH; no delusions noted  Associations:  No loosening of associations appreciated  Insight:  Good due to acknowledging the reason for his admission, understanding why he needs the medications  Judgment: Fair d/t cooperation with medication and care, needs reinforcement that he should continue to take the medications after his Cardoza        Allergies:     Allergies   Allergen Reactions     Bee Venom Anaphylaxis     Clindamycin Hives     Morphine Hives        Labs:   None new.     Assessment    Primary psychiatric diagnosis:   Psychosis, Unspecified (Schizoaffective disorder vs Bipolar Affective Disorder with Psychosis)    Secondary psychiatric diagnoses of concern this admission:   Intellectual disability (IQ 63)    Diagnostic Impression: Anderson Perez is a 40 year old male with a past psychiatric history of bipolar disorder with psychosis vs schizoaffective disorder bipolar type, and intellectual disability admitted from the  ER on 08/31/2021 due to concern for out of control behaviors, " aggression and psychosis in the context of medication refusal despite Cardoza. Patient was recently released from [facility] to a group home. Presentation to the ED via ambulance after police contacted and provisional discharge rescinded. Group home noted escalating aggressive behavior, delusional thinking, and medication refusal despite Cardoza. PT refusal to discuss problems and largely denied all past history, medications, or any need for care. His reported symptoms of delusions, psychosis, and erratic/aggressive behavior are consistent with prior presentations of psychosis vs schizoaffective disorder bipolar type.    Of note, patient had previous diagnosis of Antisocial Personality Disorder. As psychosis cleared during hospitalization, aggressive and antagonistic behavior resolved completely. It seems questionable that he has ASPD, and rather that previous behaviors were a result of psychosis and/or substance intoxication.    Psychiatric Hospital course: Anderson Perez was admitted to [facility] following revocation of provisional discharge and on a court hold. His PTA olanzapine 20mg PO QDaily was resumed, as pt had been refusing at group home; per conversation with guardian, had planned to start on GRIGGS, but does not think that this was actually started, as patient was declining medications, which prompted admission in setting of decompensation. Pt was converted to ODT formulation given concerns for cheeking medications; while initially resistant, pt was agreeable with taking ODT olanzapine as long as it was given with ginger ale, which was accommodated. Olanzapine dose increased to 40mg on 9/21 given limited efficacy at PTA dose. He was also started on a brief trail of propanolol for akathisia, but this was discontinued due to lack of improvement in pacing and patient attesting that a high level of physical activity was his baseline. Over time, he's become more sociable with peers and staff on the unit, and is  spending more time sitting in the milieu as opposed to pacing the hallways. Patient has since been stable on current medications for some time. On 12/17 he was accepted to [facility]. On 12/20 Antonio reported a history of anxiety and current symptoms while being on the inpatient unit. His anxiety symptoms improved on 12/22 after hearing he would discharge on 12/28.     Medical course: Anderson Perez was physically examined by the ED prior to being transferred to the unit and was found to be medically stable and appropriate for admission. He had foot blisters from walking too much, which have now resolved. Noted weight gain from from 182 lbs at admission to 224 lbs on 12/16. Discussed adjunct medications to decrease further weight gain. Started metformin  mg on 12/17, Increased to 1000 mg on 12/22 which was well-tolerated.     Plan   Principal Diagnosis:   # Psychosis, Unspecified (Schizoaffective disorder vs Bipolar Affective Disorder with psychotic features), severe, recurrent episode    Secondary psychiatric diagnoses of concern this admission:   # Intellectual disability    Psychotropic Medications:  Modify:  No changes    Continue Scheduled:   -Olanzapine ODT 40mg qPM    Continue PRN:  -Acetaminophen 650 mg Q6H PRN for pain and fever  -Hydroxyzine 25-50 mg Q6H PRN for anxiety  -Haldol 5mg prn q6h for agitation  -Haldol 5mg + diphenhydramine 50mg + lorazepam 2mg prn for severe agitation/psychosis  -Maalox 30 ml Q4H PRN for indigestion  -Miralax prn for constipation  -Nicotine gum 2 mg every hour as needed    Additional Plans:  -Patient will be treated in therapeutic milieu with appropriate individual and group therapies as described.  -Patient would be able to go to the [facility] [facility] as the staff on station 22 find appropriate, accompanied by a  and a station 22 staff member, preferably during sunlight hours.  -Weekly COVID test    Medical diagnoses to be addressed this admission:     # Chronic Hep C  NTD    #Dry mouth  -Biotene started 10/25    #Foot blister/lesion, resolved  -Bacitracin ointment prn    #Weight gain  - Metformin XR 1000 mg with supper started on 12/22. Can increase up to 2 g daily if tolerated in 500 mg increments every week  - CMP, Cholesterol within normal limits    Consults:   -Nutrition Consult: Extended hospital stay, added snacks and double portions, took off ensure    Legal Status: Cardoza  Committed - recommitted 11/1   Legal guardian    Safety Assessment:   Behavioral Orders   Procedures     Code 1 - Restrict to Unit     Code 3     Patient is allowed to go to the [facility] [place] accompanied by two staff members as frequently as every other day per staff availability     Routine Programming     As clinically indicated     Status 15     Every 15 minutes.     SIO: No, no recent need for SIO, seclusion, or restraints.    Disposition: Patient is stable, medications optimized. Accepted at [facility] in [place], [place]. Will be transported there on 12/28. Medications to be sent to [facility] in addition to 30 day supply from here.    This patient was seen and discussed with my attending physician, Dr Paul Javed.     Bobbi Sanford MD  PGY2, Psychiatry

## 2021-12-26 NOTE — PLAN OF CARE
Problem: Adult Inpatient Plan of Care  Goal: Plan of Care Review  Outcome: No Change  Flowsheets (Taken 12/25/2021 1800)  Plan of Care Reviewed With: patient     Problem: Adult Inpatient Plan of Care  Goal: Patient-Specific Goal (Individualized)  Outcome: No Change   Patient is alert and oriented x 4, he is independent of cares and Adls. Patient visible at the lounge, sociable, calm, cooperative and full range affect. Patient not complaining of any pain, denies depression and anxiety. Patient denies SI/SIB/HI and AVH.  Patient active with pacing up/down the hallway, meals and fluids intake adequate. Patient is medication compliant, no stated side effects. Patient utilized prn nicotine gum, no other concerns. Will continue to monitor.

## 2021-12-27 PROCEDURE — 99239 HOSP IP/OBS DSCHRG MGMT >30: CPT | Performed by: PSYCHIATRY & NEUROLOGY

## 2021-12-27 PROCEDURE — 124N000002 HC R&B MH UMMC

## 2021-12-27 PROCEDURE — U0003 INFECTIOUS AGENT DETECTION BY NUCLEIC ACID (DNA OR RNA); SEVERE ACUTE RESPIRATORY SYNDROME CORONAVIRUS 2 (SARS-COV-2) (CORONAVIRUS DISEASE [COVID-19]), AMPLIFIED PROBE TECHNIQUE, MAKING USE OF HIGH THROUGHPUT TECHNOLOGIES AS DESCRIBED BY CMS-2020-01-R: HCPCS | Performed by: PSYCHIATRY & NEUROLOGY

## 2021-12-27 PROCEDURE — 250N000013 HC RX MED GY IP 250 OP 250 PS 637: Performed by: PSYCHIATRY & NEUROLOGY

## 2021-12-27 PROCEDURE — 250N000013 HC RX MED GY IP 250 OP 250 PS 637: Performed by: STUDENT IN AN ORGANIZED HEALTH CARE EDUCATION/TRAINING PROGRAM

## 2021-12-27 PROCEDURE — G0177 OPPS/PHP; TRAIN & EDUC SERV: HCPCS

## 2021-12-27 PROCEDURE — 99232 SBSQ HOSP IP/OBS MODERATE 35: CPT | Mod: GC | Performed by: PSYCHIATRY & NEUROLOGY

## 2021-12-27 RX ADMIN — NICOTINE POLACRILEX 2 MG: 2 GUM, CHEWING ORAL at 12:14

## 2021-12-27 RX ADMIN — NICOTINE POLACRILEX 2 MG: 2 GUM, CHEWING ORAL at 14:43

## 2021-12-27 RX ADMIN — NICOTINE POLACRILEX 2 MG: 2 GUM, CHEWING ORAL at 10:03

## 2021-12-27 RX ADMIN — NICOTINE POLACRILEX 2 MG: 2 GUM, CHEWING ORAL at 20:21

## 2021-12-27 RX ADMIN — METFORMIN HYDROCHLORIDE 1000 MG: 500 TABLET, EXTENDED RELEASE ORAL at 17:50

## 2021-12-27 RX ADMIN — Medication 25 MCG: at 20:20

## 2021-12-27 RX ADMIN — NICOTINE POLACRILEX 2 MG: 2 GUM, CHEWING ORAL at 15:56

## 2021-12-27 RX ADMIN — OLANZAPINE 40 MG: 20 TABLET, ORALLY DISINTEGRATING ORAL at 20:20

## 2021-12-27 RX ADMIN — NICOTINE POLACRILEX 2 MG: 2 GUM, CHEWING ORAL at 18:24

## 2021-12-27 RX ADMIN — NICOTINE POLACRILEX 2 MG: 2 GUM, CHEWING ORAL at 11:03

## 2021-12-27 RX ADMIN — NICOTINE POLACRILEX 2 MG: 2 GUM, CHEWING ORAL at 08:45

## 2021-12-27 ASSESSMENT — ACTIVITIES OF DAILY LIVING (ADL)
DRESS: SCRUBS (BEHAVIORAL HEALTH)
HYGIENE/GROOMING: INDEPENDENT
LAUNDRY: WITH SUPERVISION
DRESS: INDEPENDENT
ORAL_HYGIENE: INDEPENDENT
ORAL_HYGIENE: INDEPENDENT
LAUNDRY: WITH SUPERVISION
HYGIENE/GROOMING: INDEPENDENT;PROMPTS

## 2021-12-27 ASSESSMENT — MIFFLIN-ST. JEOR: SCORE: 1968.59

## 2021-12-27 NOTE — PLAN OF CARE
Problem: Adult Inpatient Plan of Care  Goal: Plan of Care Review  Outcome: Improving  Flowsheets (Taken 12/26/2021 1800)  Plan of Care Reviewed With: patient     Problem: Adult Inpatient Plan of Care  Goal: Patient-Specific Goal (Individualized)  Outcome: Improving   Patient continues to be doing well, he is alert and oriented x 4, and continues to be independent of cares and all Adls. Patient is calm, cooperative, pleasant, present at the milieu, and social. Patient as usual ambulates/paces up/down the dalal way, his affect is full range. Patient denies SI/SIB/HI/AH/VH, he denies depression and anxiety, and no delusions endorsed. Patient denies pain as well, had a good appetite medication compliant, no stated side effects. 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 No other concerns, will continue to monitor.

## 2021-12-27 NOTE — PLAN OF CARE
"Problem: General Rehab Plan of Care  Goal: Occupational Therapy Goals  Description: The patient and/or their representative will achieve their patient-specific goals related to the plan of care.  The patient-specific goals include:    Pt will demonstrate consistent engagement in OT group activities that support recovery as evidenced by participating in >1 scheduled OT group/day for 5 days.     Pt attended 3 out of 3 OT groups offered. Pt actively participated in occupational therapy clinic with 3-5 patients total x45 minutes. Pt was able to ask for assistance as needed, and independently initiate a structured creative expression task. Pt demonstrated good focus, planning, and attention to detail. Social with peers and writer. Future-oriented in discussing upcoming discharge, and shared that he likes the [facility] that he will be living in. Pt actively participated in a structured occupational therapy group with 2-3 patients total x50 minutes with a focus on social engagement and movement. Pt had the option to choose a self-reflection question or a gentle exercise/stretching movement, and pt primarily chose to answer questions. Pt also followed along with all guided movements. Pt identified his son as the most important thing in his life. When prompted to discuss how people would describe him, he stated \"kind and gentle, even though I don't look like it because of my tattoos.\" Pt actively participated in a structured occupational therapy group of 1-3 patients total x50 minutes with a focus on social and leisure engagement via a group game. Pt was able to follow multi-step directions and was attentive to task parameters throughout. Pt demonstrated strategic planning and good task organization. Focused and engaged throughout full duration of group. He continues to be calm, pleasant, cooperative, and engaged throughout all groups. Positive group participant. Bright affect.            "

## 2021-12-27 NOTE — PLAN OF CARE
Problem: Sleep Disturbance  Goal: Adequate Sleep/Rest  Outcome: No Change     Patient was observed sleeping at start of shift and during 15 min checks. No signs of pain nor discomfort. Slept 7 hours tonight. No PRN medications given.

## 2021-12-27 NOTE — PLAN OF CARE
BEHAVIORAL TEAM DISCUSSION    Participants: Dr. Isaacs, resident Henry Cruz, RN Sierra Ramires, CTC Siobhan Irving  Progress: pt appears to be at baseline  Anticipated length of stay: discharging tomorrow  Continued Stay Criteria/Rationale: pt is committed with Cardoza order  Medical/Physical: none  Precautions:   Behavioral Orders   Procedures     Code 1 - Restrict to Unit     Code 3     Patient is allowed to go to the [place] accompanied by two staff members as frequently as every other day per staff availability     Routine Programming     As clinically indicated     Status 15     Every 15 minutes.     Plan: provide a psychological assessment and manage medications per psychiatry, CTC to coordinate discharge plan with REX Heck and CHRISTIAN Barragan both from [facility]  Rationale for change in precautions or plan: no change

## 2021-12-27 NOTE — PROGRESS NOTES
"    ----------------------------------------------------------------------------------------------------------  [facility], [place]   Psychiatric Progress Note  Hospital Day #118    Identifier: Anderson Perez is a 41 year old male with a past psychiatric history of bipolar disorder with psychosis vs schizoaffective disorder bipolar type, and intellectual disability admitted from the ER on 08/31/2021 due to concern for out of control behaviors, aggression and psychosis. Patient is psychiatrically stable, medications optimized, awaiting placement.     Interim History:   The patient's care was discussed with the treatment team and chart notes were reviewed.    Vitals: VSS  Sleep: 7 hours (12/27/21 0600)  Scheduled medications: Taking all scheduled medications as prescribed  PRN medications: No psychotropic prns received, only nicotine x10    Interim events:   None    Overnight Staff Report:  - Patient is calm, cooperative, pleasant, present at the milieu, and social.  - Baseline pacing through hallway  - No acute events  - No change from previous  -Discharge scheduled for tomorrow a.m., weather permitting given far travel distance     Subjective:     Patient Interview:  Antonio reports that he is doing \"good\" and looking forward to discharge tomorrow.  He reports that when he leaves the hospital, he is going to \"take my meds\" to not have to come back to the hospital. He has no current safety concerns. If he struggles mental health-wise, he plans to come back to the ED or call his child's mother. No physical concerns, restless legs, or other medication side effects.      Review of systems:   ROS negative except as stated above.    Objective:   /83 (BP Location: Right arm)   Pulse 85   Temp 97.6  F (36.4  C)   Resp 16   Ht 1.829 m (6')   Wt 102.1 kg (225 lb)   SpO2 99%   BMI 30.52 kg/m    Weight is 225 lbs 0 oz  Body mass index is 30.52 kg/m .     Weight on admission was 182 lbs " "4.8 oz  Body mass index is 24.72 kg/m .    Mental Status Examination:    Oriented to:  Grossly oriented.  General: Awake and alert, walking in hallway, wearing red sweatshirt, walking in dalal during interview.  Appearance:  appears older than stated age, Grooming is adequate, Dressed in hospital attire and Hair is short. Various tattoos on neck.  Behavior: calm, pleasant, cooperative  Eye Contact: good  Psychomotor: No abnormal motor symptoms appreciated. Seems restless with constant pacing but denies restlessness. No catatonia present.  Speech:  appropriate volume/tone, spontaneous and with good articulation  Language: Fluent in English with appropriate syntax and vocabulary.  Mood:  \"Good\"  Affect:  Appropriate, appears euthymic and hopeful, full range affect  Thought Process:  coherent, linear, logical and goal directed  Thought Content: No SI/HI/AH/VH; no delusions noted  Associations:  No loosening of associations appreciated  Insight:  Good, due to acknowledging why he needs the medications  Judgment: Fair, cooperative with medication and care, needs reinforcement that he should continue to take the medications after his Cardoza        Allergies:     Allergies   Allergen Reactions     Bee Venom Anaphylaxis     Clindamycin Hives     Morphine Hives        Labs:   None new.     Assessment    Primary psychiatric diagnosis:   Psychosis, Unspecified (Schizoaffective disorder vs Bipolar Affective Disorder with Psychosis)    Secondary psychiatric diagnoses of concern this admission:   Intellectual disability (IQ 63)    Diagnostic Impression: Anderson Perez is a 40 year old male with a past psychiatric history of bipolar disorder with psychosis vs schizoaffective disorder bipolar type, and intellectual disability admitted from the  ER on 08/31/2021 due to concern for out of control behaviors, aggression and psychosis in the context of medication refusal despite Cardoza. Patient was recently released from [facility] to a " group home. Presentation to the ED via ambulance after police contacted and provisional discharge rescinded. Group home noted escalating aggressive behavior, delusional thinking, and medication refusal despite Cardoza. PT refusal to discuss problems and largely denied all past history, medications, or any need for care. His reported symptoms of delusions, psychosis, and erratic/aggressive behavior are consistent with prior presentations of psychosis vs schizoaffective disorder bipolar type.    Of note, patient had previous diagnosis of Antisocial Personality Disorder. As psychosis cleared during hospitalization, aggressive and antagonistic behavior resolved completely. It seems questionable that he has ASPD, and rather that previous behaviors were a result of psychosis and/or substance intoxication.    Psychiatric Hospital course: Anderson Perez was admitted to [facility] following revocation of provisional discharge and on a court hold. His PTA olanzapine 20mg PO QDaily was resumed, as pt had been refusing at group home; per conversation with guardian, had planned to start on GRIGGS, but does not think that this was actually started, as patient was declining medications, which prompted admission in setting of decompensation. Pt was converted to ODT formulation given concerns for cheeking medications; while initially resistant, pt was agreeable with taking ODT olanzapine as long as it was given with ginger ale, which was accommodated. Olanzapine dose increased to 40mg on 9/21 given limited efficacy at PTA dose. He was also started on a brief trail of propanolol for akathisia, but this was discontinued due to lack of improvement in pacing and patient attesting that a high level of physical activity was his baseline. Over time, he's become more sociable with peers and staff on the unit, and is spending more time sitting in the milieu as opposed to pacing the hallways. Patient has since been stable on current  medications for some time. On 12/17 he was accepted to [facility]. On 12/20 Antonio reported a history of anxiety and current symptoms while being on the inpatient unit. His anxiety symptoms improved on 12/22 after hearing he would discharge on 12/28.     Medical course: Anderson Perez was physically examined by the ED prior to being transferred to the unit and was found to be medically stable and appropriate for admission. He had foot blisters from walking too much, which have now resolved. Noted weight gain from from 182 lbs at admission to 224 lbs on 12/16. Discussed adjunct medications to decrease further weight gain. Started metformin  mg on 12/17. Increased to 1000 mg on 12/22.     Plan   Principal Diagnosis:   # Psychosis, Unspecified (Schizoaffective disorder vs Bipolar Affective Disorder with psychotic features), severe, recurrent episode    Secondary psychiatric diagnoses of concern this admission:   # Intellectual disability    Psychotropic Medications:  Modify:  No changes    Continue Scheduled:   -Olanzapine ODT 40mg qPM    Continue PRN:  -Acetaminophen 650 mg Q6H PRN for pain and fever  -Hydroxyzine 25-50 mg Q6H PRN for anxiety  -Haldol 5mg prn q6h for agitation  -Haldol 5mg + diphenhydramine 50mg + lorazepam 2mg prn for severe agitation/psychosis  -Maalox 30 ml Q4H PRN for indigestion  -Miralax prn for constipation  -Nicotine gum 2 mg every hour as needed    Additional Plans:  -Patient will be treated in therapeutic milieu with appropriate individual and group therapies as described.  -Patient would be able to go to the [email or web address] as the staff on [facility] find appropriate, accompanied by a  and a [facility] staff member, preferably during sunlight hours.  -Weekly COVID test    Medical diagnoses to be addressed this admission:    # Chronic Hep C  NTD    #Dry mouth  -Biotene started 10/25    #Foot blister/lesion, resolved  -Bacitracin ointment prn    #Weight gain  -  Metformin XR 1000 mg with supper started on 12/22. Can increase up to 2 g daily if tolerated in 500 mg increments every week  - CMP, Cholesterol within normal limits    Consults:  -Nutrition Consult: Extended hospital stay, added snacks and double portions, took off ensure    Legal Status: Cardoza  Committed - recommitted 11/1   Legal guardian    Safety Assessment:   Behavioral Orders   Procedures     Code 1 - Restrict to Unit     Code 3     Patient is allowed to go to the [place] accompanied by two staff members as frequently as every other day per staff availability     Routine Programming     As clinically indicated     Status 15     Every 15 minutes.     SIO: No, no recent need for SIO, seclusion, or restraints.    Disposition: Patient is stable, medications optimized. Accepted at [facility] in [place], [place]. Will be transported there on 12/28 AM. Medications to be sent to [facility] in addition to 30 day supply from here.    This patient was seen separately and discussed with my attending physician, Dr. Shital Saez MD, PhD  PGY-2 Psychiatry Resident    This patient has been seen and evaluated by me, Glenn Isaacs.  I have discussed this patient with the psychiatry resident and I agree with the findings and plan in this note.    I have reviewed today's vital signs, medications, labs and imaging.     Glenn Adhikari MD on 12/27/2021 at 8:28 PM

## 2021-12-27 NOTE — PLAN OF CARE
Problem: Behavioral Disturbance  Goal: Behavioral Disturbance  Description: Signs and symptoms of listed problems will be absent or manageable by discharge or transition of care.  Outcome: Adequate for Discharge  Flowsheets  Taken 12/27/2021 1231 by Sierra Ramires RN  Behavioral Disturbance Present: insight  Taken 12/22/2021 1421 by Cuong Otto RN  Behavioral Disturbance Assessed: all     Pt visible in the milieu for the majority of the day. He is calm, pleasant and social with others. This writer reviewed discharge paperwork and medications with pt. Pt verbalized understanding of information. He is looking forward to discharge. Denies SI/SIB, as well as AH/VH. Does not appear responding to internal stimuli. Aside from nicorette gum prn, no request or indication for prn medications. Will continue to assess and offer support.     COVID swab performed and sent to lab.

## 2021-12-27 NOTE — DISCHARGE SUMMARY
"    ----------------------------------------------------------------------------------------------------------  [facility], [place]   Discharge Summary  Hospital Day #118  Anderson Perez MRN# [id number]   Age: 41 year old YOB: 1980   Date of Admission:  8/30/2021  Date of Discharge:  12/28/2021  9:45 AM  Admitting Physician:  Mike Bobo MD  Discharge Physician:  Glenn Isaacs MD       Event Leading to Hospitalization:   \"Anderson Perez is a 40 year old male with a past psychiatric history of bipolar disorder with psychosis vs schizoaffective disorder bipolar type, antisocial personality disorder, and intellectual disability admitted from the  ER on 08/31/2021 due to concern for out of control behaviors, aggression and psychosis in the context of medication refusal despite Cardoza.\"    See Admission note by Mike Bobo MD on 8/30/2021 for additional details.      Objective:   B/P: 138/83, T: 97.6, P: 85, R: 16    Psychiatric Examination:  Appearance:  awake, alert, appeared older than stated age, well groomed, cooperative, no apparent distress and mildly obese   Muscle Strength and Tone: normal and walking down the dalal  Gait and Station: Normal  Behavior (Psychomotor):  no evidence of tardive dyskinesia, dystonia, or tics  Eye Contact:  good  Speech:  clear, coherent and normal prosody  Mood:  \"excited and ready to go\"  Affect:  appropriate and in normal range  Attitude:  cooperative  Thought Process:  logical, linear and goal oriented  Thought Content:  no evidence of suicidal ideation or homicidal ideation, no evidence of psychotic thought, no auditory hallucinations present and no visual hallucinations present  Associations:  no loose associations  Insight:  Good, due to acknowledging why he needs the medications and why he was hospitalized  Judgment:  intact, cooperative with medication and care, needs reinforcement that he should continue to " take the medications after his Cardoza  Oriented to:  time, person, and place  Attention Span and Concentration:  intact  Recent and Remote Memory:  intact  Language: Fluent in English with appropriate syntax and vocabulary.  Fund of Knowledge: low-normal     Hospital Course:   Diagnostic Impression:                Anderson Perez is a 40 year old male with a past psychiatric history of bipolar disorder with psychosis vs schizoaffective disorder bipolar type, and intellectual disability admitted from the ER on 08/31/2021 due to concern for out of control behaviors, aggression and psychosis in the context of medication refusal despite Cardoza. Patient was recently [facility] from [facility] to a group home. He presented to the ED via ambulance after police were contacted and his provisional discharge was rescinded. Group home staff noted his escalating aggressive behavior, delusional thinking, and medication refusal despite Cardoza. PT refusal to discuss problems and largely denied all past history, medications, or any need for care. His reported symptoms of delusions, psychosis, and erratic/aggressive behavior are consistent with prior presentations of psychosis vs schizoaffective disorder bipolar type.     Of note, patient had previous diagnosis of Antisocial Personality Disorder. As psychosis cleared during hospitalization, aggressive and antagonistic behavior resolved completely. It seems questionable that he has ASPD, and rather that previous behaviors were a result of psychosis and/or substance intoxication.    Psychiatric Course:  Anderson Perez was admitted to [facility] following revocation of provisional discharge and on a court hold. His PTA olanzapine 20mg PO QDay was resumed, as pt had been refusing at group home; per conversation with guardian, had planned to start on GRIGGS, but does not think that this was actually started, as patient was declining medications, which prompted admission in setting of  decompensation. Pt was converted to ODT formulation given concerns for cheeking medications; while initially resistant, pt was agreeable with taking ODT olanzapine as long as it was given with ginger ale, which was accommodated. Olanzapine dose increased to 40 mg after 3 weeks given limited efficacy at PTA dose. He was also started on a brief trial of propanolol for akathisia, but this was discontinued due to lack of improvement in pacing and patient attesting that a high level of physical activity was his baseline. Over time, Antonio has become more sociable with peers and staff on the unit, and is spending more time sitting in the milieu as opposed to pacing the hallways. Patient was stable on current medications for a number of weeks prior to being accepted to [facility]. After learning of discharge plans, Antonio's reported anxiety improved substantially. Due to prolonged hospitalization, Antonio was allowed therapeutic visits outside with  and staff, daily as staffing allowed. On day of discharge, Antonio was excited to leave the hospital. He reported feeling much better. He understood he should keep taking his medication if he wants to stay out of the hospital again. He is looking forward to being closer to his son in [place], [place].    Medical Course:  Anderson Perez was physically examined by the ED and found to be medically stable and appropriate for admission. Initially, he had foot blisters from walking too much. These resolved over time. Antonio also experienced notable weight gain from 182 lbs at admission to 224 lbs on 12/16 (3.5 months). Discussed adjunct medications to decrease further weight gain, and Metformin was initiated.    Consults:   -Nutrition Consult: Extended hospital stay, added snacks and double portions, took off Ensure    Risk Assessment:   Anderson Perez has notable risk factors for self-harm, including age, psychosis and substance abuse. However, risk is mitigated by  commitment to family, sobriety, ability to volunteer a safety plan and history of seeking help when needed. Additional steps taken to minimize risk include: counseling on medication adherence, discharge planning with CM and group home staff. Therefore, based on all available evidence including the factors cited above, Anderson Perez does not appear to be an imminent danger to self or others and is appropriate for outpatient level of care. However, if patient uses substances or is non-adherent with medication, their risk of decompensation and SI/HI will be elevated. This was discussed with the patient.    This document serves as a transfer of care to Anderson Perez's outpatient providers.     Diagnoses:   # Psychosis, Unspecified (Schizoaffective disorder vs Bipolar Affective Disorder with psychotic features), severe, recurrent episode  # Intellectual disability  # Chronic Hepatitis C  # Weight Gain, medication associated     Discharge Plan:   Patient is being discharged to group home with the following medications and appointments as detailed below:    Medications:  Added/Changed:  - Olanzapine 40 mg tablet  - Metformin, increased dose up to 2 g over next two weeks  Continued:  - Vitamin D3  - Hydroxyzine 25-50 mg PRN for anxiety  - calcium carbonate PRN  Discontinued:  - paliperidone GRIGGS       Review of your medicines      START taking      Dose / Directions   hydrOXYzine 25 MG tablet  Commonly known as: ATARAX      Dose: 25-50 mg  Take 1-2 tablets (25-50 mg) by mouth every 4 hours as needed for anxiety  Quantity: 15 tablet  Refills: 0     metFORMIN 500 MG 24 hr tablet  Commonly known as: GLUCOPHAGE-XR  Used for: Bipolar disorder, in full remission, most recent episode manic (H)      Start taking on: December 22, 2021  Take 2 tablets (1,000 mg) by mouth daily (with dinner) for 7 days, THEN 3 tablets (1,500 mg) daily (with dinner) for 7 days, THEN 4 tablets (2,000 mg) daily (with dinner).  Quantity: 155  tablet  Refills: 0     nicotine 2 MG gum  Commonly known as: NICORETTE      Dose: 2 mg  Place 1 each (2 mg) inside cheek every hour as needed for smoking cessation  Quantity: 40 each  Refills: 0     OLANZapine zydis 20 MG ODT  Commonly known as: zyPREXA  Used for: Bipolar disorder, in full remission, most recent episode manic (H)  Replaces: OLANZapine 20 MG tablet      Dose: 40 mg  Take 2 tablets (40 mg) by mouth At Bedtime  Quantity: 30 tablet  Refills: 0        CONTINUE these medicines which may have CHANGED, or have new prescriptions. If we are uncertain of the size of tablets/capsules you have at home, strength may be listed as something that might have changed.      Dose / Directions   Vitamin D3 25 mcg (1000 units) tablet  Commonly known as: CHOLECALCIFEROL  This may have changed: when to take this  Used for: Bipolar disorder, in full remission, most recent episode manic (H)      Dose: 25 mcg  Take 1 tablet (25 mcg) by mouth At Bedtime  Quantity: 30 tablet  Refills: 0        CONTINUE these medicines which have NOT CHANGED      Dose / Directions   calcium carbonate 1500 (600 Ca) MG tablet  Commonly known as: OS-RUBEN      Dose: 600 mg  Take 600 mg by mouth every evening  Refills: 0     EPINEPHrine 0.3 MG/0.3ML injection 2-pack  Commonly known as: ANY BX GENERIC EQUIV  Used for: Allergy to honey bee venom      Dose: 0.3 mg  Inject 0.3 mLs (0.3 mg) into the muscle as needed for anaphylaxis (he must be seen in ER if he uses epipen)  Quantity: 1 each  Refills: 1        STOP taking    OLANZapine 20 MG tablet  Commonly known as: zyPREXA  Replaced by: OLANZapine zydis 20 MG ODT        paliperidone 234 MG/1.5ML Jamilah  Commonly known as: INVEGA SUSTENNA        paliperidone ER 6 MG 24 hr tablet  Commonly known as: INVEGA              Where to get your medicines      These medications were sent to [facility] [place], [place] - [address]  [address] [address]    Phone:  [phone]     hydrOXYzine 25 MG tablet    metFORMIN 500 MG 24 hr tablet    nicotine 2 MG gum    OLANZapine zydis 20 MG ODT    Vitamin D3 25 mcg (1000 units) tablet       Psychiatric Appointments: Tonie BUSBY CNP  @ 8:45 This is a video appointment      Pt seen and discussed with my attending, MD Sheldon Parry MD  PGY-1 Psychiatry Resident Physician      Attestation:  The patient has been seen and evaluated by me, Glenn Isaacs . I have examined the patient today and reviewed the discharge plan with the resident. I agree with the final assessment and plan, as noted in the discharge summary. I have reviewed today's vital signs, medications, labs and imaging.    Total time discharge plannin minutes    Glenn Adhikari MD on 2021 at 7:05 PM       Appendix A: All Labs This Admission:     Results for orders placed or performed during the hospital encounter of 21   Drug abuse screen 1 urine (ED)     Status: Normal   Result Value Ref Range    Amphetamines Urine Screen Negative Screen Negative    Barbiturates Urine Screen Negative Screen Negative    Benzodiazepines Urine Screen Negative Screen Negative    Cannabinoids Urine Screen Negative Screen Negative    Cocaine Urine Screen Negative Screen Negative    Opiates Urine Screen Negative Screen Negative   Asymptomatic COVID-19 Virus (Coronavirus) by PCR Nose     Status: Normal    Specimen: Nose; Swab   Result Value Ref Range    SARS CoV2 PCR Negative Negative    Narrative    Testing was performed using the tamela  SARS-CoV-2 & Influenza A/B Assay on the tamela  [place]  System.  This test should be ordered for the detection of SARS-COV-2 in individuals who meet SARS-CoV-2 clinical and/or epidemiological criteria. Test performance is unknown in asymptomatic patients.  This test is for in vitro diagnostic use under the FDA EUA for laboratories certified under CLIA to perform moderate and/or high  complexity testing. This test has not been FDA cleared or approved.  A negative test does not rule out the presence of PCR inhibitors in the specimen or target RNA in concentration below the limit of detection for the assay. The possibility of a false negative should be considered if the patient's recent exposure or clinical presentation suggests COVID-19.  [facility] are certified under the Clinical Laboratory Improvement Amendments of 1988 (CLIA-88) as qualified to perform moderate and/or high complexity laboratory testing.   Asymptomatic COVID-19 Virus (Coronavirus) by PCR Nasopharyngeal     Status: Normal    Specimen: Nasopharyngeal; Swab   Result Value Ref Range    SARS CoV2 PCR Negative Negative    Narrative    Testing was performed using the tamela  SARS-CoV-2 & Influenza A/B Assay on the tamela  [place]  System.  This test should be ordered for the detection of SARS-COV-2 in individuals who meet SARS-CoV-2 clinical and/or epidemiological criteria. Test performance is unknown in asymptomatic patients.  This test is for in vitro diagnostic use under the FDA EUA for laboratories certified under CLIA to perform moderate and/or high complexity testing. This test has not been FDA cleared or approved.  A negative test does not rule out the presence of PCR inhibitors in the specimen or target RNA in concentration below the limit of detection for the assay. The possibility of a false negative should be considered if the patient's recent exposure or clinical presentation suggests COVID-19.  [facility] are certified under the Clinical Laboratory Improvement Amendments of 1988 (CLIA-88) as qualified to perform moderate and/or high complexity laboratory testing.   Asymptomatic COVID-19 Virus (Coronavirus) by PCR Nasopharyngeal     Status: Normal    Specimen: Nasopharyngeal; Swab   Result Value Ref Range    SARS CoV2 PCR Negative Negative    Narrative    Testing was performed using the  tamela  SARS-CoV-2 & Influenza A/B Assay on the tamela  [place]  System.  This test should be ordered for the detection of SARS-COV-2 in individuals who meet SARS-CoV-2 clinical and/or epidemiological criteria. Test performance is unknown in asymptomatic patients.  This test is for in vitro diagnostic use under the FDA EUA for laboratories certified under CLIA to perform moderate and/or high complexity testing. This test has not been FDA cleared or approved.  A negative test does not rule out the presence of PCR inhibitors in the specimen or target RNA in concentration below the limit of detection for the assay. The possibility of a false negative should be considered if the patient's recent exposure or clinical presentation suggests COVID-19.  [facility] are certified under the Clinical Laboratory Improvement Amendments of 1988 (CLIA-88) as qualified to perform moderate and/or high complexity laboratory testing.   Comprehensive metabolic panel     Status: Abnormal   Result Value Ref Range    Sodium 139 133 - 144 mmol/L    Potassium 3.9 3.4 - 5.3 mmol/L    Chloride 107 94 - 109 mmol/L    Carbon Dioxide (CO2) 28 20 - 32 mmol/L    Anion Gap 4 3 - 14 mmol/L    Urea Nitrogen 22 7 - 30 mg/dL    Creatinine 0.88 0.66 - 1.25 mg/dL    Calcium 9.1 8.5 - 10.1 mg/dL    Glucose 115 (H) 70 - 99 mg/dL    Alkaline Phosphatase 102 40 - 150 U/L    AST 32 0 - 45 U/L    ALT 54 0 - 70 U/L    Protein Total 7.4 6.8 - 8.8 g/dL    Albumin 3.7 3.4 - 5.0 g/dL    Bilirubin Total 0.6 0.2 - 1.3 mg/dL    GFR Estimate >90 >60 mL/min/1.73m2   Cholesterol HDL and Non HDL Panel     Status: Normal   Result Value Ref Range    Cholesterol 166 <200 mg/dL    Direct Measure HDL 47 >=40 mg/dL    Non HDL Cholesterol 119 <130 mg/dL   Asymptomatic COVID-19 Virus (Coronavirus) by PCR Nasopharyngeal     Status: Normal    Specimen: Nasopharyngeal; Swab   Result Value Ref Range    SARS CoV2 PCR Negative Negative    Narrative    Testing was performed  using the tamela  SARS-CoV-2 & Influenza A/B Assay on the tamela  [facility]  System.  This test should be ordered for the detection of SARS-COV-2 in individuals who meet SARS-CoV-2 clinical and/or epidemiological criteria. Test performance is unknown in asymptomatic patients.  This test is for in vitro diagnostic use under the FDA EUA for laboratories certified under CLIA to perform moderate and/or high complexity testing. This test has not been FDA cleared or approved.  A negative test does not rule out the presence of PCR inhibitors in the specimen or target RNA in concentration below the limit of detection for the assay. The possibility of a false negative should be considered if the patient's recent exposure or clinical presentation suggests COVID-19.  [facility] are certified under the Clinical Laboratory Improvement Amendments of 1988 (CLIA-88) as qualified to perform moderate and/or high complexity laboratory testing.   Urine Drugs of Abuse Screen     Status: Normal    Narrative    The following orders were created for panel order Urine Drugs of Abuse Screen.  Procedure                               Abnormality         Status                     ---------                               -----------         ------                     Drug abuse screen 1 urin...[[id number]]  Normal              Final result                 Please view results for these tests on the individual orders.

## 2021-12-27 NOTE — PLAN OF CARE
Assessment/Intervention/Current Symptoms and Care Coordination    Attended team meeting and reviewed chart notes.    Writer left VM with CHRISTIAN Barragan regarding pt's discharge tomorrow and also emailed the group home and [facility] to confirm transportation is on for tomorrow. It is suppose to snow tomorrow.    Pt signed PD.      Discharge Plan or Goal  To group home tomorrow      Barriers to Discharge   Inclement weather      Referral Status  completed      Legal Status  Committed with Cardoza order

## 2021-12-28 VITALS
HEART RATE: 88 BPM | SYSTOLIC BLOOD PRESSURE: 120 MMHG | OXYGEN SATURATION: 98 % | BODY MASS INDEX: 30.48 KG/M2 | DIASTOLIC BLOOD PRESSURE: 84 MMHG | RESPIRATION RATE: 12 BRPM | WEIGHT: 225 LBS | HEIGHT: 72 IN | TEMPERATURE: 97.5 F

## 2021-12-28 LAB — SARS-COV-2 RNA RESP QL NAA+PROBE: NEGATIVE

## 2021-12-28 PROCEDURE — 250N000013 HC RX MED GY IP 250 OP 250 PS 637: Performed by: STUDENT IN AN ORGANIZED HEALTH CARE EDUCATION/TRAINING PROGRAM

## 2021-12-28 RX ADMIN — NICOTINE POLACRILEX 2 MG: 2 GUM, CHEWING ORAL at 08:57

## 2021-12-28 ASSESSMENT — ACTIVITIES OF DAILY LIVING (ADL)
DRESS: SCRUBS (BEHAVIORAL HEALTH);INDEPENDENT
ORAL_HYGIENE: INDEPENDENT
HYGIENE/GROOMING: INDEPENDENT
LAUNDRY: WITH SUPERVISION

## 2021-12-28 NOTE — PLAN OF CARE
Problem: Adult Inpatient Plan of Care  Goal: Readiness for Transition of Care  Outcome: Adequate for Discharge     Problem: Behavioral Health Plan of Care  Goal: Adheres to Safety Considerations for Self and Others  Outcome: Adequate for Discharge     Problem: Behavior Regulation Impairment (Manic or Hypomanic Signs/Symptoms)  Goal: Improved Impulse Control (Manic/Hypomanic Signs/Symptoms)  Outcome: Adequate for Discharge     Pt discharged from [facility]. Pt is in no acute distress. He denies the presence of SI/SIB, as well as AH/VH.  Denies access to firearms. Pt in possession of all belongings upon discharge, as well as discharge medications. Pt walked to discharge vehicle by staff.

## 2021-12-28 NOTE — PLAN OF CARE
Pt appeared to sleep 7 hours. No PRNs given or requested. No medical or behavioral events this shift.      Problem: Sleep Disturbance  Goal: Adequate Sleep/Rest  Outcome: Adequate for Discharge

## 2021-12-28 NOTE — PLAN OF CARE
Assessment/Intervention/Current Symptoms and Care Coordination    Attended team meeting and reviewed chart notes.      Pt will transfer to group [place] and be transported by [facility] CAD CM and CM      Discharge Plan or Goal  To group homw      Barriers to Discharge   none      Referral Status  completed    Legal Status  Pt provisionally discharged- PD and change of status form faxed to [facility]

## 2021-12-28 NOTE — PLAN OF CARE
Problem: Adult Inpatient Plan of Care  Goal: Plan of Care Review  Outcome: Improving  Flowsheets (Taken 12/27/2021 1700)  Plan of Care Reviewed With: patient     Problem: Adult Inpatient Plan of Care  Goal: Patient-Specific Goal (Individualized)  Outcome: Adequate for Discharge   Patient doing well during this shift, patient alert and oriented x 4, Patient visible at the lounge, active cooperative, calm. Patient's affect is good, he is aware that he is discharging tomorrow at 0900 o'clock, he is anxious from that but at the same time happy. Patient attended the OT group and was active. Patient denies depression, no aggression,  SI/SIB/HI/and AVH. Patient ate well, he is medication compliant and denies side effects. Will continue to monitor.

## 2022-01-25 ENCOUNTER — DOCUMENTATION ONLY (OUTPATIENT)
Dept: OTHER | Facility: CLINIC | Age: 42
End: 2022-01-25